# Patient Record
Sex: MALE | Race: WHITE | Employment: OTHER | URBAN - METROPOLITAN AREA
[De-identification: names, ages, dates, MRNs, and addresses within clinical notes are randomized per-mention and may not be internally consistent; named-entity substitution may affect disease eponyms.]

---

## 2023-03-20 LAB
PROSTATE SPECIFIC AG (NG/ML) IN SER/PLAS: 1.2 NG/ML (ref 0–4)
PROSTATE SPECIFIC AG FREE (NG/ML) IN SER/PLAS: 0.2 NG/ML
PROSTATE SPECIFIC AG FREE/PROSTATE SPECIFIC AG TOTAL IN SER/PLAS: 17 %

## 2023-04-10 DIAGNOSIS — K25.9 ESOPHAGOGASTRIC ULCER, UNSPECIFIED ULCER CHRONICITY: ICD-10-CM

## 2023-04-10 RX ORDER — FUROSEMIDE 20 MG/1
1 TABLET ORAL DAILY
COMMUNITY
Start: 2021-11-15 | End: 2023-11-16 | Stop reason: SDUPTHER

## 2023-04-10 RX ORDER — CHOLECALCIFEROL (VITAMIN D3) 25 MCG
1 TABLET ORAL DAILY
COMMUNITY
Start: 2021-11-15

## 2023-04-10 RX ORDER — PANTOPRAZOLE SODIUM 40 MG/1
40 TABLET, DELAYED RELEASE ORAL DAILY
Qty: 30 TABLET | Refills: 11 | Status: SHIPPED | OUTPATIENT
Start: 2023-04-10 | End: 2024-03-15

## 2023-04-10 RX ORDER — BICALUTAMIDE 50 MG/1
1 TABLET, FILM COATED ORAL DAILY
COMMUNITY
Start: 2020-07-01

## 2023-04-10 RX ORDER — SPIRONOLACTONE 50 MG/1
1 TABLET, FILM COATED ORAL DAILY
COMMUNITY
Start: 2021-12-20 | End: 2023-06-16 | Stop reason: SDUPTHER

## 2023-04-10 RX ORDER — CALCIUM CARBONATE 200(500)MG
1 TABLET,CHEWABLE ORAL
COMMUNITY
Start: 2021-12-06 | End: 2024-03-11 | Stop reason: ALTCHOICE

## 2023-04-10 RX ORDER — NADOLOL 20 MG/1
10 TABLET ORAL DAILY
COMMUNITY
End: 2023-11-16 | Stop reason: SDUPTHER

## 2023-04-10 RX ORDER — MINERAL OIL
180 ENEMA (ML) RECTAL DAILY
COMMUNITY
Start: 2022-05-17

## 2023-04-10 RX ORDER — PANTOPRAZOLE SODIUM 40 MG/1
1 TABLET, DELAYED RELEASE ORAL DAILY
COMMUNITY
Start: 2021-11-15 | End: 2023-04-10 | Stop reason: SDUPTHER

## 2023-05-10 ENCOUNTER — TELEPHONE (OUTPATIENT)
Dept: PRIMARY CARE | Facility: CLINIC | Age: 78
End: 2023-05-10
Payer: MEDICARE

## 2023-05-10 NOTE — TELEPHONE ENCOUNTER
Pts wife called and stated that the medication lactulose's  went out of business.  Asking what brand name you suggest to go with next.

## 2023-05-22 ENCOUNTER — LAB (OUTPATIENT)
Dept: LAB | Facility: LAB | Age: 78
End: 2023-05-22
Payer: MEDICARE

## 2023-05-22 ENCOUNTER — OFFICE VISIT (OUTPATIENT)
Dept: PRIMARY CARE | Facility: CLINIC | Age: 78
End: 2023-05-22
Payer: MEDICARE

## 2023-05-22 VITALS
BODY MASS INDEX: 29.98 KG/M2 | WEIGHT: 191 LBS | OXYGEN SATURATION: 98 % | TEMPERATURE: 97.3 F | HEIGHT: 67 IN | HEART RATE: 50 BPM | DIASTOLIC BLOOD PRESSURE: 68 MMHG | SYSTOLIC BLOOD PRESSURE: 132 MMHG

## 2023-05-22 DIAGNOSIS — Z79.899 MEDICATION MANAGEMENT: ICD-10-CM

## 2023-05-22 DIAGNOSIS — J30.9 ALLERGIC RHINITIS, UNSPECIFIED SEASONALITY, UNSPECIFIED TRIGGER: ICD-10-CM

## 2023-05-22 DIAGNOSIS — R01.1 MURMUR: ICD-10-CM

## 2023-05-22 DIAGNOSIS — K21.9 GASTROESOPHAGEAL REFLUX DISEASE, UNSPECIFIED WHETHER ESOPHAGITIS PRESENT: ICD-10-CM

## 2023-05-22 DIAGNOSIS — K74.69 OTHER CIRRHOSIS OF LIVER (MULTI): ICD-10-CM

## 2023-05-22 DIAGNOSIS — R53.83 OTHER FATIGUE: ICD-10-CM

## 2023-05-22 DIAGNOSIS — Z00.00 WELL ADULT EXAM: Primary | ICD-10-CM

## 2023-05-22 LAB
ALANINE AMINOTRANSFERASE (SGPT) (U/L) IN SER/PLAS: 14 U/L (ref 10–52)
ALBUMIN (G/DL) IN SER/PLAS: 3.8 G/DL (ref 3.4–5)
ALKALINE PHOSPHATASE (U/L) IN SER/PLAS: 128 U/L (ref 33–136)
AMMONIA (UMOL/L) IN PLASMA: 52 UMOL/L
ANION GAP IN SER/PLAS: 13 MMOL/L (ref 10–20)
ASPARTATE AMINOTRANSFERASE (SGOT) (U/L) IN SER/PLAS: 26 U/L (ref 9–39)
BASOPHILS (10*3/UL) IN BLOOD BY AUTOMATED COUNT: 0.04 X10E9/L (ref 0–0.1)
BASOPHILS/100 LEUKOCYTES IN BLOOD BY AUTOMATED COUNT: 0.6 % (ref 0–2)
BILIRUBIN TOTAL (MG/DL) IN SER/PLAS: 1.7 MG/DL (ref 0–1.2)
CALCIDIOL (25 OH VITAMIN D3) (NG/ML) IN SER/PLAS: 41 NG/ML
CALCIUM (MG/DL) IN SER/PLAS: 9.4 MG/DL (ref 8.6–10.3)
CARBON DIOXIDE, TOTAL (MMOL/L) IN SER/PLAS: 24 MMOL/L (ref 21–32)
CHLORIDE (MMOL/L) IN SER/PLAS: 102 MMOL/L (ref 98–107)
CHOLESTEROL (MG/DL) IN SER/PLAS: 212 MG/DL (ref 0–199)
CHOLESTEROL IN HDL (MG/DL) IN SER/PLAS: 67.7 MG/DL
CHOLESTEROL/HDL RATIO: 3.1
COBALAMIN (VITAMIN B12) (PG/ML) IN SER/PLAS: 680 PG/ML (ref 211–911)
CREATININE (MG/DL) IN SER/PLAS: 0.95 MG/DL (ref 0.5–1.3)
EOSINOPHILS (10*3/UL) IN BLOOD BY AUTOMATED COUNT: 0.19 X10E9/L (ref 0–0.4)
EOSINOPHILS/100 LEUKOCYTES IN BLOOD BY AUTOMATED COUNT: 2.8 % (ref 0–6)
ERYTHROCYTE DISTRIBUTION WIDTH (RATIO) BY AUTOMATED COUNT: 16.7 % (ref 11.5–14.5)
ERYTHROCYTE MEAN CORPUSCULAR HEMOGLOBIN CONCENTRATION (G/DL) BY AUTOMATED: 28.4 G/DL (ref 32–36)
ERYTHROCYTE MEAN CORPUSCULAR VOLUME (FL) BY AUTOMATED COUNT: 79 FL (ref 80–100)
ERYTHROCYTES (10*6/UL) IN BLOOD BY AUTOMATED COUNT: 3.75 X10E12/L (ref 4.5–5.9)
ESTIMATED AVERAGE GLUCOSE FOR HBA1C: 97 MG/DL
GFR MALE: 83 ML/MIN/1.73M2
GLUCOSE (MG/DL) IN SER/PLAS: 127 MG/DL (ref 74–99)
HEMATOCRIT (%) IN BLOOD BY AUTOMATED COUNT: 29.6 % (ref 41–52)
HEMOGLOBIN (G/DL) IN BLOOD: 8.4 G/DL (ref 13.5–17.5)
HEMOGLOBIN A1C/HEMOGLOBIN TOTAL IN BLOOD: 5 %
IMMATURE GRANULOCYTES/100 LEUKOCYTES IN BLOOD BY AUTOMATED COUNT: 0.3 % (ref 0–0.9)
INR IN PPP BY COAGULATION ASSAY: 1.2 (ref 0.9–1.1)
IRON (UG/DL) IN SER/PLAS: 32 UG/DL (ref 35–150)
IRON BINDING CAPACITY (UG/DL) IN SER/PLAS: 411 UG/DL (ref 240–445)
IRON SATURATION (%) IN SER/PLAS: 8 % (ref 25–45)
LDL: 131 MG/DL (ref 0–99)
LEUKOCYTES (10*3/UL) IN BLOOD BY AUTOMATED COUNT: 6.7 X10E9/L (ref 4.4–11.3)
LYMPHOCYTES (10*3/UL) IN BLOOD BY AUTOMATED COUNT: 1.54 X10E9/L (ref 0.8–3)
LYMPHOCYTES/100 LEUKOCYTES IN BLOOD BY AUTOMATED COUNT: 23.1 % (ref 13–44)
MONOCYTES (10*3/UL) IN BLOOD BY AUTOMATED COUNT: 0.82 X10E9/L (ref 0.05–0.8)
MONOCYTES/100 LEUKOCYTES IN BLOOD BY AUTOMATED COUNT: 12.3 % (ref 2–10)
NEUTROPHILS (10*3/UL) IN BLOOD BY AUTOMATED COUNT: 4.07 X10E9/L (ref 1.6–5.5)
NEUTROPHILS/100 LEUKOCYTES IN BLOOD BY AUTOMATED COUNT: 60.9 % (ref 40–80)
PLATELETS (10*3/UL) IN BLOOD AUTOMATED COUNT: 89 X10E9/L (ref 150–450)
POTASSIUM (MMOL/L) IN SER/PLAS: 4.7 MMOL/L (ref 3.5–5.3)
PROTEIN TOTAL: 6.6 G/DL (ref 6.4–8.2)
PROTHROMBIN TIME (PT) IN PPP BY COAGULATION ASSAY: 14.4 SEC (ref 9.8–13.4)
SODIUM (MMOL/L) IN SER/PLAS: 134 MMOL/L (ref 136–145)
THYROTROPIN (MIU/L) IN SER/PLAS BY DETECTION LIMIT <= 0.05 MIU/L: 2.19 MIU/L (ref 0.44–3.98)
TRIGLYCERIDE (MG/DL) IN SER/PLAS: 65 MG/DL (ref 0–149)
UREA NITROGEN (MG/DL) IN SER/PLAS: 17 MG/DL (ref 6–23)
VLDL: 13 MG/DL (ref 0–40)

## 2023-05-22 PROCEDURE — 93000 ELECTROCARDIOGRAM COMPLETE: CPT | Performed by: INTERNAL MEDICINE

## 2023-05-22 PROCEDURE — 80053 COMPREHEN METABOLIC PANEL: CPT

## 2023-05-22 PROCEDURE — 83036 HEMOGLOBIN GLYCOSYLATED A1C: CPT

## 2023-05-22 PROCEDURE — 82306 VITAMIN D 25 HYDROXY: CPT

## 2023-05-22 PROCEDURE — 1160F RVW MEDS BY RX/DR IN RCRD: CPT | Performed by: INTERNAL MEDICINE

## 2023-05-22 PROCEDURE — 83550 IRON BINDING TEST: CPT

## 2023-05-22 PROCEDURE — 85025 COMPLETE CBC W/AUTO DIFF WBC: CPT

## 2023-05-22 PROCEDURE — 82140 ASSAY OF AMMONIA: CPT

## 2023-05-22 PROCEDURE — 82607 VITAMIN B-12: CPT

## 2023-05-22 PROCEDURE — 80061 LIPID PANEL: CPT

## 2023-05-22 PROCEDURE — 99214 OFFICE O/P EST MOD 30 MIN: CPT | Performed by: INTERNAL MEDICINE

## 2023-05-22 PROCEDURE — 82728 ASSAY OF FERRITIN: CPT

## 2023-05-22 PROCEDURE — 83540 ASSAY OF IRON: CPT

## 2023-05-22 PROCEDURE — 1036F TOBACCO NON-USER: CPT | Performed by: INTERNAL MEDICINE

## 2023-05-22 PROCEDURE — 85610 PROTHROMBIN TIME: CPT

## 2023-05-22 PROCEDURE — 1157F ADVNC CARE PLAN IN RCRD: CPT | Performed by: INTERNAL MEDICINE

## 2023-05-22 PROCEDURE — G0444 DEPRESSION SCREEN ANNUAL: HCPCS | Performed by: INTERNAL MEDICINE

## 2023-05-22 PROCEDURE — 99397 PER PM REEVAL EST PAT 65+ YR: CPT | Performed by: INTERNAL MEDICINE

## 2023-05-22 PROCEDURE — G0439 PPPS, SUBSEQ VISIT: HCPCS | Performed by: INTERNAL MEDICINE

## 2023-05-22 PROCEDURE — 1159F MED LIST DOCD IN RCRD: CPT | Performed by: INTERNAL MEDICINE

## 2023-05-22 PROCEDURE — G0446 INTENS BEHAVE THER CARDIO DX: HCPCS | Performed by: INTERNAL MEDICINE

## 2023-05-22 PROCEDURE — 36415 COLL VENOUS BLD VENIPUNCTURE: CPT

## 2023-05-22 PROCEDURE — 84443 ASSAY THYROID STIM HORMONE: CPT

## 2023-05-22 RX ORDER — LACTULOSE 10 G/15ML
30 SOLUTION ORAL 3 TIMES DAILY
COMMUNITY
End: 2023-05-22 | Stop reason: SDUPTHER

## 2023-05-22 RX ORDER — ACETAMINOPHEN 325 MG/1
325 TABLET ORAL AS NEEDED
COMMUNITY

## 2023-05-22 RX ORDER — LACTULOSE 10 G/15ML
30 SOLUTION ORAL DAILY
Qty: 30 ML | Refills: 11
Start: 2023-05-22 | End: 2023-11-16 | Stop reason: SDUPTHER

## 2023-05-22 ASSESSMENT — PATIENT HEALTH QUESTIONNAIRE - PHQ9
2. FEELING DOWN, DEPRESSED OR HOPELESS: NOT AT ALL
1. LITTLE INTEREST OR PLEASURE IN DOING THINGS: NOT AT ALL
SUM OF ALL RESPONSES TO PHQ9 QUESTIONS 1 AND 2: 0

## 2023-05-22 NOTE — PROGRESS NOTES
Chief Complaint:   Medicare Wellness Exam/Comprehensive Problem Focused Follow Up and Physical Exam    HPI:  More tired lately  Few months  No cp no sob  No abd pain  No fever  Taking naps  Dr coronado gave him flomax  Gi said ok to use tylenol rarely  Bm 3 times per day   Will go to [facility] June 2  Had murmur his whole life, mccray benign     Patient Care Team:  Gisele Patel MD as PCP - General  Gisele Patel MD as PCP - [employer] PCP   Active Problem List  There is no problem list on file for this patient.        Comprehensive Medical/Surgical/Social/Family History  Past Medical History:   Diagnosis Date    Aortic ectasia, unspecified site (CMS/[facility]) 03/16/2021    Aortic dilatation    Personal history of diseases of the blood and blood-forming organs and certain disorders involving the immune mechanism 07/01/2020    History of anemia    Personal history of other medical treatment     H/O echocardiogram     Past Surgical History:   Procedure Laterality Date    COLONOSCOPY  10/22/2018    Colonoscopy    OTHER SURGICAL HISTORY  07/01/2020    Cataract surgery     Social History     Social History Narrative    Not on file     Tobacco/Alcohol/Opioid use, as well as Illicit Drug Use was screened for/reviewed and documented in Social Documentation section of the chart and medication list as appropriate    Allergies and Medications  Patient has no known allergies.  Current Outpatient Medications   Medication Instructions    acetaminophen (TYLENOL) 325 mg, oral, As needed    bicalutamide (Casodex) 50 mg tablet 1 tablet, oral, Daily    calcium carbonate (Tums) 200 mg calcium chewable tablet 1 tablet, oral, Daily RT    cholecalciferol (Vitamin D-3) 25 MCG (1000 UT) tablet 1 tablet, oral, Daily    fexofenadine (ALLEGRA) 180 mg, oral, Daily    furosemide (Lasix) 20 mg tablet 1 tablet, oral, Daily    lactulose 30 g, oral, Daily    nadolol (CORGARD) 10 mg, oral, Daily    pantoprazole (PROTONIX) 40  "mg, oral, Daily    spironolactone (Aldactone) 50 mg tablet 1 tablet, oral, Daily     Medications and Supplements  prescribed by me and other practitioners or clinical pharmacist (such as prescriptions, OTC's, herbal therapies and supplements) were reviewed and documented in the medical record.      Activities of Daily Living  In your present state of health, do you have any difficulty performing the following activities?:   Preparing food and eating?: No  Bathing yourself: No  Getting dressed: No  Using the toilet:No  Moving around from place to place: No  In the past year have you fallen or had a near fall?:No  Able to manage finances independently: Yes  Able to perform grocery shopping: No  Able to manage medications independently: Yes  Able to do housework independently: No  Patient self-assessment of health status? Good    Depression Screen  (Note: if answer to either of the following is \"Yes\", then a more complete depression screening is indicated)   Q1: Over the past two weeks, have you felt down, depressed or hopeless? No  Q2: Over the past two weeks, have you felt little interest or pleasure in doing things? No    Current exercise habits: none    Dietary issues discussed: Yes  Hearing difficulties: Yes  Safe in current home environment: Yes  Visual Acuity assessed: No  Cognitive Impairment No    Advance directives  Advanced Care Planning (including a Living Will, Healthcare POA, as well as specific end of life choices and/or directives), was discussed with the patient and/or surrogate, voluntarily, and documented in the Problem List of the medical record.   Iman is poa  Cardiac Risk Assessment  Cardiovascular risk was discussed and, if needed, lifestyle modifications recommended, including nutritional choices, exercise, and elimination of habits contributing to risk. We agreed on a plan to reduce the current cardiovascular risk based on above discussion as needed.  Aspirin use/disuse was discussed and " "documented in the Problem List of the medical record after reviewing the updated guidelines below:    Consider low dose Aspirin ( mg) use if the benefit for cardiovascular disease prevention outweighs risk for bleeding complications.   In general, low dose ASA should be considered:  In patients WITHOUT prior MI/stroke/PAD (primary prevention):   a. Age <60: Use if 10-year cardiovascular disease risk >20%, with discussion of risks and benefits with patient  b. Age 60-<70: Use if 10-year cardiovascular disease risk >20% and low bleeding (e.g., gastrointenstinal) risk, with discussion of risks and benefits with patient  c. Age >=70: Do not use    In patients WITH prior MI/stroke/PAD (secondary prevention):   Generally use unless extremely high bleeding (e.g., gastrointenstinal) risk, with discussion of risks and benefits with patient        ROS otherwise negative aside from what was mentioned above in HPI.    Gen:  no fever  HEENT:  no trouble swallowing  CV:  no dyspnea, cyanosis  Lungs:  no shortness of breath  GI:  no constipation, no blood in stool  Vascular:  no edema  Neuro:   no weakness  Skin:  no rash  MS:no joint swelling  Gu:  no urinary complaints  All other systems have been reviewed and are negative for complaint    Vitals  /68   Pulse 50   Temp 36.3 °C (97.3 °F) (Temporal)   Ht 1.702 m (5' 7\")   Wt 86.6 kg (191 lb)   SpO2 98%   BMI 29.91 kg/m²   Body mass index is 29.91 kg/m².  Objective   Physical Exam  General Appearance:  Alert and oriented.  NAD  HEENT:  Tm's normal , throat clear, no erythema  Lungs, CTAB  Skin:  no suspicious lesions,  warm and dry  Head :  Normocephalic  Oral Cavity; Clear mucosa moist  Neck/thyroid:  neck supple, full rom, no cervical lymphadenopathy  no thyromegaly  Heart:  RRR  2/6 murmur  Abdomen:  Normal , bs present, soft, nontender, not distended, no masses palpated  Extremities:  No clubbing, cyanosis, or edema  Neurologic:  Nonfocal  Psych: alert, normal " mood    During the course of the visit the patient was educated and counseled about age appropriate screening and preventive services. Completed preventive screenings were documented in the chart and orders were placed for outstanding screenings/procedures as documented in the Assessment and Plan.    Patient Instructions (the written plan) was given to the patient at check out.    Alfonso was seen today for medicare annual wellness visit subsequent.  Diagnoses and all orders for this visit:  Well adult exam (Primary)  -     CT cardiac scoring wo IV contrast; Future  Other cirrhosis of liver (CMS/HCC)  Comments:  stable, fu at least yearly  Orders:  -     Hemoglobin A1C; Future  -     Comprehensive Metabolic Panel; Future  -     TSH with reflex to Free T4 if abnormal; Future  -     Vitamin D, Total; Future  -     Vitamin B12; Future  -     Lipid Panel; Future  -     CBC and Auto Differential; Future  -     Iron and TIBC; Future  -     Ferritin; Future  -     ECG 12 lead  -     Protime-INR; Future  -     Ammonia; Future  -     lactulose 20 gram/30 mL oral solution; Take 45 mL (30 g) by mouth once daily.  Gastroesophageal reflux disease, unspecified whether esophagitis present  -     Hemoglobin A1C; Future  -     Comprehensive Metabolic Panel; Future  -     TSH with reflex to Free T4 if abnormal; Future  -     Vitamin D, Total; Future  -     Vitamin B12; Future  -     Lipid Panel; Future  -     CBC and Auto Differential; Future  -     Iron and TIBC; Future  -     Ferritin; Future  -     ECG 12 lead  -     Protime-INR; Future  -     Ammonia; Future  Medication management  -     Hemoglobin A1C; Future  -     Comprehensive Metabolic Panel; Future  -     TSH with reflex to Free T4 if abnormal; Future  -     Vitamin D, Total; Future  -     Vitamin B12; Future  -     Lipid Panel; Future  -     CBC and Auto Differential; Future  -     Iron and TIBC; Future  -     Ferritin; Future  -     ECG 12 lead  -     Protime-INR; Future  -      Ammonia; Future  Allergic rhinitis, unspecified seasonality, unspecified trigger  -     Hemoglobin A1C; Future  -     Comprehensive Metabolic Panel; Future  -     TSH with reflex to Free T4 if abnormal; Future  -     Vitamin D, Total; Future  -     Vitamin B12; Future  -     Lipid Panel; Future  -     CBC and Auto Differential; Future  -     Iron and TIBC; Future  -     Ferritin; Future  -     ECG 12 lead  -     Protime-INR; Future  -     Ammonia; Future  Murmur  Comments:  benign  Other fatigue  Comments:  discussed   likely due to chronic medical conditions  mccray as ordered  Orders:  -     Hemoglobin A1C; Future  -     Comprehensive Metabolic Panel; Future  -     TSH with reflex to Free T4 if abnormal; Future  -     Vitamin D, Total; Future  -     Vitamin B12; Future  -     Lipid Panel; Future  -     CBC and Auto Differential; Future  -     Iron and TIBC; Future  -     Ferritin; Future  -     ECG 12 lead  -     Protime-INR; Future  -     Ammonia; Future  -     CT cardiac scoring wo IV contrast; Future      Chronic conditions reviewed in the assessment and plan.    Continue medications unless specified otherwise.  Previous labs reviewed.   Other specialty provider notes reviewed.     Lifetime ASCVD Risk:  N/A    Current 10-Year ASCVD Risk:  20.44% - High Risk    Composite Updated 10-Year ASCVD Risk:  N/A    Current 10-Year ASCVD Risk Had No Treatment Been Initiated:  N/A    Previous 10-Year ASCVD Risk:  N/A    Optimal 10-Year ASCVD Risk:  19.87%      Risk Reference Range   Low Risk  < 5%    Borderline Risk  >= 5% < 7.5%    Intermediate Risk  >= 7.5% < 20%    High Risk  >= 20%        Alfonso was seen today for medicare annual wellness visit subsequent.  Diagnoses and all orders for this visit:  Well adult exam (Primary)  -     CT cardiac scoring wo IV contrast; Future  Other cirrhosis of liver (CMS/HCC)  Comments:  stable, fu at least yearly  Orders:  -     Hemoglobin A1C; Future  -     Comprehensive Metabolic Panel;  Future  -     TSH with reflex to Free T4 if abnormal; Future  -     Vitamin D, Total; Future  -     Vitamin B12; Future  -     Lipid Panel; Future  -     CBC and Auto Differential; Future  -     Iron and TIBC; Future  -     Ferritin; Future  -     ECG 12 lead  -     Protime-INR; Future  -     Ammonia; Future  -     lactulose 20 gram/30 mL oral solution; Take 45 mL (30 g) by mouth once daily.  Gastroesophageal reflux disease, unspecified whether esophagitis present  -     Hemoglobin A1C; Future  -     Comprehensive Metabolic Panel; Future  -     TSH with reflex to Free T4 if abnormal; Future  -     Vitamin D, Total; Future  -     Vitamin B12; Future  -     Lipid Panel; Future  -     CBC and Auto Differential; Future  -     Iron and TIBC; Future  -     Ferritin; Future  -     ECG 12 lead  -     Protime-INR; Future  -     Ammonia; Future  Medication management  -     Hemoglobin A1C; Future  -     Comprehensive Metabolic Panel; Future  -     TSH with reflex to Free T4 if abnormal; Future  -     Vitamin D, Total; Future  -     Vitamin B12; Future  -     Lipid Panel; Future  -     CBC and Auto Differential; Future  -     Iron and TIBC; Future  -     Ferritin; Future  -     ECG 12 lead  -     Protime-INR; Future  -     Ammonia; Future  Allergic rhinitis, unspecified seasonality, unspecified trigger  -     Hemoglobin A1C; Future  -     Comprehensive Metabolic Panel; Future  -     TSH with reflex to Free T4 if abnormal; Future  -     Vitamin D, Total; Future  -     Vitamin B12; Future  -     Lipid Panel; Future  -     CBC and Auto Differential; Future  -     Iron and TIBC; Future  -     Ferritin; Future  -     ECG 12 lead  -     Protime-INR; Future  -     Ammonia; Future  Murmur  Comments:  benign  Other fatigue  Comments:  discussed   likely due to chronic medical conditions  mccray as ordered  Orders:  -     Hemoglobin A1C; Future  -     Comprehensive Metabolic Panel; Future  -     TSH with reflex to Free T4 if abnormal;  Future  -     Vitamin D, Total; Future  -     Vitamin B12; Future  -     Lipid Panel; Future  -     CBC and Auto Differential; Future  -     Iron and TIBC; Future  -     Ferritin; Future  -     ECG 12 lead  -     Protime-INR; Future  -     Ammonia; Future  -     CT cardiac scoring wo IV contrast; Future        Annual Wellness exam completed   Preventive Health history reviewed:  Vaccines today: none  Labs ordered    Depression Screening done  Advanced Directives Discussion Completed  Cardiovascular risk discussed and if needed, lifestyle modifications recommended, including nutritional choices, exercise, and elimination of habits contributing to risk.  We agreed on a plan to reduce the current cardiovascular risk.  See ecalc ASCVD Risk  Plus for data discussed regarding risk and risk reduction opportunities.  Aspirin use/disuse was discussed after reviewing the updated guidelines.

## 2023-05-23 DIAGNOSIS — D50.9 IRON DEFICIENCY ANEMIA, UNSPECIFIED IRON DEFICIENCY ANEMIA TYPE: Primary | ICD-10-CM

## 2023-05-23 LAB — FERRITIN (UG/LL) IN SER/PLAS: 20 UG/L (ref 20–300)

## 2023-05-23 RX ORDER — FERROUS SULFATE 325(65) MG
65 TABLET, DELAYED RELEASE (ENTERIC COATED) ORAL
Qty: 60 TABLET | Refills: 11
Start: 2023-05-23 | End: 2023-11-28 | Stop reason: ALTCHOICE

## 2023-06-12 ENCOUNTER — LAB (OUTPATIENT)
Dept: LAB | Facility: LAB | Age: 78
End: 2023-06-12
Payer: MEDICARE

## 2023-06-12 DIAGNOSIS — D50.9 IRON DEFICIENCY ANEMIA, UNSPECIFIED IRON DEFICIENCY ANEMIA TYPE: ICD-10-CM

## 2023-06-12 LAB
BASOPHILS (10*3/UL) IN BLOOD BY AUTOMATED COUNT: 0.05 X10E9/L (ref 0–0.1)
BASOPHILS/100 LEUKOCYTES IN BLOOD BY AUTOMATED COUNT: 0.8 % (ref 0–2)
EOSINOPHILS (10*3/UL) IN BLOOD BY AUTOMATED COUNT: 0.23 X10E9/L (ref 0–0.4)
EOSINOPHILS/100 LEUKOCYTES IN BLOOD BY AUTOMATED COUNT: 3.6 % (ref 0–6)
ERYTHROCYTE DISTRIBUTION WIDTH (RATIO) BY AUTOMATED COUNT: 24.9 % (ref 11.5–14.5)
ERYTHROCYTE MEAN CORPUSCULAR HEMOGLOBIN CONCENTRATION (G/DL) BY AUTOMATED: 30.7 G/DL (ref 32–36)
ERYTHROCYTE MEAN CORPUSCULAR VOLUME (FL) BY AUTOMATED COUNT: 86 FL (ref 80–100)
ERYTHROCYTES (10*6/UL) IN BLOOD BY AUTOMATED COUNT: 3.91 X10E12/L (ref 4.5–5.9)
HEMATOCRIT (%) IN BLOOD BY AUTOMATED COUNT: 33.5 % (ref 41–52)
HEMOGLOBIN (G/DL) IN BLOOD: 10.3 G/DL (ref 13.5–17.5)
IMMATURE GRANULOCYTES/100 LEUKOCYTES IN BLOOD BY AUTOMATED COUNT: 0.3 % (ref 0–0.9)
LEUKOCYTES (10*3/UL) IN BLOOD BY AUTOMATED COUNT: 6.4 X10E9/L (ref 4.4–11.3)
LYMPHOCYTES (10*3/UL) IN BLOOD BY AUTOMATED COUNT: 1.47 X10E9/L (ref 0.8–3)
LYMPHOCYTES/100 LEUKOCYTES IN BLOOD BY AUTOMATED COUNT: 23.1 % (ref 13–44)
MONOCYTES (10*3/UL) IN BLOOD BY AUTOMATED COUNT: 0.88 X10E9/L (ref 0.05–0.8)
MONOCYTES/100 LEUKOCYTES IN BLOOD BY AUTOMATED COUNT: 13.8 % (ref 2–10)
NEUTROPHILS (10*3/UL) IN BLOOD BY AUTOMATED COUNT: 3.72 X10E9/L (ref 1.6–5.5)
NEUTROPHILS/100 LEUKOCYTES IN BLOOD BY AUTOMATED COUNT: 58.4 % (ref 40–80)
OVALOCYTES PRESENCE IN BLOOD BY LIGHT MICROSCOPY: NORMAL
PLATELETS (10*3/UL) IN BLOOD AUTOMATED COUNT: 73 X10E9/L (ref 150–450)
RBC MORPHOLOGY IN BLOOD: NORMAL

## 2023-06-12 PROCEDURE — 36415 COLL VENOUS BLD VENIPUNCTURE: CPT

## 2023-06-12 PROCEDURE — 85025 COMPLETE CBC W/AUTO DIFF WBC: CPT

## 2023-06-16 DIAGNOSIS — K74.69 OTHER CIRRHOSIS OF LIVER (MULTI): ICD-10-CM

## 2023-06-16 RX ORDER — SPIRONOLACTONE 50 MG/1
50 TABLET, FILM COATED ORAL DAILY
Qty: 90 TABLET | Refills: 3 | Status: SHIPPED | OUTPATIENT
Start: 2023-06-16 | End: 2024-05-15

## 2023-07-24 DIAGNOSIS — D64.9 ANEMIA, UNSPECIFIED TYPE: Primary | ICD-10-CM

## 2023-07-27 ENCOUNTER — LAB (OUTPATIENT)
Dept: LAB | Facility: LAB | Age: 78
End: 2023-07-27
Payer: MEDICARE

## 2023-07-27 DIAGNOSIS — D64.9 ANEMIA, UNSPECIFIED TYPE: ICD-10-CM

## 2023-07-27 DIAGNOSIS — K74.69 OTHER CIRRHOSIS OF LIVER (MULTI): ICD-10-CM

## 2023-07-27 DIAGNOSIS — D50.9 IRON DEFICIENCY ANEMIA, UNSPECIFIED IRON DEFICIENCY ANEMIA TYPE: ICD-10-CM

## 2023-07-27 LAB
ALANINE AMINOTRANSFERASE (SGPT) (U/L) IN SER/PLAS: 17 U/L (ref 10–52)
ALBUMIN (G/DL) IN SER/PLAS: 3.2 G/DL (ref 3.4–5)
ALKALINE PHOSPHATASE (U/L) IN SER/PLAS: 144 U/L (ref 33–136)
ANION GAP IN SER/PLAS: 13 MMOL/L (ref 10–20)
ASPARTATE AMINOTRANSFERASE (SGOT) (U/L) IN SER/PLAS: 32 U/L (ref 9–39)
BASOPHILS (10*3/UL) IN BLOOD BY AUTOMATED COUNT: 0.03 X10E9/L (ref 0–0.1)
BASOPHILS/100 LEUKOCYTES IN BLOOD BY AUTOMATED COUNT: 0.4 % (ref 0–2)
BILIRUBIN TOTAL (MG/DL) IN SER/PLAS: 3.3 MG/DL (ref 0–1.2)
CALCIUM (MG/DL) IN SER/PLAS: 8.7 MG/DL (ref 8.6–10.3)
CARBON DIOXIDE, TOTAL (MMOL/L) IN SER/PLAS: 23 MMOL/L (ref 21–32)
CHLORIDE (MMOL/L) IN SER/PLAS: 103 MMOL/L (ref 98–107)
CREATININE (MG/DL) IN SER/PLAS: 0.84 MG/DL (ref 0.5–1.3)
EOSINOPHILS (10*3/UL) IN BLOOD BY AUTOMATED COUNT: 0.23 X10E9/L (ref 0–0.4)
EOSINOPHILS/100 LEUKOCYTES IN BLOOD BY AUTOMATED COUNT: 3.1 % (ref 0–6)
ERYTHROCYTE DISTRIBUTION WIDTH (RATIO) BY AUTOMATED COUNT: 17 % (ref 11.5–14.5)
ERYTHROCYTE MEAN CORPUSCULAR HEMOGLOBIN CONCENTRATION (G/DL) BY AUTOMATED: 34.2 G/DL (ref 32–36)
ERYTHROCYTE MEAN CORPUSCULAR VOLUME (FL) BY AUTOMATED COUNT: 96 FL (ref 80–100)
ERYTHROCYTES (10*6/UL) IN BLOOD BY AUTOMATED COUNT: 3.57 X10E12/L (ref 4.5–5.9)
FERRITIN (UG/LL) IN SER/PLAS: 175 UG/L (ref 20–300)
GFR MALE: 90 ML/MIN/1.73M2
GLUCOSE (MG/DL) IN SER/PLAS: 97 MG/DL (ref 74–99)
HEMATOCRIT (%) IN BLOOD BY AUTOMATED COUNT: 34.2 % (ref 41–52)
HEMOGLOBIN (G/DL) IN BLOOD: 11.7 G/DL (ref 13.5–17.5)
IMMATURE GRANULOCYTES/100 LEUKOCYTES IN BLOOD BY AUTOMATED COUNT: 0.3 % (ref 0–0.9)
IRON (UG/DL) IN SER/PLAS: 206 UG/DL (ref 35–150)
IRON BINDING CAPACITY (UG/DL) IN SER/PLAS: <261 UG/DL (ref 240–445)
IRON SATURATION (%) IN SER/PLAS: ABNORMAL % (ref 25–45)
LEUKOCYTES (10*3/UL) IN BLOOD BY AUTOMATED COUNT: 7.3 X10E9/L (ref 4.4–11.3)
LYMPHOCYTES (10*3/UL) IN BLOOD BY AUTOMATED COUNT: 1.65 X10E9/L (ref 0.8–3)
LYMPHOCYTES/100 LEUKOCYTES IN BLOOD BY AUTOMATED COUNT: 22.6 % (ref 13–44)
MONOCYTES (10*3/UL) IN BLOOD BY AUTOMATED COUNT: 1.01 X10E9/L (ref 0.05–0.8)
MONOCYTES/100 LEUKOCYTES IN BLOOD BY AUTOMATED COUNT: 13.8 % (ref 2–10)
NEUTROPHILS (10*3/UL) IN BLOOD BY AUTOMATED COUNT: 4.37 X10E9/L (ref 1.6–5.5)
NEUTROPHILS/100 LEUKOCYTES IN BLOOD BY AUTOMATED COUNT: 59.8 % (ref 40–80)
OVALOCYTES PRESENCE IN BLOOD BY LIGHT MICROSCOPY: NORMAL
PLATELETS (10*3/UL) IN BLOOD AUTOMATED COUNT: 75 X10E9/L (ref 150–450)
POTASSIUM (MMOL/L) IN SER/PLAS: 4.3 MMOL/L (ref 3.5–5.3)
PROTEIN TOTAL: 5.7 G/DL (ref 6.4–8.2)
RBC MORPHOLOGY IN BLOOD: NORMAL
SODIUM (MMOL/L) IN SER/PLAS: 135 MMOL/L (ref 136–145)
UREA NITROGEN (MG/DL) IN SER/PLAS: 10 MG/DL (ref 6–23)

## 2023-07-27 PROCEDURE — 82728 ASSAY OF FERRITIN: CPT

## 2023-07-27 PROCEDURE — 80053 COMPREHEN METABOLIC PANEL: CPT

## 2023-07-27 PROCEDURE — 83540 ASSAY OF IRON: CPT

## 2023-07-27 PROCEDURE — 83550 IRON BINDING TEST: CPT

## 2023-07-27 PROCEDURE — 85025 COMPLETE CBC W/AUTO DIFF WBC: CPT

## 2023-07-27 PROCEDURE — 36415 COLL VENOUS BLD VENIPUNCTURE: CPT

## 2023-08-11 DIAGNOSIS — K74.69 OTHER CIRRHOSIS OF LIVER (MULTI): Primary | ICD-10-CM

## 2023-09-09 LAB
PROSTATE SPECIFIC AG (NG/ML) IN SER/PLAS: 1.6 NG/ML (ref 0–4)
PROSTATE SPECIFIC AG FREE (NG/ML) IN SER/PLAS: 0.2 NG/ML
PROSTATE SPECIFIC AG FREE/PROSTATE SPECIFIC AG TOTAL IN SER/PLAS: 12 %

## 2023-09-20 LAB
AMORPHOUS CRYSTALS, URINE: NORMAL /HPF
APPEARANCE, URINE: ABNORMAL
APPEARANCE, URINE: NORMAL
ASCORBIC ACID: NORMAL MG/DL
BACTERIA, URINE: ABNORMAL /HPF
BACTERIA, URINE: NORMAL /HPF
BILIRUBIN, URINE: NEGATIVE
BILIRUBIN, URINE: NORMAL
BLOOD, URINE: ABNORMAL
BLOOD, URINE: NORMAL
BROAD CASTS, URINE: NORMAL /LPF
BUDDING YEAST, URINE: NORMAL /HPF
CALCIUM CARBONATE CRYSTALS, URINE: NORMAL /HPF
CALCIUM OXALATE CRYSTALS, URINE: NORMAL /HPF
CALCIUM PHOSPHATE CRYSTALS, URINE: NORMAL /HPF
COLOR, URINE: NORMAL
COLOR, URINE: YELLOW
CYSTINE CRYSTALS, URINE: NORMAL /HPF
EPITHELIAL CASTS, URINE: NORMAL /LPF
FAT, URINE: NORMAL /HPF
FATTY CASTS, URINE: NORMAL /LPF
FINE GRANULAR CASTS, URINE: NORMAL /LPF
GLUCOSE, URINE: NEGATIVE MG/DL
GLUCOSE, URINE: NORMAL
HYALINE CASTS, URINE: ABNORMAL /LPF
HYALINE CASTS, URINE: NORMAL /LPF
KETONES, URINE: NEGATIVE MG/DL
KETONES, URINE: NORMAL
LEUCINE CRYSTALS, URINE: NORMAL /HPF
LEUKOCYTE ESTERASE, URINE: ABNORMAL
LEUKOCYTE ESTERASE, URINE: NORMAL
MIXED CELLULAR CASTS, URINE: NORMAL /LPF
MUCUS, URINE: NORMAL /LPF
NITRITE, URINE: NORMAL
NITRITE, URINE: POSITIVE
OVAL FAT BODIES, URINE: NORMAL /HPF
PH, URINE: 5 (ref 5–8)
PH, URINE: NORMAL
PROTEIN, URINE: NEGATIVE MG/DL
PROTEIN, URINE: NORMAL
RBC CASTS, URINE: NORMAL /LPF
RBC CLUMPS, URINE: NORMAL /HPF
RBC, URINE: ABNORMAL /HPF (ref 0–5)
RBC, URINE: NORMAL
RENAL EPITHELIAL CELLS, URINE: NORMAL /HPF
SPECIFIC GRAVITY, URINE: 1.01 (ref 1–1.03)
SPECIFIC GRAVITY, URINE: NORMAL
SPERMATOZOA, URINE: NORMAL /HPF
SQUAMOUS EPITHELIAL CELLS, URINE: <1 /HPF
SQUAMOUS EPITHELIAL CELLS, URINE: NORMAL /HPF
TRANSITIONAL EPITHELIAL CELLS, URINE: NORMAL /HPF
TRICHOMONAS, URINE: NORMAL /HPF
TRIPLE PHOSPHATE CRYSTALS, URINE: NORMAL /HPF
TYROSINE CRYSTALS, URINE: NORMAL /HPF
URIC ACID (URATE) CRYSTALS, URINE: NORMAL /HPF
URINALYSIS MICROSCOPIC COMMENT: NORMAL
URINE CULTURE: NORMAL
UROBILINOGEN, URINE: <2 MG/DL (ref 0–1.9)
UROBILINOGEN, URINE: NORMAL
WAXY CASTS, URINE: NORMAL /LPF
WBC CASTS, URINE: NORMAL /LPF
WBC CLUMPS, URINE: NORMAL /HPF
WBC, URINE: ABNORMAL /HPF (ref 0–5)
WBC, URINE: NORMAL
YEAST HYPHAE, URINE: NORMAL /HPF

## 2023-09-22 LAB — URINE CULTURE: ABNORMAL

## 2023-11-06 ENCOUNTER — CLINICAL SUPPORT (OUTPATIENT)
Dept: PRIMARY CARE | Facility: CLINIC | Age: 78
End: 2023-11-06
Payer: MEDICARE

## 2023-11-06 PROCEDURE — 90662 IIV NO PRSV INCREASED AG IM: CPT | Performed by: INTERNAL MEDICINE

## 2023-11-06 PROCEDURE — G0008 ADMIN INFLUENZA VIRUS VAC: HCPCS | Performed by: INTERNAL MEDICINE

## 2023-11-16 DIAGNOSIS — K74.69 OTHER CIRRHOSIS OF LIVER (MULTI): ICD-10-CM

## 2023-11-16 RX ORDER — FUROSEMIDE 20 MG/1
20 TABLET ORAL DAILY
Qty: 30 TABLET | Refills: 11 | Status: SHIPPED | OUTPATIENT
Start: 2023-11-16

## 2023-11-16 RX ORDER — NADOLOL 20 MG/1
10 TABLET ORAL DAILY
Qty: 30 TABLET | Refills: 6 | Status: SHIPPED | OUTPATIENT
Start: 2023-11-16

## 2023-11-16 RX ORDER — LACTULOSE 10 G/15ML
30 SOLUTION ORAL DAILY
Qty: 237 ML | Refills: 11 | Status: SHIPPED | OUTPATIENT
Start: 2023-11-16 | End: 2023-11-30 | Stop reason: SDUPTHER

## 2023-11-16 NOTE — TELEPHONE ENCOUNTER
Constulose 30mg   Nadolol 20mg    Furosemide 20mg    Patient wife calling stating she left 2 voicemail's for patient for med refills and she messed up on one of the voicemail's and she didn't want to leave another one and confuse anyone. Patient needs these scripts called in to the [facility] . If any questions call patients wife

## 2023-11-28 ENCOUNTER — OFFICE VISIT (OUTPATIENT)
Dept: PRIMARY CARE | Facility: CLINIC | Age: 78
End: 2023-11-28
Payer: MEDICARE

## 2023-11-28 ENCOUNTER — LAB (OUTPATIENT)
Dept: LAB | Facility: LAB | Age: 78
End: 2023-11-28
Payer: MEDICARE

## 2023-11-28 VITALS
DIASTOLIC BLOOD PRESSURE: 65 MMHG | SYSTOLIC BLOOD PRESSURE: 112 MMHG | WEIGHT: 180 LBS | TEMPERATURE: 97.7 F | HEART RATE: 67 BPM | OXYGEN SATURATION: 97 % | BODY MASS INDEX: 28.19 KG/M2

## 2023-11-28 DIAGNOSIS — E55.9 VITAMIN D DEFICIENCY: ICD-10-CM

## 2023-11-28 DIAGNOSIS — E78.2 MIXED HYPERLIPIDEMIA: ICD-10-CM

## 2023-11-28 DIAGNOSIS — R73.9 HYPERGLYCEMIA: ICD-10-CM

## 2023-11-28 DIAGNOSIS — K74.69 OTHER CIRRHOSIS OF LIVER (MULTI): ICD-10-CM

## 2023-11-28 DIAGNOSIS — K74.69 OTHER CIRRHOSIS OF LIVER (MULTI): Primary | ICD-10-CM

## 2023-11-28 PROBLEM — Z98.890 HISTORY OF LASER IRIDOTOMY: Status: ACTIVE | Noted: 2021-06-25

## 2023-11-28 PROBLEM — R09.82 PND (POST-NASAL DRIP): Status: ACTIVE | Noted: 2023-11-28

## 2023-11-28 PROBLEM — F41.9 ANXIETY: Status: ACTIVE | Noted: 2023-11-28

## 2023-11-28 PROBLEM — K62.5 RECTAL HEMORRHAGE: Status: ACTIVE | Noted: 2023-11-28

## 2023-11-28 PROBLEM — C61 MALIGNANT NEOPLASM OF PROSTATE (MULTI): Status: ACTIVE | Noted: 2018-10-10

## 2023-11-28 PROBLEM — D62 ACUTE BLOOD LOSS ANEMIA: Status: ACTIVE | Noted: 2021-10-29

## 2023-11-28 PROBLEM — E44.0 MALNUTRITION OF MODERATE DEGREE (MULTI): Status: ACTIVE | Noted: 2021-11-03

## 2023-11-28 PROBLEM — H61.23 BILATERAL IMPACTED CERUMEN: Status: ACTIVE | Noted: 2023-11-28

## 2023-11-28 PROBLEM — E53.8 COBALAMIN DEFICIENCY: Status: ACTIVE | Noted: 2021-06-04

## 2023-11-28 PROBLEM — K74.60 HEPATIC CIRRHOSIS (MULTI): Status: ACTIVE | Noted: 2019-12-03

## 2023-11-28 PROBLEM — D50.8 IRON DEFICIENCY ANEMIA SECONDARY TO INADEQUATE DIETARY IRON INTAKE: Status: ACTIVE | Noted: 2023-11-28

## 2023-11-28 PROBLEM — E83.51 HYPOCALCEMIA: Status: ACTIVE | Noted: 2023-11-28

## 2023-11-28 PROBLEM — H00.019 HORDEOLUM EXTERNUM: Status: ACTIVE | Noted: 2023-11-28

## 2023-11-28 PROBLEM — K63.5 COLON POLYPS: Status: ACTIVE | Noted: 2023-11-28

## 2023-11-28 PROBLEM — H61.21 EXCESSIVE CERUMEN IN EAR CANAL, RIGHT: Status: ACTIVE | Noted: 2023-11-28

## 2023-11-28 PROBLEM — M54.9 BACK PAIN: Status: ACTIVE | Noted: 2023-11-28

## 2023-11-28 PROBLEM — I10 ESSENTIAL HYPERTENSION: Status: ACTIVE | Noted: 2017-10-13

## 2023-11-28 PROBLEM — R79.89 ABNORMAL CBC: Status: ACTIVE | Noted: 2023-11-28

## 2023-11-28 PROBLEM — C22.9: Status: ACTIVE | Noted: 2023-11-28

## 2023-11-28 PROBLEM — I85.00 ESOPHAGEAL VARICES (MULTI): Status: ACTIVE | Noted: 2019-12-03

## 2023-11-28 PROBLEM — R52 PAIN: Status: ACTIVE | Noted: 2023-11-28

## 2023-11-28 PROBLEM — D69.6 THROMBOCYTOPENIA (CMS-HCC): Status: ACTIVE | Noted: 2017-10-14

## 2023-11-28 PROBLEM — J34.89 SINUS PRESSURE: Status: ACTIVE | Noted: 2023-11-28

## 2023-11-28 PROBLEM — D72.829 LEUKOCYTOSIS: Status: ACTIVE | Noted: 2017-10-13

## 2023-11-28 PROBLEM — D64.9 ANEMIA: Status: ACTIVE | Noted: 2021-06-04

## 2023-11-28 PROBLEM — K76.6 PORTAL HYPERTENSION (MULTI): Status: ACTIVE | Noted: 2021-11-07

## 2023-11-28 PROBLEM — Z96.1 PSEUDOPHAKIA: Status: ACTIVE | Noted: 2021-06-25

## 2023-11-28 PROBLEM — K25.9 GASTRIC ULCER: Status: ACTIVE | Noted: 2023-11-28

## 2023-11-28 PROBLEM — R01.1 HEART MURMUR: Status: ACTIVE | Noted: 2023-11-28

## 2023-11-28 PROBLEM — J30.1 ALLERGIC RHINITIS DUE TO POLLEN: Status: ACTIVE | Noted: 2023-11-28

## 2023-11-28 PROBLEM — H40.033 ANATOMICAL NARROW ANGLE, BILATERAL: Status: ACTIVE | Noted: 2021-06-25

## 2023-11-28 PROBLEM — M79.671 RIGHT FOOT PAIN: Status: ACTIVE | Noted: 2023-11-28

## 2023-11-28 PROBLEM — S99.929A INJURY OF TOE: Status: ACTIVE | Noted: 2023-11-28

## 2023-11-28 PROBLEM — K62.5 BRBPR (BRIGHT RED BLOOD PER RECTUM): Status: ACTIVE | Noted: 2021-10-24

## 2023-11-28 LAB
25(OH)D3 SERPL-MCNC: 56 NG/ML (ref 30–100)
ALBUMIN SERPL BCP-MCNC: 3.5 G/DL (ref 3.4–5)
ALP SERPL-CCNC: 142 U/L (ref 33–136)
ALT SERPL W P-5'-P-CCNC: 18 U/L (ref 10–52)
AMMONIA PLAS-SCNC: 65 UMOL/L (ref 16–53)
ANION GAP SERPL CALC-SCNC: 14 MMOL/L (ref 10–20)
AST SERPL W P-5'-P-CCNC: 34 U/L (ref 9–39)
BASOPHILS # BLD AUTO: 0.06 X10*3/UL (ref 0–0.1)
BASOPHILS NFR BLD AUTO: 0.8 %
BILIRUB DIRECT SERPL-MCNC: 0.6 MG/DL (ref 0–0.3)
BILIRUB SERPL-MCNC: 2.3 MG/DL (ref 0–1.2)
BUN SERPL-MCNC: 16 MG/DL (ref 6–23)
CALCIUM SERPL-MCNC: 9.1 MG/DL (ref 8.6–10.3)
CHLORIDE SERPL-SCNC: 100 MMOL/L (ref 98–107)
CHOLEST SERPL-MCNC: 231 MG/DL (ref 0–199)
CHOLESTEROL/HDL RATIO: 3
CO2 SERPL-SCNC: 27 MMOL/L (ref 21–32)
CREAT SERPL-MCNC: 0.93 MG/DL (ref 0.5–1.3)
EOSINOPHIL # BLD AUTO: 0.21 X10*3/UL (ref 0–0.4)
EOSINOPHIL NFR BLD AUTO: 2.9 %
ERYTHROCYTE [DISTWIDTH] IN BLOOD BY AUTOMATED COUNT: 13.2 % (ref 11.5–14.5)
EST. AVERAGE GLUCOSE BLD GHB EST-MCNC: 74 MG/DL
FERRITIN SERPL-MCNC: 83 NG/ML (ref 20–300)
GFR SERPL CREATININE-BSD FRML MDRD: 85 ML/MIN/1.73M*2
GLUCOSE SERPL-MCNC: 113 MG/DL (ref 74–99)
HBA1C MFR BLD: 4.2 %
HCT VFR BLD AUTO: 37.3 % (ref 41–52)
HDLC SERPL-MCNC: 77.6 MG/DL
HGB BLD-MCNC: 12.7 G/DL (ref 13.5–17.5)
IMM GRANULOCYTES # BLD AUTO: 0.06 X10*3/UL (ref 0–0.5)
IMM GRANULOCYTES NFR BLD AUTO: 0.8 % (ref 0–0.9)
INR PPP: 1.3 (ref 0.9–1.1)
IRON SATN MFR SERPL: 26 % (ref 25–45)
IRON SERPL-MCNC: 87 UG/DL (ref 35–150)
LDLC SERPL CALC-MCNC: 143 MG/DL
LYMPHOCYTES # BLD AUTO: 1.62 X10*3/UL (ref 0.8–3)
LYMPHOCYTES NFR BLD AUTO: 22.7 %
MCH RBC QN AUTO: 33.9 PG (ref 26–34)
MCHC RBC AUTO-ENTMCNC: 34 G/DL (ref 32–36)
MCV RBC AUTO: 100 FL (ref 80–100)
MONOCYTES # BLD AUTO: 1.03 X10*3/UL (ref 0.05–0.8)
MONOCYTES NFR BLD AUTO: 14.4 %
NEUTROPHILS # BLD AUTO: 4.17 X10*3/UL (ref 1.6–5.5)
NEUTROPHILS NFR BLD AUTO: 58.4 %
NEUTS VAC BLD QL SMEAR: PRESENT
NON HDL CHOLESTEROL: 153 MG/DL (ref 0–149)
NRBC BLD-RTO: 0 /100 WBCS (ref 0–0)
PLATELET # BLD AUTO: 84 X10*3/UL (ref 150–450)
POTASSIUM SERPL-SCNC: 5.1 MMOL/L (ref 3.5–5.3)
PROT SERPL-MCNC: 6.3 G/DL (ref 6.4–8.2)
PROTHROMBIN TIME: 14.2 SECONDS (ref 9.8–12.8)
RBC # BLD AUTO: 3.75 X10*6/UL (ref 4.5–5.9)
RBC MORPH BLD: NORMAL
SODIUM SERPL-SCNC: 136 MMOL/L (ref 136–145)
TIBC SERPL-MCNC: 339 UG/DL (ref 240–445)
TRIGL SERPL-MCNC: 53 MG/DL (ref 0–149)
UIBC SERPL-MCNC: 252 UG/DL (ref 110–370)
VLDL: 11 MG/DL (ref 0–40)
WBC # BLD AUTO: 7.2 X10*3/UL (ref 4.4–11.3)

## 2023-11-28 PROCEDURE — 83036 HEMOGLOBIN GLYCOSYLATED A1C: CPT

## 2023-11-28 PROCEDURE — 1036F TOBACCO NON-USER: CPT | Performed by: INTERNAL MEDICINE

## 2023-11-28 PROCEDURE — 99214 OFFICE O/P EST MOD 30 MIN: CPT | Performed by: INTERNAL MEDICINE

## 2023-11-28 PROCEDURE — 82306 VITAMIN D 25 HYDROXY: CPT

## 2023-11-28 PROCEDURE — 3074F SYST BP LT 130 MM HG: CPT | Performed by: INTERNAL MEDICINE

## 2023-11-28 PROCEDURE — 82728 ASSAY OF FERRITIN: CPT

## 2023-11-28 PROCEDURE — 36415 COLL VENOUS BLD VENIPUNCTURE: CPT

## 2023-11-28 PROCEDURE — 85025 COMPLETE CBC W/AUTO DIFF WBC: CPT

## 2023-11-28 PROCEDURE — 1125F AMNT PAIN NOTED PAIN PRSNT: CPT | Performed by: INTERNAL MEDICINE

## 2023-11-28 PROCEDURE — 82140 ASSAY OF AMMONIA: CPT

## 2023-11-28 PROCEDURE — 83540 ASSAY OF IRON: CPT

## 2023-11-28 PROCEDURE — 83550 IRON BINDING TEST: CPT

## 2023-11-28 PROCEDURE — 82248 BILIRUBIN DIRECT: CPT

## 2023-11-28 PROCEDURE — 3078F DIAST BP <80 MM HG: CPT | Performed by: INTERNAL MEDICINE

## 2023-11-28 PROCEDURE — 85610 PROTHROMBIN TIME: CPT

## 2023-11-28 PROCEDURE — 80061 LIPID PANEL: CPT

## 2023-11-28 PROCEDURE — 1160F RVW MEDS BY RX/DR IN RCRD: CPT | Performed by: INTERNAL MEDICINE

## 2023-11-28 PROCEDURE — 80053 COMPREHEN METABOLIC PANEL: CPT

## 2023-11-28 PROCEDURE — 1159F MED LIST DOCD IN RCRD: CPT | Performed by: INTERNAL MEDICINE

## 2023-11-28 RX ORDER — DUTASTERIDE 0.5 MG/1
0.5 CAPSULE, LIQUID FILLED ORAL DAILY
COMMUNITY
Start: 2023-11-16

## 2023-11-28 ASSESSMENT — PATIENT HEALTH QUESTIONNAIRE - PHQ9
SUM OF ALL RESPONSES TO PHQ9 QUESTIONS 1 AND 2: 0
2. FEELING DOWN, DEPRESSED OR HOPELESS: NOT AT ALL
1. LITTLE INTEREST OR PLEASURE IN DOING THINGS: NOT AT ALL

## 2023-11-28 NOTE — PROGRESS NOTES
Subjective   Patient ID: Alfonso Toscano is a 78 y.o. male who presents for Follow-up (Had all teeth removed and has upper and lower dentures in October 2023).  HPI  Lots of exercising  This weekend  Most days feels well  Thinking is clear  3 bm per day  No cp no sob  No bleeding        Review of Systems  Gen:  no fever  HEENT:  no trouble swallowing  CV:  no dyspnea, cyanosis  Lungs:  no shortness of breath  GI:  no constipation, no blood in stool  Vascular:  no edema  Neuro:   no new weakness  Skin:  no rash  MS:no joint swelling  Gu:  no urinary complaints  All other systems have been reviewed and are negative for complaint    /65   Pulse 67   Temp 36.5 °C (97.7 °F) (Temporal)   Wt 81.6 kg (180 lb)   SpO2 97%   BMI 28.19 kg/m²   Objective   Physical Exam  Lab Results   Component Value Date    WBC 7.3 07/27/2023    HGB 11.7 (L) 07/27/2023    HCT 34.2 (L) 07/27/2023    MCV 96 07/27/2023    PLT 75 (L) 07/27/2023     Lab Results   Component Value Date    GLUCOSE 97 07/27/2023    CALCIUM 8.7 07/27/2023     (L) 07/27/2023    K 4.3 07/27/2023    CO2 23 07/27/2023     07/27/2023    BUN 10 07/27/2023    CREATININE 0.84 07/27/2023     Social History     Socioeconomic History    Marital status:      Spouse name: None    Number of children: None    Years of education: None    Highest education level: None   Occupational History    None   Tobacco Use    Smoking status: Never    Smokeless tobacco: Never   Substance and Sexual Activity    Alcohol use: Not Currently    Drug use: None    Sexual activity: None   Other Topics Concern    None   Social History Narrative    None     Social Determinants of Health     Financial Resource Strain: Not on file   Food Insecurity: Not on file   Transportation Needs: Not on file   Physical Activity: Not on file   Stress: Not on file   Social Connections: Not on file   Intimate Partner Violence: Not on file   Housing Stability: Not on file     Family History    Problem Relation Name Age of Onset    Colon cancer Mother      Liver cancer Mother         General:  Alert and in  NAD  Mouth clear   Lungs, CTAB  Skin:  no suspicious lesions,  warm and dry  Head :  Normocephalic  Neck/thyroid:  neck supple, full rom, no cervical lymphadenopathy  no thyromegaly  Heart:  RRR  2/6 murmur  Abdomen:  Normal , bs present, soft, nontender, not distended, no masses palpated  Extremities:  No clubbing, cyanosis, or edema  Neurologic:  Nonfocal  Psych: alert, normal mood    Problem List Items Addressed This Visit             ICD-10-CM    Hepatic cirrhosis (CMS/HCC) - Primary K74.60    Relevant Orders    Hemoglobin A1C    Comprehensive Metabolic Panel    Vitamin D 25-Hydroxy,Total (for eval of Vitamin D levels)    Lipid Panel    CBC and Auto Differential    Iron and TIBC    Ferritin    Protime-INR    Ammonia    Hyperglycemia R73.9    Relevant Orders    Hemoglobin A1C    Comprehensive Metabolic Panel    Vitamin D 25-Hydroxy,Total (for eval of Vitamin D levels)    Lipid Panel    CBC and Auto Differential    Iron and TIBC    Ferritin    Protime-INR    Vitamin D deficiency E55.9    Relevant Orders    Hemoglobin A1C    Comprehensive Metabolic Panel    Vitamin D 25-Hydroxy,Total (for eval of Vitamin D levels)    Lipid Panel    CBC and Auto Differential    Iron and TIBC    Ferritin    Protime-INR     Other Visit Diagnoses         Codes    Mixed hyperlipidemia     E78.2    Relevant Orders    Lipid Panel        Follow up in 3 months or prn.  Chronic conditions reviewed in the assessment and plan.    Continue medications unless specified otherwise.  Previous labs reviewed.

## 2023-11-30 DIAGNOSIS — K74.69 OTHER CIRRHOSIS OF LIVER (MULTI): ICD-10-CM

## 2023-11-30 RX ORDER — LACTULOSE 10 G/15ML
SOLUTION ORAL
Qty: 237 ML | Refills: 11 | Status: SHIPPED | OUTPATIENT
Start: 2023-11-30 | End: 2024-02-19 | Stop reason: SDUPTHER

## 2024-02-19 DIAGNOSIS — K74.69 OTHER CIRRHOSIS OF LIVER (MULTI): ICD-10-CM

## 2024-02-19 RX ORDER — LACTULOSE 10 G/15ML
SOLUTION ORAL
Qty: 237 ML | Refills: 11 | Status: SHIPPED | OUTPATIENT
Start: 2024-02-19 | End: 2024-02-20 | Stop reason: SDUPTHER

## 2024-02-20 DIAGNOSIS — K74.69 OTHER CIRRHOSIS OF LIVER (MULTI): ICD-10-CM

## 2024-02-20 RX ORDER — LACTULOSE 10 G/15ML
SOLUTION ORAL
Qty: 1892 ML | Refills: 11 | Status: SHIPPED | OUTPATIENT
Start: 2024-02-20

## 2024-03-11 ENCOUNTER — TELEMEDICINE (OUTPATIENT)
Dept: PRIMARY CARE | Facility: CLINIC | Age: 79
End: 2024-03-11
Payer: MEDICARE

## 2024-03-11 DIAGNOSIS — J06.9 UPPER RESPIRATORY TRACT INFECTION, UNSPECIFIED TYPE: Primary | ICD-10-CM

## 2024-03-11 PROCEDURE — 1125F AMNT PAIN NOTED PAIN PRSNT: CPT | Performed by: NURSE PRACTITIONER

## 2024-03-11 PROCEDURE — 1123F ACP DISCUSS/DSCN MKR DOCD: CPT | Performed by: NURSE PRACTITIONER

## 2024-03-11 PROCEDURE — 1159F MED LIST DOCD IN RCRD: CPT | Performed by: NURSE PRACTITIONER

## 2024-03-11 PROCEDURE — 1157F ADVNC CARE PLAN IN RCRD: CPT | Performed by: NURSE PRACTITIONER

## 2024-03-11 PROCEDURE — 1158F ADVNC CARE PLAN TLK DOCD: CPT | Performed by: NURSE PRACTITIONER

## 2024-03-11 PROCEDURE — 99213 OFFICE O/P EST LOW 20 MIN: CPT | Performed by: NURSE PRACTITIONER

## 2024-03-11 PROCEDURE — 1036F TOBACCO NON-USER: CPT | Performed by: NURSE PRACTITIONER

## 2024-03-11 RX ORDER — AZITHROMYCIN 250 MG/1
TABLET, FILM COATED ORAL
Qty: 6 TABLET | Refills: 0 | Status: SHIPPED | OUTPATIENT
Start: 2024-03-11 | End: 2024-03-16

## 2024-03-11 RX ORDER — FLUTICASONE PROPIONATE 50 MCG
1 SPRAY, SUSPENSION (ML) NASAL 2 TIMES DAILY PRN
Qty: 16 G | Refills: 5 | Status: SHIPPED | OUTPATIENT
Start: 2024-03-11 | End: 2025-03-11

## 2024-03-11 NOTE — PROGRESS NOTES
"An interactive audio/visual  telecommunication system which permits real time communications between the patient (at the originating site) and provider (at the distant site) was utilized to provide this telehealth service.    Verbal consent was requested and obtained from the patient for this telehealth visit.  We have confirmed the patient wishes to see me, Galilea Ortega, a board certified nurse practitioner with an active [place] advanced practice license as well as verified the patient's identity and physical location in [place].    Problem List Items Addressed This Visit    None  Visit Diagnoses       Upper respiratory tract infection, unspecified type    -  Primary    likely viral  conservative symptomatic care discussed  wait & see azith sent  fu IO this week if not improving  - needs physical exam   ED red flags discussed    Relevant Medications    azithromycin (Zithromax) 250 mg tablet    fluticasone (Flonase) 50 mcg/actuation nasal spray             Subjective   Patient ID: Alfonso Tsocano is a 79 y.o. male who presents for Sick Visit (Sinus headache cough congestion body aches x 1 week/Has not tested for covid/Productive cough/Taking tylenol tried mucinex and alegra has not helped).  HPI  SPO2 94-96%  No fever  No dyspnea  No sore throat  No ear pain      Review of Systems   All other systems reviewed and are negative.      BP Readings from Last 3 Encounters:   11/28/23 112/65   05/22/23 132/68   10/31/22 115/62      Wt Readings from Last 3 Encounters:   11/28/23 81.6 kg (180 lb)   05/22/23 86.6 kg (191 lb)   10/31/22 84.8 kg (187 lb)      BMI:   Estimated body mass index is 28.19 kg/m² as calculated from the following:    Height as of 5/22/23: 1.702 m (5' 7\").    Weight as of 11/28/23: 81.6 kg (180 lb).    Objective   Physical Exam  Gen: Alert, NAD  Respiratory:  resp effort NL  Neuro:  coordination intact   Mood: normal    Sitting upright  Speaking in complete sentences   "

## 2024-03-13 DIAGNOSIS — K25.9 ESOPHAGOGASTRIC ULCER, UNSPECIFIED ULCER CHRONICITY: ICD-10-CM

## 2024-03-14 PROBLEM — E78.2 MIXED HYPERLIPIDEMIA: Status: ACTIVE | Noted: 2023-11-28

## 2024-03-15 RX ORDER — PANTOPRAZOLE SODIUM 40 MG/1
40 TABLET, DELAYED RELEASE ORAL DAILY
Qty: 30 TABLET | Refills: 11 | Status: SHIPPED | OUTPATIENT
Start: 2024-03-15

## 2024-04-10 ENCOUNTER — LAB (OUTPATIENT)
Dept: LAB | Facility: LAB | Age: 79
End: 2024-04-10
Payer: MEDICARE

## 2024-04-10 ENCOUNTER — OFFICE VISIT (OUTPATIENT)
Dept: PRIMARY CARE | Facility: CLINIC | Age: 79
End: 2024-04-10
Payer: MEDICARE

## 2024-04-10 VITALS
TEMPERATURE: 97.2 F | OXYGEN SATURATION: 98 % | HEIGHT: 68 IN | DIASTOLIC BLOOD PRESSURE: 70 MMHG | HEART RATE: 52 BPM | SYSTOLIC BLOOD PRESSURE: 132 MMHG | WEIGHT: 175 LBS | BODY MASS INDEX: 26.52 KG/M2

## 2024-04-10 DIAGNOSIS — E55.9 VITAMIN D DEFICIENCY: ICD-10-CM

## 2024-04-10 DIAGNOSIS — D69.6 THROMBOCYTOPENIA (CMS-HCC): ICD-10-CM

## 2024-04-10 DIAGNOSIS — J30.1 ALLERGIC RHINITIS DUE TO POLLEN, UNSPECIFIED SEASONALITY: ICD-10-CM

## 2024-04-10 DIAGNOSIS — E78.2 MIXED HYPERLIPIDEMIA: ICD-10-CM

## 2024-04-10 DIAGNOSIS — I85.10 SECONDARY ESOPHAGEAL VARICES WITHOUT BLEEDING (MULTI): ICD-10-CM

## 2024-04-10 DIAGNOSIS — R73.9 ELEVATED BLOOD SUGAR: ICD-10-CM

## 2024-04-10 DIAGNOSIS — E78.2 HYPERLIPEMIA, MIXED: ICD-10-CM

## 2024-04-10 DIAGNOSIS — R53.83 OTHER FATIGUE: ICD-10-CM

## 2024-04-10 DIAGNOSIS — Z00.00 WELL ADULT EXAM: Primary | ICD-10-CM

## 2024-04-10 DIAGNOSIS — C22.9 MALIGNANT NEOPLASM OF LIVER, UNSPECIFIED LIVER MALIGNANCY TYPE (MULTI): ICD-10-CM

## 2024-04-10 PROBLEM — K62.5 BRBPR (BRIGHT RED BLOOD PER RECTUM): Status: RESOLVED | Noted: 2021-10-24 | Resolved: 2024-04-10

## 2024-04-10 PROBLEM — K76.6 PORTAL HYPERTENSION (MULTI): Status: RESOLVED | Noted: 2021-11-07 | Resolved: 2024-04-10

## 2024-04-10 PROBLEM — R79.89 ABNORMAL CBC: Status: RESOLVED | Noted: 2023-11-28 | Resolved: 2024-04-10

## 2024-04-10 PROBLEM — M54.9 BACK PAIN: Status: RESOLVED | Noted: 2023-11-28 | Resolved: 2024-04-10

## 2024-04-10 PROBLEM — E44.0 MALNUTRITION OF MODERATE DEGREE (MULTI): Status: RESOLVED | Noted: 2021-11-03 | Resolved: 2024-04-10

## 2024-04-10 LAB
25(OH)D3 SERPL-MCNC: 48 NG/ML (ref 30–100)
ALBUMIN SERPL BCP-MCNC: 3.2 G/DL (ref 3.4–5)
ALP SERPL-CCNC: 150 U/L (ref 33–136)
ALT SERPL W P-5'-P-CCNC: 19 U/L (ref 10–52)
AMMONIA PLAS-SCNC: 43 UMOL/L (ref 16–53)
ANION GAP SERPL CALC-SCNC: 12 MMOL/L (ref 10–20)
AST SERPL W P-5'-P-CCNC: 42 U/L (ref 9–39)
BASOPHILS # BLD AUTO: 0.03 X10*3/UL (ref 0–0.1)
BASOPHILS NFR BLD AUTO: 0.5 %
BILIRUB DIRECT SERPL-MCNC: 0.7 MG/DL (ref 0–0.3)
BILIRUB SERPL-MCNC: 2.3 MG/DL (ref 0–1.2)
BUN SERPL-MCNC: 11 MG/DL (ref 6–23)
CALCIUM SERPL-MCNC: 8.8 MG/DL (ref 8.6–10.3)
CHLORIDE SERPL-SCNC: 101 MMOL/L (ref 98–107)
CHOLEST SERPL-MCNC: 168 MG/DL (ref 0–199)
CHOLESTEROL/HDL RATIO: 2.5
CO2 SERPL-SCNC: 25 MMOL/L (ref 21–32)
CREAT SERPL-MCNC: 0.79 MG/DL (ref 0.5–1.3)
EGFRCR SERPLBLD CKD-EPI 2021: >90 ML/MIN/1.73M*2
EOSINOPHIL # BLD AUTO: 0.16 X10*3/UL (ref 0–0.4)
EOSINOPHIL NFR BLD AUTO: 2.5 %
ERYTHROCYTE [DISTWIDTH] IN BLOOD BY AUTOMATED COUNT: 15 % (ref 11.5–14.5)
EST. AVERAGE GLUCOSE BLD GHB EST-MCNC: 65 MG/DL
FERRITIN SERPL-MCNC: 101 NG/ML (ref 20–300)
GLUCOSE SERPL-MCNC: 108 MG/DL (ref 74–99)
HBA1C MFR BLD: 3.9 %
HCT VFR BLD AUTO: 34.1 % (ref 41–52)
HDLC SERPL-MCNC: 66.8 MG/DL
HGB BLD-MCNC: 11.5 G/DL (ref 13.5–17.5)
IMM GRANULOCYTES # BLD AUTO: 0.02 X10*3/UL (ref 0–0.5)
IMM GRANULOCYTES NFR BLD AUTO: 0.3 % (ref 0–0.9)
INR PPP: 1.2 (ref 0.9–1.1)
IRON SATN MFR SERPL: 26 % (ref 25–45)
IRON SERPL-MCNC: 74 UG/DL (ref 35–150)
LDLC SERPL CALC-MCNC: 89 MG/DL
LYMPHOCYTES # BLD AUTO: 1.38 X10*3/UL (ref 0.8–3)
LYMPHOCYTES NFR BLD AUTO: 21.2 %
MCH RBC QN AUTO: 34.6 PG (ref 26–34)
MCHC RBC AUTO-ENTMCNC: 33.7 G/DL (ref 32–36)
MCV RBC AUTO: 103 FL (ref 80–100)
MONOCYTES # BLD AUTO: 0.99 X10*3/UL (ref 0.05–0.8)
MONOCYTES NFR BLD AUTO: 15.2 %
NEUTROPHILS # BLD AUTO: 3.93 X10*3/UL (ref 1.6–5.5)
NEUTROPHILS NFR BLD AUTO: 60.3 %
NON HDL CHOLESTEROL: 101 MG/DL (ref 0–149)
NRBC BLD-RTO: 0 /100 WBCS (ref 0–0)
PLATELET # BLD AUTO: 69 X10*3/UL (ref 150–450)
POTASSIUM SERPL-SCNC: 4.7 MMOL/L (ref 3.5–5.3)
PROT SERPL-MCNC: 6 G/DL (ref 6.4–8.2)
PROTHROMBIN TIME: 13.7 SECONDS (ref 9.8–12.8)
RBC # BLD AUTO: 3.32 X10*6/UL (ref 4.5–5.9)
SODIUM SERPL-SCNC: 133 MMOL/L (ref 136–145)
TIBC SERPL-MCNC: 286 UG/DL (ref 240–445)
TRIGL SERPL-MCNC: 61 MG/DL (ref 0–149)
TSH SERPL-ACNC: 2.24 MIU/L (ref 0.44–3.98)
UIBC SERPL-MCNC: 212 UG/DL (ref 110–370)
VIT B12 SERPL-MCNC: 681 PG/ML (ref 211–911)
VLDL: 12 MG/DL (ref 0–40)
WBC # BLD AUTO: 6.5 X10*3/UL (ref 4.4–11.3)

## 2024-04-10 PROCEDURE — 1036F TOBACCO NON-USER: CPT | Performed by: INTERNAL MEDICINE

## 2024-04-10 PROCEDURE — G0444 DEPRESSION SCREEN ANNUAL: HCPCS | Performed by: INTERNAL MEDICINE

## 2024-04-10 PROCEDURE — 99397 PER PM REEVAL EST PAT 65+ YR: CPT | Performed by: INTERNAL MEDICINE

## 2024-04-10 PROCEDURE — 85610 PROTHROMBIN TIME: CPT

## 2024-04-10 PROCEDURE — 83540 ASSAY OF IRON: CPT

## 2024-04-10 PROCEDURE — 99214 OFFICE O/P EST MOD 30 MIN: CPT | Performed by: INTERNAL MEDICINE

## 2024-04-10 PROCEDURE — 83550 IRON BINDING TEST: CPT

## 2024-04-10 PROCEDURE — 82140 ASSAY OF AMMONIA: CPT

## 2024-04-10 PROCEDURE — 80061 LIPID PANEL: CPT

## 2024-04-10 PROCEDURE — 82728 ASSAY OF FERRITIN: CPT

## 2024-04-10 PROCEDURE — G0439 PPPS, SUBSEQ VISIT: HCPCS | Performed by: INTERNAL MEDICINE

## 2024-04-10 PROCEDURE — 3075F SYST BP GE 130 - 139MM HG: CPT | Performed by: INTERNAL MEDICINE

## 2024-04-10 PROCEDURE — 85025 COMPLETE CBC W/AUTO DIFF WBC: CPT

## 2024-04-10 PROCEDURE — 1157F ADVNC CARE PLAN IN RCRD: CPT | Performed by: INTERNAL MEDICINE

## 2024-04-10 PROCEDURE — 3078F DIAST BP <80 MM HG: CPT | Performed by: INTERNAL MEDICINE

## 2024-04-10 PROCEDURE — 80053 COMPREHEN METABOLIC PANEL: CPT

## 2024-04-10 PROCEDURE — 84443 ASSAY THYROID STIM HORMONE: CPT

## 2024-04-10 PROCEDURE — 1159F MED LIST DOCD IN RCRD: CPT | Performed by: INTERNAL MEDICINE

## 2024-04-10 PROCEDURE — 82306 VITAMIN D 25 HYDROXY: CPT

## 2024-04-10 PROCEDURE — 82248 BILIRUBIN DIRECT: CPT

## 2024-04-10 PROCEDURE — 36415 COLL VENOUS BLD VENIPUNCTURE: CPT

## 2024-04-10 PROCEDURE — 1123F ACP DISCUSS/DSCN MKR DOCD: CPT | Performed by: INTERNAL MEDICINE

## 2024-04-10 PROCEDURE — 83036 HEMOGLOBIN GLYCOSYLATED A1C: CPT

## 2024-04-10 PROCEDURE — 1158F ADVNC CARE PLAN TLK DOCD: CPT | Performed by: INTERNAL MEDICINE

## 2024-04-10 PROCEDURE — G0446 INTENS BEHAVE THER CARDIO DX: HCPCS | Performed by: INTERNAL MEDICINE

## 2024-04-10 PROCEDURE — 82607 VITAMIN B-12: CPT

## 2024-04-10 ASSESSMENT — PATIENT HEALTH QUESTIONNAIRE - PHQ9
2. FEELING DOWN, DEPRESSED OR HOPELESS: NOT AT ALL
SUM OF ALL RESPONSES TO PHQ9 QUESTIONS 1 AND 2: 0
1. LITTLE INTEREST OR PLEASURE IN DOING THINGS: NOT AT ALL

## 2024-04-10 NOTE — PROGRESS NOTES
Chief Complaint:   Medicare Wellness Exam/Comprehensive Problem Focused Follow Up and Physical Exam    HPI:    Sleeping well  No eating concerns  No elimination concerns  No cp   No sob  One per day of ensure  Active  Feels well  Does not want imaging done of liver  Bruising if doing active work  Was ill w uri, now better        Patient Care Team:  Gisele Patel MD as PCP - General  iGsele Patel MD as PCP - [employer] PCP   Active Problem List  Patient Active Problem List   Diagnosis    Acute blood loss anemia    Allergic rhinitis due to pollen    Anatomical narrow angle, bilateral    Anemia    Anxiety    Bilateral impacted cerumen    Cobalamin deficiency    Colon polyps    Esophageal varices (CMS/HCC)    Essential hypertension    Excessive cerumen in ear canal, right    Gastric ulcer    Hepatic cirrhosis (CMS/HCC)    History of laser iridotomy    Hyperglycemia    Iron deficiency anemia secondary to inadequate dietary iron intake    Leukocytosis    Hypocalcemia    Malignant neoplasm of liver, not specified as primary or secondary (CMS/HCC)    Malignant neoplasm of prostate (CMS/HCC)    Heart murmur    PND (post-nasal drip)    Pseudophakia    Rectal hemorrhage    Right foot pain    Sinus pressure    Hordeolum externum    Thrombocytopenia (CMS/HCC)    Injury of toe    Vitamin D deficiency    Pain    Mixed hyperlipidemia         Comprehensive Medical/Surgical/Social/Family History  Past Medical History:   Diagnosis Date    Aortic ectasia, unspecified site (CMS/HCC) 03/16/2021    Aortic dilatation    Personal history of diseases of the blood and blood-forming organs and certain disorders involving the immune mechanism 07/01/2020    History of anemia    Personal history of other medical treatment     H/O echocardiogram     Past Surgical History:   Procedure Laterality Date    COLONOSCOPY  10/22/2018    Colonoscopy    OTHER SURGICAL HISTORY  07/01/2020    Cataract surgery     Social  "History     Social History Narrative    Not on file     Tobacco/Alcohol/Opioid use, as well as Illicit Drug Use was screened for/reviewed and documented in Social Documentation section of the chart and medication list as appropriate    Allergies and Medications  Patient has no known allergies.  Current Outpatient Medications   Medication Instructions    acetaminophen (TYLENOL) 325 mg, oral, As needed    bicalutamide (Casodex) 50 mg tablet 1 tablet, oral, Daily    cholecalciferol (Vitamin D-3) 25 MCG (1000 UT) tablet 1 tablet, oral, Daily    dutasteride (AVODART) 0.5 mg, oral, Daily    fexofenadine (ALLEGRA) 180 mg, oral, Daily, prn    fluticasone (Flonase) 50 mcg/actuation nasal spray 1 spray, Each Nostril, 2 times daily PRN, Shake gently. Before first use, prime pump. After use, clean tip and replace cap.    furosemide (LASIX) 20 mg, oral, Daily    lactulose 20 gram/30 mL oral solution 30ML twice daily    nadolol (CORGARD) 10 mg, oral, Daily    pantoprazole (PROTONIX) 40 mg, oral, Daily    spironolactone (ALDACTONE) 50 mg, oral, Daily     Medications and Supplements  prescribed by me and other practitioners or clinical pharmacist (such as prescriptions, OTC's, herbal therapies and supplements) were reviewed and documented in the medical record.      Activities of Daily Living  In your present state of health, do you have any difficulty performing the following activities?:   Preparing food and eating?: Yes  Bathing yourself: No  Getting dressed: No  Using the toilet:No  Moving around from place to place: Yes  In the past year have you fallen or had a near fall?:No  Able to manage finances independently: No  Able to perform grocery shopping: No  Able to manage medications independently: No  Able to do housework independently: No  Patient self-assessment of health status? Good    Depression Screen  (Note: if answer to either of the following is \"Yes\", then a more complete depression screening is indicated)   Q1: Over " the past two weeks, have you felt down, depressed or hopeless? No  Q2: Over the past two weeks, have you felt little interest or pleasure in doing things? No    Current exercise habits: no   Dietary issues discussed: Yes  Hearing difficulties: Yes  Safe in current home environment: Yes  Visual Acuity assessed: No  Cognitive Impairment No    Advance directives  Advanced Care Planning (including a Living Will, Healthcare POA, as well as specific end of life choices and/or directives), was discussed with the patient and/or surrogate, voluntarily, and documented in the Problem List of the medical record.   Wife elise   Cardiac Risk Assessment  Cardiovascular risk was discussed and, if needed, lifestyle modifications recommended, including nutritional choices, exercise, and elimination of habits contributing to risk. We agreed on a plan to reduce the current cardiovascular risk based on above discussion as needed.  Aspirin use/disuse was discussed and documented in the Problem List of the medical record after reviewing the updated guidelines below:    Consider low dose Aspirin ( mg) use if the benefit for cardiovascular disease prevention outweighs risk for bleeding complications.   In general, low dose ASA should be considered:  In patients WITHOUT prior MI/stroke/PAD (primary prevention):   a. Age <60: Use if 10-year cardiovascular disease risk >20%, with discussion of risks and benefits with patient  b. Age 60-<70: Use if 10-year cardiovascular disease risk >20% and low bleeding (e.g., gastrointenstinal) risk, with discussion of risks and benefits with patient  c. Age >=70: Do not use    In patients WITH prior MI/stroke/PAD (secondary prevention):   Generally use unless extremely high bleeding (e.g., gastrointenstinal) risk, with discussion of risks and benefits with patient        ROS otherwise negative aside from what was mentioned above in HPI.    Gen:  no fever  HEENT:  no trouble swallowing  CV:  no dyspnea,  "cyanosis  Lungs:  no shortness of breath  GI:  no constipation, no blood in stool  Vascular:  no edema  Neuro:   no new weakness  Skin:  no rash  MS:no joint swelling  Gu:  no urinary complaints  All other systems have been reviewed and are negative for complaint    Vitals  /70   Pulse 52   Temp 36.2 °C (97.2 °F) (Temporal)   Ht 1.715 m (5' 7.5\")   Wt 79.4 kg (175 lb)   SpO2 98%   BMI 27.00 kg/m²   Body mass index is 27 kg/m².  Objective   Physical Exam  General Appearance:  Alert and oriented.  NAD  HEENT:  Tm's normal , throat clear, no erythema  Lungs, CTAB  Skin:  no suspicious lesions,  warm and dry bruising on hands   Head :  Normocephalic  Oral Cavity; Clear mucosa moist  Neck/thyroid:  neck supple, full rom, no cervical lymphadenopathy  no thyromegaly  Heart:  RRR  2/6 murmurs  Abdomen:  Normal , bs present, soft, nontender, not distended, no masses palpated  Extremities:  No clubbing, cyanosis, or edema  Neurologic:  Nonfocal  Psych: alert, normal mood    During the course of the visit the patient was educated and counseled about age appropriate screening and preventive services. Completed preventive screenings were documented in the chart and orders were placed for outstanding screenings/procedures as documented in the Assessment and Plan.    Patient Instructions (the written plan) was given to the patient at check out.       Problem List Items Addressed This Visit       Allergic rhinitis due to pollen    Esophageal varices (CMS/HCC)    Overview     Last Assessment & Plan: Formatting of this note might be different from the original. Assessment: has had in the past, monitored per PCP    No new symptoms  stable condition.   Follow up at least yearly.  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   2021  Pt does not want further imaging  No new sx          Relevant Orders    Hepatic Function Panel    Protime-INR    Ammonia    Hemoglobin A1C    TSH with reflex to Free T4 if abnormal    Vitamin D 25-Hydroxy,Total (for eval of Vitamin D levels)    Vitamin B12    Lipid Panel    CBC and Auto Differential    Basic Metabolic Panel    Iron and TIBC    Ferritin    Thrombocytopenia (CMS/HCC)    Overview     Last Assessment & Plan: Formatting of this note might be different from the original. Assessment: monitored per PCP    No new symptoms  stable condition.   Follow up at least yearly.           Relevant Orders    Hepatic Function Panel    Protime-INR    Ammonia    Hemoglobin A1C    TSH with reflex to Free T4 if abnormal    Vitamin D 25-Hydroxy,Total (for eval of Vitamin D levels)    Vitamin B12    Lipid Panel    CBC and Auto Differential    Basic Metabolic Panel    Iron and TIBC    Ferritin    Vitamin D deficiency    Relevant Orders    Vitamin D 25-Hydroxy,Total (for eval of Vitamin D levels)    Mixed hyperlipidemia     Other Visit Diagnoses       Well adult exam    -  Primary    Elevated blood sugar        Relevant Orders    Hemoglobin A1C    Other fatigue        Relevant Orders    TSH with reflex to Free T4 if abnormal    Hyperlipemia, mixed        Relevant Orders    Lipid Panel          Chronic conditions reviewed in the assessment and plan.    Continue medications unless specified otherwise.  Previous labs reviewed.   Other specialty provider notes reviewed.       Annual Wellness exam completed   Preventive Health history reviewed:  Vaccines today: none  Labs ordered    Depression Screening done  Advanced Directives Discussion  Completed  Cardiovascular risk discussed and if needed, lifestyle modifications recommended, including nutritional choices, exercise, and elimination of habits contributing to risk.  We agreed on a plan to reduce the current cardiovascular risk.   Aspirin use/disuse was discussed after reviewing the updated guidelines. No asa

## 2024-05-15 DIAGNOSIS — K74.69 OTHER CIRRHOSIS OF LIVER (MULTI): ICD-10-CM

## 2024-05-15 RX ORDER — SPIRONOLACTONE 50 MG/1
50 TABLET, FILM COATED ORAL DAILY
Qty: 90 TABLET | Refills: 3 | Status: SHIPPED | OUTPATIENT
Start: 2024-05-15

## 2024-06-10 ENCOUNTER — LAB (OUTPATIENT)
Dept: LAB | Facility: LAB | Age: 79
End: 2024-06-10
Payer: MEDICARE

## 2024-06-10 DIAGNOSIS — C61 MALIGNANT NEOPLASM OF PROSTATE (MULTI): Primary | ICD-10-CM

## 2024-06-10 LAB — PSA SERPL-MCNC: 2.06 NG/ML

## 2024-06-10 PROCEDURE — 84153 ASSAY OF PSA TOTAL: CPT

## 2024-06-10 PROCEDURE — 36415 COLL VENOUS BLD VENIPUNCTURE: CPT

## 2024-10-08 ENCOUNTER — LAB (OUTPATIENT)
Dept: LAB | Facility: LAB | Age: 79
End: 2024-10-08
Payer: MEDICARE

## 2024-10-08 ENCOUNTER — APPOINTMENT (OUTPATIENT)
Dept: PRIMARY CARE | Facility: CLINIC | Age: 79
End: 2024-10-08
Payer: MEDICARE

## 2024-10-08 VITALS
WEIGHT: 173.4 LBS | BODY MASS INDEX: 26.76 KG/M2 | OXYGEN SATURATION: 97 % | HEART RATE: 52 BPM | DIASTOLIC BLOOD PRESSURE: 52 MMHG | TEMPERATURE: 97.8 F | SYSTOLIC BLOOD PRESSURE: 134 MMHG

## 2024-10-08 DIAGNOSIS — Z23 VACCINE FOR STREPTOCOCCUS PNEUMONIAE AND INFLUENZA: ICD-10-CM

## 2024-10-08 DIAGNOSIS — R53.83 OTHER FATIGUE: ICD-10-CM

## 2024-10-08 DIAGNOSIS — E55.9 VITAMIN D DEFICIENCY: ICD-10-CM

## 2024-10-08 DIAGNOSIS — R73.9 ELEVATED BLOOD SUGAR: ICD-10-CM

## 2024-10-08 DIAGNOSIS — D69.6 THROMBOCYTOPENIA (CMS-HCC): ICD-10-CM

## 2024-10-08 DIAGNOSIS — E78.2 MIXED HYPERLIPIDEMIA: ICD-10-CM

## 2024-10-08 DIAGNOSIS — K74.69 OTHER CIRRHOSIS OF LIVER: ICD-10-CM

## 2024-10-08 DIAGNOSIS — I85.00 ESOPHAGEAL VARICES WITHOUT BLEEDING, UNSPECIFIED ESOPHAGEAL VARICES TYPE (MULTI): ICD-10-CM

## 2024-10-08 DIAGNOSIS — I10 ESSENTIAL HYPERTENSION: ICD-10-CM

## 2024-10-08 DIAGNOSIS — D64.9 ANEMIA, UNSPECIFIED TYPE: ICD-10-CM

## 2024-10-08 DIAGNOSIS — D64.9 ANEMIA, UNSPECIFIED TYPE: Primary | ICD-10-CM

## 2024-10-08 PROBLEM — D62 ACUTE BLOOD LOSS ANEMIA: Status: RESOLVED | Noted: 2021-10-29 | Resolved: 2024-10-08

## 2024-10-08 PROBLEM — K62.5 RECTAL HEMORRHAGE: Status: RESOLVED | Noted: 2023-11-28 | Resolved: 2024-10-08

## 2024-10-08 LAB
25(OH)D3 SERPL-MCNC: 51 NG/ML (ref 30–100)
ALBUMIN SERPL BCP-MCNC: 3.4 G/DL (ref 3.4–5)
ALP SERPL-CCNC: 131 U/L (ref 33–136)
ALT SERPL W P-5'-P-CCNC: 16 U/L (ref 10–52)
AMMONIA PLAS-SCNC: 50 UMOL/L (ref 16–53)
ANION GAP SERPL CALC-SCNC: 10 MMOL/L (ref 10–20)
AST SERPL W P-5'-P-CCNC: 36 U/L (ref 9–39)
BASOPHILS # BLD AUTO: 0.04 X10*3/UL (ref 0–0.1)
BASOPHILS NFR BLD AUTO: 0.6 %
BILIRUB SERPL-MCNC: 2 MG/DL (ref 0–1.2)
BUN SERPL-MCNC: 12 MG/DL (ref 6–23)
CALCIUM SERPL-MCNC: 8.6 MG/DL (ref 8.6–10.3)
CHLORIDE SERPL-SCNC: 103 MMOL/L (ref 98–107)
CHOLEST SERPL-MCNC: 210 MG/DL (ref 0–199)
CHOLESTEROL/HDL RATIO: 2.8
CO2 SERPL-SCNC: 25 MMOL/L (ref 21–32)
CREAT SERPL-MCNC: 0.88 MG/DL (ref 0.5–1.3)
EGFRCR SERPLBLD CKD-EPI 2021: 89 ML/MIN/1.73M*2
EOSINOPHIL # BLD AUTO: 0.3 X10*3/UL (ref 0–0.4)
EOSINOPHIL NFR BLD AUTO: 4.2 %
ERYTHROCYTE [DISTWIDTH] IN BLOOD BY AUTOMATED COUNT: 14.2 % (ref 11.5–14.5)
FERRITIN SERPL-MCNC: 51 NG/ML (ref 20–300)
GLUCOSE SERPL-MCNC: 98 MG/DL (ref 74–99)
HCT VFR BLD AUTO: 32.6 % (ref 41–52)
HDLC SERPL-MCNC: 76.1 MG/DL
HGB BLD-MCNC: 10.5 G/DL (ref 13.5–17.5)
IMM GRANULOCYTES # BLD AUTO: 0.02 X10*3/UL (ref 0–0.5)
IMM GRANULOCYTES NFR BLD AUTO: 0.3 % (ref 0–0.9)
IRON SATN MFR SERPL: 26 % (ref 25–45)
IRON SERPL-MCNC: 90 UG/DL (ref 35–150)
LDLC SERPL CALC-MCNC: 124 MG/DL
LYMPHOCYTES # BLD AUTO: 1.54 X10*3/UL (ref 0.8–3)
LYMPHOCYTES NFR BLD AUTO: 21.7 %
MCH RBC QN AUTO: 30.6 PG (ref 26–34)
MCHC RBC AUTO-ENTMCNC: 32.2 G/DL (ref 32–36)
MCV RBC AUTO: 95 FL (ref 80–100)
MONOCYTES # BLD AUTO: 1.05 X10*3/UL (ref 0.05–0.8)
MONOCYTES NFR BLD AUTO: 14.8 %
NEUTROPHILS # BLD AUTO: 4.14 X10*3/UL (ref 1.6–5.5)
NEUTROPHILS NFR BLD AUTO: 58.4 %
NON HDL CHOLESTEROL: 134 MG/DL (ref 0–149)
NRBC BLD-RTO: 0 /100 WBCS (ref 0–0)
PLATELET # BLD AUTO: 68 X10*3/UL (ref 150–450)
POTASSIUM SERPL-SCNC: 4.4 MMOL/L (ref 3.5–5.3)
PROT SERPL-MCNC: 6.1 G/DL (ref 6.4–8.2)
RBC # BLD AUTO: 3.43 X10*6/UL (ref 4.5–5.9)
SODIUM SERPL-SCNC: 134 MMOL/L (ref 136–145)
TIBC SERPL-MCNC: 340 UG/DL (ref 240–445)
TRIGL SERPL-MCNC: 50 MG/DL (ref 0–149)
TSH SERPL-ACNC: 2.49 MIU/L (ref 0.44–3.98)
UIBC SERPL-MCNC: 250 UG/DL (ref 110–370)
VIT B12 SERPL-MCNC: 643 PG/ML (ref 211–911)
VLDL: 10 MG/DL (ref 0–40)
WBC # BLD AUTO: 7.1 X10*3/UL (ref 4.4–11.3)

## 2024-10-08 PROCEDURE — 1157F ADVNC CARE PLAN IN RCRD: CPT | Performed by: INTERNAL MEDICINE

## 2024-10-08 PROCEDURE — 3078F DIAST BP <80 MM HG: CPT | Performed by: INTERNAL MEDICINE

## 2024-10-08 PROCEDURE — 80061 LIPID PANEL: CPT

## 2024-10-08 PROCEDURE — 82607 VITAMIN B-12: CPT

## 2024-10-08 PROCEDURE — 3075F SYST BP GE 130 - 139MM HG: CPT | Performed by: INTERNAL MEDICINE

## 2024-10-08 PROCEDURE — 1123F ACP DISCUSS/DSCN MKR DOCD: CPT | Performed by: INTERNAL MEDICINE

## 2024-10-08 PROCEDURE — 84443 ASSAY THYROID STIM HORMONE: CPT

## 2024-10-08 PROCEDURE — G2211 COMPLEX E/M VISIT ADD ON: HCPCS | Performed by: INTERNAL MEDICINE

## 2024-10-08 PROCEDURE — 82728 ASSAY OF FERRITIN: CPT

## 2024-10-08 PROCEDURE — 82306 VITAMIN D 25 HYDROXY: CPT

## 2024-10-08 PROCEDURE — 83540 ASSAY OF IRON: CPT

## 2024-10-08 PROCEDURE — 36415 COLL VENOUS BLD VENIPUNCTURE: CPT

## 2024-10-08 PROCEDURE — 82140 ASSAY OF AMMONIA: CPT

## 2024-10-08 PROCEDURE — 85025 COMPLETE CBC W/AUTO DIFF WBC: CPT

## 2024-10-08 PROCEDURE — 83036 HEMOGLOBIN GLYCOSYLATED A1C: CPT

## 2024-10-08 PROCEDURE — 80053 COMPREHEN METABOLIC PANEL: CPT

## 2024-10-08 PROCEDURE — G0008 ADMIN INFLUENZA VIRUS VAC: HCPCS | Performed by: INTERNAL MEDICINE

## 2024-10-08 PROCEDURE — 90662 IIV NO PRSV INCREASED AG IM: CPT | Performed by: INTERNAL MEDICINE

## 2024-10-08 PROCEDURE — 1159F MED LIST DOCD IN RCRD: CPT | Performed by: INTERNAL MEDICINE

## 2024-10-08 PROCEDURE — 83550 IRON BINDING TEST: CPT

## 2024-10-08 PROCEDURE — 99214 OFFICE O/P EST MOD 30 MIN: CPT | Performed by: INTERNAL MEDICINE

## 2024-10-08 NOTE — PROGRESS NOTES
Subjective   Patient ID: Alfonso Toscano is a 79 y.o. male who presents for Follow-up (Dozes off after activity - Falls asleep easily ).  HPI  Dozes off easily  No cp no sob  Bm 4 per day  No depression  No edema  Sleeps well   Review of Systems  Gen:  no fever  HEENT:  no trouble swallowing  CV:  no dyspnea, cyanosis  Lungs:  no shortness of breath  GI:  no constipation, no blood in stool  Vascular:  no edema  Neuro:   no weakness  Skin:  no rash  MS:no joint swelling  Gu:  no urinary complaints  All other systems have been reviewed and are negative for complaint    /52   Pulse 52   Temp 36.6 °C (97.8 °F) (Temporal)   Wt 78.7 kg (173 lb 6.4 oz)   SpO2 97%   BMI 26.76 kg/m²   Objective   Physical Exam  Lab Results   Component Value Date    WBC 7.1 10/08/2024    HGB 10.5 (L) 10/08/2024    HCT 32.6 (L) 10/08/2024    MCV 95 10/08/2024    PLT 68 (L) 10/08/2024     Lab Results   Component Value Date    GLUCOSE 98 10/08/2024    CALCIUM 8.6 10/08/2024     (L) 10/08/2024    K 4.4 10/08/2024    CO2 25 10/08/2024     10/08/2024    BUN 12 10/08/2024    CREATININE 0.88 10/08/2024     Social History     Socioeconomic History    Marital status:    Tobacco Use    Smoking status: Never    Smokeless tobacco: Never   Substance and Sexual Activity    Alcohol use: Not Currently     Family History   Problem Relation Name Age of Onset    Colon cancer Mother      Liver cancer Mother         General:  Alert and in  NAD  Lungs, CTAB  Skin:  no suspicious lesions,  warm and dry  Head :  Normocephalic  Neck/thyroid:  neck supple, full rom, no cervical lymphadenopathy  no thyromegaly  Heart:  RRR  no murmurs  Abdomen:  Normal , bs present, soft, nontender, not distended, no masses palpated  Extremities:  No clubbing, cyanosis, or edema  Neurologic:  Nonfocal  Psych: alert, normal mood      Problem List Items Addressed This Visit       Anemia - Primary    Relevant Orders    Ferritin (Completed)    Iron and TIBC  (Completed)    Esophageal varices    Overview     Last Assessment & Plan: Formatting of this note might be different from the original. Assessment: has had in the past, monitored per PCP    No new symptoms  stable condition.   Follow up at least yearly.  Cont meds          Essential hypertension    Overview     Formatting of this note might be different from the original. On low dose losartan unsure of dose Hold for now Last Assessment & Plan: Formatting of this note might be different from the original. Assessment: takes  Med, monitored per PCP  /52 pulse 72         Hepatic cirrhosis (Multi)    Overview      Pt no longer sees gi     No new symptoms  stable condition.   Follow up at least yearly.           Relevant Orders    Comprehensive Metabolic Panel (Completed)    Hemoglobin A1C (Completed)    TSH with reflex to Free T4 if abnormal (Completed)    Vitamin B12 (Completed)    Lipid Panel (Completed)    CBC and Auto Differential (Completed)    Thrombocytopenia (CMS-HCC)    Overview     Last Assessment & Plan: Formatting of this note might be different from the original. Assessment: monitored per PCP    No new symptoms  stable condition.   Follow up at least yearly.           Relevant Orders    Comprehensive Metabolic Panel (Completed)    Hemoglobin A1C (Completed)    TSH with reflex to Free T4 if abnormal (Completed)    Vitamin B12 (Completed)    Lipid Panel (Completed)    CBC and Auto Differential (Completed)    Vitamin D deficiency    Relevant Orders    Vitamin D 25-Hydroxy,Total (for eval of Vitamin D levels) (Completed)    Mixed hyperlipidemia    Relevant Orders    Comprehensive Metabolic Panel (Completed)    Hemoglobin A1C (Completed)    TSH with reflex to Free T4 if abnormal (Completed)    Vitamin B12 (Completed)    Lipid Panel (Completed)    CBC and Auto Differential (Completed)     Other Visit Diagnoses       Elevated blood sugar        Relevant Orders    Hemoglobin A1C (Completed)    Other fatigue         Relevant Orders    Ammonia (Completed)    Vaccine for streptococcus pneumoniae and influenza        Relevant Orders    Flu vaccine, trivalent, preservative free, HIGH-DOSE, age 65y+ (Fluzone) (Completed)          Chronic conditions reviewed in the assessment and plan.    Continue medications unless specified otherwise.  Previous labs reviewed.   Other specialty provider notes reviewed.   Follow up in 6 months or prn  for mcw   May need to adjust lactulose

## 2024-10-09 LAB
EST. AVERAGE GLUCOSE BLD GHB EST-MCNC: 71 MG/DL
HBA1C MFR BLD: 4.1 %

## 2024-10-10 DIAGNOSIS — D69.6 THROMBOCYTOPENIA (CMS-HCC): ICD-10-CM

## 2024-10-10 DIAGNOSIS — K74.69 OTHER CIRRHOSIS OF LIVER: ICD-10-CM

## 2024-10-10 DIAGNOSIS — E78.2 MIXED HYPERLIPIDEMIA: ICD-10-CM

## 2024-10-14 DIAGNOSIS — K74.69 OTHER CIRRHOSIS OF LIVER: ICD-10-CM

## 2024-10-14 RX ORDER — FUROSEMIDE 20 MG/1
20 TABLET ORAL DAILY
Qty: 30 TABLET | Refills: 11 | Status: SHIPPED | OUTPATIENT
Start: 2024-10-14

## 2024-10-31 ENCOUNTER — LAB (OUTPATIENT)
Dept: LAB | Facility: LAB | Age: 79
End: 2024-10-31
Payer: MEDICARE

## 2024-10-31 DIAGNOSIS — D69.6 THROMBOCYTOPENIA (CMS-HCC): ICD-10-CM

## 2024-10-31 DIAGNOSIS — K74.69 OTHER CIRRHOSIS OF LIVER: ICD-10-CM

## 2024-10-31 DIAGNOSIS — E78.2 MIXED HYPERLIPIDEMIA: ICD-10-CM

## 2024-10-31 LAB
BASOPHILS # BLD AUTO: 0.03 X10*3/UL (ref 0–0.1)
BASOPHILS NFR BLD AUTO: 0.4 %
EOSINOPHIL # BLD AUTO: 0.21 X10*3/UL (ref 0–0.4)
EOSINOPHIL NFR BLD AUTO: 3.1 %
ERYTHROCYTE [DISTWIDTH] IN BLOOD BY AUTOMATED COUNT: 14.2 % (ref 11.5–14.5)
HCT VFR BLD AUTO: 31.2 % (ref 41–52)
HGB BLD-MCNC: 10.1 G/DL (ref 13.5–17.5)
IMM GRANULOCYTES # BLD AUTO: 0.02 X10*3/UL (ref 0–0.5)
IMM GRANULOCYTES NFR BLD AUTO: 0.3 % (ref 0–0.9)
LYMPHOCYTES # BLD AUTO: 1.31 X10*3/UL (ref 0.8–3)
LYMPHOCYTES NFR BLD AUTO: 19.2 %
MCH RBC QN AUTO: 30.5 PG (ref 26–34)
MCHC RBC AUTO-ENTMCNC: 32.4 G/DL (ref 32–36)
MCV RBC AUTO: 94 FL (ref 80–100)
MONOCYTES # BLD AUTO: 1.29 X10*3/UL (ref 0.05–0.8)
MONOCYTES NFR BLD AUTO: 18.9 %
NEUTROPHILS # BLD AUTO: 3.97 X10*3/UL (ref 1.6–5.5)
NEUTROPHILS NFR BLD AUTO: 58.1 %
NRBC BLD-RTO: 0 /100 WBCS (ref 0–0)
PLATELET # BLD AUTO: 68 X10*3/UL (ref 150–450)
RBC # BLD AUTO: 3.31 X10*6/UL (ref 4.5–5.9)
WBC # BLD AUTO: 6.8 X10*3/UL (ref 4.4–11.3)

## 2024-10-31 PROCEDURE — 85025 COMPLETE CBC W/AUTO DIFF WBC: CPT

## 2024-10-31 PROCEDURE — 36415 COLL VENOUS BLD VENIPUNCTURE: CPT

## 2024-11-04 DIAGNOSIS — R71.0 DECREASED HEMOGLOBIN: ICD-10-CM

## 2024-11-05 DIAGNOSIS — D64.9 ANEMIA, UNSPECIFIED TYPE: Primary | ICD-10-CM

## 2024-11-05 RX ORDER — FERROUS SULFATE 325(65) MG
TABLET, DELAYED RELEASE (ENTERIC COATED) ORAL
Start: 2024-11-05

## 2024-11-19 ENCOUNTER — LAB (OUTPATIENT)
Dept: LAB | Facility: LAB | Age: 79
End: 2024-11-19
Payer: MEDICARE

## 2024-11-19 DIAGNOSIS — D64.9 ANEMIA, UNSPECIFIED TYPE: ICD-10-CM

## 2024-11-19 DIAGNOSIS — K74.69 OTHER CIRRHOSIS OF LIVER: ICD-10-CM

## 2024-11-19 PROCEDURE — 82274 ASSAY TEST FOR BLOOD FECAL: CPT

## 2024-11-20 RX ORDER — NADOLOL 20 MG/1
10 TABLET ORAL DAILY
Qty: 30 TABLET | Refills: 6 | Status: SHIPPED | OUTPATIENT
Start: 2024-11-20

## 2024-11-22 LAB — HEMOCCULT STL QL IA: POSITIVE

## 2024-11-23 DIAGNOSIS — K92.2 GASTROINTESTINAL HEMORRHAGE, UNSPECIFIED GASTROINTESTINAL HEMORRHAGE TYPE: ICD-10-CM

## 2024-11-23 DIAGNOSIS — D64.9 ANEMIA, UNSPECIFIED TYPE: Primary | ICD-10-CM

## 2024-11-25 ENCOUNTER — LAB (OUTPATIENT)
Dept: LAB | Facility: LAB | Age: 79
End: 2024-11-25
Payer: MEDICARE

## 2024-11-25 DIAGNOSIS — R71.0 DECREASED HEMOGLOBIN: ICD-10-CM

## 2024-11-25 DIAGNOSIS — D64.9 ANEMIA, UNSPECIFIED TYPE: ICD-10-CM

## 2024-11-25 LAB
ALBUMIN SERPL BCP-MCNC: 3.3 G/DL (ref 3.4–5)
ALP SERPL-CCNC: 138 U/L (ref 33–136)
ALT SERPL W P-5'-P-CCNC: 19 U/L (ref 10–52)
ANION GAP SERPL CALC-SCNC: 11 MMOL/L (ref 10–20)
AST SERPL W P-5'-P-CCNC: 59 U/L (ref 9–39)
BASOPHILS # BLD AUTO: 0.04 X10*3/UL (ref 0–0.1)
BASOPHILS NFR BLD AUTO: 0.6 %
BILIRUB SERPL-MCNC: 2 MG/DL (ref 0–1.2)
BUN SERPL-MCNC: 13 MG/DL (ref 6–23)
CALCIUM SERPL-MCNC: 8.5 MG/DL (ref 8.6–10.3)
CHLORIDE SERPL-SCNC: 102 MMOL/L (ref 98–107)
CO2 SERPL-SCNC: 24 MMOL/L (ref 21–32)
CREAT SERPL-MCNC: 0.94 MG/DL (ref 0.5–1.3)
EGFRCR SERPLBLD CKD-EPI 2021: 83 ML/MIN/1.73M*2
EOSINOPHIL # BLD AUTO: 0.2 X10*3/UL (ref 0–0.4)
EOSINOPHIL NFR BLD AUTO: 3.2 %
ERYTHROCYTE [DISTWIDTH] IN BLOOD BY AUTOMATED COUNT: 14.6 % (ref 11.5–14.5)
FERRITIN SERPL-MCNC: 74 NG/ML (ref 20–300)
GLUCOSE SERPL-MCNC: 101 MG/DL (ref 74–99)
HCT VFR BLD AUTO: 30.6 % (ref 41–52)
HGB BLD-MCNC: 9.9 G/DL (ref 13.5–17.5)
IMM GRANULOCYTES # BLD AUTO: 0.02 X10*3/UL (ref 0–0.5)
IMM GRANULOCYTES NFR BLD AUTO: 0.3 % (ref 0–0.9)
IRON SATN MFR SERPL: 66 % (ref 25–45)
IRON SERPL-MCNC: 214 UG/DL (ref 35–150)
LYMPHOCYTES # BLD AUTO: 1.3 X10*3/UL (ref 0.8–3)
LYMPHOCYTES NFR BLD AUTO: 21.1 %
MCH RBC QN AUTO: 30.8 PG (ref 26–34)
MCHC RBC AUTO-ENTMCNC: 32.4 G/DL (ref 32–36)
MCV RBC AUTO: 95 FL (ref 80–100)
MONOCYTES # BLD AUTO: 1.11 X10*3/UL (ref 0.05–0.8)
MONOCYTES NFR BLD AUTO: 18 %
NEUTROPHILS # BLD AUTO: 3.5 X10*3/UL (ref 1.6–5.5)
NEUTROPHILS NFR BLD AUTO: 56.8 %
NRBC BLD-RTO: 0 /100 WBCS (ref 0–0)
PLATELET # BLD AUTO: 66 X10*3/UL (ref 150–450)
POTASSIUM SERPL-SCNC: 5.4 MMOL/L (ref 3.5–5.3)
PROT SERPL-MCNC: 6 G/DL (ref 6.4–8.2)
RBC # BLD AUTO: 3.21 X10*6/UL (ref 4.5–5.9)
SODIUM SERPL-SCNC: 132 MMOL/L (ref 136–145)
TIBC SERPL-MCNC: 322 UG/DL (ref 240–445)
UIBC SERPL-MCNC: 108 UG/DL (ref 110–370)
WBC # BLD AUTO: 6.2 X10*3/UL (ref 4.4–11.3)

## 2024-11-25 PROCEDURE — 83540 ASSAY OF IRON: CPT

## 2024-11-25 PROCEDURE — 80053 COMPREHEN METABOLIC PANEL: CPT

## 2024-11-25 PROCEDURE — 82728 ASSAY OF FERRITIN: CPT

## 2024-11-25 PROCEDURE — 83550 IRON BINDING TEST: CPT

## 2024-11-25 PROCEDURE — 85025 COMPLETE CBC W/AUTO DIFF WBC: CPT

## 2024-11-25 PROCEDURE — 36415 COLL VENOUS BLD VENIPUNCTURE: CPT

## 2024-11-29 DIAGNOSIS — E87.5 HIGH POTASSIUM: ICD-10-CM

## 2024-11-29 DIAGNOSIS — D64.9 ANEMIA, UNSPECIFIED TYPE: ICD-10-CM

## 2024-12-09 ENCOUNTER — LAB (OUTPATIENT)
Dept: LAB | Facility: LAB | Age: 79
End: 2024-12-09
Payer: MEDICARE

## 2024-12-09 ENCOUNTER — HOSPITAL ENCOUNTER (OUTPATIENT)
Dept: RADIOLOGY | Facility: CLINIC | Age: 79
Discharge: HOME | End: 2024-12-09
Payer: MEDICARE

## 2024-12-09 DIAGNOSIS — C61 MALIGNANT NEOPLASM OF PROSTATE (MULTI): Primary | ICD-10-CM

## 2024-12-09 DIAGNOSIS — K92.2 GASTROINTESTINAL HEMORRHAGE, UNSPECIFIED GASTROINTESTINAL HEMORRHAGE TYPE: ICD-10-CM

## 2024-12-09 LAB — PSA SERPL-MCNC: 3.91 NG/ML

## 2024-12-09 PROCEDURE — 74174 CTA ABD&PLVS W/CONTRAST: CPT | Performed by: RADIOLOGY

## 2024-12-09 PROCEDURE — 36415 COLL VENOUS BLD VENIPUNCTURE: CPT

## 2024-12-09 PROCEDURE — 2550000001 HC RX 255 CONTRASTS: Performed by: INTERNAL MEDICINE

## 2024-12-09 PROCEDURE — 84153 ASSAY OF PSA TOTAL: CPT

## 2024-12-09 PROCEDURE — 74174 CTA ABD&PLVS W/CONTRAST: CPT

## 2024-12-14 DIAGNOSIS — C22.0 HEPATOCELLULAR CARCINOMA (MULTI): Primary | ICD-10-CM

## 2024-12-20 ENCOUNTER — LAB (OUTPATIENT)
Dept: LAB | Facility: LAB | Age: 79
End: 2024-12-20
Payer: MEDICARE

## 2024-12-20 DIAGNOSIS — E87.5 HIGH POTASSIUM: ICD-10-CM

## 2024-12-20 DIAGNOSIS — D64.9 ANEMIA, UNSPECIFIED TYPE: ICD-10-CM

## 2024-12-20 DIAGNOSIS — D64.9 LOW HEMOGLOBIN: ICD-10-CM

## 2024-12-20 LAB
ANION GAP SERPL CALC-SCNC: 10 MMOL/L (ref 10–20)
BASOPHILS # BLD AUTO: 0.03 X10*3/UL (ref 0–0.1)
BASOPHILS NFR BLD AUTO: 0.5 %
BUN SERPL-MCNC: 10 MG/DL (ref 6–23)
CALCIUM SERPL-MCNC: 8.3 MG/DL (ref 8.6–10.3)
CHLORIDE SERPL-SCNC: 104 MMOL/L (ref 98–107)
CO2 SERPL-SCNC: 26 MMOL/L (ref 21–32)
CREAT SERPL-MCNC: 0.82 MG/DL (ref 0.5–1.3)
EGFRCR SERPLBLD CKD-EPI 2021: 90 ML/MIN/1.73M*2
EOSINOPHIL # BLD AUTO: 0.2 X10*3/UL (ref 0–0.4)
EOSINOPHIL NFR BLD AUTO: 3 %
ERYTHROCYTE [DISTWIDTH] IN BLOOD BY AUTOMATED COUNT: 14.6 % (ref 11.5–14.5)
FERRITIN SERPL-MCNC: 37 NG/ML (ref 20–300)
GLUCOSE SERPL-MCNC: 91 MG/DL (ref 74–99)
HCT VFR BLD AUTO: 28.8 % (ref 41–52)
HGB BLD-MCNC: 9.4 G/DL (ref 13.5–17.5)
IMM GRANULOCYTES # BLD AUTO: 0.07 X10*3/UL (ref 0–0.5)
IMM GRANULOCYTES NFR BLD AUTO: 1.1 % (ref 0–0.9)
IRON SERPL-MCNC: 55 UG/DL (ref 35–150)
LYMPHOCYTES # BLD AUTO: 1.39 X10*3/UL (ref 0.8–3)
LYMPHOCYTES NFR BLD AUTO: 21.1 %
MCH RBC QN AUTO: 30.2 PG (ref 26–34)
MCHC RBC AUTO-ENTMCNC: 32.6 G/DL (ref 32–36)
MCV RBC AUTO: 93 FL (ref 80–100)
MONOCYTES # BLD AUTO: 1.14 X10*3/UL (ref 0.05–0.8)
MONOCYTES NFR BLD AUTO: 17.3 %
NEUTROPHILS # BLD AUTO: 3.77 X10*3/UL (ref 1.6–5.5)
NEUTROPHILS NFR BLD AUTO: 57 %
NRBC BLD-RTO: 0 /100 WBCS (ref 0–0)
PLATELET # BLD AUTO: 63 X10*3/UL (ref 150–450)
POTASSIUM SERPL-SCNC: 4.2 MMOL/L (ref 3.5–5.3)
RBC # BLD AUTO: 3.11 X10*6/UL (ref 4.5–5.9)
SODIUM SERPL-SCNC: 136 MMOL/L (ref 136–145)
WBC # BLD AUTO: 6.6 X10*3/UL (ref 4.4–11.3)

## 2024-12-20 PROCEDURE — 36415 COLL VENOUS BLD VENIPUNCTURE: CPT

## 2024-12-20 PROCEDURE — 85025 COMPLETE CBC W/AUTO DIFF WBC: CPT

## 2024-12-20 PROCEDURE — 83540 ASSAY OF IRON: CPT

## 2024-12-20 PROCEDURE — 80048 BASIC METABOLIC PNL TOTAL CA: CPT

## 2024-12-20 PROCEDURE — 82728 ASSAY OF FERRITIN: CPT

## 2024-12-23 DIAGNOSIS — K74.69 OTHER CIRRHOSIS OF LIVER: Primary | ICD-10-CM

## 2024-12-23 DIAGNOSIS — K74.60 HEPATIC CIRRHOSIS, UNSPECIFIED HEPATIC CIRRHOSIS TYPE, UNSPECIFIED WHETHER ASCITES PRESENT (MULTI): ICD-10-CM

## 2024-12-23 DIAGNOSIS — I85.00 ESOPHAGEAL VARICES WITHOUT BLEEDING, UNSPECIFIED ESOPHAGEAL VARICES TYPE (MULTI): Primary | ICD-10-CM

## 2024-12-31 ENCOUNTER — HOSPITAL ENCOUNTER (OUTPATIENT)
Dept: RADIOLOGY | Facility: CLINIC | Age: 79
Discharge: HOME | End: 2024-12-31
Payer: MEDICARE

## 2024-12-31 DIAGNOSIS — C22.0 HEPATOCELLULAR CARCINOMA (MULTI): ICD-10-CM

## 2024-12-31 PROCEDURE — A9575 INJ GADOTERATE MEGLUMI 0.1ML: HCPCS | Performed by: INTERNAL MEDICINE

## 2024-12-31 PROCEDURE — 74183 MRI ABD W/O CNTR FLWD CNTR: CPT

## 2024-12-31 PROCEDURE — 2550000001 HC RX 255 CONTRASTS: Performed by: INTERNAL MEDICINE

## 2024-12-31 RX ORDER — GADOTERATE MEGLUMINE 376.9 MG/ML
18 INJECTION INTRAVENOUS
Status: COMPLETED | OUTPATIENT
Start: 2024-12-31 | End: 2024-12-31

## 2024-12-31 RX ADMIN — GADOTERATE MEGLUMINE 18 ML: 376.9 INJECTION INTRAVENOUS at 12:52

## 2025-01-01 ENCOUNTER — TELEPHONE (OUTPATIENT)
Dept: PRIMARY CARE | Facility: CLINIC | Age: 80
End: 2025-01-01
Payer: MEDICARE

## 2025-01-03 DIAGNOSIS — R16.0 LIVER MASSES: ICD-10-CM

## 2025-01-06 ENCOUNTER — TELEPHONE (OUTPATIENT)
Dept: HEMATOLOGY/ONCOLOGY | Facility: CLINIC | Age: 80
End: 2025-01-06
Payer: MEDICARE

## 2025-01-06 NOTE — TELEPHONE ENCOUNTER
I called and spoke with Julieta about getting Alfonso scheduled to see Dr. Alfredo. Julieta was agreeable to this Friday 01/10 at 1200. I did confirm that Alfonso did just have an ablation done at [facility]. No path/tissue was obtained during that time. Following the ablation Alfonso was in the hospital for week then went home with home care. They have not had contact with [facility] since 2021. Office number was provided and they are aware of our location. She was appreciative and will be at the appointment 01/10

## 2025-01-09 ENCOUNTER — APPOINTMENT (OUTPATIENT)
Dept: HEMATOLOGY/ONCOLOGY | Facility: CLINIC | Age: 80
End: 2025-01-09
Payer: MEDICARE

## 2025-01-10 ENCOUNTER — OFFICE VISIT (OUTPATIENT)
Dept: HEMATOLOGY/ONCOLOGY | Facility: CLINIC | Age: 80
End: 2025-01-10
Payer: MEDICARE

## 2025-01-10 ENCOUNTER — LAB (OUTPATIENT)
Dept: LAB | Facility: CLINIC | Age: 80
End: 2025-01-10
Payer: MEDICARE

## 2025-01-10 VITALS
DIASTOLIC BLOOD PRESSURE: 69 MMHG | SYSTOLIC BLOOD PRESSURE: 130 MMHG | HEART RATE: 58 BPM | OXYGEN SATURATION: 98 % | BODY MASS INDEX: 26.92 KG/M2 | WEIGHT: 182.32 LBS | RESPIRATION RATE: 18 BRPM | TEMPERATURE: 97.3 F

## 2025-01-10 DIAGNOSIS — D49.7 NEOPLASM OF UNSPECIFIED BEHAVIOR OF ENDOCRINE GLANDS AND OTHER PARTS OF NERVOUS SYSTEM: ICD-10-CM

## 2025-01-10 DIAGNOSIS — C22.0 HEPATOCELLULAR CARCINOMA (MULTI): ICD-10-CM

## 2025-01-10 DIAGNOSIS — R16.0 LIVER MASSES: ICD-10-CM

## 2025-01-10 DIAGNOSIS — D64.9 NORMOCYTIC ANEMIA: ICD-10-CM

## 2025-01-10 DIAGNOSIS — D64.9 NORMOCYTIC ANEMIA: Primary | ICD-10-CM

## 2025-01-10 LAB
ALBUMIN SERPL BCP-MCNC: 3.2 G/DL (ref 3.4–5)
ALP SERPL-CCNC: 123 U/L (ref 33–136)
ALT SERPL W P-5'-P-CCNC: 15 U/L (ref 10–52)
ANION GAP SERPL CALC-SCNC: 11 MMOL/L (ref 10–20)
APPEARANCE UR: CLEAR
AST SERPL W P-5'-P-CCNC: 29 U/L (ref 9–39)
BASO STIPL BLD QL SMEAR: PRESENT
BASOPHILS # BLD AUTO: 0.02 X10*3/UL (ref 0–0.1)
BASOPHILS NFR BLD AUTO: 0.3 %
BILIRUB SERPL-MCNC: 2 MG/DL (ref 0–1.2)
BILIRUB UR STRIP.AUTO-MCNC: NEGATIVE MG/DL
BUN SERPL-MCNC: 12 MG/DL (ref 6–23)
CALCIUM SERPL-MCNC: 8.9 MG/DL (ref 8.6–10.3)
CHLORIDE SERPL-SCNC: 102 MMOL/L (ref 98–107)
CO2 SERPL-SCNC: 26 MMOL/L (ref 21–32)
COLOR UR: NORMAL
CREAT SERPL-MCNC: 0.88 MG/DL (ref 0.5–1.3)
EGFRCR SERPLBLD CKD-EPI 2021: 89 ML/MIN/1.73M*2
EOSINOPHIL # BLD AUTO: 0.18 X10*3/UL (ref 0–0.4)
EOSINOPHIL NFR BLD AUTO: 2.5 %
ERYTHROCYTE [DISTWIDTH] IN BLOOD BY AUTOMATED COUNT: 14.4 % (ref 11.5–14.5)
GLUCOSE SERPL-MCNC: 108 MG/DL (ref 74–99)
GLUCOSE UR STRIP.AUTO-MCNC: NORMAL MG/DL
HCT VFR BLD AUTO: 29.3 % (ref 41–52)
HGB BLD-MCNC: 9.5 G/DL (ref 13.5–17.5)
IMM GRANULOCYTES # BLD AUTO: 0.01 X10*3/UL (ref 0–0.5)
IMM GRANULOCYTES NFR BLD AUTO: 0.1 % (ref 0–0.9)
KETONES UR STRIP.AUTO-MCNC: NEGATIVE MG/DL
LEUKOCYTE ESTERASE UR QL STRIP.AUTO: NEGATIVE
LYMPHOCYTES # BLD AUTO: 1.2 X10*3/UL (ref 0.8–3)
LYMPHOCYTES NFR BLD AUTO: 16.9 %
MAGNESIUM SERPL-MCNC: 1.7 MG/DL (ref 1.6–2.4)
MCH RBC QN AUTO: 30 PG (ref 26–34)
MCHC RBC AUTO-ENTMCNC: 32.4 G/DL (ref 32–36)
MCV RBC AUTO: 92 FL (ref 80–100)
MONOCYTES # BLD AUTO: 0.99 X10*3/UL (ref 0.05–0.8)
MONOCYTES NFR BLD AUTO: 14 %
NEUTROPHILS # BLD AUTO: 4.68 X10*3/UL (ref 1.6–5.5)
NEUTROPHILS NFR BLD AUTO: 66.2 %
NITRITE UR QL STRIP.AUTO: NEGATIVE
NRBC BLD-RTO: ABNORMAL /100{WBCS}
OVALOCYTES BLD QL SMEAR: NORMAL
PH UR STRIP.AUTO: 7 [PH]
PLATELET # BLD AUTO: 58 X10*3/UL (ref 150–450)
POLYCHROMASIA BLD QL SMEAR: NORMAL
POTASSIUM SERPL-SCNC: 4.3 MMOL/L (ref 3.5–5.3)
PROT SERPL-MCNC: 5.8 G/DL (ref 6.4–8.2)
PROT UR STRIP.AUTO-MCNC: NEGATIVE MG/DL
RBC # BLD AUTO: 3.17 X10*6/UL (ref 4.5–5.9)
RBC # UR STRIP.AUTO: NEGATIVE /UL
RBC MORPH BLD: NORMAL
SODIUM SERPL-SCNC: 135 MMOL/L (ref 136–145)
SP GR UR STRIP.AUTO: 1.01
TSH SERPL-ACNC: 2.09 MIU/L (ref 0.44–3.98)
UROBILINOGEN UR STRIP.AUTO-MCNC: NORMAL MG/DL
WBC # BLD AUTO: 7.1 X10*3/UL (ref 4.4–11.3)

## 2025-01-10 PROCEDURE — 36415 COLL VENOUS BLD VENIPUNCTURE: CPT

## 2025-01-10 PROCEDURE — 84155 ASSAY OF PROTEIN SERUM: CPT

## 2025-01-10 PROCEDURE — 82024 ASSAY OF ACTH: CPT

## 2025-01-10 PROCEDURE — 83521 IG LIGHT CHAINS FREE EACH: CPT

## 2025-01-10 PROCEDURE — 84165 PROTEIN E-PHORESIS SERUM: CPT

## 2025-01-10 PROCEDURE — 82330 ASSAY OF CALCIUM: CPT

## 2025-01-10 PROCEDURE — 1157F ADVNC CARE PLAN IN RCRD: CPT | Performed by: INTERNAL MEDICINE

## 2025-01-10 PROCEDURE — 3075F SYST BP GE 130 - 139MM HG: CPT | Performed by: INTERNAL MEDICINE

## 2025-01-10 PROCEDURE — 99215 OFFICE O/P EST HI 40 MIN: CPT | Mod: 25 | Performed by: INTERNAL MEDICINE

## 2025-01-10 PROCEDURE — 1159F MED LIST DOCD IN RCRD: CPT | Performed by: INTERNAL MEDICINE

## 2025-01-10 PROCEDURE — 99205 OFFICE O/P NEW HI 60 MIN: CPT | Performed by: INTERNAL MEDICINE

## 2025-01-10 PROCEDURE — 87340 HEPATITIS B SURFACE AG IA: CPT

## 2025-01-10 PROCEDURE — 80053 COMPREHEN METABOLIC PANEL: CPT

## 2025-01-10 PROCEDURE — 82746 ASSAY OF FOLIC ACID SERUM: CPT

## 2025-01-10 PROCEDURE — 3078F DIAST BP <80 MM HG: CPT | Performed by: INTERNAL MEDICINE

## 2025-01-10 PROCEDURE — 83735 ASSAY OF MAGNESIUM: CPT

## 2025-01-10 PROCEDURE — 82784 ASSAY IGA/IGD/IGG/IGM EACH: CPT

## 2025-01-10 PROCEDURE — 81003 URINALYSIS AUTO W/O SCOPE: CPT

## 2025-01-10 PROCEDURE — 86704 HEP B CORE ANTIBODY TOTAL: CPT

## 2025-01-10 PROCEDURE — 83010 ASSAY OF HAPTOGLOBIN QUANT: CPT

## 2025-01-10 PROCEDURE — 85025 COMPLETE CBC W/AUTO DIFF WBC: CPT

## 2025-01-10 PROCEDURE — 1126F AMNT PAIN NOTED NONE PRSNT: CPT | Performed by: INTERNAL MEDICINE

## 2025-01-10 PROCEDURE — 1123F ACP DISCUSS/DSCN MKR DOCD: CPT | Performed by: INTERNAL MEDICINE

## 2025-01-10 PROCEDURE — 82533 TOTAL CORTISOL: CPT

## 2025-01-10 PROCEDURE — 84443 ASSAY THYROID STIM HORMONE: CPT

## 2025-01-10 PROCEDURE — 86706 HEP B SURFACE ANTIBODY: CPT

## 2025-01-10 ASSESSMENT — NCCN CANCER DISTRESS MANAGEMENT: NCCN PHYSICAL CONCERNS: 3

## 2025-01-10 ASSESSMENT — PAIN SCALES - GENERAL: PAINLEVEL_OUTOF10: 0-NO PAIN

## 2025-01-10 NOTE — PROGRESS NOTES
Patient ID: Alfonso Toscano is a 79 y.o. male.  Referring Physician: Gisele Patel MD  [address] [place] [address], [address]  [place] [address]  Primary Care Provider: Gisele Patel MD      Subjective    HPI  Mr. Alfonso Toscano is a 78 y/o M with a history of hepatocellular carcinoma presenting for initial consultation at the request of Dr. Patel for follow-up after hospital discharge.    MRI 11/6/25 shows cirrhosis, new heterogeneously enhancing masses throughout the left and right  hepatic lobes with increasing intrahepatic biliary dilatation as detailed are concerning for recurrence of hepatocellular carcinoma, an old ablation site in the posterior right lobe with no visible viable neoplasm, abnormal possible old ablation site in the ventral lateral segment of the left lobe which does have a possible viable enhancing tissue, 3 or 4 arterial-enhancing sub centimeter lesions and one additional arterial phase enhancement in the right lower lobe concerning for hepatocellular carcinoma.     Most recent hepatic function testing shows bilirubin 2.0, EST 59, and elevated alk phos 138.    Patient reports prolonged hospitalization after ablation with a GI bleed and he felt absolutely awful and was in the ICU. He was a very heavy drinker and quit drinking in 1988. He does get banded regularly for esophageal varicosities. He has a history of prostate cancer and sees urology every 6 months. He denies any new pain. No n/v/d. No unexplained weight loss, he has actually gained a little weight recently. No new lumps, bumps, or rash. No new cough or SOB. No other respiratory symptoms. No issues with swelling. No other major complaints at this time.     Patient's past medical history, surgical history, family history and social history reviewed.    Objective     Vitals:    01/10/25 1212   BP: 130/69   Pulse: 58   Resp: 18   Temp: 36.3 °C (97.3 °F)   SpO2: 98%     Review of Systems:   Review of  Systems:    Positive per HPI, otherwise negative.   Physical Exam  Gen: appears well in clinic, NAD  HEENT: atraumatic head, normocephalic, EOMI, conjunctiva normal  LUNG: no increased WOB, CTAB  CV: No JVD. RRR  GI: soft, NT, ND  LE: no LE edema  Skin: no obvious rashes or lesions on visible skin  Neuro: interactive, no focal deficits noted  Psych: normal mood and affect  Performance Status:  Symptomatic; fully ambulatory    Labs/Imaging/Pathology: personally reviewed reports and images in Epic electronic medical record system. Pertinent results as it related to the plan represented in below in assessment and plan.   Assessment/Plan    History of hepatocellular adenocarcinoma  - Initially diagnosed in May 2021.   - Ultrasound showed 3 liver lesions.  - MRI liver done.   - He underwent ablation at the [facility] 10/11/21 with complications and a prolonged hospitalization for GI bleed. He was admitted 10/24/21-11/9/21 with persistent GI blood loss.   - Since then he has seen gastroenterology and undergoes banding on a regular basis for esophageal varicosities.   - Last colonoscopy 11/3/21 that showed diverticulitis in the sigmoid colon.  - Last EGD 10/26/21.  - We discussed there has been no imaging on the liver lesions since 2021.  - It is hard to understand if lesions are new or worsening and that he has a new sub centimeter lesion that is concerning for hepatocellular adenocarcinoma and we will plan for repeat MRI in 6 weeks.   - Regarding his anemia we will plan for SPEP and serum free light chains to assess for plasma cell dyscrasia.  - Will plan to check haptoglobin to rule out hemolysis.  - We will repeat blood counts today.  - If all are normal we will plan for follow-up with imaging in 6 weeks.   - We discussed given he is not interested in liver directed therapy that we could consider systemic therapy with avastin +atezolizumab.  - Reviewed side effects and consent signed today.  - RTC in 6 weeks.      RTC in 6 weeks. This note has been transcribed using a medical scribe and there is a possibility of unintentional typing misprints.    Diagnoses and all orders for this visit:  Normocytic anemia  -     Haptoglobin; Future  -     Serum Protein Electrophoresis; Future  -     [place]/Lambda Free Light Chain, Serum; Future  -     Immunoglobulins (IgG, IgA, IgM); Future  -     Folate; Future  Hepatocellular carcinoma (Multi)  -     Referral to Hematology and Oncology  -     CBC and Auto Differential; Future  -     Comprehensive Metabolic Panel; Future  -     Hepatitis B Core Antibody, Total; Future  -     Hepatitis B surface antibody; Future  -     Hepatitis B surface antigen; Future  -     ACTH; Future  -     Cortisol; Future  -     TSH with reflex to Free T4 if abnormal; Future  -     Urinalysis with Reflex Microscopic; Future  -     Magnesium; Future  -     IR CVC port placement; Future  -     Calcium, Ionized; Future  -     MR liver w and wo IV contrast; Future  -     Clinic Appointment Request; Future  Liver masses  -     Referral To Hematology and Oncology  -     Calcium, Ionized; Future  -     MR liver w and wo IV contrast; Future  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   Time Spent  Prep time on day of patient encounter: 5 minutes  Time spent directly with patient, family or caregiver: 40 minutes  Additional Time Spent on Patient Care Activities: 7 minutes  Documentation Time: 7 minutes  Other Time Spent: 0 minutes  Total: 59 minutes

## 2025-01-11 LAB
CA-I BLD-SCNC: 1.16 MMOL/L (ref 1.1–1.33)
CORTIS SERPL-MCNC: 16 UG/DL (ref 2.5–20)
FOLATE SERPL-MCNC: 9.5 NG/ML
HAPTOGLOB SERPL NEPH-MCNC: <30 MG/DL (ref 30–200)
HBV CORE AB SER QL: NONREACTIVE
HBV SURFACE AB SER-ACNC: <3.1 MIU/ML
HBV SURFACE AG SERPL QL IA: NONREACTIVE
IGA SERPL-MCNC: 592 MG/DL (ref 70–400)
IGG SERPL-MCNC: 1080 MG/DL (ref 700–1600)
IGM SERPL-MCNC: 130 MG/DL (ref 40–230)
PROT SERPL-MCNC: 5.8 G/DL (ref 6.4–8.2)

## 2025-01-12 LAB
ACTH PLAS-MCNC: 17.7 PG/ML (ref 7.2–63.3)
KAPPA LC SERPL-MCNC: 4.66 MG/DL (ref 0.33–1.94)
KAPPA LC/LAMBDA SER: 1.14 {RATIO} (ref 0.26–1.65)
LAMBDA LC SERPL-MCNC: 4.08 MG/DL (ref 0.57–2.63)

## 2025-01-13 LAB
ALBUMIN: 3.2 G/DL (ref 3.4–5)
ALPHA 1 GLOBULIN: 0.3 G/DL (ref 0.2–0.6)
ALPHA 2 GLOBULIN: 0.5 G/DL (ref 0.4–1.1)
BETA GLOBULIN: 0.9 G/DL (ref 0.5–1.2)
GAMMA GLOBULIN: 1 G/DL (ref 0.5–1.4)
PATH REVIEW-SERUM PROTEIN ELECTROPHORESIS: ABNORMAL
PROTEIN ELECTROPHORESIS COMMENT: ABNORMAL

## 2025-01-21 ENCOUNTER — TELEPHONE (OUTPATIENT)
Dept: HEMATOLOGY/ONCOLOGY | Facility: CLINIC | Age: 80
End: 2025-01-21
Payer: MEDICARE

## 2025-01-21 DIAGNOSIS — C22.0 HEPATOCELLULAR CARCINOMA (MULTI): Primary | ICD-10-CM

## 2025-01-21 DIAGNOSIS — D64.9 NORMOCYTIC ANEMIA: ICD-10-CM

## 2025-01-21 NOTE — TELEPHONE ENCOUNTER
"I called and spoke with Julieta about [employer]. Per Dr. Alfredo \"Can we let him know there is some evidence his blood is breaking down and recommend another blood test when convenient. We will follow his results.\" I told Julieta all information. She was very appreciative and all questions were answered.She is aware that we will call with results. She was appreciative and denied further questions     "

## 2025-01-23 ENCOUNTER — LAB (OUTPATIENT)
Dept: LAB | Facility: LAB | Age: 80
End: 2025-01-23
Payer: MEDICARE

## 2025-01-23 DIAGNOSIS — C22.0 HEPATOCELLULAR CARCINOMA (MULTI): ICD-10-CM

## 2025-01-23 DIAGNOSIS — D64.9 NORMOCYTIC ANEMIA: ICD-10-CM

## 2025-01-23 LAB
BASOPHILS # BLD AUTO: 0.03 X10*3/UL (ref 0–0.1)
BASOPHILS NFR BLD AUTO: 0.4 %
DAT-POLYSPECIFIC: NORMAL
EOSINOPHIL # BLD AUTO: 0.25 X10*3/UL (ref 0–0.4)
EOSINOPHIL NFR BLD AUTO: 3.5 %
ERYTHROCYTE [DISTWIDTH] IN BLOOD BY AUTOMATED COUNT: 14.7 % (ref 11.5–14.5)
HCT VFR BLD AUTO: 31.3 % (ref 41–52)
HGB BLD-MCNC: 9.9 G/DL (ref 13.5–17.5)
IMM GRANULOCYTES # BLD AUTO: 0.03 X10*3/UL (ref 0–0.5)
IMM GRANULOCYTES NFR BLD AUTO: 0.4 % (ref 0–0.9)
LYMPHOCYTES # BLD AUTO: 1.43 X10*3/UL (ref 0.8–3)
LYMPHOCYTES NFR BLD AUTO: 20.1 %
MCH RBC QN AUTO: 29.3 PG (ref 26–34)
MCHC RBC AUTO-ENTMCNC: 31.6 G/DL (ref 32–36)
MCV RBC AUTO: 93 FL (ref 80–100)
MONOCYTES # BLD AUTO: 1.03 X10*3/UL (ref 0.05–0.8)
MONOCYTES NFR BLD AUTO: 14.5 %
NEUTROPHILS # BLD AUTO: 4.33 X10*3/UL (ref 1.6–5.5)
NEUTROPHILS NFR BLD AUTO: 61.1 %
NRBC BLD-RTO: 0 /100 WBCS (ref 0–0)
PLATELET # BLD AUTO: 75 X10*3/UL (ref 150–450)
RBC # BLD AUTO: 3.38 X10*6/UL (ref 4.5–5.9)
WBC # BLD AUTO: 7.1 X10*3/UL (ref 4.4–11.3)

## 2025-01-23 PROCEDURE — 82105 ALPHA-FETOPROTEIN SERUM: CPT

## 2025-01-23 PROCEDURE — 85025 COMPLETE CBC W/AUTO DIFF WBC: CPT

## 2025-01-24 LAB — AFP SERPL-MCNC: 48 NG/ML (ref 0–9)

## 2025-02-22 ENCOUNTER — HOSPITAL ENCOUNTER (OUTPATIENT)
Dept: RADIOLOGY | Facility: HOSPITAL | Age: 80
Discharge: HOME | End: 2025-02-22
Payer: MEDICARE

## 2025-02-22 DIAGNOSIS — C22.0 HEPATOCELLULAR CARCINOMA (MULTI): ICD-10-CM

## 2025-02-22 DIAGNOSIS — R16.0 LIVER MASSES: ICD-10-CM

## 2025-02-22 PROCEDURE — 74183 MRI ABD W/O CNTR FLWD CNTR: CPT

## 2025-02-22 PROCEDURE — 2550000001 HC RX 255 CONTRASTS: Performed by: INTERNAL MEDICINE

## 2025-02-22 PROCEDURE — A9575 INJ GADOTERATE MEGLUMI 0.1ML: HCPCS | Performed by: INTERNAL MEDICINE

## 2025-02-22 PROCEDURE — 74183 MRI ABD W/O CNTR FLWD CNTR: CPT | Performed by: STUDENT IN AN ORGANIZED HEALTH CARE EDUCATION/TRAINING PROGRAM

## 2025-02-22 RX ORDER — GADOTERATE MEGLUMINE 376.9 MG/ML
18 INJECTION INTRAVENOUS
Status: COMPLETED | OUTPATIENT
Start: 2025-02-22 | End: 2025-02-22

## 2025-02-22 RX ADMIN — GADOTERATE MEGLUMINE 18 ML: 376.9 INJECTION INTRAVENOUS at 11:20

## 2025-02-22 NOTE — PROGRESS NOTES
Patient ID: Alfonso Toscano is a 80 y.o. male.    Subjective    HPI  Mr. Alfonso Toscano is a 78 y/o M with a history of hepatocellular carcinoma presenting for follow up of hepatocellular cancer.   Since his last visit, he had an MRI on 2/22/25 which revealed the segment III lesion is stable, however there is a new lesion in segment VI. He denies any new abdominal complaints. He does have fatigue and cramping of the calf muscles. He does describe being generally weak, but has no other complaints.       Patient's past medical history, surgical history, family history and social history reviewed.    Review of Systems:   Review of Systems:    Positive per HPI, otherwise negative.     Objective    BSA: 1.95 meters squared  /71 (BP Location: Right arm, Patient Position: Sitting)   Pulse 52   Temp 36.4 °C (97.5 °F) (Temporal)   Resp 19   Wt 80.5 kg (177 lb 7.5 oz)   SpO2 97%   BMI 27.85 kg/m²     Physical Exam  Gen: appears well in clinic, NAD  HEENT: atraumatic head, normocephalic, EOMI, conjunctiva normal  LUNG: no increased WOB, CTAB  CV: No JVD. RRR  GI: soft, NT, ND  LE: no LE edema  Skin: no obvious rashes or lesions on visible skin  Neuro: interactive, no focal deficits noted  Psych: normal mood and affect    Performance Status:  Symptomatic; fully ambulatory    Labs/Imaging/Pathology: Personally reviewed reports and images in Epic electronic medical record system. Pertinent results as it related to the plan represented in below in assessment and plan.   Component      Latest Ref Rng 1/23/2025   Alpha-Fetoprotein      0 - 9 ng/mL 48 (H)       Legend:  (H) High      Assessment/Plan   History of hepatocellular adenocarcinoma  - Initially diagnosed in May 2021.   - Ultrasound showed 3 liver lesions.  - MRI liver done.   - He underwent ablation at the [facility] 10/11/21 with complications and a prolonged hospitalization for GI bleed. He was admitted 10/24/21-11/9/21 with persistent GI blood loss.    - Since then he has seen gastroenterology and undergoes banding on a regular basis for esophageal varicosities.   - Last colonoscopy 11/3/21 that showed diverticulitis in the sigmoid colon.  - Last EGD 10/26/21.  - We discussed there has been no imaging on the liver lesions since 2021.  - It is hard to understand if lesions are new or worsening and that he has a new sub centimeter lesion that is concerning for hepatocellular adenocarcinoma and we will plan for repeat MRI in 6 weeks.   - Regarding his anemia we will plan for SPEP and serum free light chains to assess for plasma cell dyscrasia.  - Will plan to check haptoglobin to rule out hemolysis.  - We will repeat blood counts today.  - If all are normal we will plan for follow-up with imaging in 6 weeks.   - We discussed given he is not interested in liver directed therapy that we could consider systemic therapy with avastin +atezolizumab.  - Reviewed side effects and consent signed today.  - RTC in 6 weeks.     2/25/25:   - New lesions in segment VI is concerning for disease progression.    - His AFP on 1/23/25, 48 ng/mL, was also elevated.    - We discussed localized treatment options. However, he is reluctant to undergo radiation or ablation.    - We discussed alternatively, we could consider systemic treatment.   - Will plan to check labs today   - We discussed bevacizumab vs atezolizumab   - Will plan to start cycle 1 next week   - RTC for toxicity check with Eric, then RTC with Dr. Alfredo for cycle 2   - We reviewed side effects and consent signed      Reviewed ongoing medical problems and how they relate to his malignancy, will continue long term monitoring.    RTC for toxicity check with Eric, then RTC with Dr. Alfredo for cycle 2  This note has been transcribed using a medical scribe and there is a possibility of unintentional typing misprints       Diagnoses and all orders for this visit:  Hepatocellular carcinoma (Multi)  -     Clinic Appointment  Request  -     prochlorperazine (Compazine) 10 mg tablet; Take 1 tablet (10 mg) by mouth every 6 hours if needed for nausea or vomiting.  -     loperamide (Imodium) 2 mg capsule; Take 2 capsules (4 mg) by mouth with the first episode of diarrhea and 1 capsule (2 mg) by mouth with any additional episodes. Maximum 8 capsules (16 mg) per day.  -     CBC and Auto Differential; Future  -     Comprehensive metabolic panel; Future  -     Acth; Future  -     Afp Tumor Marker; Future  -     Cortisol Am; Future  -     Tsh With Reflex To Free T4 If Abnormal; Future  -     Urinalysis with Reflex Microscopic; Future  -     Infusion Appointment Request; Future  -     Clinic Appointment Request; Future  -     Infusion Appointment Request; Future  -     CBC and Auto Differential; Future  -     Comprehensive metabolic panel; Future  -     Urinalysis with Reflex Microscopic; Future  -     Infusion Appointment Request; Future  Other orders  -     prochlorperazine (Compazine) tablet 10 mg  -     prochlorperazine (Compazine) injection 10 mg  -     atezolizumab (Tecentriq) 1,200 mg in sodium chloride 0.9% 270 mL IV  -     bevacizumab-awwb (Mvasi) 1,200 mg in sodium chloride 0.9% 148 mL IV  -     sodium chloride 0.9 % bolus 500 mL  -     dextrose 5 % in water (D5W) bolus 500 mL  -     diphenhydrAMINE (BENADryl) injection 50 mg  -     methylPREDNISolone sod succinate (SOLU-Medrol) 40 mg/mL injection 40 mg  -     famotidine PF (Pepcid) injection 20 mg  -     EPINEPHrine (Epipen) injection syringe 0.3 mg  -     albuterol 2.5 mg /3 mL (0.083 %) nebulizer solution 3 mL    Laura Alfredo MD  Hematology/Oncology  [facility] at [facility]      Scribe Attestation  By signing my name below, I, Mosie Ocampo, attest that this documentation has been prepared under the direction and in the presence of Laura Alfredo MD.    Time Spent  Prep time on day of patient encounter: 5 minutes  Time spent directly with patient, family or  caregiver: 30 minutes  Additional Time Spent on Patient Care Activities: 5 minutes  Documentation Time: 5 minutes  Other Time Spent: 0 minutes  Total: 45 minutes

## 2025-02-25 ENCOUNTER — PATIENT OUTREACH (OUTPATIENT)
Dept: HEMATOLOGY/ONCOLOGY | Facility: CLINIC | Age: 80
End: 2025-02-25

## 2025-02-25 ENCOUNTER — LAB (OUTPATIENT)
Dept: LAB | Facility: CLINIC | Age: 80
End: 2025-02-25
Payer: MEDICARE

## 2025-02-25 ENCOUNTER — OFFICE VISIT (OUTPATIENT)
Dept: HEMATOLOGY/ONCOLOGY | Facility: CLINIC | Age: 80
End: 2025-02-25
Payer: MEDICARE

## 2025-02-25 VITALS
HEART RATE: 52 BPM | SYSTOLIC BLOOD PRESSURE: 138 MMHG | RESPIRATION RATE: 19 BRPM | OXYGEN SATURATION: 97 % | DIASTOLIC BLOOD PRESSURE: 71 MMHG | BODY MASS INDEX: 27.85 KG/M2 | TEMPERATURE: 97.5 F | WEIGHT: 177.47 LBS

## 2025-02-25 DIAGNOSIS — C22.0 HEPATOCELLULAR CARCINOMA (MULTI): ICD-10-CM

## 2025-02-25 DIAGNOSIS — C22.0 HEPATOCELLULAR CARCINOMA (MULTI): Primary | ICD-10-CM

## 2025-02-25 LAB
ALBUMIN SERPL BCP-MCNC: 3.4 G/DL (ref 3.4–5)
ALP SERPL-CCNC: 114 U/L (ref 33–136)
ALT SERPL W P-5'-P-CCNC: 18 U/L (ref 10–52)
ANION GAP SERPL CALC-SCNC: 10 MMOL/L (ref 10–20)
APPEARANCE UR: CLEAR
AST SERPL W P-5'-P-CCNC: 31 U/L (ref 9–39)
BASOPHILS # BLD AUTO: 0.03 X10*3/UL (ref 0–0.1)
BASOPHILS NFR BLD AUTO: 0.3 %
BILIRUB SERPL-MCNC: 1.8 MG/DL (ref 0–1.2)
BILIRUB UR STRIP.AUTO-MCNC: NEGATIVE MG/DL
BUN SERPL-MCNC: 15 MG/DL (ref 6–23)
CALCIUM SERPL-MCNC: 8.9 MG/DL (ref 8.6–10.3)
CHLORIDE SERPL-SCNC: 103 MMOL/L (ref 98–107)
CO2 SERPL-SCNC: 26 MMOL/L (ref 21–32)
COLOR UR: YELLOW
CREAT SERPL-MCNC: 1.03 MG/DL (ref 0.5–1.3)
EGFRCR SERPLBLD CKD-EPI 2021: 74 ML/MIN/1.73M*2
EOSINOPHIL # BLD AUTO: 0.19 X10*3/UL (ref 0–0.4)
EOSINOPHIL NFR BLD AUTO: 2.2 %
ERYTHROCYTE [DISTWIDTH] IN BLOOD BY AUTOMATED COUNT: 15 % (ref 11.5–14.5)
GLUCOSE SERPL-MCNC: 136 MG/DL (ref 74–99)
GLUCOSE UR STRIP.AUTO-MCNC: NORMAL MG/DL
HCT VFR BLD AUTO: 29.3 % (ref 41–52)
HGB BLD-MCNC: 9.3 G/DL (ref 13.5–17.5)
IMM GRANULOCYTES # BLD AUTO: 0.01 X10*3/UL (ref 0–0.5)
IMM GRANULOCYTES NFR BLD AUTO: 0.1 % (ref 0–0.9)
KETONES UR STRIP.AUTO-MCNC: NEGATIVE MG/DL
LEUKOCYTE ESTERASE UR QL STRIP.AUTO: NEGATIVE
LYMPHOCYTES # BLD AUTO: 1.53 X10*3/UL (ref 0.8–3)
LYMPHOCYTES NFR BLD AUTO: 17.3 %
MCH RBC QN AUTO: 28.7 PG (ref 26–34)
MCHC RBC AUTO-ENTMCNC: 31.7 G/DL (ref 32–36)
MCV RBC AUTO: 90 FL (ref 80–100)
MONOCYTES # BLD AUTO: 1.34 X10*3/UL (ref 0.05–0.8)
MONOCYTES NFR BLD AUTO: 15.2 %
NEUTROPHILS # BLD AUTO: 5.73 X10*3/UL (ref 1.6–5.5)
NEUTROPHILS NFR BLD AUTO: 64.9 %
NITRITE UR QL STRIP.AUTO: NEGATIVE
NRBC BLD-RTO: ABNORMAL /100{WBCS}
PH UR STRIP.AUTO: 7 [PH]
PLATELET # BLD AUTO: 74 X10*3/UL (ref 150–450)
POTASSIUM SERPL-SCNC: 4.7 MMOL/L (ref 3.5–5.3)
PROT SERPL-MCNC: 6.1 G/DL (ref 6.4–8.2)
PROT UR STRIP.AUTO-MCNC: NEGATIVE MG/DL
RBC # BLD AUTO: 3.24 X10*6/UL (ref 4.5–5.9)
RBC # UR STRIP.AUTO: NEGATIVE MG/DL
RBC MORPH BLD: NORMAL
SODIUM SERPL-SCNC: 134 MMOL/L (ref 136–145)
SP GR UR STRIP.AUTO: 1.02
TSH SERPL-ACNC: 1.86 MIU/L (ref 0.44–3.98)
UROBILINOGEN UR STRIP.AUTO-MCNC: ABNORMAL MG/DL
WBC # BLD AUTO: 8.8 X10*3/UL (ref 4.4–11.3)

## 2025-02-25 PROCEDURE — 84443 ASSAY THYROID STIM HORMONE: CPT

## 2025-02-25 PROCEDURE — 3075F SYST BP GE 130 - 139MM HG: CPT | Performed by: INTERNAL MEDICINE

## 2025-02-25 PROCEDURE — 1126F AMNT PAIN NOTED NONE PRSNT: CPT | Performed by: INTERNAL MEDICINE

## 2025-02-25 PROCEDURE — 81003 URINALYSIS AUTO W/O SCOPE: CPT

## 2025-02-25 PROCEDURE — 1123F ACP DISCUSS/DSCN MKR DOCD: CPT | Performed by: INTERNAL MEDICINE

## 2025-02-25 PROCEDURE — 82533 TOTAL CORTISOL: CPT

## 2025-02-25 PROCEDURE — 80053 COMPREHEN METABOLIC PANEL: CPT

## 2025-02-25 PROCEDURE — 82024 ASSAY OF ACTH: CPT

## 2025-02-25 PROCEDURE — 36415 COLL VENOUS BLD VENIPUNCTURE: CPT

## 2025-02-25 PROCEDURE — 85025 COMPLETE CBC W/AUTO DIFF WBC: CPT

## 2025-02-25 PROCEDURE — 3078F DIAST BP <80 MM HG: CPT | Performed by: INTERNAL MEDICINE

## 2025-02-25 PROCEDURE — 1159F MED LIST DOCD IN RCRD: CPT | Performed by: INTERNAL MEDICINE

## 2025-02-25 PROCEDURE — 82105 ALPHA-FETOPROTEIN SERUM: CPT

## 2025-02-25 PROCEDURE — 99215 OFFICE O/P EST HI 40 MIN: CPT | Performed by: INTERNAL MEDICINE

## 2025-02-25 PROCEDURE — G2211 COMPLEX E/M VISIT ADD ON: HCPCS | Performed by: INTERNAL MEDICINE

## 2025-02-25 PROCEDURE — 1157F ADVNC CARE PLAN IN RCRD: CPT | Performed by: INTERNAL MEDICINE

## 2025-02-25 RX ORDER — EPINEPHRINE 0.3 MG/.3ML
0.3 INJECTION SUBCUTANEOUS EVERY 5 MIN PRN
OUTPATIENT
Start: 2025-03-06

## 2025-02-25 RX ORDER — FAMOTIDINE 10 MG/ML
20 INJECTION, SOLUTION INTRAVENOUS ONCE AS NEEDED
OUTPATIENT
Start: 2025-03-06

## 2025-02-25 RX ORDER — PROCHLORPERAZINE EDISYLATE 5 MG/ML
10 INJECTION INTRAMUSCULAR; INTRAVENOUS EVERY 6 HOURS PRN
OUTPATIENT
Start: 2025-03-06

## 2025-02-25 RX ORDER — PROCHLORPERAZINE MALEATE 10 MG
10 TABLET ORAL EVERY 6 HOURS PRN
OUTPATIENT
Start: 2025-03-06

## 2025-02-25 RX ORDER — LOPERAMIDE HYDROCHLORIDE 2 MG/1
CAPSULE ORAL
Qty: 100 CAPSULE | Refills: 2 | Status: SHIPPED | OUTPATIENT
Start: 2025-02-25

## 2025-02-25 RX ORDER — PROCHLORPERAZINE MALEATE 10 MG
10 TABLET ORAL EVERY 6 HOURS PRN
Qty: 30 TABLET | Refills: 5 | Status: SHIPPED | OUTPATIENT
Start: 2025-02-25

## 2025-02-25 RX ORDER — ALBUTEROL SULFATE 0.83 MG/ML
3 SOLUTION RESPIRATORY (INHALATION) AS NEEDED
OUTPATIENT
Start: 2025-03-06

## 2025-02-25 RX ORDER — DIPHENHYDRAMINE HYDROCHLORIDE 50 MG/ML
50 INJECTION INTRAMUSCULAR; INTRAVENOUS AS NEEDED
OUTPATIENT
Start: 2025-03-06

## 2025-02-25 ASSESSMENT — PAIN SCALES - GENERAL: PAINLEVEL_OUTOF10: 0-NO PAIN

## 2025-02-26 LAB
AFP SERPL-MCNC: 29 NG/ML (ref 0–9)
CORTIS AM PEAK SERPL-MSCNC: 12.9 UG/DL (ref 5–20)

## 2025-02-27 LAB — ACTH PLAS-MCNC: 14.3 PG/ML (ref 7.2–63.3)

## 2025-02-28 NOTE — PROGRESS NOTES
Education given on Avastin and Atezolizumab. Red bag was provided with all contents included. Our office number was provided and hours included on when to call us. Education given on our on call number during weekends, holidays and after hours. Handouts were given. Education was provided on what to do if he were to have a fever. The yellow ED card was provided and education was given on to keep that on him at all times if he needs to seek emergent medical care. Handouts were provided. Alfonso and his wife know that he can continue to take his daily medications as prescribed and that his daily routine does not have to change. Education given on PRN medications that were sent to [facility] pharmacy. Education provided on compazine for nausea as needed. Alfonso knows that he does not have to take this prior to coming in as this medication will only be for if needed at home. Education was also provided on imodium if he were to experience diarrhea. Handout was provided on how we recommend taking it.  Education given on infusion routine. Handouts were provided on Atezolizumab and Avastin. We discussed all the potential side effects from both medications. Alfonso will get his labs done 1-2 days prior to cycle 2. All appointments were made for cycle 1, tox check 1 week to follow and scheduled with Dr. Alfredo prior to cycle 2.  All questions were answered about treatment, the infusion routine and side effects. At this time Alfonso declined a port placement and would like to do PIV. Education given on signs to watch for phlebitis and infiltration. Alfonso knows that we can schedule a port at any time. At this time his appetite is good and does not need to see dietician. They know that we can schedule that at any time. Linda PRYOR will see them on day 1. Alfonso and his wife know they can call us at any time with symptoms, side effects or questions.

## 2025-03-06 ENCOUNTER — INFUSION (OUTPATIENT)
Dept: HEMATOLOGY/ONCOLOGY | Facility: CLINIC | Age: 80
End: 2025-03-06
Payer: MEDICARE

## 2025-03-06 ENCOUNTER — NUTRITION (OUTPATIENT)
Dept: HEMATOLOGY/ONCOLOGY | Facility: CLINIC | Age: 80
End: 2025-03-06

## 2025-03-06 VITALS — HEIGHT: 66 IN | WEIGHT: 178.13 LBS | BODY MASS INDEX: 28.63 KG/M2

## 2025-03-06 VITALS
HEIGHT: 66 IN | OXYGEN SATURATION: 97 % | RESPIRATION RATE: 16 BRPM | TEMPERATURE: 97.5 F | SYSTOLIC BLOOD PRESSURE: 135 MMHG | HEART RATE: 56 BPM | BODY MASS INDEX: 28.63 KG/M2 | WEIGHT: 178.13 LBS | DIASTOLIC BLOOD PRESSURE: 52 MMHG

## 2025-03-06 DIAGNOSIS — C22.0 HEPATOCELLULAR CARCINOMA (MULTI): ICD-10-CM

## 2025-03-06 PROCEDURE — 2500000004 HC RX 250 GENERAL PHARMACY W/ HCPCS (ALT 636 FOR OP/ED): Performed by: INTERNAL MEDICINE

## 2025-03-06 PROCEDURE — 96413 CHEMO IV INFUSION 1 HR: CPT

## 2025-03-06 PROCEDURE — 96417 CHEMO IV INFUS EACH ADDL SEQ: CPT

## 2025-03-06 RX ORDER — HEPARIN SODIUM,PORCINE/PF 10 UNIT/ML
50 SYRINGE (ML) INTRAVENOUS AS NEEDED
Status: CANCELLED | OUTPATIENT
Start: 2025-03-06

## 2025-03-06 RX ORDER — DIPHENHYDRAMINE HYDROCHLORIDE 50 MG/ML
50 INJECTION INTRAMUSCULAR; INTRAVENOUS AS NEEDED
Status: DISCONTINUED | OUTPATIENT
Start: 2025-03-06 | End: 2025-03-06 | Stop reason: HOSPADM

## 2025-03-06 RX ORDER — ALBUTEROL SULFATE 0.83 MG/ML
3 SOLUTION RESPIRATORY (INHALATION) AS NEEDED
Status: DISCONTINUED | OUTPATIENT
Start: 2025-03-06 | End: 2025-03-06 | Stop reason: HOSPADM

## 2025-03-06 RX ORDER — HEPARIN 100 UNIT/ML
500 SYRINGE INTRAVENOUS AS NEEDED
OUTPATIENT
Start: 2025-03-06

## 2025-03-06 RX ORDER — FAMOTIDINE 10 MG/ML
20 INJECTION, SOLUTION INTRAVENOUS ONCE AS NEEDED
Status: DISCONTINUED | OUTPATIENT
Start: 2025-03-06 | End: 2025-03-06 | Stop reason: HOSPADM

## 2025-03-06 RX ORDER — EPINEPHRINE 0.3 MG/.3ML
0.3 INJECTION SUBCUTANEOUS EVERY 5 MIN PRN
Status: DISCONTINUED | OUTPATIENT
Start: 2025-03-06 | End: 2025-03-06 | Stop reason: HOSPADM

## 2025-03-06 RX ORDER — HEPARIN 100 UNIT/ML
500 SYRINGE INTRAVENOUS AS NEEDED
Status: CANCELLED | OUTPATIENT
Start: 2025-03-06

## 2025-03-06 RX ORDER — HEPARIN SODIUM,PORCINE/PF 10 UNIT/ML
50 SYRINGE (ML) INTRAVENOUS AS NEEDED
OUTPATIENT
Start: 2025-03-06

## 2025-03-06 RX ADMIN — ATEZOLIZUMAB 1200 MG: 1200 INJECTION, SOLUTION INTRAVENOUS at 11:29

## 2025-03-06 RX ADMIN — BEVACIZUMAB-AWWB 1200 MG: 400 INJECTION, SOLUTION INTRAVENOUS at 12:52

## 2025-03-06 ASSESSMENT — PAIN SCALES - GENERAL: PAINLEVEL_OUTOF10: 0-NO PAIN

## 2025-03-06 NOTE — SIGNIFICANT EVENT
03/06/25 1055   Prechemo Checklist   Has the patient been in the hospital, ED, or urgent care since last date of service No   Chemo/Immuno Consent Completed and Signed Yes  (2/25/24)   Protocol/Indications Verified Yes   Confirmed to previous date/time of medication N/A  (C1D1 today)   Compared to previous dose N/A  (C1D1 today)   All medications are dated accurately Yes   Pregnancy Test Negative Not applicable   Parameters Met Yes  (labs done on 2/25 - AST: 31, ALT: 18, bili: 1.8, Scr: 1.03, urine protein: negative, BP: 135/52)   BSA/Weight-Height Verified Yes   Dose Calculations Verified (current, total, cumulative) Yes

## 2025-03-06 NOTE — PROGRESS NOTES
NUTRITION Assessment NOTE    Nutrition Assessment     Reason for Visit:  Alfonso Toscano is a 80 y.o. male with HCC, starting Atezolizumab + Bevacizumab.     Patient Active Problem List   Diagnosis    Allergic rhinitis due to pollen    Anatomical narrow angle, bilateral    Anemia    Anxiety    Bilateral impacted cerumen    Cobalamin deficiency    Colon polyps    Esophageal varices    Essential hypertension    Excessive cerumen in ear canal, right    Gastric ulcer    Gastrointestinal hemorrhage associated with anorectal source    Hepatic cirrhosis (Multi)    History of laser iridotomy    Hyperglycemia    Iron deficiency anemia secondary to inadequate dietary iron intake    Leukocytosis    Hypocalcemia    Malignant neoplasm of liver, not specified as primary or secondary (Multi)    Malignant neoplasm of prostate (Multi)    Heart murmur    PND (post-nasal drip)    Pseudophakia    Right foot pain    Sinus pressure    Hordeolum externum    Thrombocytopenia (CMS-HCC)    Vitamin D deficiency    Mixed hyperlipidemia    Hepatocellular carcinoma    Nontraumatic hemorrhagic shock (Multi)    Cancer (Multi)    Gastrointestinal hemorrhage, unspecified gastrointestinal hemorrhage type    Lactic acidosis       Nutrition Significant Labs:  Lab Results   Component Value Date/Time    GLUCOSE 81 04/10/2025 0419     (L) 04/10/2025 0419    K 4.8 04/10/2025 0419     04/10/2025 0419    CO2 23 04/10/2025 0419    ANIONGAP 9 (L) 04/10/2025 0419    BUN 12 04/10/2025 0419    CREATININE 0.96 04/10/2025 0419    EGFR 81 04/10/2025 0419    CALCIUM 7.5 (L) 04/10/2025 0419    ALBUMIN 2.3 (L) 04/09/2025 0420    ALKPHOS 149 (H) 04/09/2025 0420    PROT 4.2 (L) 04/09/2025 0420    AST 46 (H) 04/09/2025 0420    BILITOT 2.9 (H) 04/09/2025 0420    ALT 23 04/09/2025 0420    MG 1.70 04/09/2025 0420    PHOS 2.6 03/13/2025 1402     Lab Results   Component Value Date/Time    VITD25 51 10/08/2024 1048     CMP Trend:    Recent Labs     04/10/25  0417  "04/09/25  0420 04/08/25  0408 04/07/25  0555   GLUCOSE 81 100* 88 83   * 128* 130* 130*   K 4.8 4.7 4.6 4.5    101 100 101   CO2 23 24 25 24   ANIONGAP 9* 8* 10 10   BUN 12 13 15 16   CREATININE 0.96 0.99 1.13 1.18   EGFR 81 78 67 63   CALCIUM 7.5* 7.3* 7.3* 7.3*   ALBUMIN  --  2.3* 2.3* 2.3*   ALKPHOS  --  149* 142* 134   PROT  --  4.2* 4.3* 4.3*   AST  --  46* 44* 42*   BILITOT  --  2.9* 3.1* 2.9*   ALT  --  23 22 21    and LFT Trend:   Recent Labs     04/09/25 0420 04/08/25  0408 04/07/25  0555 10/08/24  1048 04/10/24  1059 11/28/23  1117 12/15/21  1910 12/03/21  1000   ALBUMIN 2.3* 2.3* 2.3*   < > 3.2* 3.5   < > 2.4*   BILITOT 2.9* 3.1* 2.9*   < > 2.3* 2.3*   < > 1.6*   BILIDIR  --   --   --   --  0.7* 0.6*  --  0.7*   ALKPHOS 149* 142* 134   < > 150* 142*   < > 321*   ALT 23 22 21   < > 19 18   < > 40   AST 46* 44* 42*   < > 42* 34   < > 52*   PROT 4.2* 4.3* 4.3*   < > 6.0* 6.3*   < > 5.5*    < > = values in this interval not displayed.       Anthropometrics:  Height: 168.4 cm (5' 6.3\")   Weight: 80.8 kg (178 lb 2.1 oz)   BMI (Calculated): 28.49    IBW/kg (Dietitian Calculated): 65.36 kg Percent of IBW: 123.62 %     Adjusted Body Weight (kg): 69.22 kg    Weight History:   Daily Weight  04/08/25 : 90 kg (198 lb 6.6 oz)  03/31/25 : 94.8 kg (208 lb 15.9 oz)  03/27/25 : 87.6 kg (193 lb 2 oz)  03/19/25 : 83.5 kg (184 lb)  03/13/25 : 81.6 kg (179 lb 14.3 oz)  03/13/25 : 81.6 kg (179 lb 14.3 oz)  03/06/25 : 80.8 kg (178 lb 2.1 oz)  03/06/25 : 80.8 kg (178 lb 2.1 oz)  02/25/25 : 80.5 kg (177 lb 7.5 oz)  02/22/25 : 87 kg (191 lb 12.8 oz)    Weight Change %:       Nutrition History:  Food & Nutrition History:  Patient reports a good appetite and intake at this time. No involuntary weight loss prior to starting treatment. Denies decreased intake or early satiety. No issues with N/V/D/C. Had difficulty swallowing in the past d/t esophageal varices, but not experiencing dysphagia at this time. Patient and wife " report typical intake.     Breakfast: oatmeal or bagel, coffee; occasional eggs   Lunch: if having a big lunch, has a small dinner   Dinner: meat, potatoes, veggie   Snacks: fruit    Enjoys soup. Has Ensure on board.     Medications:  Current Outpatient Medications   Medication Instructions    bicalutamide (Casodex) 50 mg tablet 1 tablet, oral, Daily with lunch    cholecalciferol (Vitamin D-3) 25 MCG (1000 UT) tablet 1 tablet, Daily with lunch    dutasteride (AVODART) 0.5 mg, oral, Daily with lunch    fexofenadine (ALLEGRA) 180 mg, oral, Daily PRN    furosemide (LASIX) 20 mg, oral, Daily    lactulose 20 gram/30 mL oral solution 30ML three times daily    loperamide (Imodium) 2 mg capsule 1 capsule, oral, 4 times daily PRN    nadolol (CORGARD) 10 mg, oral, Daily    pantoprazole (PROTONIX) 40 mg, oral, Daily    prochlorperazine (COMPAZINE) 10 mg, oral, Every 6 hours PRN    spironolactone (ALDACTONE) 50 mg, oral, Daily       Nutrition Focused Physical Exam Findings: Deferred, but temporal wasting noted.     Estimated Needs:       Total Energy Estimated Needs in 24 hours (kCal): 2076 kCal  Method for Estimating Needs: 30 kcal/kg ABW  Total Protein Estimated Needs in 24 Hours (g): 83.04 g (83-103g/day)  Method for Estimating 24 Hour Protein Needs: 1.2-1.5g/kg ABW  Total Fluid Estimated Needs in 24 Hours (mL): 2076 mL  Method for Estimating 24 Hour Fluid Needs: 1 mL/kcal or per MD             Nutrition Diagnosis        Nutrition Diagnosis  Patient has Nutrition Diagnosis: Yes  Nutrition Diagnosis 1: Increased nutrient needs  Related to (1): dx and tx  As Evidenced by (1): need for a high protein/high calorie diet for weight and LBM maintenance  Additional Assessment Information (1): potential for swallowing difficulties, given hx of esophagal varices       Nutrition Interventions/Recommendations   Nutrition Prescription: Oral nutrition Management of NIS, maintain weight    Nutrition Interventions:   Food and Nutrient  Delivery: Meals & Snacks: Energy-modified diet, Protein-modified diet, Fluid-modified diet, Modification of schedule of oral intake  Goals: Encouraged to incorporate higher calorie foods as often as possible; discussed food preferences. Recommend small frequent meals with protein source at each meal/ snack. Encouraged adequate fluid intake for hydration. Discussed importance of adequate intake to maintain weight while undergoing treatment. Reviewed where good sources of kcal and PRO are found in the diet.  Medical Food Supplement: Commercial beverage medical food supplement therapy  Goals: Discussed use of ONS; Recommend daily if possible. Reviewed high protein/high calorie commercial options. If unable to tolerate commerical options, gave info for making homemade high protein/high calorie shakes/smoothies/soups     Coordination of Care:       Nutrition Education:   Nutrition Education Content: Content related nutrition education  Provided the Eating Hints booklet along with other food lists and info for other online resources    Nutrition Monitoring and Evaluation   Food and Nutrient Intake  Monitoring and Evaluation Plan: Energy intake, Fluid intake, Amount of food, Meal/snack pattern, Protein intake  Energy Intake: Estimated energy intake  Criteria: 75% of needs; dietary recall; measured/ reported weight  Fluid Intake: Estimated fluid intake  Criteria: maintain hydration; dietary recall  Amount of Food: Medical food intake  Criteria: ONS as directed; dietary recall  Meal/Snack Pattern: Estimated meal and snack pattern  Criteria: 4-6 meals daily, dietary recall  Estimated protein intake: Estimated protein intake  Criteria: 75% of needs; dietery recall    Anthropometric measurements  Monitoring and Evaluation Plan: Weight  Body Weight: Measured body weight  Criteria: Maintain weight         Nutrition Focused Physical Findings  Monitoring and Evaluation Plan: Digestive System  Criteria: monitor for any GI toxicity  and adjust plan as needed         Follow Up: RD contact information provided. This service will continue to follow.

## 2025-03-10 NOTE — PROGRESS NOTES
Patient ID: Alfonso Toscano is a 80 y.o. male.  Diagnosis:  Hepatocellular adenocarcinoma   MedOn: Dr. Alfredo   Primary Care Provider: Gisele Patel MD  Referring Provider: No referring provider defined for this encounter.  Visit Type: Follow Up    Date of Service: 03/13/25  Location: [facility]     Patient Care Team:  Gisele Patel MD as PCP - General  Gisele Patel MD as PCP - [employer] PCP  Laura Alfredo MD as Consulting Physician (Hematology and Oncology)    Current Therapy: Atezo + Beva     ONCOLOGIC HISTORY     No matching staging information was found for the patient.    Hepatocellular adenocarcinoma  May 2021: Initially diagnosed   10/11/2021: Ablation at [facility]; complications and a prolonged hospitalization for GIB  10/24/2021 - 11/9/2021: persistent GI blood loss   - Since then he has seen gastroenterology and undergoes banding on a regular basis for esophageal varicosities.   - Last colonoscopy 11/3/21 that showed diverticulitis in the sigmoid colon.  - Last EGD 10/26/21.  - No imaging on the liver lesions since 2021.  12/9/2024: CT angio AP ordered by PCP for GIB   12/31/2024: MRI Liver   - It is hard to understand if lesions are new or worsening. Hhe has a new sub centimeter lesion that is concerning for hepatocellular adenocarcinoma. Repeat MRI in 6 weeks.   - Anemia workup with SPEP- no M protein; FLC elevated, no hemolysis   - He is not interested in liver directed therapy; we could consider systemic therapy   1/23/2025: Elevated AFP 48   2/24/2025: MRI Liver- New lesions in segment VI is concerning for disease progression.    - Discussed localized treatment options. However, he is reluctant to undergo radiation or ablation.   3/6/2025: Started C1 Atezolizumab and Bevacizumab   3/13/2025: toxicity check     Oncology History   Hepatocellular carcinoma (Multi)   2/25/2025 Initial Diagnosis    Hepatocellular carcinoma (Multi)     3/6/2025 -  Chemotherapy     Atezolizumab + Bevacizumab, 21 Day Cycles        Other Contributing History      Subjective      INTERVAL HISTORY     Alfonso Toscano is a 80 y.o. male who presents today for follow up of hepatocellular adenocarcinoma. Patient of Dr. Alfredo currently on Atezo + Beva. Here today for a tox check. Treatment was last Thursday. Patient was feeling fine Friday - Sunday. On Monday he developed fatigue. Fatigue has been worsening since Monday and now associated with confusion, hoarseness, slow speech, and unsteady gait. He denies GI side effects, eating and drinking well. No signs of infection, bleeding, or stroke. His BP is normal. He does have difficulty hearing. His spouse is present and is helpful with describing his history and symptoms. Spouse feels he is off, wanting to check on his anemia. He states today is just a bad day and yesterday as bad too.     Review of Systems   Constitutional:  Positive for fatigue. Negative for appetite change, chills, diaphoresis, fever and unexpected weight change.   HENT:   Positive for hearing loss and voice change. Negative for nosebleeds.    Eyes: Negative.  Negative for eye problems.   Respiratory:  Negative for cough and shortness of breath.    Cardiovascular:  Negative for chest pain, leg swelling and palpitations.   Gastrointestinal:  Negative for blood in stool, constipation, diarrhea, nausea and vomiting.   Genitourinary:  Negative for hematuria.    Musculoskeletal:  Positive for gait problem. Negative for arthralgias, back pain and myalgias.   Skin:  Negative for itching and rash.   Neurological:  Positive for gait problem and speech difficulty. Negative for dizziness, extremity weakness, headaches and numbness.   Hematological:  Bruises/bleeds easily.   Psychiatric/Behavioral:  Positive for confusion.        Objective      /57   Pulse 59   Temp 36.4 °C (97.5 °F) (Temporal)   Resp 16   Wt 81.6 kg (179 lb 14.3 oz)   SpO2 95%   BMI 28.77 kg/m²   BSA: 1.95 meters  squared    Wt Readings from Last 5 Encounters:   03/13/25 81.6 kg (179 lb 14.3 oz)   03/06/25 80.8 kg (178 lb 2.1 oz)   03/06/25 80.8 kg (178 lb 2.1 oz)   02/25/25 80.5 kg (177 lb 7.5 oz)   02/22/25 87 kg (191 lb 12.8 oz)     Performance Status:  ECOG Score: 1- Restricted in physically strenuous activity.  Carries out light duty.  Karnofsky Score: 80 - Normal activity with effort; some signs or symptoms of disease    PHYSICAL EXAM   Physical Exam  Vitals reviewed.   Constitutional:       General: He is not in acute distress.  HENT:      Head: Normocephalic and atraumatic.      Right Ear: Decreased hearing noted.      Left Ear: Decreased hearing noted.      Mouth/Throat:      Mouth: Mucous membranes are moist.      Pharynx: Oropharynx is clear.   Eyes:      Pupils: Pupils are equal, round, and reactive to light.   Cardiovascular:      Rate and Rhythm: Normal rate.      Pulses: Normal pulses.      Heart sounds: Murmur heard.   Pulmonary:      Effort: Pulmonary effort is normal. No respiratory distress.      Breath sounds: Normal breath sounds. No wheezing.   Abdominal:      General: Bowel sounds are normal. There is no distension.      Palpations: Abdomen is soft. There is no mass.      Tenderness: There is no abdominal tenderness. There is no guarding.   Musculoskeletal:         General: Normal range of motion.      Cervical back: Normal range of motion.      Right lower leg: No edema.      Left lower leg: No edema.   Skin:     General: Skin is warm and dry.      Findings: Bruising present.   Neurological:      General: No focal deficit present.      Mental Status: He is lethargic.      Motor: No weakness.      Gait: Gait abnormal.      Comments: Per infusion RN  - left arm tremor with IV start - I did not appreciate this on my neuro assessment - wife states this is new    Psychiatric:         Mood and Affect: Mood normal.         Speech: Speech is delayed.         Behavior: Behavior normal.       Allergies  No Known  Allergies   Medications  Current Outpatient Medications   Medication Instructions    acetaminophen (TYLENOL) 325 mg, As needed    bicalutamide (Casodex) 50 mg tablet 1 tablet, Daily    cholecalciferol (Vitamin D-3) 25 MCG (1000 UT) tablet 1 tablet, Daily    dutasteride (AVODART) 0.5 mg, Daily    fexofenadine (ALLEGRA) 180 mg, Daily    furosemide (LASIX) 20 mg, oral, Daily    lactulose 20 gram/30 mL oral solution 30ML twice daily    loperamide (Imodium) 2 mg capsule Take 2 capsules (4 mg) by mouth with the first episode of diarrhea and 1 capsule (2 mg) by mouth with any additional episodes. Maximum 8 capsules (16 mg) per day.    nadolol (CORGARD) 10 mg, oral, Daily    pantoprazole (PROTONIX) 40 mg, oral, Daily    prochlorperazine (COMPAZINE) 10 mg, oral, Every 6 hours PRN    spironolactone (ALDACTONE) 50 mg, oral, Daily        Diagnostic Results   RESULTS   No results found for this or any previous visit (from the past 96 hours).   Latest Reference Range & Units 03/13/25 14:02   GLUCOSE 74 - 99 mg/dL 172 (H)   SODIUM 136 - 145 mmol/L 133 (L)   POTASSIUM 3.5 - 5.3 mmol/L 4.0   CHLORIDE 98 - 107 mmol/L 104   Bicarbonate 21 - 32 mmol/L 22   Anion Gap 10 - 20 mmol/L 11   Blood Urea Nitrogen 6 - 23 mg/dL 14   Creatinine 0.50 - 1.30 mg/dL 0.87   EGFR >60 mL/min/1.73m*2 88   Calcium 8.6 - 10.3 mg/dL 8.6   Albumin 3.4 - 5.0 g/dL 2.9 (L)   Alkaline Phosphatase 33 - 136 U/L 110   ALT 10 - 52 U/L 17   AST 9 - 39 U/L 31   Bilirubin Total 0.0 - 1.2 mg/dL 2.5 (H)   (H): Data is abnormally high  (L): Data is abnormally low    Recent Imaging     12/9/2024 CTA AP  IMPRESSION:  1. No CTA evidence of active GI bleed.  2. New heterogeneously enhancing masses throughout the left and right  hepatic lobes with increasing intrahepatic biliary dilatation as  detailed are concerning for recurrence of hepatocellular carcinoma.  Low attenuating lesion with central lucency in segment 5/6 are  consistent with post-ablation changes.  MRI liver  would provide  better diagnostic characterization these multiple intrahepatic mass  lesions.  3. Chronic incomplete portal vein thrombosis has progressed when  compared to 10/24/2021.  4. New concave wedge compression fracture of L4 with 50% vertebral  height loss. No retropulsion into the spinal canal.  5. Cirrhotic appearing liver with ascites, numerous splenorenal and  gastroesophageal varices are compatible with decompensated cirrhosis.  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  6. Stable mural wall thickening of the ascending colon is likely  secondary to portal colopathy.  7. Additional incidental non-acute findings as detailed above.    12/31/2024 MRI Liver  IMPRESSION:  Cirrhotic liver with stigmata of portal hypertension including upper  abdominal varices (including unchanged coronary vein varix reported  on recent CTA) and splenomegaly. No ascites      I note prior percutaneous liver ablation from 2021 performed at an  outside institution. Images from the procedure are not available for  review      There is definitely an old ablation site measuring about 22 mm  maximum diameter in the posterior segment right lobe, peripheral to  which there are clustered few dilated bile ducts. No evidence of  residual viable neoplasm. Complete lack of any enhancement within it      I suspect another ablation zone is the single largest liver  abnormality on today's exam, located in the ventral lateral segment  left lobe, the arterial phase enhancing lesion I described as the  first focal abnormality in the liver section of the body my report  above. There is no definite portal venous phase washout of the  arterial phase enhancing components, but if this was a treated  hepatocellular carcinoma, it has viable/enhancing tissue      Three or four subcentimeter and one additional more substantial but  no larger than 18 mm, arterial enhancing liver lesions without portal  venous washout, capsule or  pseudo capsule are all best categorized as  LR-3, intermediate probability for hepatocellular carcinoma.  Surveillance recommended      Two branches of the posterior branch right portal vein have chronic  thrombosis peripheral to the treated right lobe hepatocellular  carcinoma      The main, left and anterior branch right portal veins are all  narrowed but patent    2/24/2025 MRI Liver  IMPRESSION:  1. Morphological changes of cirrhosis with multiple observations as  follows: TR viable lesion in segment 3 measuring 3.5 cm, grossly  unchanged from prior MRI. TR nonviable lesion in segment 6/7 with the  ablation zone measuring 2.2 cm. LI-RADS 5 exophytic observation in  segment 6 posteriorly measuring 1.8 cm which appears new from prior  CT scan dated 10/24/2021 (was not properly visualized in prior MRI  dated 12/31/2024 given motion artifact and location).      2. Sequelae of portal hypertension with a trace perihepatic ascites.  Large caliber multiple upper abdomen versus noted.      3. Diminutive and chronically thrombosed main portal vein and its  main branches.      4. Redemonstrated focal right posterior hepatic lobe biliary  dilatation likely related to more central stricture formation.    Assessment/Plan   ASSESSMENT     Alfonso Toscano is a 80 y.o. male with Hepatocellular adenocarcinoma initially diagnosed in May 2021 s/p ablation now with disease progression who presents in follow up on atezolizumab and bevacizumab systemic therapy. He started treatment last week and is here today for a toxicity check.     Fatigued, confusion, unsteady gait, hoarse/weak voice since Monday. Denies n/v/c/d. No signs of infection, no fevers. Denies chest pain and SOB. No blurred vision or headaches. Denies dizziness. Sleeping over 12 hours. Continues to eat and drink well. Denies bleeding. Denies pain. No acute abdomen. Neurological assessment negative. Infusion RN noted a new tremor when starting his IV. Per spouse he had  normal mentation on Sunday at an event, became confused Monday and has been worsening since. His vitals are normal. No changes in medications other than starting Atezo/Beva for his liver cancer. He did not take any of the newly prescribed antiemetics because he had no nausea. Checked CBC and CMP for electrolyte abnormalities and worsening anemia. No electrolyte abnormalities to explain symptoms. His Na is 133, Ca is normal, bili 2.5 all other LFTs normal, glucose 172, kidney function normal. CBC is baseline, hgb 9.4 which is his baseline anemia. Platelets 50. WBC and diff normal.     Bevacizumab with known adverse reactions of fatigue (33% to 82%) and voice disorder (5% to 13%) and Atezolizumab has known adverse reactions of asthenia and fatigue (<=44%). Typically these drugs are well tolerated but demyelinating disease, encephalitis, Guillain-Crumpler syndrome, meningitis, myasthenia gravis, myelitis, reversible posterior leukoencephalopathy syndrome (RPLS), hemorrhaging, GI perforation, thromboembolism, and PRES have been reported.     Advised patient to be evaluated in the ED for his neuro symptoms. Patient and spouse agreeable.     PLAN     # Hepatocellular adenocarcinoma   - C2 Atezo and Beva scheduled 3/27    # Confusion, unsteady gait, fatigue, speech changes  - CBC and CMP, UACS  -  ml bolus   - Evaluation in ED     Follow up scheduled with Dr. Alfredo on 3/27/25 for C2.     Alfonso was seen today for op infusion.  Diagnoses and all orders for this visit:  Hepatocellular carcinoma (Multi) (Primary)  -     CBC and Auto Differential; Future  -     Comprehensive Metabolic Panel; Future  Confusion  -     Urinalysis with Reflex Culture and Microscopic; Future  On antineoplastic chemotherapy  -     Urinalysis with Reflex Culture and Microscopic; Future  Other orders  -     Cancel: sodium chloride 0.9 % bolus 500 mL    Atezolizumab + Bevacizumab, 21 Day Cycles  Venous Access Orders    Patient verbalizes understanding  of above plan. Time provided for patient's questions. All questions answered to patient's satisfaction in office. Patient instructed to reach out for any new concerning issues at [phone].    Blessing Hoff MSN, APRN, A-GNP-C, AOCNP  [facility]  Division of Hematology & Medical Oncology   [place] [address]  Phone: [phone]  Available via [employer] Secure Chat  [email or web address]

## 2025-03-11 DIAGNOSIS — K25.9 ESOPHAGOGASTRIC ULCER, UNSPECIFIED ULCER CHRONICITY: ICD-10-CM

## 2025-03-11 RX ORDER — PANTOPRAZOLE SODIUM 40 MG/1
40 TABLET, DELAYED RELEASE ORAL DAILY
Qty: 30 TABLET | Refills: 11 | Status: SHIPPED | OUTPATIENT
Start: 2025-03-11

## 2025-03-11 NOTE — TELEPHONE ENCOUNTER
"Refill Request      Last OV with PCP 10/8/2024     Future Appointments       Date / Time Provider Department Dept Phone    3/13/2025 1:30 PM (Arrive by 1:15 PM) Blessing Hoff, APRN-CNP [facility]  [phone]    3/13/2025 2:00 PM INF [address] [phone]    3/27/2025 11:20 AM (Arrive by 11:05 AM) Laura Alfredo MD [facility]  [phone]    3/27/2025 11:30 AM INF [address] [phone]    5/21/2025 2:00 PM (Arrive by 1:45 PM) Gisele Patel MD  [facility] [phone]          Lab Results   Component Value Date    HGBA1C 4.1 10/08/2024     Lab Results   Component Value Date    CHOL 210 (H) 10/08/2024    CHOL 168 04/10/2024    CHOL 231 (H) 11/28/2023     Lab Results   Component Value Date    HDL 76.1 10/08/2024    HDL 66.8 04/10/2024    HDL 77.6 11/28/2023     Lab Results   Component Value Date    LDLCALC 124 (H) 10/08/2024    LDLCALC 89 04/10/2024    LDLCALC 143 (H) 11/28/2023     Lab Results   Component Value Date    TRIG 50 10/08/2024    TRIG 61 04/10/2024    TRIG 53 11/28/2023     No components found for: \"CHOLHDL\"  Lab Results   Component Value Date    TSH 1.86 02/25/2025     Lab Results   Component Value Date    GLUCOSE 136 (H) 02/25/2025    CALCIUM 8.9 02/25/2025     (L) 02/25/2025    K 4.7 02/25/2025    CO2 26 02/25/2025     02/25/2025    BUN 15 02/25/2025    CREATININE 1.03 02/25/2025       "

## 2025-03-13 ENCOUNTER — APPOINTMENT (OUTPATIENT)
Dept: RADIOLOGY | Facility: HOSPITAL | Age: 80
End: 2025-03-13
Payer: MEDICARE

## 2025-03-13 ENCOUNTER — OFFICE VISIT (OUTPATIENT)
Dept: HEMATOLOGY/ONCOLOGY | Facility: CLINIC | Age: 80
End: 2025-03-13
Payer: MEDICARE

## 2025-03-13 ENCOUNTER — INFUSION (OUTPATIENT)
Dept: HEMATOLOGY/ONCOLOGY | Facility: CLINIC | Age: 80
End: 2025-03-13
Payer: MEDICARE

## 2025-03-13 ENCOUNTER — HOSPITAL ENCOUNTER (EMERGENCY)
Facility: HOSPITAL | Age: 80
Discharge: HOME | End: 2025-03-13
Attending: STUDENT IN AN ORGANIZED HEALTH CARE EDUCATION/TRAINING PROGRAM
Payer: MEDICARE

## 2025-03-13 ENCOUNTER — APPOINTMENT (OUTPATIENT)
Dept: CARDIOLOGY | Facility: HOSPITAL | Age: 80
End: 2025-03-13
Payer: MEDICARE

## 2025-03-13 VITALS
BODY MASS INDEX: 28.91 KG/M2 | DIASTOLIC BLOOD PRESSURE: 57 MMHG | TEMPERATURE: 97.7 F | HEIGHT: 66 IN | WEIGHT: 179.9 LBS | HEART RATE: 53 BPM | RESPIRATION RATE: 16 BRPM | OXYGEN SATURATION: 96 % | SYSTOLIC BLOOD PRESSURE: 149 MMHG

## 2025-03-13 VITALS
SYSTOLIC BLOOD PRESSURE: 141 MMHG | HEART RATE: 59 BPM | DIASTOLIC BLOOD PRESSURE: 57 MMHG | WEIGHT: 179.9 LBS | TEMPERATURE: 97.5 F | BODY MASS INDEX: 28.77 KG/M2 | RESPIRATION RATE: 16 BRPM | OXYGEN SATURATION: 95 %

## 2025-03-13 DIAGNOSIS — R53.1 GENERAL WEAKNESS: Primary | ICD-10-CM

## 2025-03-13 DIAGNOSIS — C22.0 HEPATOCELLULAR CARCINOMA (MULTI): Primary | ICD-10-CM

## 2025-03-13 DIAGNOSIS — Z79.899 ON ANTINEOPLASTIC CHEMOTHERAPY: ICD-10-CM

## 2025-03-13 DIAGNOSIS — E72.20 HYPERAMMONEMIA (MULTI): ICD-10-CM

## 2025-03-13 DIAGNOSIS — R41.0 CONFUSION: ICD-10-CM

## 2025-03-13 DIAGNOSIS — C22.0 HEPATOCELLULAR CARCINOMA (MULTI): ICD-10-CM

## 2025-03-13 DIAGNOSIS — K76.82 HEPATIC ENCEPHALOPATHY: ICD-10-CM

## 2025-03-13 LAB
ALBUMIN SERPL BCP-MCNC: 2.9 G/DL (ref 3.4–5)
ALP SERPL-CCNC: 110 U/L (ref 33–136)
ALT SERPL W P-5'-P-CCNC: 17 U/L (ref 10–52)
AMMONIA PLAS-SCNC: 81 UMOL/L (ref 16–53)
ANION GAP SERPL CALC-SCNC: 11 MMOL/L (ref 10–20)
APPEARANCE UR: CLEAR
APTT PPP: 39 SECONDS (ref 26–36)
AST SERPL W P-5'-P-CCNC: 31 U/L (ref 9–39)
BASOPHILS # BLD AUTO: 0.02 X10*3/UL (ref 0–0.1)
BASOPHILS NFR BLD AUTO: 0.4 %
BILIRUB SERPL-MCNC: 2.5 MG/DL (ref 0–1.2)
BILIRUB UR STRIP.AUTO-MCNC: NEGATIVE MG/DL
BUN SERPL-MCNC: 14 MG/DL (ref 6–23)
CALCIUM SERPL-MCNC: 8.6 MG/DL (ref 8.6–10.3)
CARDIAC TROPONIN I PNL SERPL HS: 9 NG/L (ref 0–20)
CHLORIDE SERPL-SCNC: 104 MMOL/L (ref 98–107)
CO2 SERPL-SCNC: 22 MMOL/L (ref 21–32)
COLOR UR: YELLOW
CREAT SERPL-MCNC: 0.87 MG/DL (ref 0.5–1.3)
EGFRCR SERPLBLD CKD-EPI 2021: 88 ML/MIN/1.73M*2
EOSINOPHIL # BLD AUTO: 0.17 X10*3/UL (ref 0–0.4)
EOSINOPHIL NFR BLD AUTO: 3.5 %
ERYTHROCYTE [DISTWIDTH] IN BLOOD BY AUTOMATED COUNT: 15.5 % (ref 11.5–14.5)
GLUCOSE SERPL-MCNC: 172 MG/DL (ref 74–99)
GLUCOSE UR STRIP.AUTO-MCNC: NORMAL MG/DL
HCT VFR BLD AUTO: 29.1 % (ref 41–52)
HGB BLD-MCNC: 9.4 G/DL (ref 13.5–17.5)
IMM GRANULOCYTES # BLD AUTO: 0.01 X10*3/UL (ref 0–0.5)
IMM GRANULOCYTES NFR BLD AUTO: 0.2 % (ref 0–0.9)
INR PPP: 1.4 (ref 0.9–1.1)
KETONES UR STRIP.AUTO-MCNC: NEGATIVE MG/DL
LEUKOCYTE ESTERASE UR QL STRIP.AUTO: NEGATIVE
LYMPHOCYTES # BLD AUTO: 0.93 X10*3/UL (ref 0.8–3)
LYMPHOCYTES NFR BLD AUTO: 18.9 %
MAGNESIUM SERPL-MCNC: 1.66 MG/DL (ref 1.6–2.4)
MCH RBC QN AUTO: 28.9 PG (ref 26–34)
MCHC RBC AUTO-ENTMCNC: 32.3 G/DL (ref 32–36)
MCV RBC AUTO: 90 FL (ref 80–100)
MONOCYTES # BLD AUTO: 0.74 X10*3/UL (ref 0.05–0.8)
MONOCYTES NFR BLD AUTO: 15.1 %
NEUTROPHILS # BLD AUTO: 3.04 X10*3/UL (ref 1.6–5.5)
NEUTROPHILS NFR BLD AUTO: 61.9 %
NITRITE UR QL STRIP.AUTO: NEGATIVE
NRBC BLD-RTO: ABNORMAL /100{WBCS}
PH UR STRIP.AUTO: 7 [PH]
PHOSPHATE SERPL-MCNC: 2.6 MG/DL (ref 2.5–4.9)
PLATELET # BLD AUTO: 50 X10*3/UL (ref 150–450)
POTASSIUM SERPL-SCNC: 4 MMOL/L (ref 3.5–5.3)
PROT SERPL-MCNC: 5.4 G/DL (ref 6.4–8.2)
PROT UR STRIP.AUTO-MCNC: NEGATIVE MG/DL
PROTHROMBIN TIME: 15.7 SECONDS (ref 9.8–12.4)
RBC # BLD AUTO: 3.25 X10*6/UL (ref 4.5–5.9)
RBC # UR STRIP.AUTO: NEGATIVE MG/DL
SODIUM SERPL-SCNC: 133 MMOL/L (ref 136–145)
SP GR UR STRIP.AUTO: 1.02
UROBILINOGEN UR STRIP.AUTO-MCNC: ABNORMAL MG/DL
WBC # BLD AUTO: 4.9 X10*3/UL (ref 4.4–11.3)

## 2025-03-13 PROCEDURE — 85730 THROMBOPLASTIN TIME PARTIAL: CPT | Performed by: PHYSICIAN ASSISTANT

## 2025-03-13 PROCEDURE — 3078F DIAST BP <80 MM HG: CPT

## 2025-03-13 PROCEDURE — 1157F ADVNC CARE PLAN IN RCRD: CPT

## 2025-03-13 PROCEDURE — 93005 ELECTROCARDIOGRAM TRACING: CPT

## 2025-03-13 PROCEDURE — 84484 ASSAY OF TROPONIN QUANT: CPT | Performed by: PHYSICIAN ASSISTANT

## 2025-03-13 PROCEDURE — 82140 ASSAY OF AMMONIA: CPT | Performed by: PHYSICIAN ASSISTANT

## 2025-03-13 PROCEDURE — 36415 COLL VENOUS BLD VENIPUNCTURE: CPT | Performed by: PHYSICIAN ASSISTANT

## 2025-03-13 PROCEDURE — 81003 URINALYSIS AUTO W/O SCOPE: CPT | Performed by: PHYSICIAN ASSISTANT

## 2025-03-13 PROCEDURE — 99215 OFFICE O/P EST HI 40 MIN: CPT

## 2025-03-13 PROCEDURE — 1160F RVW MEDS BY RX/DR IN RCRD: CPT

## 2025-03-13 PROCEDURE — 71045 X-RAY EXAM CHEST 1 VIEW: CPT

## 2025-03-13 PROCEDURE — 80053 COMPREHEN METABOLIC PANEL: CPT

## 2025-03-13 PROCEDURE — 96360 HYDRATION IV INFUSION INIT: CPT | Mod: INF

## 2025-03-13 PROCEDURE — 83735 ASSAY OF MAGNESIUM: CPT | Performed by: STUDENT IN AN ORGANIZED HEALTH CARE EDUCATION/TRAINING PROGRAM

## 2025-03-13 PROCEDURE — 85025 COMPLETE CBC W/AUTO DIFF WBC: CPT

## 2025-03-13 PROCEDURE — 70450 CT HEAD/BRAIN W/O DYE: CPT

## 2025-03-13 PROCEDURE — 99285 EMERGENCY DEPT VISIT HI MDM: CPT | Mod: 25 | Performed by: STUDENT IN AN ORGANIZED HEALTH CARE EDUCATION/TRAINING PROGRAM

## 2025-03-13 PROCEDURE — 99417 PROLNG OP E/M EACH 15 MIN: CPT

## 2025-03-13 PROCEDURE — 84100 ASSAY OF PHOSPHORUS: CPT | Performed by: STUDENT IN AN ORGANIZED HEALTH CARE EDUCATION/TRAINING PROGRAM

## 2025-03-13 PROCEDURE — 1159F MED LIST DOCD IN RCRD: CPT

## 2025-03-13 PROCEDURE — 2500000001 HC RX 250 WO HCPCS SELF ADMINISTERED DRUGS (ALT 637 FOR MEDICARE OP): Performed by: PHYSICIAN ASSISTANT

## 2025-03-13 PROCEDURE — 99215 OFFICE O/P EST HI 40 MIN: CPT | Mod: 25

## 2025-03-13 PROCEDURE — 1126F AMNT PAIN NOTED NONE PRSNT: CPT

## 2025-03-13 PROCEDURE — 2500000004 HC RX 250 GENERAL PHARMACY W/ HCPCS (ALT 636 FOR OP/ED)

## 2025-03-13 PROCEDURE — 1123F ACP DISCUSS/DSCN MKR DOCD: CPT

## 2025-03-13 PROCEDURE — 3077F SYST BP >= 140 MM HG: CPT

## 2025-03-13 RX ORDER — LACTULOSE 10 G/15ML
20 SOLUTION ORAL ONCE
Status: COMPLETED | OUTPATIENT
Start: 2025-03-13 | End: 2025-03-13

## 2025-03-13 RX ORDER — HEPARIN 100 UNIT/ML
500 SYRINGE INTRAVENOUS AS NEEDED
OUTPATIENT
Start: 2025-03-13

## 2025-03-13 RX ORDER — HEPARIN SODIUM,PORCINE/PF 10 UNIT/ML
50 SYRINGE (ML) INTRAVENOUS AS NEEDED
OUTPATIENT
Start: 2025-03-13

## 2025-03-13 RX ADMIN — SODIUM CHLORIDE 500 ML: 9 INJECTION, SOLUTION INTRAVENOUS at 14:28

## 2025-03-13 RX ADMIN — LACTULOSE 20 G: 20 SOLUTION ORAL at 20:47

## 2025-03-13 ASSESSMENT — ENCOUNTER SYMPTOMS
COUGH: 0
BLOOD IN STOOL: 0
HEMATURIA: 0
FATIGUE: 1
PALPITATIONS: 0
EXTREMITY WEAKNESS: 0
DIAPHORESIS: 0
HEADACHES: 0
VOMITING: 0
SHORTNESS OF BREATH: 0
VOICE CHANGE: 1
CONFUSION: 1
BRUISES/BLEEDS EASILY: 1
ARTHRALGIAS: 0
FEVER: 0
APPETITE CHANGE: 0
CONSTIPATION: 0
BACK PAIN: 0
DIARRHEA: 0
DIZZINESS: 0
LEG SWELLING: 0
MYALGIAS: 0
NAUSEA: 0
NUMBNESS: 0
CHILLS: 0
EYE PROBLEMS: 0
EYES NEGATIVE: 1
UNEXPECTED WEIGHT CHANGE: 0
SPEECH DIFFICULTY: 1

## 2025-03-13 ASSESSMENT — COLUMBIA-SUICIDE SEVERITY RATING SCALE - C-SSRS
1. IN THE PAST MONTH, HAVE YOU WISHED YOU WERE DEAD OR WISHED YOU COULD GO TO SLEEP AND NOT WAKE UP?: NO
6. HAVE YOU EVER DONE ANYTHING, STARTED TO DO ANYTHING, OR PREPARED TO DO ANYTHING TO END YOUR LIFE?: NO
2. HAVE YOU ACTUALLY HAD ANY THOUGHTS OF KILLING YOURSELF?: NO

## 2025-03-13 ASSESSMENT — PAIN SCALES - GENERAL
PAINLEVEL_OUTOF10: 0 - NO PAIN
PAINLEVEL_OUTOF10: 0-NO PAIN

## 2025-03-13 ASSESSMENT — PAIN - FUNCTIONAL ASSESSMENT: PAIN_FUNCTIONAL_ASSESSMENT: 0-10

## 2025-03-13 NOTE — ED PROVIDER NOTES
Emergency Department Provider Note        History of Present Illness     History provided by: Patient  Limitations to History: None  External Records Reviewed with Brief Summary:  oncology np notes    HPI:  Alfonso Toscano is a 80 y.o. male with history of significant alcohol use until 1988 at which point he stopped due to developing cirrhosis, history of esophageal varices, hypertension, anemia, liver cancer on new chemotherapy that he started last Thursday, thrombocytopenia and hyperlipidemia who presents today for evaluation of altered mental status generalized weakness and new tremors.  His wife is providing majority of the history, states that she started noticing a change on Monday, states he has been more tired, sleeping a lot and has seemed more weak.  She states at times he is also seemed confused.  Patient denies any chest pain, shortness of breath, abdominal pain, nausea, vomiting, diarrhea, denies any urinary frequency burning urgency, denies any fevers or chills, denies URI symptoms, denies any headache or blurred vision, denies any falls.  Denies blood in his stools.  He does not feel weak but does endorse feeling more tired.  He is alert and oriented to self, president, place but not year, believes it is 2022.  He is oriented to situation.  There is concern from oncology NP that this is medication related, CBC CMP was obtained outpatient and resulted about 2 hours ago.  Patient was sent here for further workup.    Physical Exam   Triage vitals:  T 36.2 °C (97.2 °F)  HR 54  /67  RR 18  O2 97 % None (Room air)    Physical Exam  Vitals and nursing note reviewed.   Constitutional:       General: He is not in acute distress.     Appearance: Normal appearance. He is not toxic-appearing.      Comments: Patient sleeping and falls asleep multiple times when I am interviewing him.   HENT:      Head: Normocephalic and atraumatic.      Nose: Nose normal.   Eyes:      General: No visual field deficit.      Extraocular Movements: Extraocular movements intact.      Pupils: Pupils are equal, round, and reactive to light.   Cardiovascular:      Rate and Rhythm: Normal rate and regular rhythm.      Heart sounds: Murmur (Congenital per patient) heard.   Pulmonary:      Effort: Pulmonary effort is normal.      Breath sounds: Normal breath sounds.   Abdominal:      Palpations: Abdomen is soft.      Tenderness: There is no abdominal tenderness. There is no guarding.   Musculoskeletal:         General: Normal range of motion.      Cervical back: Normal range of motion and neck supple.   Skin:     General: Skin is warm and dry.   Neurological:      General: No focal deficit present.      Mental Status: He is alert. He is disoriented.      GCS: GCS eye subscore is 4. GCS verbal subscore is 4. GCS motor subscore is 6.      Cranial Nerves: No cranial nerve deficit, dysarthria or facial asymmetry.      Motor: Tremor (Mild asterixis present.) present. No seizure activity or pronator drift.      Comments: Patient without pronator drift, is able to lift each leg off the bed, no focal deficits however he does generally seem weak.   Psychiatric:         Mood and Affect: Mood normal.         Thought Content: Thought content normal.        CT head wo IV contrast   Final Result   No evidence of acute cortical infarct or intracranial hemorrhage.        Mild cerebral volume loss and white matter hypodensities likely   representing microvascular ischemic disease.        Signed by: Pari Mistry 3/13/2025 8:00 PM   Dictation workstation:   [id number]      XR chest 1 view   Final Result   No evidence of acute intrathoracic abnormality.        Signed by: Jorje Khoury 3/13/2025 5:59 PM   Dictation workstation:   [id number]        Labs Reviewed   AMMONIA - Abnormal       Result Value    Ammonia 81 (*)    COAGULATION SCREEN - Abnormal    Protime 15.7 (*)     INR 1.4 (*)     aPTT 39 (*)     Narrative:     The APTT is no longer used for monitoring  Unfractionated Heparin Therapy. For monitoring Heparin Therapy, use the Heparin Assay.   URINALYSIS WITH REFLEX CULTURE AND MICROSCOPIC - Abnormal    Color, Urine Yellow      Appearance, Urine Clear      Specific Gravity, Urine 1.016      pH, Urine 7.0      Protein, Urine NEGATIVE      Glucose, Urine Normal      Blood, Urine NEGATIVE      Ketones, Urine NEGATIVE      Bilirubin, Urine NEGATIVE      Urobilinogen, Urine 2 (1+) (*)     Nitrite, Urine NEGATIVE      Leukocyte Esterase, Urine NEGATIVE     TROPONIN I, HIGH SENSITIVITY - Normal    Troponin I, High Sensitivity 9      Narrative:     Less than 99th percentile of normal range cutoff-  Female and children under 18 years old <14 ng/L; Male <21 ng/L: Negative  Repeat testing should be performed if clinically indicated.     Female and children under 18 years old 14-50 ng/L; Male 21-50 ng/L:  Consistent with possible cardiac damage and possible increased clinical   risk. Serial measurements may help to assess extent of myocardial damage.     >50 ng/L: Consistent with cardiac damage, increased clinical risk and  myocardial infarction. Serial measurements may help assess extent of   myocardial damage.      NOTE: Children less than 1 year old may have higher baseline troponin   levels and results should be interpreted in conjunction with the overall   clinical context.     NOTE: Troponin I testing is performed using a different   testing methodology at [facility] than at other   [facility]. Direct result comparisons should only   be made within the same method.   PHOSPHORUS - Normal    Phosphorus 2.6     MAGNESIUM - Normal    Magnesium 1.66     URINALYSIS WITH REFLEX CULTURE AND MICROSCOPIC    Narrative:     The following orders were created for panel order Urinalysis with Reflex Culture and Microscopic.  Procedure                               Abnormality         Status                     ---------                               -----------          ------                     Urinalysis with Reflex C...[[id number]]  Abnormal            Final result               Extra Urine Gray Tube[[id number]]                                                         Please view results for these tests on the individual orders.         Medical Decision Making & ED Course   Medical Decision Makin y.o. male who presents today for evaluation of altered mental status, new tremor and confusion that started on Monday, suspect hepatic encephalopathy, patient does have mild asterixis.  Did order CT head, chest x-ray, basic labs including urinalysis and ammonia level.  CT head was negative, chest x-ray unremarkable, labs reassuring aside from a mildly elevated ammonia of 81 which is worse than his baseline, given concerns for hepatic encephalopathy, myself and Dr. Rivero recommended admission however patient was very adamant that he does not want to be admitted and his wife and son were at bedside and agree with this.  They asked whether they can just give him an extra dose of lactulose.  See ED course for shared decision making discussion.  Ultimately patient and family decided that they would like to double up on the lactulose, they are aware of the increased risk of falls, confusion, delirium, death or permanent disability however would like to try increasing the lactulose to twice daily at home instead of once daily to see if this improves his symptoms.  They are aware to have PCP follow-up within 1 to 2 days and to bring him back with any worsening whatsoever.  They expressed understanding, patient be discharged in stable condition per his and family's request.  ----      Differential diagnoses considered include but are not limited to: Brain bleed, anemia, electrolyte derangements, MI, UTI, pneumonia, infection, hepatic encephalopathy, etc.     Social Determinants of Health which Significantly Impact Care: None identified     EKG Independent Interpretation: EKG interpreted by  myself. Please see ED Course for full interpretation.    Independent Result Review and Interpretation: Relevant laboratory and radiographic results were reviewed and independently interpreted by myself.  As necessary, they are commented on in the ED Course.    Chronic conditions affecting the patient's care: As documented above in [facility]    The patient was discussed with the following consultants/services: None    Care Considerations: As documented above in [facility]    ED Course:  ED Course as of 03/13/25 2047   Thu Mar 13, 2025   1721 EKG taken at 1630 on 13 March 2025 showing normal sinus bradycardia with a rate of 52, normal axis, first-degree AV block DE interval 220 otherwise normal intervals, no acute ST elevations or depressions [DS]   2016 Myself and Dr. Rivero had a shared decision-making discussion with the family at this point in time, recommended that with elevated ammonia level and likely hepatics encephalopathy admission.  Family was wondering whether they can just give the lactulose at home, stated that they have 2 quarts of it.  We discussed risks and benefits, our formal recommendation is for admission so that patient can be monitored, he has an elevated fall risk, he can have worsening confusion, delirium, falls which can include acute life or limb threatening processes.  His wife states she is with him at all times, he really does not wish to be admitted and his family at bedside agrees, we engaged in a shared decision-making discussion with patient and family as well as myself and ED attending, everyone in agreement and the family that they would prefer for patient to go home and would like to just double his lactulose, 1 dose will be given here and he is advised to be given 20 g twice daily instead of just 20 g once daily, we also discussed the need for follow-up within 1 to 2 days with PCP.  His wife states that she will arrange this.  They are aware that if any new or worsening symptoms etc. that he  would need to be brought back and he feels very comfortable with this.  All parties in agreement, patient will be discharged per his and family's request. [MC]      ED Course User Index  [DS] Stefan Rivero MD  [MC] Carmella Bowers PA-C         Diagnoses as of 03/13/25 2047   General weakness   Hyperammonemia (Multi)   Hepatic encephalopathy     Disposition   As a result of the work-up, the patient was discharged home.  he was informed of his diagnosis and instructed to come back with any concerns or worsening of condition.  he and was agreeable to the plan as discussed above.  he was given the opportunity to ask questions.  All of the patient's questions were answered.    Procedures   Procedures    This was a shared visit with an ED attending.  The patient was seen and discussed with the ED attending    Carmella Bowers PA-C  Emergency Medicine       Carmella Bowers PA-C  03/13/25 2047

## 2025-03-14 LAB
ATRIAL RATE: 52 BPM
P AXIS: 41 DEGREES
P OFFSET: 166 MS
P ONSET: 110 MS
PATH REVIEW-CBC DIFFERENTIAL: NORMAL
PR INTERVAL: 220 MS
Q ONSET: 220 MS
QRS COUNT: 9 BEATS
QRS DURATION: 78 MS
QT INTERVAL: 440 MS
QTC CALCULATION(BAZETT): 409 MS
QTC FREDERICIA: 419 MS
R AXIS: 40 DEGREES
T AXIS: 53 DEGREES
T OFFSET: 440 MS
VENTRICULAR RATE: 52 BPM

## 2025-03-14 NOTE — DISCHARGE INSTRUCTIONS
Increase lactulose from 30 mL once daily to 30 mL twice daily.  Monitor, patient as he can be an elevated fall risk over the next couple of days, make sure that he is not going anywhere alone he is closely monitored, we recommended admission to the hospital however engaged in shared decision making where family feels comfortable caring for him.  If any worsening, please feel free to bring him back to the emergency room.  Please follow-up with family doctor in the next 1 to 2 days for reassessment.

## 2025-03-18 LAB
ATRIAL RATE: 52 BPM
P AXIS: 41 DEGREES
P OFFSET: 166 MS
P ONSET: 110 MS
PR INTERVAL: 220 MS
Q ONSET: 220 MS
QRS COUNT: 9 BEATS
QRS DURATION: 78 MS
QT INTERVAL: 440 MS
QTC CALCULATION(BAZETT): 409 MS
QTC FREDERICIA: 419 MS
R AXIS: 40 DEGREES
T AXIS: 53 DEGREES
T OFFSET: 440 MS
VENTRICULAR RATE: 52 BPM

## 2025-03-19 ENCOUNTER — APPOINTMENT (OUTPATIENT)
Dept: PRIMARY CARE | Facility: CLINIC | Age: 80
End: 2025-03-19
Payer: MEDICARE

## 2025-03-19 VITALS
HEART RATE: 56 BPM | HEIGHT: 66 IN | BODY MASS INDEX: 29.57 KG/M2 | DIASTOLIC BLOOD PRESSURE: 76 MMHG | SYSTOLIC BLOOD PRESSURE: 140 MMHG | TEMPERATURE: 97.6 F | OXYGEN SATURATION: 98 % | WEIGHT: 184 LBS

## 2025-03-19 DIAGNOSIS — D69.6 THROMBOCYTOPENIA (CMS-HCC): ICD-10-CM

## 2025-03-19 DIAGNOSIS — K74.60 HEPATIC CIRRHOSIS, UNSPECIFIED HEPATIC CIRRHOSIS TYPE, UNSPECIFIED WHETHER ASCITES PRESENT (MULTI): Primary | ICD-10-CM

## 2025-03-19 DIAGNOSIS — C61 MALIGNANT NEOPLASM OF PROSTATE (MULTI): ICD-10-CM

## 2025-03-19 DIAGNOSIS — C22.0 HEPATOCELLULAR CARCINOMA (MULTI): ICD-10-CM

## 2025-03-19 DIAGNOSIS — E78.2 MIXED HYPERLIPIDEMIA: ICD-10-CM

## 2025-03-19 PROCEDURE — 1157F ADVNC CARE PLAN IN RCRD: CPT | Performed by: INTERNAL MEDICINE

## 2025-03-19 PROCEDURE — 1159F MED LIST DOCD IN RCRD: CPT | Performed by: INTERNAL MEDICINE

## 2025-03-19 PROCEDURE — 3077F SYST BP >= 140 MM HG: CPT | Performed by: INTERNAL MEDICINE

## 2025-03-19 PROCEDURE — G2211 COMPLEX E/M VISIT ADD ON: HCPCS | Performed by: INTERNAL MEDICINE

## 2025-03-19 PROCEDURE — 99214 OFFICE O/P EST MOD 30 MIN: CPT | Performed by: INTERNAL MEDICINE

## 2025-03-19 PROCEDURE — 1123F ACP DISCUSS/DSCN MKR DOCD: CPT | Performed by: INTERNAL MEDICINE

## 2025-03-19 PROCEDURE — 3078F DIAST BP <80 MM HG: CPT | Performed by: INTERNAL MEDICINE

## 2025-03-19 ASSESSMENT — PATIENT HEALTH QUESTIONNAIRE - PHQ9
1. LITTLE INTEREST OR PLEASURE IN DOING THINGS: NOT AT ALL
2. FEELING DOWN, DEPRESSED OR HOPELESS: NOT AT ALL
SUM OF ALL RESPONSES TO PHQ9 QUESTIONS 1 AND 2: 0

## 2025-03-19 NOTE — PROGRESS NOTES
"     3/19/2025    1630   Over the past 2 weeks, how often have you been bothered by any of the following problems?     Unable to screen due to: --   Little interest or pleasure in doing things Not at all   Feeling down, depressed, or hopeless Not at all   Patient Health Questionnaire-2 Score 0   Subjective   Patient ID: Alfonso Toscano is a 80 y.o. male who presents for Follow-up.  HPI  First infusion went well   Likes dr nichole  Came in to hematology office and had a   Tremor   More fatigue  Confusion  Did not want to stay at hospital  Doubled up on lactulose now much better  Loose stool   4 times  Doing much better       Review of Systems  Gen:  no fever  HEENT:  no trouble swallowing  CV:  no dyspnea, cyanosis  Lungs:  no shortness of breath  GI:  no constipation, no blood in stool  Vascular:  no edema  Neuro:   no weakness  Skin:  no rash  MS:no joint swelling  Gu:  no urinary complaints  All other systems have been reviewed and are negative for complaint    /76   Pulse 56   Temp 36.4 °C (97.6 °F)   Ht 1.676 m (5' 6\")   Wt 83.5 kg (184 lb)   SpO2 98%   BMI 29.70 kg/m²   Objective   Physical Exam  Lab Results   Component Value Date    WBC 4.9 03/13/2025    HGB 9.4 (L) 03/13/2025    HCT 29.1 (L) 03/13/2025    MCV 90 03/13/2025    PLT 50 (L) 03/13/2025     Lab Results   Component Value Date    GLUCOSE 172 (H) 03/13/2025    CALCIUM 8.6 03/13/2025     (L) 03/13/2025    K 4.0 03/13/2025    CO2 22 03/13/2025     03/13/2025    BUN 14 03/13/2025    CREATININE 0.87 03/13/2025     Social History     Socioeconomic History    Marital status:    Tobacco Use    Smoking status: Never    Smokeless tobacco: Never   Substance and Sexual Activity    Alcohol use: Not Currently     Family History   Problem Relation Name Age of Onset    Colon cancer Mother      Liver cancer Mother         General:  Alert and in  NAD  oriented  Correct year oriented place and person and year   Lungs, CTAB  Skin:  no " suspicious lesions,  warm and dry  Head :  Normocephalic  Neck/thyroid:  neck supple, full rom, no cervical lymphadenopathy  no thyromegaly  Heart:  RRR   3/6 murmurs  Abdomen:  Normal , bs present, soft, nontender, not distended, no masses palpated  Extremities:  No clubbing, cyanosis, or edema  Neurologic:  Nonfocal no asterixis    Psych: alert, normal mood      Problem List Items Addressed This Visit       Hepatic cirrhosis (Multi) - Primary    Overview     Following w oncology  Doing well now   stable condition.   Follow up at least yearly.           Relevant Orders    Ammonia    Malignant neoplasm of prostate (Multi)    Overview     Last Assessment & Plan: Formatting of this note might be different from the original. Assessment: had  In 2008,had radiation Comment on above: Stable condition. Follow up at least yearly.dr coronado;  stable         Thrombocytopenia (CMS-HCC)    Overview         No new symptoms  stable condition.   Follow up at least yearly.           Mixed hyperlipidemia    Hepatocellular carcinoma (Multi)    Overview       DEFECTS OF TREATED HEPATIC NEOPLASM; 1.5 CM HYPODENSE RIGHT ANTERIOR   HEPATIC LESION,   STABLE.   2021  Pt does not want further imaging  No new sx   3/2025  Following w oncology dr nichole for infusions  Doing well currently         Wife is also historian  Chronic conditions reviewed in the assessment and plan.    Continue medications unless specified otherwise.  Previous labs reviewed.   Other specialty provider notes reviewed.    In June for fu or prn

## 2025-03-20 PROBLEM — R52 PAIN: Status: RESOLVED | Noted: 2023-11-28 | Resolved: 2025-03-20

## 2025-03-20 PROBLEM — S99.929A INJURY OF TOE: Status: RESOLVED | Noted: 2023-11-28 | Resolved: 2025-03-20

## 2025-03-23 LAB — AMMONIA PLAS-SCNC: NORMAL UMOL/L

## 2025-03-24 DIAGNOSIS — K74.60 HEPATIC CIRRHOSIS, UNSPECIFIED HEPATIC CIRRHOSIS TYPE, UNSPECIFIED WHETHER ASCITES PRESENT (MULTI): ICD-10-CM

## 2025-03-25 ENCOUNTER — LAB (OUTPATIENT)
Dept: LAB | Facility: CLINIC | Age: 80
End: 2025-03-25
Payer: MEDICARE

## 2025-03-25 DIAGNOSIS — R41.0 CONFUSION: ICD-10-CM

## 2025-03-25 DIAGNOSIS — C22.0 HEPATOCELLULAR CARCINOMA: ICD-10-CM

## 2025-03-25 DIAGNOSIS — Z79.899 ON ANTINEOPLASTIC CHEMOTHERAPY: ICD-10-CM

## 2025-03-25 DIAGNOSIS — K74.69 OTHER CIRRHOSIS OF LIVER: ICD-10-CM

## 2025-03-25 LAB
ALBUMIN SERPL BCP-MCNC: 2.9 G/DL (ref 3.4–5)
ALP SERPL-CCNC: 124 U/L (ref 33–136)
ALT SERPL W P-5'-P-CCNC: 19 U/L (ref 10–52)
AMMONIA PLAS-SCNC: 60 UMOL/L (ref 16–53)
ANION GAP SERPL CALC-SCNC: 11 MMOL/L (ref 10–20)
AST SERPL W P-5'-P-CCNC: 37 U/L (ref 9–39)
BASOPHILS # BLD AUTO: 0.04 X10*3/UL (ref 0–0.1)
BASOPHILS NFR BLD AUTO: 0.6 %
BILIRUB SERPL-MCNC: 2.9 MG/DL (ref 0–1.2)
BUN SERPL-MCNC: 13 MG/DL (ref 6–23)
CALCIUM SERPL-MCNC: 8.2 MG/DL (ref 8.6–10.3)
CHLORIDE SERPL-SCNC: 103 MMOL/L (ref 98–107)
CO2 SERPL-SCNC: 24 MMOL/L (ref 21–32)
CREAT SERPL-MCNC: 0.94 MG/DL (ref 0.5–1.3)
EGFRCR SERPLBLD CKD-EPI 2021: 83 ML/MIN/1.73M*2
EOSINOPHIL # BLD AUTO: 0.21 X10*3/UL (ref 0–0.4)
EOSINOPHIL NFR BLD AUTO: 3.1 %
ERYTHROCYTE [DISTWIDTH] IN BLOOD BY AUTOMATED COUNT: 16.1 % (ref 11.5–14.5)
GLUCOSE SERPL-MCNC: 96 MG/DL (ref 74–99)
HCT VFR BLD AUTO: 29.6 % (ref 41–52)
HGB BLD-MCNC: 9.5 G/DL (ref 13.5–17.5)
IMM GRANULOCYTES # BLD AUTO: 0.02 X10*3/UL (ref 0–0.5)
IMM GRANULOCYTES NFR BLD AUTO: 0.3 % (ref 0–0.9)
LYMPHOCYTES # BLD AUTO: 0.98 X10*3/UL (ref 0.8–3)
LYMPHOCYTES NFR BLD AUTO: 14.3 %
MCH RBC QN AUTO: 28.4 PG (ref 26–34)
MCHC RBC AUTO-ENTMCNC: 32.1 G/DL (ref 32–36)
MCV RBC AUTO: 89 FL (ref 80–100)
MONOCYTES # BLD AUTO: 1.22 X10*3/UL (ref 0.05–0.8)
MONOCYTES NFR BLD AUTO: 17.8 %
NEUTROPHILS # BLD AUTO: 4.4 X10*3/UL (ref 1.6–5.5)
NEUTROPHILS NFR BLD AUTO: 63.9 %
PLATELET # BLD AUTO: 48 X10*3/UL (ref 150–450)
POTASSIUM SERPL-SCNC: 4 MMOL/L (ref 3.5–5.3)
PROT SERPL-MCNC: 5.5 G/DL (ref 6.4–8.2)
RBC # BLD AUTO: 3.34 X10*6/UL (ref 4.5–5.9)
SODIUM SERPL-SCNC: 134 MMOL/L (ref 136–145)
WBC # BLD AUTO: 6.9 X10*3/UL (ref 4.4–11.3)

## 2025-03-25 PROCEDURE — 80053 COMPREHEN METABOLIC PANEL: CPT

## 2025-03-25 PROCEDURE — 36415 COLL VENOUS BLD VENIPUNCTURE: CPT

## 2025-03-25 PROCEDURE — 82140 ASSAY OF AMMONIA: CPT | Performed by: INTERNAL MEDICINE

## 2025-03-25 PROCEDURE — 85025 COMPLETE CBC W/AUTO DIFF WBC: CPT

## 2025-03-25 RX ORDER — LACTULOSE 10 G/15ML
SOLUTION ORAL
Qty: 1892 ML | Refills: 11 | Status: ON HOLD | OUTPATIENT
Start: 2025-03-25

## 2025-03-25 NOTE — TELEPHONE ENCOUNTER
"Refill Request      Last OV with PCP 3/19/2025     Future Appointments       Date / Time Provider Department Dept Phone    3/27/2025 11:20 AM (Arrive by 11:05 AM) Laura Alfredo MD [facility]  [phone]    3/27/2025 11:30 AM INF 9C [facility]  [phone]    6/16/2025 3:00 PM (Arrive by 2:45 PM) Gisele Patel MD  [facility] [phone]          Lab Results   Component Value Date    HGBA1C 4.1 10/08/2024     Lab Results   Component Value Date    CHOL 210 (H) 10/08/2024    CHOL 168 04/10/2024    CHOL 231 (H) 11/28/2023     Lab Results   Component Value Date    HDL 76.1 10/08/2024    HDL 66.8 04/10/2024    HDL 77.6 11/28/2023     Lab Results   Component Value Date    LDLCALC 124 (H) 10/08/2024    LDLCALC 89 04/10/2024    LDLCALC 143 (H) 11/28/2023     Lab Results   Component Value Date    TRIG 50 10/08/2024    TRIG 61 04/10/2024    TRIG 53 11/28/2023     No components found for: \"CHOLHDL\"  Lab Results   Component Value Date    TSH 1.86 02/25/2025     Lab Results   Component Value Date    GLUCOSE 96 03/25/2025    CALCIUM 8.2 (L) 03/25/2025     (L) 03/25/2025    K 4.0 03/25/2025    CO2 24 03/25/2025     03/25/2025    BUN 13 03/25/2025    CREATININE 0.94 03/25/2025       "

## 2025-03-27 ENCOUNTER — LAB (OUTPATIENT)
Dept: LAB | Facility: CLINIC | Age: 80
End: 2025-03-27
Payer: MEDICARE

## 2025-03-27 ENCOUNTER — HOSPITAL ENCOUNTER (OUTPATIENT)
Dept: RADIOLOGY | Facility: CLINIC | Age: 80
Discharge: HOME | End: 2025-03-27
Payer: MEDICARE

## 2025-03-27 ENCOUNTER — INFUSION (OUTPATIENT)
Dept: HEMATOLOGY/ONCOLOGY | Facility: CLINIC | Age: 80
End: 2025-03-27
Payer: MEDICARE

## 2025-03-27 ENCOUNTER — OFFICE VISIT (OUTPATIENT)
Dept: HEMATOLOGY/ONCOLOGY | Facility: CLINIC | Age: 80
End: 2025-03-27
Payer: MEDICARE

## 2025-03-27 VITALS
TEMPERATURE: 97.5 F | RESPIRATION RATE: 18 BRPM | HEART RATE: 60 BPM | OXYGEN SATURATION: 98 % | BODY MASS INDEX: 31.17 KG/M2 | SYSTOLIC BLOOD PRESSURE: 143 MMHG | WEIGHT: 193.12 LBS | DIASTOLIC BLOOD PRESSURE: 60 MMHG

## 2025-03-27 DIAGNOSIS — C22.0 HEPATOCELLULAR CARCINOMA: ICD-10-CM

## 2025-03-27 LAB
APPEARANCE UR: CLEAR
BILIRUB UR STRIP.AUTO-MCNC: NEGATIVE MG/DL
COLOR UR: YELLOW
GLUCOSE UR STRIP.AUTO-MCNC: NORMAL MG/DL
KETONES UR STRIP.AUTO-MCNC: NEGATIVE MG/DL
LEUKOCYTE ESTERASE UR QL STRIP.AUTO: NEGATIVE
MUCOUS THREADS #/AREA URNS AUTO: NORMAL /LPF
NITRITE UR QL STRIP.AUTO: NEGATIVE
PH UR STRIP.AUTO: 6 [PH]
PROT UR STRIP.AUTO-MCNC: NEGATIVE MG/DL
RBC # UR STRIP.AUTO: ABNORMAL MG/DL
RBC #/AREA URNS AUTO: NORMAL /HPF
SP GR UR STRIP.AUTO: 1.01
UROBILINOGEN UR STRIP.AUTO-MCNC: NORMAL MG/DL
WBC #/AREA URNS AUTO: NORMAL /HPF

## 2025-03-27 PROCEDURE — 99214 OFFICE O/P EST MOD 30 MIN: CPT | Performed by: INTERNAL MEDICINE

## 2025-03-27 PROCEDURE — 1126F AMNT PAIN NOTED NONE PRSNT: CPT | Performed by: INTERNAL MEDICINE

## 2025-03-27 PROCEDURE — 81003 URINALYSIS AUTO W/O SCOPE: CPT

## 2025-03-27 PROCEDURE — 1159F MED LIST DOCD IN RCRD: CPT | Performed by: INTERNAL MEDICINE

## 2025-03-27 PROCEDURE — 3078F DIAST BP <80 MM HG: CPT | Performed by: INTERNAL MEDICINE

## 2025-03-27 PROCEDURE — 1123F ACP DISCUSS/DSCN MKR DOCD: CPT | Performed by: INTERNAL MEDICINE

## 2025-03-27 PROCEDURE — 3077F SYST BP >= 140 MM HG: CPT | Performed by: INTERNAL MEDICINE

## 2025-03-27 PROCEDURE — G2211 COMPLEX E/M VISIT ADD ON: HCPCS | Performed by: INTERNAL MEDICINE

## 2025-03-27 PROCEDURE — 2550000001 HC RX 255 CONTRASTS: Performed by: INTERNAL MEDICINE

## 2025-03-27 PROCEDURE — 74170 CT ABD WO CNTRST FLWD CNTRST: CPT

## 2025-03-27 PROCEDURE — 1157F ADVNC CARE PLAN IN RCRD: CPT | Performed by: INTERNAL MEDICINE

## 2025-03-27 RX ADMIN — IOHEXOL 50 ML: 350 INJECTION, SOLUTION INTRAVENOUS at 13:19

## 2025-03-27 ASSESSMENT — PAIN SCALES - GENERAL: PAINLEVEL_OUTOF10: 0-NO PAIN

## 2025-03-27 NOTE — PROGRESS NOTES
Patient ID: Alfonso Toscano is a 80 y.o. male.    Subjective    HPI  Mr. Alfonso Toscano is a 80 y.o. male presents for follow up of hepatocellular cancer.  Blood work from 3/25/25, Bilirubin elevated at 2.9; AFP of 2/25/25, 29 ng/mL, down from 48.    Today, his wife notes some fatigue and notes he did have one episode of hematochezia this morning. He also has some abdominal swelling. He denies nausea, vomiting, diarrhea, difficulty breathing, lower limb swelling.    Patient's past medical history, surgical history, family history and social history reviewed.    Review of Systems:   Review of Systems:    Positive per HPI, otherwise negative.     Objective    BSA: 2.02 meters squared  /60 (BP Location: Right arm, Patient Position: Sitting)   Pulse 60   Temp 36.4 °C (97.5 °F) (Temporal)   Resp 18   Wt 87.6 kg (193 lb 2 oz)   SpO2 98%   BMI 31.17 kg/m²       Physical Exam  Gen: appears well in clinic, NAD  HEENT: atraumatic head, normocephalic, EOMI, conjunctiva normal  LUNG: no increased WOB, CTAB  CV: No JVD. RRR  GI: soft, NT, ND  LE: no LE edema  Skin: no obvious rashes or lesions on visible skin  Neuro: interactive, no focal deficits noted  Psych: normal mood and affect    Performance Status:  Symptomatic; fully ambulatory    Labs/Imaging/Pathology: Personally reviewed reports and images in Epic electronic medical record system. Pertinent results as it related to the plan represented in below in assessment and plan.     Component      Latest Ref Rng 1/23/2025 2/25/2025   Alpha-Fetoprotein      0 - 9 ng/mL 48 (H)  29 (H)       Legend:  (H) High    Component      Latest Ref Rng 3/13/2025 3/25/2025   WBC      4.4 - 11.3 x10*3/uL 4.9  6.9    nRBC --     RBC      4.50 - 5.90 x10*6/uL 3.25 (L)  3.34 (L)    HEMOGLOBIN      13.5 - 17.5 g/dL 9.4 (L)  9.5 (L)    HEMATOCRIT      41.0 - 52.0 % 29.1 (L)  29.6 (L)    MCV      80 - 100 fL 90  89    MCHC      32.0 - 36.0 g/dL 32.3  32.1    Platelets      150 - 450  x10*3/uL 50 (L)  48 (L)    RED CELL DISTRIBUTION WIDTH      11.5 - 14.5 % 15.5 (H)  16.1 (H)    Neutrophils %      40.0 - 80.0 % 61.9  63.9    Immature Granulocytes %, Automated      0.0 - 0.9 % 0.2  0.3    Lymphocytes %      13.0 - 44.0 % 18.9  14.3    Monocytes %      2.0 - 10.0 % 15.1  17.8    Eosinophils %      0.0 - 6.0 % 3.5  3.1    Basophils %      0.0 - 2.0 % 0.4  0.6    Neutrophils Absolute      1.60 - 5.50 x10*3/uL 3.04  4.40    Lymphocytes Absolute      0.80 - 3.00 x10*3/uL 0.93  0.98    Monocytes Absolute      0.05 - 0.80 x10*3/uL 0.74  1.22 (H)    Eosinophils Absolute      0.00 - 0.40 x10*3/uL 0.17  0.21    Basophils Absolute      0.00 - 0.10 x10*3/uL 0.02  0.04    MCH      26.0 - 34.0 pg 28.9  28.4    Immature Granulocytes Absolute, Automated      0.00 - 0.50 x10*3/uL 0.01  0.02       Legend:  (L) Low  (H) High    Component      Latest Ref Rn 3/13/2025 3/25/2025   GLUCOSE      74 - 99 mg/dL 172 (H)  96    SODIUM      136 - 145 mmol/L 133 (L)  134 (L)    POTASSIUM      3.5 - 5.3 mmol/L 4.0  4.0    CHLORIDE      98 - 107 mmol/L 104  103    Bicarbonate      21 - 32 mmol/L 22  24    Anion Gap      10 - 20 mmol/L 11  11    Blood Urea Nitrogen      6 - 23 mg/dL 14  13    Creatinine      0.50 - 1.30 mg/dL 0.87  0.94    Calcium      8.6 - 10.3 mg/dL 8.6  8.2 (L)    Albumin      3.4 - 5.0 g/dL 2.9 (L)  2.9 (L)    Alkaline Phosphatase      33 - 136 U/L 110  124    Total Protein      6.4 - 8.2 g/dL 5.4 (L)  5.5 (L)    AST      9 - 39 U/L 31  37    Bilirubin Total      0.0 - 1.2 mg/dL 2.5 (H)  2.9 (H)    ALT      10 - 52 U/L 17  19    EGFR      >60 mL/min/1.73m*2 88  83       Legend:  (H) High  (L) Low    Assessment/Plan   History of hepatocellular adenocarcinoma  - Initially diagnosed in May 2021.   - Ultrasound showed 3 liver lesions.  - MRI liver done.   - He underwent ablation at the [facility] 10/11/21 with complications and a prolonged hospitalization for GI bleed. He was admitted 10/24/21-11/9/21  with persistent GI blood loss.   - Since then he has seen gastroenterology and undergoes banding on a regular basis for esophageal varicosities.   - Last colonoscopy 11/3/21 that showed diverticulitis in the sigmoid colon.  - Last EGD 10/26/21.  - We discussed there has been no imaging on the liver lesions since 2021.  - It is hard to understand if lesions are new or worsening and that he has a new sub centimeter lesion that is concerning for hepatocellular adenocarcinoma and we will plan for repeat MRI in 6 weeks.   - Regarding his anemia we will plan for SPEP and serum free light chains to assess for plasma cell dyscrasia.  - Will plan to check haptoglobin to rule out hemolysis.  - We will repeat blood counts today.  - If all are normal we will plan for follow-up with imaging in 6 weeks.   - We discussed given he is not interested in liver directed therapy that we could consider systemic therapy with avastin +atezolizumab.  - Reviewed side effects and consent signed today.  - RTC in 6 weeks.     2/25/25:   - New lesions in segment VI is concerning for disease progression.    - His AFP on 1/23/25, 48 ng/mL, was also elevated.    - We discussed localized treatment options. However, he is reluctant to undergo radiation or ablation.    - We discussed alternatively, we could consider systemic treatment.   - Will plan to check labs today   - We discussed bevacizumab vs atezolizumab   - Will plan to start cycle 1 next week   - RTC for toxicity check with Eric, then RTC with Dr. Alfredo for cycle 2   - We reviewed side effects and consent signed    3/27/25:   - Patient has been having some increased weight gain and increased ammonia leading to AMS after initial therapy; concern for toxicity from immunotherapy versus obstructive process   - Will plan for stat CT of liver to determine etiology   - Hold treatment today   - Will call patient with CT results   - We did discuss we would consider localized SBRT if there is no  other site of disease and will reach out to Dr. Alexandra if needed.   - RTC TBD      Reviewed ongoing medical problems and how they relate to his malignancy, will continue long term monitoring.    RTC next week  This note has been transcribed using a medical scribe and there is a possibility of unintentional typing misprints      Diagnoses and all orders for this visit:  Hepatocellular carcinoma (Multi)  -     Clinic Appointment Request  -     CT liver w and wo IV contrast; Future      Laura Alfredo MD  Hematology/Oncology  [facility]      Scribe Attestation  By signing my name below, I, Moise Ocampo, attest that this documentation has been prepared under the direction and in the presence of Laura Alfredo MD.

## 2025-03-28 ENCOUNTER — HOSPITAL ENCOUNTER (INPATIENT)
Facility: HOSPITAL | Age: 80
DRG: 853 | End: 2025-03-28
Attending: STUDENT IN AN ORGANIZED HEALTH CARE EDUCATION/TRAINING PROGRAM | Admitting: STUDENT IN AN ORGANIZED HEALTH CARE EDUCATION/TRAINING PROGRAM
Payer: MEDICARE

## 2025-03-28 ENCOUNTER — APPOINTMENT (OUTPATIENT)
Dept: GASTROENTEROLOGY | Facility: HOSPITAL | Age: 80
DRG: 853 | End: 2025-03-28
Payer: MEDICARE

## 2025-03-28 ENCOUNTER — APPOINTMENT (OUTPATIENT)
Dept: RADIOLOGY | Facility: HOSPITAL | Age: 80
DRG: 853 | End: 2025-03-28
Payer: MEDICARE

## 2025-03-28 ENCOUNTER — ANESTHESIA EVENT (OUTPATIENT)
Dept: GASTROENTEROLOGY | Facility: HOSPITAL | Age: 80
DRG: 853 | End: 2025-03-28
Payer: MEDICARE

## 2025-03-28 ENCOUNTER — ANESTHESIA (OUTPATIENT)
Dept: GASTROENTEROLOGY | Facility: HOSPITAL | Age: 80
DRG: 853 | End: 2025-03-28
Payer: MEDICARE

## 2025-03-28 DIAGNOSIS — D64.9 ANEMIA, UNSPECIFIED TYPE: ICD-10-CM

## 2025-03-28 DIAGNOSIS — R79.89 ELEVATED TROPONIN: ICD-10-CM

## 2025-03-28 DIAGNOSIS — R79.89 ELEVATED LACTIC ACID LEVEL: ICD-10-CM

## 2025-03-28 DIAGNOSIS — D62 ACUTE BLOOD LOSS ANEMIA: ICD-10-CM

## 2025-03-28 DIAGNOSIS — I95.9 HYPOTENSION, UNSPECIFIED HYPOTENSION TYPE: ICD-10-CM

## 2025-03-28 DIAGNOSIS — K92.2 GASTROINTESTINAL HEMORRHAGE, UNSPECIFIED GASTROINTESTINAL HEMORRHAGE TYPE: Primary | ICD-10-CM

## 2025-03-28 DIAGNOSIS — K92.1 HEMATOCHEZIA: ICD-10-CM

## 2025-03-28 DIAGNOSIS — K70.31 ALCOHOLIC CIRRHOSIS OF LIVER WITH ASCITES (MULTI): ICD-10-CM

## 2025-03-28 PROBLEM — R57.8: Status: ACTIVE | Noted: 2025-03-28

## 2025-03-28 LAB
ABO GROUP (TYPE) IN BLOOD: NORMAL
ALBUMIN SERPL BCP-MCNC: 2.4 G/DL (ref 3.4–5)
ALP SERPL-CCNC: 109 U/L (ref 33–136)
ALT SERPL W P-5'-P-CCNC: 19 U/L (ref 10–52)
ANION GAP SERPL CALC-SCNC: 13 MMOL/L (ref 10–20)
ANTIBODY SCREEN: NORMAL
APTT PPP: 43 SECONDS (ref 26–36)
AST SERPL W P-5'-P-CCNC: 38 U/L (ref 9–39)
BASOPHILS # BLD AUTO: 0.05 X10*3/UL (ref 0–0.1)
BASOPHILS NFR BLD AUTO: 0.5 %
BILIRUB SERPL-MCNC: 2.6 MG/DL (ref 0–1.2)
BLOOD EXPIRATION DATE: NORMAL
BUN SERPL-MCNC: 14 MG/DL (ref 6–23)
CALCIUM SERPL-MCNC: 7.8 MG/DL (ref 8.6–10.3)
CARDIAC TROPONIN I PNL SERPL HS: 361 NG/L (ref 0–20)
CARDIAC TROPONIN I PNL SERPL HS: 582 NG/L (ref 0–20)
CHLORIDE SERPL-SCNC: 109 MMOL/L (ref 98–107)
CO2 SERPL-SCNC: 19 MMOL/L (ref 21–32)
CREAT SERPL-MCNC: 1.01 MG/DL (ref 0.5–1.3)
DISPENSE STATUS: NORMAL
EGFRCR SERPLBLD CKD-EPI 2021: 76 ML/MIN/1.73M*2
EOSINOPHIL # BLD AUTO: 0.22 X10*3/UL (ref 0–0.4)
EOSINOPHIL NFR BLD AUTO: 2.4 %
ERYTHROCYTE [DISTWIDTH] IN BLOOD BY AUTOMATED COUNT: 16.2 % (ref 11.5–14.5)
ERYTHROCYTE [DISTWIDTH] IN BLOOD BY AUTOMATED COUNT: 16.3 % (ref 11.5–14.5)
ERYTHROCYTE [DISTWIDTH] IN BLOOD BY AUTOMATED COUNT: 16.3 % (ref 11.5–14.5)
ERYTHROCYTE [DISTWIDTH] IN BLOOD BY AUTOMATED COUNT: 17.2 % (ref 11.5–14.5)
GLUCOSE BLD MANUAL STRIP-MCNC: 112 MG/DL (ref 74–99)
GLUCOSE BLD MANUAL STRIP-MCNC: 89 MG/DL (ref 74–99)
GLUCOSE BLD MANUAL STRIP-MCNC: 99 MG/DL (ref 74–99)
GLUCOSE SERPL-MCNC: 130 MG/DL (ref 74–99)
HCT VFR BLD AUTO: 22.2 % (ref 41–52)
HCT VFR BLD AUTO: 22.5 % (ref 41–52)
HCT VFR BLD AUTO: 22.5 % (ref 41–52)
HCT VFR BLD AUTO: 26.9 % (ref 41–52)
HGB BLD-MCNC: 7.2 G/DL (ref 13.5–17.5)
HGB BLD-MCNC: 7.4 G/DL (ref 13.5–17.5)
HGB BLD-MCNC: 7.7 G/DL (ref 13.5–17.5)
HGB BLD-MCNC: 8.8 G/DL (ref 13.5–17.5)
IMM GRANULOCYTES # BLD AUTO: 0.04 X10*3/UL (ref 0–0.5)
IMM GRANULOCYTES NFR BLD AUTO: 0.4 % (ref 0–0.9)
INR PPP: 2 (ref 0.9–1.1)
LACTATE SERPL-SCNC: 2.1 MMOL/L (ref 0.4–2)
LACTATE SERPL-SCNC: 2.1 MMOL/L (ref 0.4–2)
LACTATE SERPL-SCNC: 2.2 MMOL/L (ref 0.4–2)
LACTATE SERPL-SCNC: 3.3 MMOL/L (ref 0.4–2)
LYMPHOCYTES # BLD AUTO: 1.96 X10*3/UL (ref 0.8–3)
LYMPHOCYTES NFR BLD AUTO: 21 %
MCH RBC QN AUTO: 28.5 PG (ref 26–34)
MCH RBC QN AUTO: 29.6 PG (ref 26–34)
MCH RBC QN AUTO: 29.7 PG (ref 26–34)
MCH RBC QN AUTO: 29.8 PG (ref 26–34)
MCHC RBC AUTO-ENTMCNC: 32 G/DL (ref 32–36)
MCHC RBC AUTO-ENTMCNC: 32.7 G/DL (ref 32–36)
MCHC RBC AUTO-ENTMCNC: 33.3 G/DL (ref 32–36)
MCHC RBC AUTO-ENTMCNC: 34.2 G/DL (ref 32–36)
MCV RBC AUTO: 87 FL (ref 80–100)
MCV RBC AUTO: 89 FL (ref 80–100)
MCV RBC AUTO: 89 FL (ref 80–100)
MCV RBC AUTO: 91 FL (ref 80–100)
MONOCYTES # BLD AUTO: 1.62 X10*3/UL (ref 0.05–0.8)
MONOCYTES NFR BLD AUTO: 17.3 %
NEUTROPHILS # BLD AUTO: 5.46 X10*3/UL (ref 1.6–5.5)
NEUTROPHILS NFR BLD AUTO: 58.4 %
NRBC BLD-RTO: 0 /100 WBCS (ref 0–0)
PLATELET # BLD AUTO: 37 X10*3/UL (ref 150–450)
PLATELET # BLD AUTO: 51 X10*3/UL (ref 150–450)
PLATELET # BLD AUTO: 51 X10*3/UL (ref 150–450)
PLATELET # BLD AUTO: 74 X10*3/UL (ref 150–450)
POTASSIUM SERPL-SCNC: 4.2 MMOL/L (ref 3.5–5.3)
PRODUCT BLOOD TYPE: 5100
PRODUCT CODE: NORMAL
PROT SERPL-MCNC: 4.5 G/DL (ref 6.4–8.2)
PROTHROMBIN TIME: 21.8 SECONDS (ref 9.8–12.4)
RBC # BLD AUTO: 2.49 X10*6/UL (ref 4.5–5.9)
RBC # BLD AUTO: 2.53 X10*6/UL (ref 4.5–5.9)
RBC # BLD AUTO: 2.58 X10*6/UL (ref 4.5–5.9)
RBC # BLD AUTO: 2.97 X10*6/UL (ref 4.5–5.9)
RH FACTOR (ANTIGEN D): NORMAL
SODIUM SERPL-SCNC: 137 MMOL/L (ref 136–145)
UNIT ABO: NORMAL
UNIT NUMBER: NORMAL
UNIT RH: NORMAL
UNIT VOLUME: 278
UNIT VOLUME: 289
UNIT VOLUME: 330
UNIT VOLUME: 350
WBC # BLD AUTO: 5.3 X10*3/UL (ref 4.4–11.3)
WBC # BLD AUTO: 6.6 X10*3/UL (ref 4.4–11.3)
WBC # BLD AUTO: 8.5 X10*3/UL (ref 4.4–11.3)
WBC # BLD AUTO: 9.4 X10*3/UL (ref 4.4–11.3)
XM INTEP: NORMAL
XM INTEP: NORMAL

## 2025-03-28 PROCEDURE — 86923 COMPATIBILITY TEST ELECTRIC: CPT

## 2025-03-28 PROCEDURE — 45382 COLONOSCOPY W/CONTROL BLEED: CPT | Performed by: STUDENT IN AN ORGANIZED HEALTH CARE EDUCATION/TRAINING PROGRAM

## 2025-03-28 PROCEDURE — P9045 ALBUMIN (HUMAN), 5%, 250 ML: HCPCS | Mod: JZ | Performed by: STUDENT IN AN ORGANIZED HEALTH CARE EDUCATION/TRAINING PROGRAM

## 2025-03-28 PROCEDURE — 06LY8CC OCCLUSION OF HEMORRHOIDAL PLEXUS WITH EXTRALUMINAL DEVICE, VIA NATURAL OR ARTIFICIAL OPENING ENDOSCOPIC: ICD-10-PCS | Performed by: STUDENT IN AN ORGANIZED HEALTH CARE EDUCATION/TRAINING PROGRAM

## 2025-03-28 PROCEDURE — 3E033XZ INTRODUCTION OF VASOPRESSOR INTO PERIPHERAL VEIN, PERCUTANEOUS APPROACH: ICD-10-PCS | Performed by: NURSE ANESTHETIST, CERTIFIED REGISTERED

## 2025-03-28 PROCEDURE — 0DJ08ZZ INSPECTION OF UPPER INTESTINAL TRACT, VIA NATURAL OR ARTIFICIAL OPENING ENDOSCOPIC: ICD-10-PCS | Performed by: STUDENT IN AN ORGANIZED HEALTH CARE EDUCATION/TRAINING PROGRAM

## 2025-03-28 PROCEDURE — 84484 ASSAY OF TROPONIN QUANT: CPT

## 2025-03-28 PROCEDURE — 2500000004 HC RX 250 GENERAL PHARMACY W/ HCPCS (ALT 636 FOR OP/ED): Performed by: NURSE ANESTHETIST, CERTIFIED REGISTERED

## 2025-03-28 PROCEDURE — 2500000005 HC RX 250 GENERAL PHARMACY W/O HCPCS

## 2025-03-28 PROCEDURE — P9016 RBC LEUKOCYTES REDUCED: HCPCS

## 2025-03-28 PROCEDURE — 74174 CTA ABD&PLVS W/CONTRAST: CPT

## 2025-03-28 PROCEDURE — 2500000005 HC RX 250 GENERAL PHARMACY W/O HCPCS: Performed by: NURSE ANESTHETIST, CERTIFIED REGISTERED

## 2025-03-28 PROCEDURE — 2500000004 HC RX 250 GENERAL PHARMACY W/ HCPCS (ALT 636 FOR OP/ED): Performed by: EMERGENCY MEDICINE

## 2025-03-28 PROCEDURE — 2500000004 HC RX 250 GENERAL PHARMACY W/ HCPCS (ALT 636 FOR OP/ED)

## 2025-03-28 PROCEDURE — 36415 COLL VENOUS BLD VENIPUNCTURE: CPT | Performed by: STUDENT IN AN ORGANIZED HEALTH CARE EDUCATION/TRAINING PROGRAM

## 2025-03-28 PROCEDURE — 99223 1ST HOSP IP/OBS HIGH 75: CPT | Performed by: NURSE PRACTITIONER

## 2025-03-28 PROCEDURE — 99291 CRITICAL CARE FIRST HOUR: CPT | Performed by: STUDENT IN AN ORGANIZED HEALTH CARE EDUCATION/TRAINING PROGRAM

## 2025-03-28 PROCEDURE — P9035 PLATELET PHERES LEUKOREDUCED: HCPCS

## 2025-03-28 PROCEDURE — 0W3P8ZZ CONTROL BLEEDING IN GASTROINTESTINAL TRACT, VIA NATURAL OR ARTIFICIAL OPENING ENDOSCOPIC: ICD-10-PCS | Performed by: STUDENT IN AN ORGANIZED HEALTH CARE EDUCATION/TRAINING PROGRAM

## 2025-03-28 PROCEDURE — 2720000007 HC OR 272 NO HCPCS

## 2025-03-28 PROCEDURE — 85610 PROTHROMBIN TIME: CPT | Performed by: STUDENT IN AN ORGANIZED HEALTH CARE EDUCATION/TRAINING PROGRAM

## 2025-03-28 PROCEDURE — 76937 US GUIDE VASCULAR ACCESS: CPT

## 2025-03-28 PROCEDURE — 3700000002 HC GENERAL ANESTHESIA TIME - EACH INCREMENTAL 1 MINUTE

## 2025-03-28 PROCEDURE — 85730 THROMBOPLASTIN TIME PARTIAL: CPT | Performed by: STUDENT IN AN ORGANIZED HEALTH CARE EDUCATION/TRAINING PROGRAM

## 2025-03-28 PROCEDURE — 86901 BLOOD TYPING SEROLOGIC RH(D): CPT | Performed by: STUDENT IN AN ORGANIZED HEALTH CARE EDUCATION/TRAINING PROGRAM

## 2025-03-28 PROCEDURE — 85025 COMPLETE CBC W/AUTO DIFF WBC: CPT | Performed by: STUDENT IN AN ORGANIZED HEALTH CARE EDUCATION/TRAINING PROGRAM

## 2025-03-28 PROCEDURE — 2500000004 HC RX 250 GENERAL PHARMACY W/ HCPCS (ALT 636 FOR OP/ED): Performed by: STUDENT IN AN ORGANIZED HEALTH CARE EDUCATION/TRAINING PROGRAM

## 2025-03-28 PROCEDURE — P9017 PLASMA 1 DONOR FRZ W/IN 8 HR: HCPCS

## 2025-03-28 PROCEDURE — 36430 TRANSFUSION BLD/BLD COMPNT: CPT

## 2025-03-28 PROCEDURE — 82947 ASSAY GLUCOSE BLOOD QUANT: CPT

## 2025-03-28 PROCEDURE — P9045 ALBUMIN (HUMAN), 5%, 250 ML: HCPCS | Mod: JZ | Performed by: NURSE ANESTHETIST, CERTIFIED REGISTERED

## 2025-03-28 PROCEDURE — 99291 CRITICAL CARE FIRST HOUR: CPT

## 2025-03-28 PROCEDURE — 84484 ASSAY OF TROPONIN QUANT: CPT | Performed by: STUDENT IN AN ORGANIZED HEALTH CARE EDUCATION/TRAINING PROGRAM

## 2025-03-28 PROCEDURE — 80053 COMPREHEN METABOLIC PANEL: CPT | Performed by: STUDENT IN AN ORGANIZED HEALTH CARE EDUCATION/TRAINING PROGRAM

## 2025-03-28 PROCEDURE — 2500000004 HC RX 250 GENERAL PHARMACY W/ HCPCS (ALT 636 FOR OP/ED): Mod: JZ | Performed by: STUDENT IN AN ORGANIZED HEALTH CARE EDUCATION/TRAINING PROGRAM

## 2025-03-28 PROCEDURE — 2550000001 HC RX 255 CONTRASTS: Performed by: STUDENT IN AN ORGANIZED HEALTH CARE EDUCATION/TRAINING PROGRAM

## 2025-03-28 PROCEDURE — 96361 HYDRATE IV INFUSION ADD-ON: CPT

## 2025-03-28 PROCEDURE — 83605 ASSAY OF LACTIC ACID: CPT

## 2025-03-28 PROCEDURE — 83605 ASSAY OF LACTIC ACID: CPT | Performed by: STUDENT IN AN ORGANIZED HEALTH CARE EDUCATION/TRAINING PROGRAM

## 2025-03-28 PROCEDURE — 3700000001 HC GENERAL ANESTHESIA TIME - INITIAL BASE CHARGE

## 2025-03-28 PROCEDURE — 85027 COMPLETE CBC AUTOMATED: CPT

## 2025-03-28 PROCEDURE — 43235 EGD DIAGNOSTIC BRUSH WASH: CPT | Performed by: STUDENT IN AN ORGANIZED HEALTH CARE EDUCATION/TRAINING PROGRAM

## 2025-03-28 PROCEDURE — 96375 TX/PRO/DX INJ NEW DRUG ADDON: CPT

## 2025-03-28 PROCEDURE — 74174 CTA ABD&PLVS W/CONTRAST: CPT | Performed by: RADIOLOGY

## 2025-03-28 PROCEDURE — 2020000001 HC ICU ROOM DAILY

## 2025-03-28 PROCEDURE — 96365 THER/PROPH/DIAG IV INF INIT: CPT

## 2025-03-28 PROCEDURE — 96374 THER/PROPH/DIAG INJ IV PUSH: CPT | Mod: 59

## 2025-03-28 RX ORDER — ALBUMIN HUMAN 50 G/1000ML
SOLUTION INTRAVENOUS AS NEEDED
Status: DISCONTINUED | OUTPATIENT
Start: 2025-03-28 | End: 2025-03-28

## 2025-03-28 RX ORDER — ALBUMIN HUMAN 50 G/1000ML
25 SOLUTION INTRAVENOUS ONCE
Status: COMPLETED | OUTPATIENT
Start: 2025-03-28 | End: 2025-03-28

## 2025-03-28 RX ORDER — NOREPINEPHRINE BITARTRATE/D5W 8 MG/250ML
0-.2 PLASTIC BAG, INJECTION (ML) INTRAVENOUS CONTINUOUS
Status: DISCONTINUED | OUTPATIENT
Start: 2025-03-28 | End: 2025-03-29

## 2025-03-28 RX ORDER — PANTOPRAZOLE SODIUM 40 MG/10ML
40 INJECTION, POWDER, LYOPHILIZED, FOR SOLUTION INTRAVENOUS 2 TIMES DAILY
Status: DISCONTINUED | OUTPATIENT
Start: 2025-03-28 | End: 2025-03-31

## 2025-03-28 RX ORDER — FUROSEMIDE 40 MG/1
20 TABLET ORAL DAILY
Status: DISCONTINUED | OUTPATIENT
Start: 2025-03-29 | End: 2025-03-31 | Stop reason: HOSPADM

## 2025-03-28 RX ORDER — DEXTROSE 50 % IN WATER (D50W) INTRAVENOUS SYRINGE
25
Status: DISCONTINUED | OUTPATIENT
Start: 2025-03-28 | End: 2025-03-29

## 2025-03-28 RX ORDER — PHENYLEPHRINE HCL IN 0.9% NACL 1 MG/10 ML
SYRINGE (ML) INTRAVENOUS AS NEEDED
Status: DISCONTINUED | OUTPATIENT
Start: 2025-03-28 | End: 2025-03-28

## 2025-03-28 RX ORDER — PANTOPRAZOLE SODIUM 40 MG/10ML
40 INJECTION, POWDER, LYOPHILIZED, FOR SOLUTION INTRAVENOUS DAILY
Status: DISCONTINUED | OUTPATIENT
Start: 2025-03-28 | End: 2025-03-28

## 2025-03-28 RX ORDER — BICALUTAMIDE 50 MG/1
50 TABLET, FILM COATED ORAL DAILY
Status: DISCONTINUED | OUTPATIENT
Start: 2025-03-29 | End: 2025-03-29

## 2025-03-28 RX ORDER — OCTREOTIDE ACETATE 50 UG/ML
50 INJECTION, SOLUTION INTRAVENOUS; SUBCUTANEOUS ONCE
Status: COMPLETED | OUTPATIENT
Start: 2025-03-28 | End: 2025-03-28

## 2025-03-28 RX ORDER — ETOMIDATE 2 MG/ML
INJECTION INTRAVENOUS AS NEEDED
Status: DISCONTINUED | OUTPATIENT
Start: 2025-03-28 | End: 2025-03-28

## 2025-03-28 RX ORDER — SPIRONOLACTONE 25 MG/1
50 TABLET ORAL DAILY
Status: DISCONTINUED | OUTPATIENT
Start: 2025-03-29 | End: 2025-03-31 | Stop reason: HOSPADM

## 2025-03-28 RX ORDER — CEFTRIAXONE 1 G/50ML
1 INJECTION, SOLUTION INTRAVENOUS EVERY 24 HOURS
Status: DISCONTINUED | OUTPATIENT
Start: 2025-03-29 | End: 2025-03-31

## 2025-03-28 RX ORDER — FINASTERIDE 5 MG/1
5 TABLET, FILM COATED ORAL DAILY
Status: DISCONTINUED | OUTPATIENT
Start: 2025-03-29 | End: 2025-03-31 | Stop reason: HOSPADM

## 2025-03-28 RX ORDER — LIDOCAINE HYDROCHLORIDE 20 MG/ML
INJECTION, SOLUTION INFILTRATION; PERINEURAL AS NEEDED
Status: DISCONTINUED | OUTPATIENT
Start: 2025-03-28 | End: 2025-03-28

## 2025-03-28 RX ORDER — DEXTROSE 50 % IN WATER (D50W) INTRAVENOUS SYRINGE
12.5
Status: DISCONTINUED | OUTPATIENT
Start: 2025-03-28 | End: 2025-03-29

## 2025-03-28 RX ORDER — ONDANSETRON HYDROCHLORIDE 2 MG/ML
INJECTION, SOLUTION INTRAVENOUS AS NEEDED
Status: DISCONTINUED | OUTPATIENT
Start: 2025-03-28 | End: 2025-03-28

## 2025-03-28 RX ORDER — SODIUM CHLORIDE, SODIUM LACTATE, POTASSIUM CHLORIDE, CALCIUM CHLORIDE 600; 310; 30; 20 MG/100ML; MG/100ML; MG/100ML; MG/100ML
100 INJECTION, SOLUTION INTRAVENOUS CONTINUOUS
Status: ACTIVE | OUTPATIENT
Start: 2025-03-28 | End: 2025-03-29

## 2025-03-28 RX ORDER — SUCCINYLCHOLINE CHLORIDE 20 MG/ML
INJECTION INTRAMUSCULAR; INTRAVENOUS AS NEEDED
Status: DISCONTINUED | OUTPATIENT
Start: 2025-03-28 | End: 2025-03-28

## 2025-03-28 RX ORDER — LACTULOSE 10 G/15ML
30 SOLUTION ORAL 3 TIMES DAILY
Status: DISCONTINUED | OUTPATIENT
Start: 2025-03-28 | End: 2025-03-30

## 2025-03-28 RX ORDER — CETIRIZINE HYDROCHLORIDE 10 MG/1
10 TABLET ORAL DAILY
Status: DISCONTINUED | OUTPATIENT
Start: 2025-03-29 | End: 2025-03-31 | Stop reason: HOSPADM

## 2025-03-28 RX ORDER — NADOLOL 20 MG/1
10 TABLET ORAL DAILY
Status: DISCONTINUED | OUTPATIENT
Start: 2025-03-29 | End: 2025-03-29

## 2025-03-28 RX ORDER — CEFTRIAXONE 1 G/50ML
1 INJECTION, SOLUTION INTRAVENOUS ONCE
Status: COMPLETED | OUTPATIENT
Start: 2025-03-28 | End: 2025-03-28

## 2025-03-28 RX ADMIN — OCTREOTIDE ACETATE 50 MCG/HR: 500 INJECTION, SOLUTION INTRAVENOUS; SUBCUTANEOUS at 09:30

## 2025-03-28 RX ADMIN — OCTREOTIDE ACETATE 50 MCG/HR: 500 INJECTION, SOLUTION INTRAVENOUS; SUBCUTANEOUS at 19:05

## 2025-03-28 RX ADMIN — SODIUM CHLORIDE, POTASSIUM CHLORIDE, SODIUM LACTATE AND CALCIUM CHLORIDE 500 ML: 600; 310; 30; 20 INJECTION, SOLUTION INTRAVENOUS at 14:09

## 2025-03-28 RX ADMIN — Medication 100 MCG: at 15:51

## 2025-03-28 RX ADMIN — PANTOPRAZOLE SODIUM 40 MG: 40 INJECTION, POWDER, FOR SOLUTION INTRAVENOUS at 08:26

## 2025-03-28 RX ADMIN — SUCCINYLCHOLINE CHLORIDE 100 MG: 20 INJECTION, SOLUTION INTRAMUSCULAR; INTRAVENOUS at 15:18

## 2025-03-28 RX ADMIN — PANTOPRAZOLE SODIUM 40 MG: 40 INJECTION, POWDER, FOR SOLUTION INTRAVENOUS at 21:12

## 2025-03-28 RX ADMIN — DEXAMETHASONE SODIUM PHOSPHATE 4 MG: 4 INJECTION, SOLUTION INTRAMUSCULAR; INTRAVENOUS at 15:26

## 2025-03-28 RX ADMIN — SODIUM CHLORIDE, POTASSIUM CHLORIDE, SODIUM LACTATE AND CALCIUM CHLORIDE 1000 ML: 600; 310; 30; 20 INJECTION, SOLUTION INTRAVENOUS at 13:07

## 2025-03-28 RX ADMIN — IOHEXOL 100 ML: 350 INJECTION, SOLUTION INTRAVENOUS at 04:33

## 2025-03-28 RX ADMIN — SODIUM CHLORIDE, POTASSIUM CHLORIDE, SODIUM LACTATE AND CALCIUM CHLORIDE 100 ML/HR: 600; 310; 30; 20 INJECTION, SOLUTION INTRAVENOUS at 13:56

## 2025-03-28 RX ADMIN — LIDOCAINE HYDROCHLORIDE 80 ML: 20 INJECTION, SOLUTION INFILTRATION; PERINEURAL at 15:18

## 2025-03-28 RX ADMIN — Medication 150 MCG: at 15:55

## 2025-03-28 RX ADMIN — Medication 150 MCG: at 16:00

## 2025-03-28 RX ADMIN — SODIUM CHLORIDE, POTASSIUM CHLORIDE, SODIUM LACTATE AND CALCIUM CHLORIDE 500 ML: 600; 310; 30; 20 INJECTION, SOLUTION INTRAVENOUS at 04:07

## 2025-03-28 RX ADMIN — ALBUMIN HUMAN 25 G: 0.05 INJECTION, SOLUTION INTRAVENOUS at 14:17

## 2025-03-28 RX ADMIN — SODIUM CHLORIDE, POTASSIUM CHLORIDE, SODIUM LACTATE AND CALCIUM CHLORIDE 100 ML/HR: 600; 310; 30; 20 INJECTION, SOLUTION INTRAVENOUS at 19:08

## 2025-03-28 RX ADMIN — ALBUMIN HUMAN 250 ML: 0.05 INJECTION, SOLUTION INTRAVENOUS at 15:10

## 2025-03-28 RX ADMIN — ETOMIDATE 18 MG: 40 INJECTION, SOLUTION INTRAVENOUS at 15:18

## 2025-03-28 RX ADMIN — OCTREOTIDE ACETATE 50 MCG: 50 INJECTION, SOLUTION INTRAVENOUS; SUBCUTANEOUS at 09:05

## 2025-03-28 RX ADMIN — CEFTRIAXONE SODIUM 1 G: 1 INJECTION, SOLUTION INTRAVENOUS at 09:04

## 2025-03-28 RX ADMIN — ONDANSETRON 4 MG: 2 INJECTION INTRAMUSCULAR; INTRAVENOUS at 15:45

## 2025-03-28 RX ADMIN — NOREPINEPHRINE BITARTRATE 0.01 MCG/KG/MIN: 8 INJECTION, SOLUTION INTRAVENOUS at 13:56

## 2025-03-28 SDOH — SOCIAL STABILITY: SOCIAL INSECURITY: DO YOU FEEL UNSAFE GOING BACK TO THE PLACE WHERE YOU ARE LIVING?: NO

## 2025-03-28 SDOH — ECONOMIC STABILITY: HOUSING INSECURITY: IN THE LAST 12 MONTHS, WAS THERE A TIME WHEN YOU WERE NOT ABLE TO PAY THE MORTGAGE OR RENT ON TIME?: NO

## 2025-03-28 SDOH — HEALTH STABILITY: MENTAL HEALTH: CURRENT SMOKER: 0

## 2025-03-28 SDOH — ECONOMIC STABILITY: INCOME INSECURITY: IN THE PAST 12 MONTHS HAS THE ELECTRIC, GAS, OIL, OR WATER COMPANY THREATENED TO SHUT OFF SERVICES IN YOUR HOME?: NO

## 2025-03-28 SDOH — SOCIAL STABILITY: SOCIAL INSECURITY: HAS ANYONE EVER THREATENED TO HURT YOUR FAMILY OR YOUR PETS?: NO

## 2025-03-28 SDOH — SOCIAL STABILITY: SOCIAL INSECURITY: WITHIN THE LAST YEAR, HAVE YOU BEEN HUMILIATED OR EMOTIONALLY ABUSED IN OTHER WAYS BY YOUR PARTNER OR EX-PARTNER?: NO

## 2025-03-28 SDOH — SOCIAL STABILITY: SOCIAL INSECURITY: HAVE YOU HAD THOUGHTS OF HARMING ANYONE ELSE?: NO

## 2025-03-28 SDOH — SOCIAL STABILITY: SOCIAL INSECURITY: HAVE YOU HAD ANY THOUGHTS OF HARMING ANYONE ELSE?: NO

## 2025-03-28 SDOH — SOCIAL STABILITY: SOCIAL INSECURITY
WITHIN THE LAST YEAR, HAVE YOU BEEN KICKED, HIT, SLAPPED, OR OTHERWISE PHYSICALLY HURT BY YOUR PARTNER OR EX-PARTNER?: NO

## 2025-03-28 SDOH — ECONOMIC STABILITY: HOUSING INSECURITY: AT ANY TIME IN THE PAST 12 MONTHS, WERE YOU HOMELESS OR LIVING IN A SHELTER (INCLUDING NOW)?: NO

## 2025-03-28 SDOH — ECONOMIC STABILITY: FOOD INSECURITY: WITHIN THE PAST 12 MONTHS, YOU WORRIED THAT YOUR FOOD WOULD RUN OUT BEFORE YOU GOT THE MONEY TO BUY MORE.: NEVER TRUE

## 2025-03-28 SDOH — SOCIAL STABILITY: SOCIAL INSECURITY: DOES ANYONE TRY TO KEEP YOU FROM HAVING/CONTACTING OTHER FRIENDS OR DOING THINGS OUTSIDE YOUR HOME?: NO

## 2025-03-28 SDOH — ECONOMIC STABILITY: HOUSING INSECURITY: IN THE PAST 12 MONTHS, HOW MANY TIMES HAVE YOU MOVED WHERE YOU WERE LIVING?: 0

## 2025-03-28 SDOH — ECONOMIC STABILITY: FOOD INSECURITY: WITHIN THE PAST 12 MONTHS, THE FOOD YOU BOUGHT JUST DIDN'T LAST AND YOU DIDN'T HAVE MONEY TO GET MORE.: NEVER TRUE

## 2025-03-28 SDOH — SOCIAL STABILITY: SOCIAL INSECURITY: WITHIN THE LAST YEAR, HAVE YOU BEEN AFRAID OF YOUR PARTNER OR EX-PARTNER?: NO

## 2025-03-28 SDOH — SOCIAL STABILITY: SOCIAL INSECURITY
WITHIN THE LAST YEAR, HAVE YOU BEEN RAPED OR FORCED TO HAVE ANY KIND OF SEXUAL ACTIVITY BY YOUR PARTNER OR EX-PARTNER?: NO

## 2025-03-28 SDOH — SOCIAL STABILITY: SOCIAL INSECURITY: ARE YOU OR HAVE YOU BEEN THREATENED OR ABUSED PHYSICALLY, EMOTIONALLY, OR SEXUALLY BY ANYONE?: NO

## 2025-03-28 SDOH — ECONOMIC STABILITY: TRANSPORTATION INSECURITY: IN THE PAST 12 MONTHS, HAS LACK OF TRANSPORTATION KEPT YOU FROM MEDICAL APPOINTMENTS OR FROM GETTING MEDICATIONS?: NO

## 2025-03-28 SDOH — SOCIAL STABILITY: SOCIAL INSECURITY: ABUSE: ADULT

## 2025-03-28 SDOH — SOCIAL STABILITY: SOCIAL INSECURITY: WERE YOU ABLE TO COMPLETE ALL THE BEHAVIORAL HEALTH SCREENINGS?: YES

## 2025-03-28 SDOH — ECONOMIC STABILITY: FOOD INSECURITY: HOW HARD IS IT FOR YOU TO PAY FOR THE VERY BASICS LIKE FOOD, HOUSING, MEDICAL CARE, AND HEATING?: NOT HARD AT ALL

## 2025-03-28 SDOH — SOCIAL STABILITY: SOCIAL INSECURITY: DO YOU FEEL ANYONE HAS EXPLOITED OR TAKEN ADVANTAGE OF YOU FINANCIALLY OR OF YOUR PERSONAL PROPERTY?: NO

## 2025-03-28 SDOH — SOCIAL STABILITY: SOCIAL INSECURITY: ARE THERE ANY APPARENT SIGNS OF INJURIES/BEHAVIORS THAT COULD BE RELATED TO ABUSE/NEGLECT?: NO

## 2025-03-28 ASSESSMENT — LIFESTYLE VARIABLES
AUDIT-C TOTAL SCORE: 0
HOW OFTEN DO YOU HAVE A DRINK CONTAINING ALCOHOL: NEVER
HOW OFTEN DO YOU HAVE 6 OR MORE DRINKS ON ONE OCCASION: NEVER
HOW MANY STANDARD DRINKS CONTAINING ALCOHOL DO YOU HAVE ON A TYPICAL DAY: PATIENT DOES NOT DRINK
AUDIT-C TOTAL SCORE: 0
SKIP TO QUESTIONS 9-10: 1

## 2025-03-28 ASSESSMENT — COGNITIVE AND FUNCTIONAL STATUS - GENERAL
TURNING FROM BACK TO SIDE WHILE IN FLAT BAD: A LITTLE
DRESSING REGULAR LOWER BODY CLOTHING: A LITTLE
TOILETING: A LITTLE
WALKING IN HOSPITAL ROOM: A LOT
DRESSING REGULAR UPPER BODY CLOTHING: A LITTLE
MOBILITY SCORE: 14
PATIENT BASELINE BEDBOUND: NO
HELP NEEDED FOR BATHING: A LITTLE
MOVING FROM LYING ON BACK TO SITTING ON SIDE OF FLAT BED WITH BEDRAILS: A LITTLE
CLIMB 3 TO 5 STEPS WITH RAILING: A LOT
STANDING UP FROM CHAIR USING ARMS: A LOT
MOVING TO AND FROM BED TO CHAIR: A LOT
PERSONAL GROOMING: A LITTLE
DAILY ACTIVITIY SCORE: 19

## 2025-03-28 ASSESSMENT — ACTIVITIES OF DAILY LIVING (ADL)
PATIENT'S MEMORY ADEQUATE TO SAFELY COMPLETE DAILY ACTIVITIES?: YES
ADEQUATE_TO_COMPLETE_ADL: YES
TOILETING: INDEPENDENT
BATHING: INDEPENDENT
GROOMING: INDEPENDENT
HEARING - RIGHT EAR: DIFFICULTY WITH NOISE
WALKS IN HOME: NEEDS ASSISTANCE
DRESSING YOURSELF: INDEPENDENT
LACK_OF_TRANSPORTATION: NO
JUDGMENT_ADEQUATE_SAFELY_COMPLETE_DAILY_ACTIVITIES: YES
FEEDING YOURSELF: INDEPENDENT
HEARING - LEFT EAR: DIFFICULTY WITH NOISE

## 2025-03-28 ASSESSMENT — PAIN SCALES - GENERAL
PAINLEVEL_OUTOF10: 0 - NO PAIN
PAIN_LEVEL: 0
PAINLEVEL_OUTOF10: 0 - NO PAIN

## 2025-03-28 ASSESSMENT — PAIN - FUNCTIONAL ASSESSMENT
PAIN_FUNCTIONAL_ASSESSMENT: 0-10

## 2025-03-28 ASSESSMENT — PATIENT HEALTH QUESTIONNAIRE - PHQ9
1. LITTLE INTEREST OR PLEASURE IN DOING THINGS: NOT AT ALL
SUM OF ALL RESPONSES TO PHQ9 QUESTIONS 1 & 2: 0
2. FEELING DOWN, DEPRESSED OR HOPELESS: NOT AT ALL

## 2025-03-28 NOTE — H&P
[facility] Critical Care Medicine       Date:  3/28/2025  Patient:  Alfonso Toscano  YOB: 1945  MRN:  [id number]   Admit Date:  3/28/2025  ========================================================================================================    Chief Complaint   Patient presents with    Black or Bloody Stool     Pt report bloody stools since yesterday, bright red today with increased weakness. Hx of liver cancer currently being treated     History of Present Illness:  Alfonso Toscano is a 80 y.o. year old male patient with Past Medical History of HCC dx 2021, prostate CA dx 2008 treated with radiation, liver cirrhosis, hx GIB, esophageal varices- last EGD 2021, HLD, HTN, and hepatic encephalopathy who presented to [facility] ER for evaluation of weakness and GIB. Per patient, noted bright red blood in BM beginning Wednesday, worsening yesterday with associated fatigue/weakness sought ER evaluation. In the ER, noted to have acute anemia and transfused PRBC x2, BP remained borderline, CTA A/P without evidence of active GIB, GI was contacted who recommended initiation of Octreotide gtt with plan for EGD/colonoscopy today. Admitted to ICU for further management of GIB/Hemorrhagic shock.    ER Course:  Vitals: Temp 36.2C, HR 52bpm, BP 76/41, SpO2 100% on RA  Labs: Bicarb 19, Tbili 2.6, Ammonia 60, Lactate 3.3, Trop 582, INR 2.0, H/H 7.2/22.5, PLT 74  Imaging: EKG- Sinus spike 59bpm with 1st degree AV block. CTA A/P- No active GIB,     Interval ICU Events:  3/28: Admit to ICU, pending post-transfusion CBC, ordered FFP x1. Plan for EGD/Colonoscopy today    Medical History:  Past Medical History:   Diagnosis Date    Aortic ectasia, 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    Aortic dilatation    Cancer (Multi)     Esophageal varices     Hepatocellular carcinoma (Multi)     Personal history of diseases of the blood and blood-forming organs and certain disorders involving the immune mechanism 07/01/2020    History of  "anemia    Personal history of other medical treatment     H/O echocardiogram     Past Surgical History:   Procedure Laterality Date    COLONOSCOPY  10/22/2018    Colonoscopy    OTHER SURGICAL HISTORY  07/01/2020    Cataract surgery     (Not in a hospital admission)    Patient has no known allergies.  Social History     Tobacco Use    Smoking status: Never    Smokeless tobacco: Never   Substance Use Topics    Alcohol use: Not Currently     Family History   Problem Relation Name Age of Onset    Colon cancer Mother      Liver cancer Mother         Review of Systems:  14 point review of systems was completed and negative except for those specially mention in my HPI    Physical Exam:    Heart Rate:  [51-60]   Temperature:  [35.6 °C (96.1 °F)-36.4 °C (97.5 °F)]   Respirations:  [12-18]   BP: ()/(39-60)   Height:  [167.6 cm (5' 6\")]   Weight:  [87.5 kg (193 lb)-87.6 kg (193 lb 2 oz)]   Pulse Ox:  [98 %-100 %]     Physical Exam  Vitals and nursing note reviewed.   Constitutional:       Appearance: He is ill-appearing.   HENT:      Mouth/Throat:      Pharynx: Oropharynx is clear.   Eyes:      Pupils: Pupils are equal, round, and reactive to light.   Cardiovascular:      Rate and Rhythm: Regular rhythm. Bradycardia present.      Pulses: Normal pulses.      Heart sounds: Normal heart sounds.   Pulmonary:      Effort: Pulmonary effort is normal.      Breath sounds: Normal breath sounds.   Abdominal:      General: There is distension.      Palpations: Abdomen is soft.      Tenderness: There is no abdominal tenderness.   Musculoskeletal:         General: Normal range of motion.      Right lower leg: No edema.      Left lower leg: No edema.   Skin:     Capillary Refill: Capillary refill takes less than 2 seconds.      Findings: Bruising present.   Neurological:      General: No focal deficit present.      Mental Status: He is alert and oriented to person, place, and time.         Objective:    I have reviewed all " medications, laboratory results, and imaging pertinent for today's encounter    Assessment/Plan:    I am currently managing this critically ill patient for the following problems:    Neuro/Psych/Pain Ctrl/Sedation:  Metabolic encephalopathy OP visit on 3/13, toxicity of chemotherapy evaluated, possible SE of Atezolizumab  Monitor neuro checks  Ammonia = 60  [facility] ICU    Respiratory/ENT:  No active issues  O2 supplementation to maintain SpO2 >92%    Cardiovascular:  Shock hemorrhagic 2/2 GIB  NSTEMI type 2 in the setting of  acute blood loss/GIB  HTN/HLD  Blood products as below  Monitor lactate  Borderline hypotensive  Holding home meds  Maintain MAP >65  Trend troponin until peak    GI:  GIB  Alcoholic liver cirrhosis sober since 1988  Hx esophageal varices last EGD 2021  NPO  PPI BID  Octreotide gtt  GI consult: plan for EGD/colonoscopy today    Renal/Volume Status (Intra & Extravascular):  No active issues  Monitor UOP  Daily electrolytes    Endocrine  No hx DM  A1C 4.1  TSH normal  Hypoglycemia precautions while NPO    Infectious Disease:  No SIRS criteria  Continue Ceftriaxone 1g daily for SBP ppx    Heme/Onc:  Hepatocellular carcinoma  Hemorrhagic shock  Thrombocytopenia baseline PLT 60-80  Hx prostate CA s/p radiation  -Intitial Dx 5/2021, underwent ablation at [facility] 10/2021 with complication of GIB  -Last visit with Dr Alfredo 2/25/2025: MRI liver with new lesions noted in segment VI concerning for disease progression; 3/6/2025 started   -3/6/2025 C1 Atezolizumab and Bevacizumab, toxicity check 3/13  S/P PRBC x2, FFP x1  Pending post transfusion labs  Q6 CBC  Daily INR    MSK:  Bedrest with active bleed  Advance activity as tolerated    Ethics/Code Status:  Full Code    :  DVT Prophylaxis: N/A, active bleed  GI Prophylaxis: PPI BID  Bowel Regimen: N/A  Diet: NPO  CVC: None  Lowry: None  Gerardo: None  Restraints: None  Dispo: ICU    Critical Care Time:  55 minutes spent in preparing to see patient  (I.e. review of medical records), evaluation of diagnostics (I.e. labs, imaging, etc.), documentation, discussing plan of care with patient/ family/ caregiver, and/ or coordination of care with multidisciplinary team. Time does not include completion of procedure time.      Noy Lechuga, APRN-CNP

## 2025-03-28 NOTE — Clinical Note
Patient will be transferred back to 102 after first 15 minute vital signs are done after procedure at 1509. Report will be given to Renetta Long RN caring for the patient.

## 2025-03-28 NOTE — Clinical Note
Huddle and Timeout completed together with team. Patient wristband and LUIS information verified.  Anesthesia safety check completed. Patient was awake

## 2025-03-28 NOTE — CONSULTS
Department of Internal Medicine  Gastroenterology  Consult note    IMPRESSION/RECOMMENDATIONS  Patient is a 80-year-old female with PMH significant for alcohol cirrhosis c/b EV, portal hypertensive gastropathy/colopathy/proctopathy, ascites (para x2 in 2021) and HCC s/p ablation 2021 recent chemotherapy stared March, 2025. Sober since 1988.    Acute GIB with hematochezia  Normocytic anemia 2/2 acute blood loss- Hgb 7.5, baseline 9.5. MCV 89.   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)  MELD-Na scope 18 points    -IV fluid resuscitation and blood products per critical care team  -Trend H&H and transfuse PRBC for Hgb <7.0  -Monitor stool color and consistency and document  -Transfuse 1 unit FFP now  -Octreotide bolus given in ED, continue octreotide drip  -PPI 80 mg IV bolus given in ED, recommend PPI drip  -Ceftriaxone 1 g IV daily for SBP prophylaxis  -Recommend EGD and flexible sigmoidoscopy when patient stabilized  -Give tapwater enema until clear prior to procedure  -Further recommendations pending results of EGD and flexible sigmoidoscopy  -Trend trend CBC, CMP and INR daily  -At some point patient will need abdominal ultrasound for ascites survey, paracentesis if indicated  -Furosemide, Aldactone on hold for now, diuretics per critical care team when indicated  -Lactulose currently on hold, resume when able.    Discussed with Dr. Carrillo, Dr. Mckoy and Dr. Warner.    Reason for Consult: GIB    History of Present Illness    Alfonso Toscano is a 80 y.o. male with a PMH of Ethanol corhosis c/b ascites, hepatocellular  carcinoma s/p IR ablation [10/11/2021] MWA of liver tumors, HTN, LEA, Prostate CA SP RT [2009], now in remission was admitted due to lower GI bleed which GI has been consulted.  According to the patient, he had his first bloody bowel movement Wednesday evening.  Denied abdominal pain, nausea, vomiting or diarrhea.  Patient went to bed feeling well and on Thursday he had 3 more episodes of hematochezia.  Wife brought him in around 3 AM this morning as he had multiple episodes of rectal bleeding throughout the night.  Patient has increased weakness and fatigue over the last 24 hours and was unable to walk without assistance.  No routine NSAID use and is not taking any blood thinners.  Patient was recently started on chemotherapy for liver cancer and had 1 dose of atezolizumab and bevaczumab-AWWB (Dr. Kat) on March 6, 2025.    On arrival to ED, patient was afebrile and hemodynamically stable.  Patient had multiple episodes of hematochezia while in the ED and his blood pressure did drop to 76/41 while in the ED. initial blood work showed WBC 9.4, Hgb 7.2, HCT 22.5%, MCV 89, and platelets 74.  Baseline Hgb 9.5.  PT 21.8, INR 2.0.  14, creatinine 1.01.  Albumin 2.4, total bilirubin 2.6, lactate 3.3, troponin 1 582.  CT angio abdomen and pelvis with no evidence of active GIB.  There is new mild colitis of the ascending and proximal transverse colon which could be infectious, inflammatory or chemotherapy related.  Cirrhotic appearing liver with bilateral masses and stigmata of portal hypertension.  There is stable appearance of subocclusive thrombus within the portal vein..  Interval development of moderate amount of ascites within the abdomen and pelvis.  Diffuse partially calcified atherosclerotic disease without significant stenosis.  Mild aneurysmal dilation of the infrarenal aorta measuring up to 2.2 cm.  Chronic diverticulosis without diverticulitis.    Patient will be admitted to the medical intensive care unit  for further evaluation and treatment.  GIs been consulted for acute GI bleeding concern for brisk upper GIB versus lower GIB.  Plan for EGD with flexible sigmoidoscopy today once patient has been stabilized.  Patient received octreotide bolus and octreotide drip started.  He received Protonix bolus and 1 g IV ceftriaxone.  Last EGD and colonoscopy in 2021.  Results listed below.  No routine NSAID use.  No blood thinners.  Patient has been sober since 1988. Last visit with Dr Alfredo 2/25/2025: MRI liver with new lesions noted in segment VI concerning for disease progression; 3/6/2025 started. -3/6/2025 C1 Atezolizumab and Bevacizumab, toxicity check 3/13.    Patient had a hospitalization in October, 2021 at [facility] for massive GIB presenting with hematochezia and hemorrhagic shock.  Patient received a total of 9 units of PRBCs.  He received 3 colonoscopies during this stay for diagnosis and treatment of bleeding from portal hypertensive proctopathy.  Hospital stay was complicated by fluid overload from blood transfusion, he was diuresed with Lasix and Aldactone and sent home on both meds.    ENDOSCOPIC REVIEW  EGD 3/30/2021 with Dr. Jean-Baptiste [facility] indicated for heme positive stool, rectal bleeding unexplained LEA in the setting of liver cirrhosis  -Z-line irregular, 40 cm from the incisors  -Scar in the lower third of the esophagus  -Nonbleeding grade 1 esophageal varices  -Portal hypertensive gastropathy  -3 recent bleeding angiodysplastic lesions in the stomach.  Clips were placed.  -Oozing gastric ulcer, clip was placed.  -Normal examined duodenum  -No specimens collected    [facility] HOSPITAL NOTES October, 2021 for hemorrhagic shock 2/2 lower GIB, hematochezia  -10/26/21 s/p EGD + Colon -> +Non-bleeding grade I-II EV's with red irena signs, completely eradicated, banded. Mildly obstructing Schatzski's ring; A single non-bleeding angioectasia in the duodenum. Clip (MR conditional) was placed.; Cirrhosis-related colopathy.   Medium sized, non-bleeding internal hemorrhoids.     -10/29/21 s/p flex sig +hemorrhoids, diverticulosis, friability with spontaneous bleeding in rectum- treated with APC, non-bleeding external and internal hemorrhoids.   Due to patient with ongoing hematochezia despite treatment with APC the previous day, CTA was done to search for other possible sources of bleeding.    -10/30/21 CTA : no evidence of active GIB.     -11/3/21 colonscopy :Diverticulosis in the sigmoid colon friability with contact bleeding in the rectum. Treated with argon plasma coagulation ,Non-bleeding external and internal hemorrhoids.     HCC diagnosed May, 2021 where there were liver lesions found on US and MRI LIver confirmed. Ablation was performed at [facility].    Colonoscopy 3/30/2021 with Dr. Estiven DIAZ indicated for heme positive stool, rectal bleeding unexplained LEA in the setting of liver cirrhosis  -Multiple small and large mouth diverticula found in the sigmoid colon  There was narrowing of the colon in association with the diverticular opening  There was evidence of diverticular spasm  Peridiverticular erythema was seen  There was evidence of an impacted diverticulum  Petechiae were visualized in association with the diverticular opening  There was no evidence of diverticular bleeding  A 9 mm polyp found in the rectum removed with hot snare as/P polypectomy with 1 hemostatic clip placed    Allergies  Patient has no known allergies.    Current Medication    Current Facility-Administered Medications:     cefTRIAXone (Rocephin) 1 g in dextrose (iso) IV 50 mL, 1 g, intravenous, Once, Eli Jauregui MD, Last Rate: 100 mL/hr at 03/28/25 0904, 1 g at 03/28/25 0904    octreotide (SandoSTATIN) 500 mcg in sodium chloride 0.9% 100 mL (5 mcg/mL) infusion, 50 mcg/hr, intravenous, Continuous, Eli Jauregui MD    pantoprazole (Protonix) injection 40 mg, 40 mg, intravenous, Daily, Nicolás Wiley DO, 40 mg at 03/28/25 0826    Current Outpatient  Medications:     bicalutamide (Casodex) 50 mg tablet, Take 1 tablet (50 mg total) by mouth once daily., Disp: , Rfl:     cholecalciferol (Vitamin D-3) 25 MCG (1000 UT) tablet, Take 1 tablet (25 mcg) by mouth once daily., Disp: , Rfl:     dutasteride (Avodart) 0.5 mg capsule, Take 1 capsule (0.5 mg) by mouth once daily., Disp: , Rfl:     fexofenadine (Allegra) 180 mg tablet, Take 1 tablet (180 mg) by mouth once daily. prn, Disp: , Rfl:     furosemide (Lasix) 20 mg tablet, TAKE 1 TABLET BY MOUTH ONCE DAILY, Disp: 30 tablet, Rfl: 11    lactulose 20 gram/30 mL oral solution, 30ML three times daily (Patient taking differently: Take 30 mL (20 g) by mouth 3 times a day. 30ML three times daily), Disp: 1892 mL, Rfl: 11    loperamide (Imodium) 2 mg capsule, Take 2 capsules (4 mg) by mouth with the first episode of diarrhea and 1 capsule (2 mg) by mouth with any additional episodes. Maximum 8 capsules (16 mg) per day., Disp: 100 capsule, Rfl: 2    nadolol (Corgard) 20 mg tablet, Take 0.5 tablets (10 mg) by mouth once daily., Disp: 30 tablet, Rfl: 6    pantoprazole (ProtoNix) 40 mg EC tablet, TAKE 1 TABLET ONCE DAILY, Disp: 30 tablet, Rfl: 11    prochlorperazine (Compazine) 10 mg tablet, Take 1 tablet (10 mg) by mouth every 6 hours if needed for nausea or vomiting., Disp: 30 tablet, Rfl: 5    spironolactone (Aldactone) 50 mg tablet, TAKE 1 TABLET BY MOUTH ONCE DAILY, Disp: 90 tablet, Rfl: 3    Past Medical History  Active Ambulatory Problems     Diagnosis Date Noted    Allergic rhinitis due to pollen 11/28/2023    Anatomical narrow angle, bilateral 06/25/2021    Anemia 06/04/2021    Anxiety 11/28/2023    Bilateral impacted cerumen 11/28/2023    Cobalamin deficiency 06/04/2021    Colon polyps 11/28/2023    Esophageal varices 12/03/2019    Essential hypertension 10/13/2017    Excessive cerumen in ear canal, right 11/28/2023    Gastric ulcer 11/28/2023    Gastrointestinal hemorrhage associated with anorectal source 10/24/2021     Hepatic cirrhosis (Multi) 12/03/2019    History of laser iridotomy 06/25/2021    Hyperglycemia 11/28/2023    Iron deficiency anemia secondary to inadequate dietary iron intake 11/28/2023    Leukocytosis 10/13/2017    Hypocalcemia 11/28/2023    Malignant neoplasm of liver, not specified as primary or secondary (Multi) 11/28/2023    Malignant neoplasm of prostate (Multi) 10/10/2018    Heart murmur 11/28/2023    PND (post-nasal drip) 11/28/2023    Pseudophakia 06/25/2021    Right foot pain 11/28/2023    Sinus pressure 11/28/2023    Hordeolum externum 11/28/2023    Thrombocytopenia (CMS-HCC) 10/14/2017    Vitamin D deficiency 11/28/2023    Mixed hyperlipidemia 11/28/2023    Hepatocellular carcinoma (Multi) 02/25/2025     Resolved Ambulatory Problems     Diagnosis Date Noted    Abnormal CBC 11/28/2023    Acute blood loss anemia 10/29/2021    Back pain 11/28/2023    Malnutrition of moderate degree (Multi) 11/03/2021    Portal hypertension (Multi) 11/07/2021    Rectal hemorrhage 11/28/2023    Injury of toe 11/28/2023    Pain 11/28/2023     Past Medical History:   Diagnosis Date    Aortic ectasia, 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    Cancer (Multi)     Personal history of diseases of the blood and blood-forming organs and certain disorders involving the immune mechanism 07/01/2020    Personal history of other medical treatment        Past Surgical History  Past Surgical History:   Procedure Laterality Date    COLONOSCOPY  10/22/2018    Colonoscopy    OTHER SURGICAL HISTORY  07/01/2020    Cataract surgery       Family History  Family History   Problem Relation Name Age of Onset    Colon cancer Mother      Liver cancer Mother         Social History  TOBACCO:  reports that he has never smoked. He has never used smokeless tobacco.  ETOH:  reports that he does not currently use alcohol.Sober since 1988.  DRUGS:  has no history on file for drug use.    Review of Systems  Review of systems negative unless otherwise stated  "above.    PHYSICAL EXAM  VS: BP (!) 109/44   Pulse 54   Temp 36.1 °C (97 °F)   Resp 14   Ht 1.676 m (5' 6\")   Wt 87.5 kg (193 lb)   SpO2 100%   BMI 31.15 kg/m²  Body mass index is 31.15 kg/m².  Constitutional:       Appearance: He is ill-appearing.   HENT:      Head: Normocephalic.      Mouth/Throat:      Mouth: Mucous membranes are moist.   Eyes:      General: Scleral icterus present.   Cardiovascular:      Rate and Rhythm: Normal rate and regular rhythm.      Heart sounds: No murmur heard.     No gallop.   Pulmonary:      Effort: Pulmonary effort is normal. No respiratory distress.      Breath sounds: Normal breath sounds. No wheezing or rales.   Abdominal:      General: Abdomen is flat. There is distension.      Palpations: Abdomen is soft.      Tenderness: There is no abdominal tenderness. There is no guarding or rebound.   Musculoskeletal:      Cervical back: Neck supple.   Skin:     General: Skin is warm and dry.      Coloration: Skin is jaundiced and pale.      DATA  Recent blood work and relevant radiology and endoscopic studies were reviewed and discussed with the patient   Results from last 7 days   Lab Units 03/28/25  0404   WBC AUTO x10*3/uL 9.4   RBC AUTO x10*6/uL 2.53*   HEMOGLOBIN g/dL 7.2*   HEMATOCRIT % 22.5*   MCV fL 89   MCHC g/dL 32.0   RDW % 17.2*   PLATELETS AUTO x10*3/uL 74*       Results from last 72 hours   Lab Units 03/28/25  0404   SODIUM mmol/L 137   POTASSIUM mmol/L 4.2   CHLORIDE mmol/L 109*   CO2 mmol/L 19*   BUN mg/dL 14   CREATININE mg/dL 1.01   CALCIUM mg/dL 7.8*   PROTEIN TOTAL g/dL 4.5*   BILIRUBIN TOTAL mg/dL 2.6*   ALK PHOS U/L 109   AST U/L 38   ALT U/L 19       Results from last 72 hours   Lab Units 03/28/25  0404   INR  2.0*         RADIOLOGY REVIEW  === 03/28/25 ===    CT ABDOMEN PELVIS ANGIOGRAM W AND/OR WO IV CONTRAST    - Impression -  1.  No evidence of active GI bleed. There is new mild colitis of the  ascending and proximal transverse colon which may be " infectious,  inflammatory, or chemotherapy related.    2. Cirrhotic appearing liver with bilateral masses again noted and  stigmata of portal hypertension. There is stable appearance of  subocclusive thrombus within the portal vein. There is also interval  development of moderate amount of ascites within the abdomen and  pelvis.    3. Diffuse partially calcified atherosclerotic disease without  significant stenosis. 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    4. Colonic diverticulosis without diverticulitis.    5. Coronary artery calcifications.    6. Stable superior endplate deformity of the L4 vertebral body.        Signed by: Nikos Metcalf 3/28/2025 7:58 AM  Dictation workstation:   [id number]       (Electronically signed byKEKE Copeland on 3/28/2025 at 9:25 AM)

## 2025-03-28 NOTE — Clinical Note
Paracentesis catheter inserted and yellow colored ascites draining via [place] [facility].  Sample was collected and sent to lab.

## 2025-03-28 NOTE — Clinical Note
Paracentesis completed.  Catheter removed and 4x4 and tegaderm dressing applied.  1000 ml removed.  Patient tolerated well.

## 2025-03-28 NOTE — Clinical Note
Dr. Schoenberger in room to discuss and explain procedure to patient.  Patient states understanding and consent signed.

## 2025-03-28 NOTE — ED PROVIDER NOTES
HPI   Chief Complaint   Patient presents with    Black or Bloody Stool     Pt report bloody stools since yesterday, bright red today with increased weakness. Hx of liver cancer currently being treated       Patient is a 80-year-old male with past medical history of hepatocellular carcinoma who presents the ED via EMS from home for weakness and GI bleeding.  Patient states that since yesterday he has developed blood in his stool.  Tonight he was going to the bathroom and was too weak to get up off the toilet.  He feels extremely fatigued and overall weak.  He denies any current blood thinners.  He reports that he is actively getting treatment for his liver cancer.  He denies any current abdominal pain.              Patient History   Past Medical History:   Diagnosis Date    Aortic ectasia, 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    Aortic dilatation    Personal history of diseases of the blood and blood-forming organs and certain disorders involving the immune mechanism 07/01/2020    History of anemia    Personal history of other medical treatment     H/O echocardiogram     Past Surgical History:   Procedure Laterality Date    COLONOSCOPY  10/22/2018    Colonoscopy    OTHER SURGICAL HISTORY  07/01/2020    Cataract surgery     Family History   Problem Relation Name Age of Onset    Colon cancer Mother      Liver cancer Mother       Social History     Tobacco Use    Smoking status: Never    Smokeless tobacco: Never   Substance Use Topics    Alcohol use: Not Currently    Drug use: Not on file       Physical Exam   ED Triage Vitals [03/28/25 0350]   Temperature Heart Rate Respirations BP   36.2 °C (97.2 °F) 52 14 (!) 76/41      Pulse Ox Temp Source Heart Rate Source Patient Position   100 % Temporal Monitor Sitting      BP Location FiO2 (%)     Left arm --       Physical Exam  Vitals and nursing note reviewed.   Constitutional:       Appearance: He is ill-appearing.   HENT:      Head: Normocephalic.      Mouth/Throat:       Mouth: Mucous membranes are moist.   Eyes:      General: Scleral icterus present.   Cardiovascular:      Rate and Rhythm: Normal rate and regular rhythm.      Heart sounds: No murmur heard.     No gallop.   Pulmonary:      Effort: Pulmonary effort is normal. No respiratory distress.      Breath sounds: Normal breath sounds. No wheezing or rales.   Abdominal:      General: Abdomen is flat. There is distension.      Palpations: Abdomen is soft.      Tenderness: There is no abdominal tenderness. There is no guarding or rebound.   Musculoskeletal:      Cervical back: Neck supple.   Skin:     General: Skin is warm and dry.      Coloration: Skin is jaundiced and pale.           ED Course & MDM   Diagnoses as of 03/28/25 0654   Gastrointestinal hemorrhage, unspecified gastrointestinal hemorrhage type   Hypotension, unspecified hypotension type   Anemia, unspecified type   Elevated troponin   Elevated lactic acid level                 No data recorded                                 Medical Decision Making  EKG performed at 0351 as interpreted by me as sinus bradycardia with a ventricular rate of 52 bpm.  First-degree AV block.  QTc within normal limits.  No ST elevation or depression.    EKG performed at 0441 as interpreted by me as sinus bradycardia with ventricular to 59 bpm.  First-degree AV block.  QTc within normal limits.  No STEMI.      On arrival to the ED the patient is ill-appearing.  He has jaundice and scleral icterus as well as paler to him.  He has no abdominal tenderness on exam.  His blood pressure is hypotensive at 84/39.  We will obtain a workup including type and screen as well as a CT angiogram of the abdomen and pelvis to evaluate for his GI bleeding.  Will give him IV Protonix.  We will initially start him with a 500 cc fluid bolus.    Lab work reveals a elevated troponin likely due to demand ischemia with his hypotension.  He has a worst anemia his hemoglobin is gone up and down over 2 g in the past  3 days likely due to blood loss given his GI bleeding.  A unit of PRBCs was ordered and informed consent was obtained.    His blood pressure did mildly improve after the first unit but he did have a bloody bowel movement here in the ED.  We will order a second unit of blood to be transfused.    Patient was signed out to the oncoming physician pending the CTA of the abdomen and pelvis being read.        Procedure  Critical Care    Performed by: Nicolás Wiley DO  Authorized by: Nicolás Wiley DO    Critical care provider statement:     Critical care time (minutes):  53    Critical care time was exclusive of:  Separately billable procedures and treating other patients    Critical care was necessary to treat or prevent imminent or life-threatening deterioration of the following conditions:  Shock    Critical care was time spent personally by me on the following activities:  Examination of patient, re-evaluation of patient's condition, evaluation of patient's response to treatment and ordering and review of laboratory studies       Nicolás Wiley DO  03/28/25 0655

## 2025-03-28 NOTE — Clinical Note
Patient in US for therapeutic and diagnostic paracentesis.   images being taken and will be reviewed by Dr. Schoenberger.

## 2025-03-28 NOTE — Clinical Note
Erbe pad removed post procedure and skin intact. Patient tolerated procedure well. Appears comfortable with no complaints of pain. VS stable. Arousable prior to transport. Patient transported to MICU via bed  Report called per CRNA . Handoff completed

## 2025-03-28 NOTE — Clinical Note
Dr. Schoenberger using US to determine site paracentesis catheter will be inserted.  Site marked.  Patient tolerating well.

## 2025-03-28 NOTE — ANESTHESIA POSTPROCEDURE EVALUATION
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)      Hematochezia    Scheduled Providers: Issa Mckoy DO; Michi Mao MD Responsible Provider: Noah Tubbs MD    Anesthesia Type: MAC ASA Status: 4 - Emergent            Anesthesia Type: MAC    Vitals Value Taken Time   /53 03/28/25 1643   Temp 36.0 03/28/25 1643   Pulse 64 03/28/25 1643   Resp 18 03/28/25 1643   SpO2 100 03/28/25 1643       Anesthesia Post Evaluation    Patient location during evaluation: bedside  Patient participation: complete - patient participated  Level of consciousness: awake and alert  Pain score: 0  Pain management: adequate  Airway patency: patent  Cardiovascular status: hemodynamically stable  Respiratory status: nonlabored ventilation, spontaneous ventilation and face mask  Hydration status: balanced  Postoperative Nausea and Vomiting: none        No notable events documented.

## 2025-03-28 NOTE — CARE PLAN
The patient's goals for the shift include        Problem: Nutrition  Goal: Nutrient intake appropriate for maintaining nutritional needs  Outcome: Not Progressing  Note: Pt npo     Problem: Discharge Planning  Goal: Discharge to home or other facility with appropriate resources  Outcome: Progressing     Problem: Chronic Conditions and Co-morbidities  Goal: Patient's chronic conditions and co-morbidity symptoms are monitored and maintained or improved  Outcome: Progressing     Problem: Skin  Goal: Decreased wound size/increased tissue granulation at next dressing change  Outcome: Met  Goal: Participates in plan/prevention/treatment measures  Outcome: Met  Goal: Prevent/manage excess moisture  Outcome: Met  Goal: Prevent/minimize sheer/friction injuries  Outcome: Met  Flowsheets (Taken 3/28/2025 7059)  Prevent/minimize sheer/friction injuries:   Turn/reposition every 2 hours/use positioning/transfer devices   HOB 30 degrees or less     Problem: Pain - Adult  Goal: Verbalizes/displays adequate comfort level or baseline comfort level  Outcome: Met     Problem: Safety - Adult  Goal: Free from fall injury  Outcome: Met       The clinical goals for the shift include patient will maintain MAP > 65 THIS SHIFT    Over the shift, the patient did not make progress toward the following goals. Barriers to progression include active GIB, hypotension, fatigue. Recommendations to address these barriers include medication management, PROM while in bed.    Problem: Nutrition  Goal: Nutrient intake appropriate for maintaining nutritional needs  Outcome: Not Progressing  Note: Pt npo

## 2025-03-28 NOTE — ANESTHESIA PREPROCEDURE EVALUATION
Patient: Alfonso Toscano    Procedure Information       Date/Time: 03/28/25 1400    Scheduled providers: sIsa Mckoy DO; Michi Mao MD    Procedures:       EGD      FLEXIBLE SIGMOIDOSCOPY    Location: [facility]            Relevant Problems   Cardiac   (+) Essential hypertension   (+) Heart murmur   (+) Mixed hyperlipidemia      Neuro   (+) Anxiety      GI   (+) Esophageal varices   (+) Gastric ulcer   (+) Gastrointestinal hemorrhage associated with anorectal source   (+) Gastrointestinal hemorrhage, unspecified gastrointestinal hemorrhage type      /Renal   (+) Malignant neoplasm of prostate (Multi)      Liver   (+) Hepatic cirrhosis (Multi)   (+) Hepatocellular carcinoma   (+) Malignant neoplasm of liver, not specified as primary or secondary (Multi)      Hematology   (+) Anemia   (+) Iron deficiency anemia secondary to inadequate dietary iron intake   (+) Thrombocytopenia (CMS-HCC)      HEENT   (+) Sinus pressure       Clinical information reviewed:   Tobacco  Allergies  Meds  Problems  Med Hx  Surg Hx   Fam Hx  Soc   Hx        NPO Detail:  No data recorded     Physical Exam    Airway  Mallampati: II  TM distance: >3 FB  Neck ROM: full     Cardiovascular   Rate: abnormal     Dental    Pulmonary - normal exam     Abdominal - normal exam             Anesthesia Plan    History of general anesthesia?: yes  History of complications of general anesthesia?: no    ASA 4 - emergent     MAC     The patient is not a current smoker.  Patient did not smoke on day of procedure.    intravenous induction   Anesthetic plan and risks discussed with patient.    Plan discussed with CRNA and attending.

## 2025-03-28 NOTE — ED PROVIDER NOTES
Emergency Department Transition of Care Note       Signout   I received Alfonso Toscano in signout from Dr. Wiley.  Please see the ED Provider Note for all HPI, PE and MDM up to the time of signout at  0700.  This is in addition to the primary record.    In brief Alfonso Toscano is an 80 y.o. male presenting for  gross GI bleed.  Patient has a history of liver CA and appears to be in end-stage liver disease.  Has had previous history is of GI bleed.  He does not take blood thinners however his INR is elevated due to his liver function.  He receives most of his care at [facility] and would like to be transferred there if possible.  Patient is currently receiving his second unit of PRBCs and another 500 cc of LR.  Patient was signed out to me pending a CT scanning of the abdomen pelvis to look for brisk bleeding.  Patient will need to be admitted to ICU for further management and care.    At the time of signout we were awaiting:    CT angio of the abdomen pelvis.    ED Course & Medical Decision Making   Medical Decision Making:  Under my care,   Patient received 2 units of PRBCs and another 500 of LR.  Received CT scans results and made a disposition for the patient.   I discussed results with the patient and his family explained that there is no areas of brisk bleeding that would constitute interventional radiology doing an embolization.  I did speak then with Joanne Webb the nurse practitioner on with GI today and  she recommends to go forth with the octreotide bolus and drip along with a gram of Rocephin.  She states that the patient is stabilized well enough today that they will try to scope him later today.  I discussed this case with Noy the nurse practitioner up in the ICU and she agrees to admit the patient under Dr. Warner.  And they will continue the patient's care.  Patient's most recent blood pressure was 112/48.  This is the best blood pressure since arrival to the ED.  He appears  to be mentating fairly well and is able to discuss his treatment plan.  I explained the plan of admission and evaluation by gastroenterology with the patient and his family and they agree.  All questions were answered.  Patient will be admitted to the ICU.    ED Course:  Diagnoses as of 03/28/25 0710   Gastrointestinal hemorrhage, unspecified gastrointestinal hemorrhage type   Hypotension, unspecified hypotension type   Anemia, unspecified type   Elevated troponin   Elevated lactic acid level       Disposition   As a result of their workup, the patient will require admission to the hospital.  The patient was informed of his diagnosis.  The patient was given the opportunity to ask questions and I answered them. The patient agreed to be admitted to the hospital.    Procedures   Procedures        Eli Jauregui MD  Emergency Medicine     Eli Jauregui MD  03/28/25 8859       Eli Jauregui MD  03/28/25 7570

## 2025-03-29 ENCOUNTER — APPOINTMENT (OUTPATIENT)
Dept: CARDIOLOGY | Facility: HOSPITAL | Age: 80
DRG: 853 | End: 2025-03-29
Payer: MEDICARE

## 2025-03-29 LAB
ALBUMIN SERPL BCP-MCNC: 2.8 G/DL (ref 3.4–5)
ALP SERPL-CCNC: 94 U/L (ref 33–136)
ALT SERPL W P-5'-P-CCNC: 18 U/L (ref 10–52)
ANION GAP SERPL CALC-SCNC: 13 MMOL/L (ref 10–20)
AST SERPL W P-5'-P-CCNC: 35 U/L (ref 9–39)
BASOPHILS # BLD AUTO: 0.01 X10*3/UL (ref 0–0.1)
BASOPHILS NFR BLD AUTO: 0.1 %
BILIRUB SERPL-MCNC: 4.1 MG/DL (ref 0–1.2)
BUN SERPL-MCNC: 23 MG/DL (ref 6–23)
CALCIUM SERPL-MCNC: 7.8 MG/DL (ref 8.6–10.3)
CHLORIDE SERPL-SCNC: 107 MMOL/L (ref 98–107)
CO2 SERPL-SCNC: 20 MMOL/L (ref 21–32)
CREAT SERPL-MCNC: 1.3 MG/DL (ref 0.5–1.3)
EGFRCR SERPLBLD CKD-EPI 2021: 56 ML/MIN/1.73M*2
EOSINOPHIL # BLD AUTO: 0 X10*3/UL (ref 0–0.4)
EOSINOPHIL NFR BLD AUTO: 0 %
ERYTHROCYTE [DISTWIDTH] IN BLOOD BY AUTOMATED COUNT: 16.3 % (ref 11.5–14.5)
ERYTHROCYTE [DISTWIDTH] IN BLOOD BY AUTOMATED COUNT: 16.8 % (ref 11.5–14.5)
ERYTHROCYTE [DISTWIDTH] IN BLOOD BY AUTOMATED COUNT: 16.8 % (ref 11.5–14.5)
ERYTHROCYTE [DISTWIDTH] IN BLOOD BY AUTOMATED COUNT: 16.9 % (ref 11.5–14.5)
ERYTHROCYTE [DISTWIDTH] IN BLOOD BY AUTOMATED COUNT: 17 % (ref 11.5–14.5)
ERYTHROCYTE [DISTWIDTH] IN BLOOD BY AUTOMATED COUNT: 17 % (ref 11.5–14.5)
GLUCOSE BLD MANUAL STRIP-MCNC: 115 MG/DL (ref 74–99)
GLUCOSE BLD MANUAL STRIP-MCNC: 118 MG/DL (ref 74–99)
GLUCOSE BLD MANUAL STRIP-MCNC: 134 MG/DL (ref 74–99)
GLUCOSE SERPL-MCNC: 125 MG/DL (ref 74–99)
HCT VFR BLD AUTO: 20.9 % (ref 41–52)
HCT VFR BLD AUTO: 21.1 % (ref 41–52)
HCT VFR BLD AUTO: 21.9 % (ref 41–52)
HCT VFR BLD AUTO: 22.1 % (ref 41–52)
HCT VFR BLD AUTO: 22.8 % (ref 41–52)
HCT VFR BLD AUTO: 22.8 % (ref 41–52)
HGB BLD-MCNC: 7 G/DL (ref 13.5–17.5)
HGB BLD-MCNC: 7.2 G/DL (ref 13.5–17.5)
HGB BLD-MCNC: 7.4 G/DL (ref 13.5–17.5)
HGB BLD-MCNC: 7.4 G/DL (ref 13.5–17.5)
HGB BLD-MCNC: 7.6 G/DL (ref 13.5–17.5)
HGB BLD-MCNC: 7.6 G/DL (ref 13.5–17.5)
IMM GRANULOCYTES # BLD AUTO: 0.07 X10*3/UL (ref 0–0.5)
IMM GRANULOCYTES NFR BLD AUTO: 0.7 % (ref 0–0.9)
INR PPP: 1.6 (ref 0.9–1.1)
LYMPHOCYTES # BLD AUTO: 0.65 X10*3/UL (ref 0.8–3)
LYMPHOCYTES NFR BLD AUTO: 6.9 %
MCH RBC QN AUTO: 29.2 PG (ref 26–34)
MCH RBC QN AUTO: 29.4 PG (ref 26–34)
MCH RBC QN AUTO: 29.5 PG (ref 26–34)
MCH RBC QN AUTO: 29.9 PG (ref 26–34)
MCHC RBC AUTO-ENTMCNC: 33.3 G/DL (ref 32–36)
MCHC RBC AUTO-ENTMCNC: 33.3 G/DL (ref 32–36)
MCHC RBC AUTO-ENTMCNC: 33.5 G/DL (ref 32–36)
MCHC RBC AUTO-ENTMCNC: 33.5 G/DL (ref 32–36)
MCHC RBC AUTO-ENTMCNC: 33.8 G/DL (ref 32–36)
MCHC RBC AUTO-ENTMCNC: 34.1 G/DL (ref 32–36)
MCV RBC AUTO: 87 FL (ref 80–100)
MCV RBC AUTO: 87 FL (ref 80–100)
MCV RBC AUTO: 88 FL (ref 80–100)
MONOCYTES # BLD AUTO: 0.28 X10*3/UL (ref 0.05–0.8)
MONOCYTES NFR BLD AUTO: 3 %
NEUTROPHILS # BLD AUTO: 8.46 X10*3/UL (ref 1.6–5.5)
NEUTROPHILS NFR BLD AUTO: 89.3 %
NRBC BLD-RTO: 0 /100 WBCS (ref 0–0)
PLATELET # BLD AUTO: 51 X10*3/UL (ref 150–450)
PLATELET # BLD AUTO: 55 X10*3/UL (ref 150–450)
PLATELET # BLD AUTO: 57 X10*3/UL (ref 150–450)
PLATELET # BLD AUTO: 60 X10*3/UL (ref 150–450)
PLATELET # BLD AUTO: 60 X10*3/UL (ref 150–450)
PLATELET # BLD AUTO: 66 X10*3/UL (ref 150–450)
POTASSIUM SERPL-SCNC: 4.4 MMOL/L (ref 3.5–5.3)
PROT SERPL-MCNC: 4.9 G/DL (ref 6.4–8.2)
PROTHROMBIN TIME: 17.8 SECONDS (ref 9.8–12.4)
RBC # BLD AUTO: 2.37 X10*6/UL (ref 4.5–5.9)
RBC # BLD AUTO: 2.41 X10*6/UL (ref 4.5–5.9)
RBC # BLD AUTO: 2.52 X10*6/UL (ref 4.5–5.9)
RBC # BLD AUTO: 2.53 X10*6/UL (ref 4.5–5.9)
RBC # BLD AUTO: 2.58 X10*6/UL (ref 4.5–5.9)
RBC # BLD AUTO: 2.58 X10*6/UL (ref 4.5–5.9)
SODIUM SERPL-SCNC: 136 MMOL/L (ref 136–145)
WBC # BLD AUTO: 10.5 X10*3/UL (ref 4.4–11.3)
WBC # BLD AUTO: 13.2 X10*3/UL (ref 4.4–11.3)
WBC # BLD AUTO: 8.4 X10*3/UL (ref 4.4–11.3)
WBC # BLD AUTO: 9.5 X10*3/UL (ref 4.4–11.3)
WBC # BLD AUTO: 9.5 X10*3/UL (ref 4.4–11.3)
WBC # BLD AUTO: 9.6 X10*3/UL (ref 4.4–11.3)

## 2025-03-29 PROCEDURE — 2500000001 HC RX 250 WO HCPCS SELF ADMINISTERED DRUGS (ALT 637 FOR MEDICARE OP)

## 2025-03-29 PROCEDURE — 2020000001 HC ICU ROOM DAILY

## 2025-03-29 PROCEDURE — 93005 ELECTROCARDIOGRAM TRACING: CPT

## 2025-03-29 PROCEDURE — 36415 COLL VENOUS BLD VENIPUNCTURE: CPT

## 2025-03-29 PROCEDURE — 2500000004 HC RX 250 GENERAL PHARMACY W/ HCPCS (ALT 636 FOR OP/ED)

## 2025-03-29 PROCEDURE — 85610 PROTHROMBIN TIME: CPT

## 2025-03-29 PROCEDURE — 85027 COMPLETE CBC AUTOMATED: CPT

## 2025-03-29 PROCEDURE — 99232 SBSQ HOSP IP/OBS MODERATE 35: CPT | Performed by: INTERNAL MEDICINE

## 2025-03-29 PROCEDURE — 82947 ASSAY GLUCOSE BLOOD QUANT: CPT

## 2025-03-29 PROCEDURE — 99291 CRITICAL CARE FIRST HOUR: CPT

## 2025-03-29 PROCEDURE — 80053 COMPREHEN METABOLIC PANEL: CPT

## 2025-03-29 RX ORDER — NADOLOL 20 MG/1
10 TABLET ORAL EVERY EVENING
Status: DISCONTINUED | OUTPATIENT
Start: 2025-03-29 | End: 2025-03-31 | Stop reason: HOSPADM

## 2025-03-29 RX ORDER — BICALUTAMIDE 50 MG/1
50 TABLET, FILM COATED ORAL DAILY
Status: DISCONTINUED | OUTPATIENT
Start: 2025-03-29 | End: 2025-03-31 | Stop reason: HOSPADM

## 2025-03-29 RX ORDER — MIDODRINE HYDROCHLORIDE 5 MG/1
5 TABLET ORAL
Status: DISCONTINUED | OUTPATIENT
Start: 2025-03-29 | End: 2025-03-30

## 2025-03-29 RX ADMIN — SODIUM CHLORIDE, POTASSIUM CHLORIDE, SODIUM LACTATE AND CALCIUM CHLORIDE 100 ML/HR: 600; 310; 30; 20 INJECTION, SOLUTION INTRAVENOUS at 06:57

## 2025-03-29 RX ADMIN — MIDODRINE HYDROCHLORIDE 5 MG: 5 TABLET ORAL at 17:52

## 2025-03-29 RX ADMIN — LACTULOSE 20 G: 10 SOLUTION ORAL at 15:06

## 2025-03-29 RX ADMIN — OCTREOTIDE ACETATE 50 MCG/HR: 500 INJECTION, SOLUTION INTRAVENOUS; SUBCUTANEOUS at 05:15

## 2025-03-29 RX ADMIN — PANTOPRAZOLE SODIUM 40 MG: 40 INJECTION, POWDER, FOR SOLUTION INTRAVENOUS at 21:04

## 2025-03-29 RX ADMIN — LACTULOSE 20 G: 10 SOLUTION ORAL at 21:04

## 2025-03-29 RX ADMIN — MIDODRINE HYDROCHLORIDE 5 MG: 5 TABLET ORAL at 11:32

## 2025-03-29 RX ADMIN — PANTOPRAZOLE SODIUM 40 MG: 40 INJECTION, POWDER, FOR SOLUTION INTRAVENOUS at 09:05

## 2025-03-29 RX ADMIN — NADOLOL 10 MG: 20 TABLET ORAL at 21:04

## 2025-03-29 RX ADMIN — OCTREOTIDE ACETATE 50 MCG/HR: 500 INJECTION, SOLUTION INTRAVENOUS; SUBCUTANEOUS at 17:49

## 2025-03-29 RX ADMIN — BICALUTAMIDE 50 MG: 50 TABLET ORAL at 11:32

## 2025-03-29 RX ADMIN — CEFTRIAXONE SODIUM 1 G: 1 INJECTION, SOLUTION INTRAVENOUS at 09:05

## 2025-03-29 RX ADMIN — LACTULOSE 20 G: 10 SOLUTION ORAL at 09:49

## 2025-03-29 RX ADMIN — CETIRIZINE HYDROCHLORIDE 10 MG: 10 TABLET ORAL at 09:49

## 2025-03-29 RX ADMIN — FINASTERIDE 5 MG: 5 TABLET, FILM COATED ORAL at 09:49

## 2025-03-29 ASSESSMENT — COGNITIVE AND FUNCTIONAL STATUS - GENERAL
MOVING TO AND FROM BED TO CHAIR: A LOT
STANDING UP FROM CHAIR USING ARMS: A LOT
EATING MEALS: A LITTLE
MOVING FROM LYING ON BACK TO SITTING ON SIDE OF FLAT BED WITH BEDRAILS: A LITTLE
DRESSING REGULAR LOWER BODY CLOTHING: A LOT
WALKING IN HOSPITAL ROOM: A LOT
TURNING FROM BACK TO SIDE WHILE IN FLAT BAD: A LITTLE
HELP NEEDED FOR BATHING: A LOT
CLIMB 3 TO 5 STEPS WITH RAILING: A LOT
DRESSING REGULAR UPPER BODY CLOTHING: A LOT
DAILY ACTIVITIY SCORE: 13
TOILETING: A LOT
PERSONAL GROOMING: A LOT
MOBILITY SCORE: 14

## 2025-03-29 ASSESSMENT — PAIN - FUNCTIONAL ASSESSMENT
PAIN_FUNCTIONAL_ASSESSMENT: 0-10

## 2025-03-29 ASSESSMENT — PAIN SCALES - GENERAL
PAINLEVEL_OUTOF10: 4
PAINLEVEL_OUTOF10: 0 - NO PAIN
PAINLEVEL_OUTOF10: 3
PAINLEVEL_OUTOF10: 0 - NO PAIN

## 2025-03-29 NOTE — CARE PLAN
Problem: Skin  Goal: Promote/optimize nutrition  Outcome: Progressing  Flowsheets (Taken 3/29/2025 1938)  Promote/optimize nutrition:   Assist with feeding   Monitor/record intake including meals     Problem: Discharge Planning  Goal: Discharge to home or other facility with appropriate resources  Outcome: Progressing     Problem: Chronic Conditions and Co-morbidities  Goal: Patient's chronic conditions and co-morbidity symptoms are monitored and maintained or improved  Outcome: Progressing     Problem: Nutrition  Goal: Nutrient intake appropriate for maintaining nutritional needs  Outcome: Progressing     Problem: Fall/Injury  Goal: Not fall by end of shift  Outcome: Progressing  Goal: Be free from injury by end of the shift  Outcome: Progressing  Goal: Verbalize understanding of personal risk factors for fall in the hospital  Outcome: Progressing   The patient's goals for the shift include      The clinical goals for the shift include pts wital signs will remain wdl

## 2025-03-29 NOTE — CARE PLAN
The patient's goals for the shift include      The clinical goals for the shift include patient will maintain MAP > 65 THIS SHIFT    Over the shift, the patient did not make progress toward the following goals. Barriers to progression include decreased mobility. Recommendations to address these barriers include increased ADLs and mobility.

## 2025-03-29 NOTE — PROGRESS NOTES
Department of Internal Medicine  Gastroenterology  Progress note      Subjective  GI is following for GIB    Pt had one stool overnight - ?BRB vs maroon - pt without complaints  Hgb stable      Current Medication    Current Facility-Administered Medications:     bicalutamide (Casodex) tablet 50 mg, 50 mg, oral, Daily, Patricia Oliveros, PharmD    cefTRIAXone (Rocephin) 1 g in dextrose (iso) IV 50 mL, 1 g, intravenous, q24h, KEKE Power, Stopped at 03/29/25 0941    cetirizine (ZyrTEC) tablet 10 mg, 10 mg, oral, Daily, KEKE Mcgrath    dextrose 50 % injection 12.5 g, 12.5 g, intravenous, q15 min PRN, KEKE Power    dextrose 50 % injection 25 g, 25 g, intravenous, q15 min PRN, KEKE Power    finasteride (Proscar) tablet 5 mg, 5 mg, oral, Daily, KEKE Mcgrath    [Held by provider] furosemide (Lasix) tablet 20 mg, 20 mg, oral, Daily, KEKE Chauhan    glucagon (Glucagen) injection 1 mg, 1 mg, intramuscular, q15 min PRN, KEKE Power    glucagon (Glucagen) injection 1 mg, 1 mg, intramuscular, q15 min PRN, KEKE Power    lactated Ringer's infusion, 100 mL/hr, intravenous, Continuous, KEKE Power, Last Rate: 100 mL/hr at 03/29/25 0657, 100 mL/hr at 03/29/25 0657    lactulose 20 gram/30 mL oral solution 20 g, 30 mL, oral, TID, KEKE Mcgrath    nadolol (Corgard) tablet 10 mg, 10 mg, oral, Daily, KEKE Mcgrath    norepinephrine (Levophed) 8 mg in dextrose 5% 250 mL (0.032 mg/mL) infusion (premix), 0-0.2 mcg/kg/min, intravenous, Continuous, KEKE Power, Last Rate: 0 mL/hr at 03/29/25 0845, 0 mcg/kg/min at 03/29/25 0845    octreotide (SandoSTATIN) 500 mcg in sodium chloride 0.9% 100 mL (5 mcg/mL) infusion, 50 mcg/hr, intravenous, Continuous, KEKE Power, Last Rate: 10 mL/hr at 03/29/25 0515, 50 mcg/hr at 03/29/25 0515    pantoprazole (Protonix) injection 40 mg, 40 mg,  intravenous, BID, Noy Lechuga, DARVIN-CNP, 40 mg at 03/29/25 0905    [Held by provider] spironolactone (Aldactone) tablet 50 mg, 50 mg, oral, Daily, DARVIN Chauhan-CNP    Past Medical History  Active Ambulatory Problems     Diagnosis Date Noted    Allergic rhinitis due to pollen 11/28/2023    Anatomical narrow angle, bilateral 06/25/2021    Anemia 06/04/2021    Anxiety 11/28/2023    Bilateral impacted cerumen 11/28/2023    Cobalamin deficiency 06/04/2021    Colon polyps 11/28/2023    Esophageal varices 12/03/2019    Essential hypertension 10/13/2017    Excessive cerumen in ear canal, right 11/28/2023    Gastric ulcer 11/28/2023    Gastrointestinal hemorrhage associated with anorectal source 10/24/2021    Hepatic cirrhosis (Multi) 12/03/2019    History of laser iridotomy 06/25/2021    Hyperglycemia 11/28/2023    Iron deficiency anemia secondary to inadequate dietary iron intake 11/28/2023    Leukocytosis 10/13/2017    Hypocalcemia 11/28/2023    Malignant neoplasm of liver, not specified as primary or secondary (Multi) 11/28/2023    Malignant neoplasm of prostate (Multi) 10/10/2018    Heart murmur 11/28/2023    PND (post-nasal drip) 11/28/2023    Pseudophakia 06/25/2021    Right foot pain 11/28/2023    Sinus pressure 11/28/2023    Hordeolum externum 11/28/2023    Thrombocytopenia (CMS-HCC) 10/14/2017    Vitamin D deficiency 11/28/2023    Mixed hyperlipidemia 11/28/2023    Hepatocellular carcinoma (Multi) 02/25/2025     Resolved Ambulatory Problems     Diagnosis Date Noted    Abnormal CBC 11/28/2023    Acute blood loss anemia 10/29/2021    Back pain 11/28/2023    Malnutrition of moderate degree (Multi) 11/03/2021    Portal hypertension (Multi) 11/07/2021    Rectal hemorrhage 11/28/2023    Injury of toe 11/28/2023    Pain 11/28/2023     Past Medical History:   Diagnosis Date    Aortic ectasia, 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    Cancer (Multi)     Personal history of diseases of the blood and  "blood-forming organs and certain disorders involving the immune mechanism 07/01/2020    Personal history of other medical treatment        PHYSICAL EXAM  VS: BP (!) 99/42   Pulse 66   Temp 36.6 °C (97.8 °F) (Temporal)   Resp 12   Ht 1.727 m (5' 8\")   Wt 92.5 kg (203 lb 14.8 oz)   SpO2 97%   BMI 31.01 kg/m²  Body mass index is 31.01 kg/m².  Physical Exam  Constitutional:       Comments: Awake   HENT:      Head: Normocephalic.   Cardiovascular:      Rate and Rhythm: Normal rate and regular rhythm.   Pulmonary:      Effort: Pulmonary effort is normal.      Breath sounds: Normal breath sounds.   Abdominal:      General: Bowel sounds are normal.      Palpations: Abdomen is soft.   Neurological:      Mental Status: He is alert.   Psychiatric:         Mood and Affect: Mood normal.          DATA  Recent blood work and relevant radiology and endoscopic studies were reviewed and discussed with the patient   Results from last 7 days   Lab Units 03/29/25  0803   WBC AUTO x10*3/uL 10.5   RBC AUTO x10*6/uL 2.53*   HEMOGLOBIN g/dL 7.4*   HEMATOCRIT % 22.1*   MCV fL 87   MCHC g/dL 33.5   RDW % 17.0*   PLATELETS AUTO x10*3/uL 57*       Results from last 72 hours   Lab Units 03/29/25  0420   SODIUM mmol/L 136   POTASSIUM mmol/L 4.4   CHLORIDE mmol/L 107   CO2 mmol/L 20*   BUN mg/dL 23   CREATININE mg/dL 1.30   CALCIUM mg/dL 7.8*   PROTEIN TOTAL g/dL 4.9*   BILIRUBIN TOTAL mg/dL 4.1*   ALK PHOS U/L 94   AST U/L 35   ALT U/L 18       Results from last 72 hours   Lab Units 03/29/25  0420   INR  1.6*               ENDOSCOPIC REVIEW  Colonoscopy Findings/Impression 3/28/25  Fair prep.  The terminal ileum appeared normal.  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 After this was completed, patient had spurting from a hemorrhoid. Colonoscope was switched to an EGD scope with  (bleeding stopped) and 3 bands were placed in the region of the bleeding (particularly over one site that looked like it had recent stigmata of " bleeding).   EGD Findings/Impression 3/28/25  No active or recent stigmata of bleeding.  Minimal Grade I varices in the mid esophagus + scarring from prior ?banding.   Small hiatal hernia.  Gastric inflammatory polyp (~2 mm) that started to ooze with suction irritation s/p 2 clips.   The stomach was otherwise normal.  The duodenum appeared normal.  IMPRESSION/RECOMMENDATIONS  Patient is a 80-year-old male with PMH significant for alcohol cirrhosis c/b EV, portal hypertensive gastropathy/colopathy/proctopathy, ascites (para x2 in 2021) and HCC s/p ablation 2021 recent chemotherapy stared March, 2025. Sober since 1988.     Pt underwent EGD/colonoscopy yest as noted above - suspect hemorrhoidal bleeding - treated with banding, XRT proctitis treated as well. Pt had one BM overnight - Hgb stable  -ok to advance diet as tolerated  -if recurrent bleeding - would ask for surgery input -   -restart meds - lactulose, diuretics per ICU team  -will follow          (Electronically signed byBrittany Carrillo MD on 3/29/2025 at 9:49 AM)

## 2025-03-29 NOTE — CARE PLAN
"  Problem: Skin  Pt turned an repositioned every 2hrs. Heels elevated off bed with pillows or protective devices. Bony prominences padded with mepilex foam dressings. Moisture barriers applied to skin exposed to incontinence or wound drainage.     Goal: Promote/optimize nutrition  Outcome: Progressing     Problem: Discharge Planning  Pt information obtained at admission about typical living arrangement and level of community support that pt has access to. Changes in level of function evaluated by multidisciplinary team and plan made for optimal level of pt independence and function once discharged from this hospital facility. Family or significant social support personnel sought for corroborating input if pt unable to speak for self or appears to be less than forthcoming with factual report of situation.      Goal: Discharge to home or other facility with appropriate resources  Outcome: Progressing     Problem: Chronic Conditions and Co-morbidities  Deconditioned, acutely weak secondary to recurrent GIB episodes on top of chronic anemia. Goal to control GI blood loss and optimize endurance to extent capable.    Goal: Patient's chronic conditions and co-morbidity symptoms are monitored and maintained or improved  Outcome: Progressing     Problem: Nutrition  NPO for overnight period. IVF infused, small amounts of water for oral hydration and comfort. Accuchecks monitored q 4hrs while NPO.   Goal: Nutrient intake appropriate for maintaining nutritional needs  Outcome: Progressing     Problem: Fall/Injury  Falls prevention measures have been explained. Pt warned of bed alarm use if activated, use of nurse call bell system detailed and modifications made to devices if activation is difficult.   Purposeful hourly rounding program explained for assurance of the \"five Ps (pain, position, potty, possessions, privacy). Monitoring devices, IV tubing and SCD connecting tubes shown to pt to educate on their contribution to " falling hazard and personal injury potential.   The above points reviewed with pts lacking retention of new information until evidence of learning is shown.     Goal: Not fall by end of shift  Outcome: Progressing     Problem: Fall/Injury  Goal: Be free from injury by end of the shift  Outcome: Progressing     Problem: Fall/Injury  Goal: Verbalize understanding of personal risk factors for fall in the hospital  Outcome: Progressing

## 2025-03-29 NOTE — PROGRESS NOTES
Alfonso Toscano is a 80 y.o. male on day 1 of admission presenting with Nontraumatic hemorrhagic shock (Multi).    [facility] Critical Care Medicine       Date:  3/29/2025  Patient:  Alfonso Toscano  YOB: 1945  MRN:  [id number]   Admit Date:  3/28/2025  ========================================================================================================    Chief Complaint   Patient presents with    Black or Bloody Stool     Pt report bloody stools since yesterday, bright red today with increased weakness. Hx of liver cancer currently being treated         History of Present Illness:  Alfonso Toscano is a 80 y.o. year old male patient with PMHx of HCC dx 2021, prostate CA dx 2008 treated with radiation, liver cirrhosis, hx GIB, esophageal varices- last EGD 2021, HLD, HTN, and hepatic encephalopathy who [place] [facility] [place] of weakness and GIB. Per patient, noted bright red blood in BM beginning Wednesday, worsening yesterday with associated fatigue/weakness sought ER evaluation. In the ER, noted to have acute anemia and transfused PRBC x2, BP remained borderline, CTA A/P without evidence of active GIB, GI was contacted who recommended initiation of Octreotide gtt with plan for EGD/colonoscopy today. Admitted to ICU for further management of GIB/Hemorrhagic shock.       Interval ICU Events:  3/28: Admit to ICU, pending post-transfusion CBC, ordered FFP x1. Plan for EGD/Colonoscopy today    3/29: No overnight events. A&Ox 3 patient sitting up in bed, denies any chest pain, abdominal pain, sob, or nausea. S/P EGD and colonoscopy 3/28 with 2 unit PRBC, FFP x1 and Plt x1.  Post transfusion Hgb 7.4 is 7.4 this morning with reports of one undocumented BM overnight. Per GI, patient cleared to advance to clear liquid no red diet and close monitoring and documented description of stool output. Patient with soft blood pressures placed on low dose levophed to keep MAP >65.  mL overnight.      Medical History:  Past Medical History:   Diagnosis Date    Aortic ectasia, 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    Aortic dilatation    Cancer (Multi)     Esophageal varices     Hepatocellular carcinoma (Multi)     Personal history of diseases of the blood and blood-forming organs and certain disorders involving the immune mechanism 07/01/2020    History of anemia    Personal history of other medical treatment     H/O echocardiogram     Past Surgical History:   Procedure Laterality Date    COLONOSCOPY  10/22/2018    Colonoscopy    OTHER SURGICAL HISTORY  07/01/2020    Cataract surgery     Medications Prior to Admission   Medication Sig Dispense Refill Last Dose/Taking    bicalutamide (Casodex) 50 mg tablet Take 1 tablet (50 mg total) by mouth once daily.   3/27/2025 Noon    cholecalciferol (Vitamin D-3) 25 MCG (1000 UT) tablet Take 1 tablet (25 mcg) by mouth once daily.   3/27/2025    dutasteride (Avodart) 0.5 mg capsule Take 1 capsule (0.5 mg) by mouth once daily.   3/27/2025    furosemide (Lasix) 20 mg tablet TAKE 1 TABLET BY MOUTH ONCE DAILY 30 tablet 11 3/27/2025 Evening    lactulose 20 gram/30 mL oral solution 30ML three times daily (Patient taking differently: Take 30 mL (20 g) by mouth 3 times a day. 30ML three times daily) 1892 mL 11 3/27/2025 Evening    nadolol (Corgard) 20 mg tablet Take 0.5 tablets (10 mg) by mouth once daily. 30 tablet 6 3/26/2025    pantoprazole (ProtoNix) 40 mg EC tablet TAKE 1 TABLET ONCE DAILY 30 tablet 11 3/27/2025    spironolactone (Aldactone) 50 mg tablet TAKE 1 TABLET BY MOUTH ONCE DAILY 90 tablet 3 3/27/2025    fexofenadine (Allegra) 180 mg tablet Take 1 tablet (180 mg) by mouth once daily. prn   Unknown    loperamide (Imodium) 2 mg capsule Take 2 capsules (4 mg) by mouth with the first episode of diarrhea and 1 capsule (2 mg) by mouth with any additional episodes. Maximum 8 capsules (16 mg) per day. (Patient not taking: Reported on 3/28/2025) 100 capsule 2 Unknown     "prochlorperazine (Compazine) 10 mg tablet Take 1 tablet (10 mg) by mouth every 6 hours if needed for nausea or vomiting. 30 tablet 5 Unknown     Patient has no known allergies.  Social History     Tobacco Use    Smoking status: Never    Smokeless tobacco: Never   Substance Use Topics    Alcohol use: Not Currently     Family History   Problem Relation Name Age of Onset    Colon cancer Mother      Liver cancer Mother         Review of Systems:  14 point review of systems was completed and negative except for those specially mention in my HPI    Physical Exam:    Heart Rate:  [52-80]   Temp:  [35.6 °C (96.1 °F)-36.7 °C (98.1 °F)]   Resp:  [11-26]   BP: ()/(36-74)   Height:  [172.7 cm (5' 8\")]   Weight:  [88.1 kg (194 lb 3.6 oz)-92.5 kg (203 lb 14.8 oz)]   SpO2:  [91 %-100 %]     Physical Exam  Constitutional:       Appearance: He is overweight.   HENT:      Head: Normocephalic and atraumatic.      Mouth/Throat:      Mouth: Mucous membranes are dry.      Pharynx: Oropharynx is clear.   Eyes:      Extraocular Movements: Extraocular movements intact.      Pupils: Pupils are equal, round, and reactive to light.   Cardiovascular:      Rate and Rhythm: Normal rate and regular rhythm.      Pulses: Normal pulses.      Heart sounds: Murmur heard.   Pulmonary:      Effort: Pulmonary effort is normal.      Breath sounds: Normal breath sounds.   Abdominal:      General: There is distension.      Palpations: Abdomen is soft.      Tenderness: There is no abdominal tenderness. There is no guarding.   Musculoskeletal:         General: Normal range of motion.      Cervical back: Normal range of motion and neck supple.      Left lower le+ Edema present.      Left foot: Swelling present.   Skin:     General: Skin is warm and dry.      Capillary Refill: Capillary refill takes less than 2 seconds.   Neurological:      Mental Status: He is alert and oriented to person, place, and time.      Comments: [facility]    Psychiatric:         " Mood and Affect: Mood normal.         Behavior: Behavior normal.         Objective:    I have reviewed all medications, laboratory results, and imaging pertinent for today's encounter    Assessment/Plan:    I am currently managing this critically ill patient for the following problems:    Neuro/Psych/Pain Ctrl/Sedation:  #Metabolic encephalopathy OP visit on 3/13, toxicity of chemotherapy evaluated, possible SE of Atezolizumab  --Current neuro status: A&O x3   --Monitor neuro checks  --Ammonia 60 (3/25)  --[facility] ICU  --Home lactulose resumed    Respiratory/ENT:  No active issues  --O2 supplementation to maintain SpO2 >92%  --Currently >92% on RA     Cardiovascular:  #Shock hemorrhagic 2/2 GIB related to portal hypertension. gastropathy, colopathy, and proctopathy (radiation-induced)  #NSTEMI type 2 in the setting of acute blood loss/GIB  #Hx HTN/HLD  #Portal Hypertension  --Blood products as below  --Monitor lactate, recent 2.2  --Borderline hypotensive: Levophed initiated at titrate to MAP >65  --Levo off at 0830  --IVF LR @100 mL x 1  --Midodrine 5mg TID ordered  --Resumed portal HTN meds: aldactone daily and nadolol resumed nightly - hold if hypotension persists  --Consider prn Lasix if additional blood transfusion required  --Troponin down trended      GI:  #Acute GIB with hematochezia  #Alcoholic liver cirrhosis sober since 1988  #Hx esophageal varices last EGD 2021  --Diet GI clear no reds  --Octreotide gtt  --PPI BID  --EDG 3/28: No active/recent bleed, minimal Grade I varices of mid esophagus + scarring (prior banding?), small gastric polyp w/oozing from suction irritation s/p 2 clips, stomach and duodenum appeared normal  --Colonoscopy 3/28: Mild radiation proctitis status post APC and 1 clip, bleeding hemorrhoid banded x3   --Monitor and document stool output  --Check CBC q4 hr  --GI following    Renal/Volume Status (Intra & Extravascular):  No active issues  --Monitor UOP  --Daily CMP, replete electrolytes  as needed    Endocrine  No hx DM  --A1C 4.1  --TSH normal     Infectious Disease:  No SIRS criteria  --Continue Ceftriaxone 1g daily x 6 days for SBP ppx  --Monitor for infection       Heme/Onc:  #Hepatocellular carcinoma  #Hemorrhagic shock  #Thrombocytopenia baseline PLT 60-80  #Hx prostate CA s/p radiation  #Hx of lower GIB 2/2 portal hypertension gastropathy, colopathy, proctopathy (radiation-induced)  --Initial Dx 5/2021, underwent ablation [facility] 10/2021 with complication of GIB  --Last visit with Dr Alfredo 2/25/2025: MRI liver with new lesions noted in segment VI concerning for disease progression; 3/6/2025 started   --3/6/2025 C1 Atezolizumab and Bevacizumab, toxicity check 3/13  --S/P PRBC x2, FFP x1, Plt x 1 (3/28)  --Post transfusion Hgb down trending 7.4_7.0  --CBC q 4hr  --Transfuse 1 unit PRBC if serial Hgb < 7.0 or if patient symptomatic  --Treat with prn Lasix to avoid fluid overload in the setting of portal HTN  --Daily INR, recent 1.6     MSK:  --Advance activity as tolerated  --PT/OT     Ethics/Code Status:  Full Code    :  DVT Prophylaxis: None - active GIB bleed  GI Prophylaxis: PPI BID  Bowel Regimen: none  Diet: NPO  CVC: none  [place]: none  Gerardo: none  Restraints: none  Dispo: ICU    Critical Care Time:  45 minutes spent in preparing to see patient (I.e. review of medical records), evaluation of diagnostics (I.e. labs, imaging, etc.), documentation, discussing plan of care with patient/ family/ caregiver, and/ or coordination of care with multidisciplinary team. Time does not include completion of procedure time.     Maria Vargas, APRN-CNP

## 2025-03-30 VITALS
RESPIRATION RATE: 17 BRPM | SYSTOLIC BLOOD PRESSURE: 136 MMHG | OXYGEN SATURATION: 94 % | BODY MASS INDEX: 32.48 KG/M2 | HEART RATE: 56 BPM | TEMPERATURE: 97.5 F | WEIGHT: 214.29 LBS | HEIGHT: 68 IN | DIASTOLIC BLOOD PRESSURE: 61 MMHG

## 2025-03-30 LAB
ALBUMIN SERPL BCP-MCNC: 2.6 G/DL (ref 3.4–5)
ALP SERPL-CCNC: 93 U/L (ref 33–136)
ALT SERPL W P-5'-P-CCNC: 18 U/L (ref 10–52)
ANION GAP SERPL CALC-SCNC: 9 MMOL/L (ref 10–20)
AST SERPL W P-5'-P-CCNC: 32 U/L (ref 9–39)
ATRIAL RATE: 52 BPM
ATRIAL RATE: 59 BPM
BASO STIPL BLD QL SMEAR: PRESENT
BASOPHILS # BLD AUTO: 0.01 X10*3/UL (ref 0–0.1)
BASOPHILS NFR BLD AUTO: 0.1 %
BILIRUB SERPL-MCNC: 2.4 MG/DL (ref 0–1.2)
BLOOD EXPIRATION DATE: NORMAL
BUN SERPL-MCNC: 27 MG/DL (ref 6–23)
CALCIUM SERPL-MCNC: 7.3 MG/DL (ref 8.6–10.3)
CHLORIDE SERPL-SCNC: 106 MMOL/L (ref 98–107)
CO2 SERPL-SCNC: 22 MMOL/L (ref 21–32)
CREAT SERPL-MCNC: 1.16 MG/DL (ref 0.5–1.3)
DACRYOCYTES BLD QL SMEAR: NORMAL
DISPENSE STATUS: NORMAL
EGFRCR SERPLBLD CKD-EPI 2021: 64 ML/MIN/1.73M*2
EOSINOPHIL # BLD AUTO: 0 X10*3/UL (ref 0–0.4)
EOSINOPHIL NFR BLD AUTO: 0 %
ERYTHROCYTE [DISTWIDTH] IN BLOOD BY AUTOMATED COUNT: 17.1 % (ref 11.5–14.5)
ERYTHROCYTE [DISTWIDTH] IN BLOOD BY AUTOMATED COUNT: 17.2 % (ref 11.5–14.5)
GLUCOSE SERPL-MCNC: 118 MG/DL (ref 74–99)
HCT VFR BLD AUTO: 20.5 % (ref 41–52)
HCT VFR BLD AUTO: 20.5 % (ref 41–52)
HGB BLD-MCNC: 6.8 G/DL (ref 13.5–17.5)
HGB BLD-MCNC: 6.9 G/DL (ref 13.5–17.5)
HOLD SPECIMEN: NORMAL
IMM GRANULOCYTES # BLD AUTO: 0.08 X10*3/UL (ref 0–0.5)
IMM GRANULOCYTES NFR BLD AUTO: 0.7 % (ref 0–0.9)
INR PPP: 2 (ref 0.9–1.1)
LYMPHOCYTES # BLD AUTO: 0.98 X10*3/UL (ref 0.8–3)
LYMPHOCYTES NFR BLD AUTO: 8.6 %
MCH RBC QN AUTO: 29.5 PG (ref 26–34)
MCH RBC QN AUTO: 29.6 PG (ref 26–34)
MCHC RBC AUTO-ENTMCNC: 33.2 G/DL (ref 32–36)
MCHC RBC AUTO-ENTMCNC: 33.7 G/DL (ref 32–36)
MCV RBC AUTO: 88 FL (ref 80–100)
MCV RBC AUTO: 89 FL (ref 80–100)
MONOCYTES # BLD AUTO: 1.06 X10*3/UL (ref 0.05–0.8)
MONOCYTES NFR BLD AUTO: 9.3 %
NEUTROPHILS # BLD AUTO: 9.21 X10*3/UL (ref 1.6–5.5)
NEUTROPHILS NFR BLD AUTO: 81.3 %
NRBC BLD-RTO: 0 /100 WBCS (ref 0–0)
NRBC BLD-RTO: 0 /100 WBCS (ref 0–0)
OVALOCYTES BLD QL SMEAR: NORMAL
P AXIS: 67 DEGREES
P AXIS: 75 DEGREES
P OFFSET: 166 MS
P OFFSET: 176 MS
P ONSET: 107 MS
P ONSET: 114 MS
PLATELET # BLD AUTO: 56 X10*3/UL (ref 150–450)
PLATELET # BLD AUTO: 61 X10*3/UL (ref 150–450)
POLYCHROMASIA BLD QL SMEAR: NORMAL
POTASSIUM SERPL-SCNC: 4.4 MMOL/L (ref 3.5–5.3)
PR INTERVAL: 218 MS
PR INTERVAL: 238 MS
PRODUCT BLOOD TYPE: 5100
PRODUCT CODE: NORMAL
PROT SERPL-MCNC: 4.8 G/DL (ref 6.4–8.2)
PROTHROMBIN TIME: 21.8 SECONDS (ref 9.8–12.4)
Q ONSET: 223 MS
Q ONSET: 226 MS
QRS COUNT: 10 BEATS
QRS COUNT: 9 BEATS
QRS DURATION: 54 MS
QRS DURATION: 66 MS
QT INTERVAL: 452 MS
QT INTERVAL: 466 MS
QTC CALCULATION(BAZETT): 433 MS
QTC CALCULATION(BAZETT): 447 MS
QTC FREDERICIA: 444 MS
QTC FREDERICIA: 449 MS
R AXIS: 48 DEGREES
R AXIS: 49 DEGREES
RBC # BLD AUTO: 2.3 X10*6/UL (ref 4.5–5.9)
RBC # BLD AUTO: 2.34 X10*6/UL (ref 4.5–5.9)
RBC MORPH BLD: NORMAL
SODIUM SERPL-SCNC: 133 MMOL/L (ref 136–145)
T AXIS: -66 DEGREES
T AXIS: 35 DEGREES
T OFFSET: 452 MS
T OFFSET: 456 MS
UNIT ABO: NORMAL
UNIT NUMBER: NORMAL
UNIT RH: NORMAL
UNIT VOLUME: 290
VENTRICULAR RATE: 52 BPM
VENTRICULAR RATE: 59 BPM
WBC # BLD AUTO: 11.2 X10*3/UL (ref 4.4–11.3)
WBC # BLD AUTO: 11.3 X10*3/UL (ref 4.4–11.3)
XM INTEP: NORMAL

## 2025-03-30 PROCEDURE — 99232 SBSQ HOSP IP/OBS MODERATE 35: CPT | Performed by: INTERNAL MEDICINE

## 2025-03-30 PROCEDURE — 2500000004 HC RX 250 GENERAL PHARMACY W/ HCPCS (ALT 636 FOR OP/ED)

## 2025-03-30 PROCEDURE — 36415 COLL VENOUS BLD VENIPUNCTURE: CPT

## 2025-03-30 PROCEDURE — 99232 SBSQ HOSP IP/OBS MODERATE 35: CPT

## 2025-03-30 PROCEDURE — 2500000002 HC RX 250 W HCPCS SELF ADMINISTERED DRUGS (ALT 637 FOR MEDICARE OP, ALT 636 FOR OP/ED)

## 2025-03-30 PROCEDURE — 84075 ASSAY ALKALINE PHOSPHATASE: CPT

## 2025-03-30 PROCEDURE — P9016 RBC LEUKOCYTES REDUCED: HCPCS

## 2025-03-30 PROCEDURE — 36430 TRANSFUSION BLD/BLD COMPNT: CPT

## 2025-03-30 PROCEDURE — 85025 COMPLETE CBC W/AUTO DIFF WBC: CPT

## 2025-03-30 PROCEDURE — 85610 PROTHROMBIN TIME: CPT

## 2025-03-30 PROCEDURE — 85027 COMPLETE CBC AUTOMATED: CPT | Performed by: STUDENT IN AN ORGANIZED HEALTH CARE EDUCATION/TRAINING PROGRAM

## 2025-03-30 PROCEDURE — 1210000001 HC SEMI-PRIVATE ROOM DAILY

## 2025-03-30 PROCEDURE — 36415 COLL VENOUS BLD VENIPUNCTURE: CPT | Performed by: STUDENT IN AN ORGANIZED HEALTH CARE EDUCATION/TRAINING PROGRAM

## 2025-03-30 PROCEDURE — 2500000001 HC RX 250 WO HCPCS SELF ADMINISTERED DRUGS (ALT 637 FOR MEDICARE OP)

## 2025-03-30 RX ORDER — LACTULOSE 10 G/15ML
20 SOLUTION ORAL 2 TIMES DAILY
Status: DISCONTINUED | OUTPATIENT
Start: 2025-03-30 | End: 2025-03-30

## 2025-03-30 RX ORDER — PHYTONADIONE 5 MG/1
5 TABLET ORAL ONCE
Status: COMPLETED | OUTPATIENT
Start: 2025-03-30 | End: 2025-03-30

## 2025-03-30 RX ORDER — LACTULOSE 10 G/15ML
20 SOLUTION ORAL 2 TIMES DAILY
Status: DISCONTINUED | OUTPATIENT
Start: 2025-03-30 | End: 2025-03-31 | Stop reason: HOSPADM

## 2025-03-30 RX ADMIN — CEFTRIAXONE SODIUM 1 G: 1 INJECTION, SOLUTION INTRAVENOUS at 09:07

## 2025-03-30 RX ADMIN — BICALUTAMIDE 50 MG: 50 TABLET ORAL at 09:07

## 2025-03-30 RX ADMIN — FINASTERIDE 5 MG: 5 TABLET, FILM COATED ORAL at 09:07

## 2025-03-30 RX ADMIN — PANTOPRAZOLE SODIUM 40 MG: 40 INJECTION, POWDER, FOR SOLUTION INTRAVENOUS at 09:07

## 2025-03-30 RX ADMIN — LACTULOSE 20 G: 20 SOLUTION ORAL at 20:31

## 2025-03-30 RX ADMIN — CETIRIZINE HYDROCHLORIDE 10 MG: 10 TABLET ORAL at 09:07

## 2025-03-30 RX ADMIN — OCTREOTIDE ACETATE 50 MCG/HR: 500 INJECTION, SOLUTION INTRAVENOUS; SUBCUTANEOUS at 02:50

## 2025-03-30 RX ADMIN — PHYTONADIONE 5 MG: 5 TABLET ORAL at 11:51

## 2025-03-30 RX ADMIN — PANTOPRAZOLE SODIUM 40 MG: 40 INJECTION, POWDER, FOR SOLUTION INTRAVENOUS at 20:31

## 2025-03-30 RX ADMIN — LACTULOSE 20 G: 10 SOLUTION ORAL at 09:07

## 2025-03-30 RX ADMIN — NADOLOL 10 MG: 20 TABLET ORAL at 20:30

## 2025-03-30 RX ADMIN — SPIRONOLACTONE 50 MG: 25 TABLET ORAL at 09:07

## 2025-03-30 ASSESSMENT — COGNITIVE AND FUNCTIONAL STATUS - GENERAL
TOILETING: A LOT
TURNING FROM BACK TO SIDE WHILE IN FLAT BAD: A LITTLE
WALKING IN HOSPITAL ROOM: A LOT
DRESSING REGULAR LOWER BODY CLOTHING: A LOT
PERSONAL GROOMING: A LOT
MOVING FROM LYING ON BACK TO SITTING ON SIDE OF FLAT BED WITH BEDRAILS: A LITTLE
DRESSING REGULAR LOWER BODY CLOTHING: A LOT
EATING MEALS: A LITTLE
TOILETING: A LOT
WALKING IN HOSPITAL ROOM: A LOT
MOBILITY SCORE: 14
CLIMB 3 TO 5 STEPS WITH RAILING: A LOT
DRESSING REGULAR UPPER BODY CLOTHING: A LOT
STANDING UP FROM CHAIR USING ARMS: A LOT
TURNING FROM BACK TO SIDE WHILE IN FLAT BAD: A LITTLE
MOBILITY SCORE: 14
DRESSING REGULAR UPPER BODY CLOTHING: A LOT
MOVING TO AND FROM BED TO CHAIR: A LOT
MOVING TO AND FROM BED TO CHAIR: A LOT
PERSONAL GROOMING: A LOT
HELP NEEDED FOR BATHING: A LOT
STANDING UP FROM CHAIR USING ARMS: A LOT
DAILY ACTIVITIY SCORE: 13
MOVING FROM LYING ON BACK TO SITTING ON SIDE OF FLAT BED WITH BEDRAILS: A LITTLE
DAILY ACTIVITIY SCORE: 13
HELP NEEDED FOR BATHING: A LOT
CLIMB 3 TO 5 STEPS WITH RAILING: A LOT
EATING MEALS: A LITTLE

## 2025-03-30 ASSESSMENT — PAIN SCALES - GENERAL
PAINLEVEL_OUTOF10: 0 - NO PAIN

## 2025-03-30 ASSESSMENT — PAIN - FUNCTIONAL ASSESSMENT
PAIN_FUNCTIONAL_ASSESSMENT: 0-10

## 2025-03-30 NOTE — PROGRESS NOTES
Alfonso Toscano is a 80 y.o. male on day 2 of admission presenting with Nontraumatic hemorrhagic shock (Multi).    [facility] Critical Care Medicine       Date:  3/30/2025  Patient:  Alfonso Toscano  YOB: 1945  MRN:  [id number]   Admit Date:  3/28/2025  ========================================================================================================    Chief Complaint   Patient presents with    Black or Bloody Stool     Pt report bloody stools since yesterday, bright red today with increased weakness. Hx of liver cancer currently being treated         History of Present Illness:  Alfonso Toscano is a 80 y.o. year old male patient with PMHx of HCC dx 2021, prostate CA dx 2008 treated with radiation, liver cirrhosis, hx GIB, esophageal varices- last EGD 2021, HLD, HTN, and hepatic encephalopathy who [place] [facility] [place] of [place] and GIB. Per patient, noted bright red blood in BM beginning Wednesday, worsening yesterday with associated fatigue/weakness sought ER evaluation. In the ER, noted to have acute anemia and transfused PRBC x2, BP remained borderline, CTA A/P without evidence of active GIB, GI was contacted who recommended initiation of Octreotide gtt with plan for EGD/colonoscopy today. Admitted to ICU for further management of GIB/Hemorrhagic shock.       Interval ICU Events:  3/28: Admit to ICU, pending post-transfusion CBC, ordered FFP x1. Plan for EGD/Colonoscopy today    3/29: No overnight events. A&Ox 3 patient sitting up in bed, denies any chest pain, abdominal pain, sob, or nausea. S/P EGD and colonoscopy 3/28 with 2 unit PRBC, FFP x1 and Plt x1.  Post transfusion Hgb 7.4 is 7.4 this morning with reports of one undocumented BM overnight. Per GI, patient cleared to advance to clear liquid no red diet and close monitoring and documented description of stool output. Patient with soft blood pressures placed on low dose levophed to keep MAP >65.  mL overnight.      3/30: No overnight events. Patient's MAP greater than 90 after 2 doses of Midodrine: Discontinued medication. Patient remained hemodynamically stable overnight. Hgb 6.8 this am and recheck 6.9 without any reported hematochezia. Frequent loose stools documented: Adjusting Lactulose dosing. Examined patient to be A&Ox3 but tired c/o poor sleep. 12 hr . No further complaints. Plan to transfuse 1 unit PRBC and increase activity as tolerated.     Medical History:  Past Medical History:   Diagnosis Date    Aortic ectasia, 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    Aortic dilatation    Cancer (Multi)     Esophageal varices     Hepatocellular carcinoma (Multi)     Personal history of diseases of the blood and blood-forming organs and certain disorders involving the immune mechanism 07/01/2020    History of anemia    Personal history of other medical treatment     H/O echocardiogram     Past Surgical History:   Procedure Laterality Date    COLONOSCOPY  10/22/2018    Colonoscopy    OTHER SURGICAL HISTORY  07/01/2020    Cataract surgery     Medications Prior to Admission   Medication Sig Dispense Refill Last Dose/Taking    bicalutamide (Casodex) 50 mg tablet Take 1 tablet (50 mg total) by mouth once daily.   3/27/2025 Noon    cholecalciferol (Vitamin D-3) 25 MCG (1000 UT) tablet Take 1 tablet (25 mcg) by mouth once daily.   3/27/2025    dutasteride (Avodart) 0.5 mg capsule Take 1 capsule (0.5 mg) by mouth once daily.   3/27/2025    furosemide (Lasix) 20 mg tablet TAKE 1 TABLET BY MOUTH ONCE DAILY 30 tablet 11 3/27/2025 Evening    lactulose 20 gram/30 mL oral solution 30ML three times daily (Patient taking differently: Take 30 mL (20 g) by mouth 3 times a day. 30ML three times daily) 1892 mL 11 3/27/2025 Evening    nadolol (Corgard) 20 mg tablet Take 0.5 tablets (10 mg) by mouth once daily. 30 tablet 6 3/26/2025    pantoprazole (ProtoNix) 40 mg EC tablet TAKE 1 TABLET ONCE DAILY 30 tablet 11 3/27/2025    spironolactone  (Aldactone) 50 mg tablet TAKE 1 TABLET BY MOUTH ONCE DAILY 90 tablet 3 3/27/2025    fexofenadine (Allegra) 180 mg tablet Take 1 tablet (180 mg) by mouth once daily. prn   Unknown    loperamide (Imodium) 2 mg capsule Take 2 capsules (4 mg) by mouth with the first episode of diarrhea and 1 capsule (2 mg) by mouth with any additional episodes. Maximum 8 capsules (16 mg) per day. (Patient not taking: Reported on 3/28/2025) 100 capsule 2 Unknown    prochlorperazine (Compazine) 10 mg tablet Take 1 tablet (10 mg) by mouth every 6 hours if needed for nausea or vomiting. 30 tablet 5 Unknown     Patient has no known allergies.  Social History     Tobacco Use    Smoking status: Never    Smokeless tobacco: Never   Substance Use Topics    Alcohol use: Not Currently     Family History   Problem Relation Name Age of Onset    Colon cancer Mother      Liver cancer Mother         Review of Systems:  14 point review of systems was completed and negative except for those specially mention in my HPI    Physical Exam:    Heart Rate:  [60-74]   Temp:  [36.2 °C (97.2 °F)-36.8 °C (98.2 °F)]   Resp:  [12-25]   BP: ()/(42-98)   Weight:  [92.5 kg (203 lb 14.8 oz)]   SpO2:  [92 %-98 %]     Physical Exam  Constitutional:       Appearance: He is overweight.   HENT:      Head: Normocephalic and atraumatic.      Mouth/Throat:      Mouth: Mucous membranes are moist.      Pharynx: Oropharynx is clear.   Eyes:      Extraocular Movements: Extraocular movements intact.      Pupils: Pupils are equal, round, and reactive to light.   Cardiovascular:      Rate and Rhythm: Normal rate and regular rhythm.      Pulses: Normal pulses.      Heart sounds: Murmur heard.   Pulmonary:      Effort: Pulmonary effort is normal.      Breath sounds: Normal breath sounds.   Abdominal:      General: Bowel sounds are normal. There is distension.      Palpations: Abdomen is soft.      Tenderness: There is no abdominal tenderness. There is no guarding.    Musculoskeletal:         General: Normal range of motion.      Cervical back: Normal range of motion and neck supple.      Left lower le+ Edema present.      Left foot: Swelling present.   Skin:     General: Skin is warm and dry.      Capillary Refill: Capillary refill takes less than 2 seconds.      Findings: Bruising present.   Neurological:      Mental Status: He is alert and oriented to person, place, and time.      Comments: [place]    Psychiatric:         Mood and Affect: Mood normal.         Speech: Speech normal.         Behavior: Behavior normal.         Objective:    I have reviewed all medications, laboratory results, and imaging pertinent for today's encounter    Assessment/Plan:    I am currently managing this critically ill patient for the following problems:    Neuro/Psych/Pain Ctrl/Sedation:  #Metabolic encephalopathy OP visit on 3/13, toxicity of chemotherapy evaluated, possible SE of Atezolizumab  --Current neuro status: A&O x3   --Ammonia 60 (3/25)  --Home lactulose resumed  --Monitor delirium, maintain sleep hygiene. Avoid anticholinergics, benzodiazepines, sedatives, excessive tubes/lines     Respiratory/ENT:  No active issues  --Currently >92% on RA     Cardiovascular:  #Shock hemorrhagic 2/2 GIB related to portal hypertension. gastropathy, colopathy, and proctopathy (radiation-induced) - Resolved  #NSTEMI type 2 in the setting of acute blood loss/GIB - Resolved  #Hx HTN/HLD  #Portal Hypertension  --Hemodynamically stable off pressors  --Midodrine discontinued d/t increased MAPs  --Resumed portal HTN meds: aldactone daily and nadolol nightly  --Continue to hold home Lasix and reassess     GI:  #Acute GIB with hematochezia  #Alcoholic liver cirrhosis sober since   #Hx esophageal varices last EGD   --Diet advanced   --PPI BID  --EDG 3/28: No active/recent bleed, minimal Grade I varices of mid esophagus + scarring (prior banding?), small gastric polyp w/oozing from suction irritation  s/p 2 clips, stomach and duodenum appeared normal  --Colonoscopy 3/28: Mild radiation proctitis status post APC and 1 clip, bleeding hemorrhoid banded x3   --Monitor and document stool output  --Frequent stools reported 2/2 lactulose - Dose adjusted to 20 g BID with hold parameters  --1 unit PRBC ordered, recheck post infusion h/h  --Plt stable at 61  --INR 2.0: Vitamin K 5 mg PO x1  --GI following  --Daily CBC    Renal/Volume Status (Intra & Extravascular):  No active issues  --Monitor UOP  --Daily CMP, replete electrolytes as needed    Endocrine  No hx DM  --A1C 4.1  --TSH normal     Infectious Disease:  No SIRS criteria  --Continue Ceftriaxone 1g daily x 6 days for SBP ppx  --Monitor for infection    Heme/Onc:  #Hepatocellular carcinoma  #Hemorrhagic shock - resolved  #Thrombocytopenia baseline PLT 60-80  #Hx prostate CA s/p radiation  #Hx of lower GIB 2/2 portal hypertension gastropathy, colopathy, proctopathy (radiation-induced)  --Initial Dx 5/2021, underwent ablation at [facility] 10/2021 with complication of GIB  --Last visit with Dr Alfredo 2/25/2025: MRI liver with new lesions noted in segment VI concerning for disease progression; 3/6/2025 started   --3/6/2025 C1 Atezolizumab and Bevacizumab, toxicity check 3/13  --S/P PRBC x2, FFP x1, Plt x 1 (3/28)  --Hgb 6.8 and recheck 6.9 - No evidence of active bleeding  --Transfuse 1 unit PRBC and recheck post transfusion labs  --Monitor daily CBC     MSK  --PT/OT consulted  --Increase daily activity     Ethics/Code Status:  Full Code    :  DVT Prophylaxis: None - active GIB bleed  GI Prophylaxis: PPI BID  Bowel Regimen: On maintenance Lactulose  Diet: Clears no red  CVC: none  [place]: none  Gerardo: none  Restraints: none  Dispo: General Medicine    Critical Care Time: 35 minutes spent in preparing to see patient (I.e. review of medical records), evaluation of diagnostics (I.e. labs, imaging, etc.), documentation, discussing plan of care with patient/ family/  caregiver, and/ or coordination of care with multidisciplinary team. Time does not include completion of procedure time.     Maria Vargas, APRN-CNP

## 2025-03-30 NOTE — NURSING NOTE
Pt received vi bed from MICU, VS and assessment completed. Oriented to surrounding. Call bell within reach. Care on going.

## 2025-03-30 NOTE — PROGRESS NOTES
Department of Internal Medicine  Gastroenterology  Progress note      Subjective  GI is following for GIB, cirrhosis    Pt having brown stool - on lactulose - more alert this am  Hgb stable - getting 1  PRBC  Current Medication    Current Facility-Administered Medications:     bicalutamide (Casodex) tablet 50 mg, 50 mg, oral, Daily, Patricia Oliveros, PharmD, 50 mg at 03/30/25 0907    cefTRIAXone (Rocephin) 1 g in dextrose (iso) IV 50 mL, 1 g, intravenous, q24h, KEKE Power, Stopped at 03/30/25 1042    cetirizine (ZyrTEC) tablet 10 mg, 10 mg, oral, Daily, KEKE Mcgrath, 10 mg at 03/30/25 0907    finasteride (Proscar) tablet 5 mg, 5 mg, oral, Daily, KEKE Mcgrath, 5 mg at 03/30/25 0907    [Held by provider] furosemide (Lasix) tablet 20 mg, 20 mg, oral, Daily, KEKE Chauhan    lactulose 20 gram/30 mL oral solution 20 g, 30 mL, oral, TID, KEKE Mcgrath, 20 g at 03/30/25 0907    nadolol (Corgard) tablet 10 mg, 10 mg, oral, q PM, KEKE Mcgrath, 10 mg at 03/29/25 2104    octreotide (SandoSTATIN) 500 mcg in sodium chloride 0.9% 100 mL (5 mcg/mL) infusion, 50 mcg/hr, intravenous, Continuous, KEKE Power, Last Rate: 10 mL/hr at 03/30/25 0435, 50 mcg/hr at 03/30/25 0435    pantoprazole (Protonix) injection 40 mg, 40 mg, intravenous, BID, KEKE Power, 40 mg at 03/30/25 0907    phytonadione (Vitamin K) tablet 5 mg, 5 mg, oral, Once, Ambar Narayanan, PharmD    spironolactone (Aldactone) tablet 50 mg, 50 mg, oral, Daily, KEKE Mcgrath, 50 mg at 03/30/25 3956    Past Medical History  Active Ambulatory Problems     Diagnosis Date Noted    Allergic rhinitis due to pollen 11/28/2023    Anatomical narrow angle, bilateral 06/25/2021    Anemia 06/04/2021    Anxiety 11/28/2023    Bilateral impacted cerumen 11/28/2023    Cobalamin deficiency 06/04/2021    Colon polyps 11/28/2023    Esophageal varices 12/03/2019    Essential  "hypertension 10/13/2017    Excessive cerumen in ear canal, right 11/28/2023    Gastric ulcer 11/28/2023    Gastrointestinal hemorrhage associated with anorectal source 10/24/2021    Hepatic cirrhosis (Multi) 12/03/2019    History of laser iridotomy 06/25/2021    Hyperglycemia 11/28/2023    Iron deficiency anemia secondary to inadequate dietary iron intake 11/28/2023    Leukocytosis 10/13/2017    Hypocalcemia 11/28/2023    Malignant neoplasm of liver, not specified as primary or secondary (Multi) 11/28/2023    Malignant neoplasm of prostate (Multi) 10/10/2018    Heart murmur 11/28/2023    PND (post-nasal drip) 11/28/2023    Pseudophakia 06/25/2021    Right foot pain 11/28/2023    Sinus pressure 11/28/2023    Hordeolum externum 11/28/2023    Thrombocytopenia (CMS-HCC) 10/14/2017    Vitamin D deficiency 11/28/2023    Mixed hyperlipidemia 11/28/2023    Hepatocellular carcinoma (Multi) 02/25/2025     Resolved Ambulatory Problems     Diagnosis Date Noted    Abnormal CBC 11/28/2023    Acute blood loss anemia 10/29/2021    Back pain 11/28/2023    Malnutrition of moderate degree (Multi) 11/03/2021    Portal hypertension (Multi) 11/07/2021    Rectal hemorrhage 11/28/2023    Injury of toe 11/28/2023    Pain 11/28/2023     Past Medical History:   Diagnosis Date    Aortic ectasia, 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    Cancer (Multi)     Personal history of diseases of the blood and blood-forming organs and certain disorders involving the immune mechanism 07/01/2020    Personal history of other medical treatment        PHYSICAL EXAM  VS: /53   Pulse 58   Temp 36.1 °C (97 °F) (Temporal)   Resp 14   Ht 1.727 m (5' 8\")   Wt 92.5 kg (203 lb 14.8 oz)   SpO2 98%   BMI 31.01 kg/m²  Body mass index is 31.01 kg/m².  Physical Exam  Abdominal:      General: Bowel sounds are normal. There is distension.      Tenderness: There is no abdominal tenderness. There is no guarding or rebound.   Neurological:      Comments: Minimal " asterixsis          DATA  Recent blood work and relevant radiology and endoscopic studies were reviewed and discussed with the patient   Results from last 7 days   Lab Units 03/30/25  0706   WBC AUTO x10*3/uL 11.2   RBC AUTO x10*6/uL 2.34*   HEMOGLOBIN g/dL 6.9*   HEMATOCRIT % 20.5*   MCV fL 88   MCHC g/dL 33.7   RDW % 17.2*   PLATELETS AUTO x10*3/uL 61*       Results from last 72 hours   Lab Units 03/30/25  0345   SODIUM mmol/L 133*   POTASSIUM mmol/L 4.4   CHLORIDE mmol/L 106   CO2 mmol/L 22   BUN mg/dL 27*   CREATININE mg/dL 1.16   CALCIUM mg/dL 7.3*   PROTEIN TOTAL g/dL 4.8*   BILIRUBIN TOTAL mg/dL 2.4*   ALK PHOS U/L 93   AST U/L 32   ALT U/L 18       Results from last 72 hours   Lab Units 03/30/25  0345   INR  2.0*               IMPRESSION/RECOMMENDATIONS  Patient is a 80-year-old male with PMH significant for alcohol cirrhosis c/b EV, portal hypertensive gastropathy/colopathy/proctopathy, ascites (para x2 in 2021) and HCC s/p ablation 2021 recent chemotherapy stared March, 2025. Sober since 1988.      Pt underwent EGD/colonoscopy  - suspect hemorrhoidal bleeding - treated with banding, XRT proctitis treated as well. Pt having brown stool - on lactulose- Hgb stable  -ok to advance diet as tolerated  -if recurrent bleeding - would ask for surgery input -   -restart meds - lactulose, diuretics per ICU team  -will sign off - please call with questions          (Electronically signed byBrittany Carrillo MD on 3/30/2025 at 10:59 AM)

## 2025-03-30 NOTE — CARE PLAN
The patient's goals for the shift include      The clinical goals for the shift include pts wital signs will remain wdl

## 2025-03-31 ENCOUNTER — APPOINTMENT (OUTPATIENT)
Dept: RADIOLOGY | Facility: HOSPITAL | Age: 80
DRG: 853 | End: 2025-03-31
Payer: MEDICARE

## 2025-03-31 VITALS
HEIGHT: 68 IN | DIASTOLIC BLOOD PRESSURE: 54 MMHG | RESPIRATION RATE: 18 BRPM | BODY MASS INDEX: 31.67 KG/M2 | SYSTOLIC BLOOD PRESSURE: 132 MMHG | HEART RATE: 50 BPM | OXYGEN SATURATION: 96 % | TEMPERATURE: 98.2 F | WEIGHT: 209 LBS

## 2025-03-31 LAB
ALBUMIN FLD-MCNC: <0.5 G/DL
ALBUMIN SERPL BCP-MCNC: 2.6 G/DL (ref 3.4–5)
ALP SERPL-CCNC: 102 U/L (ref 33–136)
ALT SERPL W P-5'-P-CCNC: 19 U/L (ref 10–52)
AMYLASE FLD-CCNC: 12 U/L
ANION GAP SERPL CALC-SCNC: 11 MMOL/L (ref 10–20)
AST SERPL W P-5'-P-CCNC: 36 U/L (ref 9–39)
BASOPHILS # BLD AUTO: 0.01 X10*3/UL (ref 0–0.1)
BASOPHILS NFR BLD AUTO: 0.1 %
BASOPHILS NFR FLD MANUAL: 0 %
BILIRUB SERPL-MCNC: 3.1 MG/DL (ref 0–1.2)
BLASTS NFR FLD MANUAL: 0 %
BUN SERPL-MCNC: 20 MG/DL (ref 6–23)
CALCIUM SERPL-MCNC: 7.7 MG/DL (ref 8.6–10.3)
CHLORIDE SERPL-SCNC: 105 MMOL/L (ref 98–107)
CLARITY FLD: CLEAR
CO2 SERPL-SCNC: 23 MMOL/L (ref 21–32)
COLOR FLD: YELLOW
CREAT SERPL-MCNC: 1.01 MG/DL (ref 0.5–1.3)
EGFRCR SERPLBLD CKD-EPI 2021: 76 ML/MIN/1.73M*2
EOSINOPHIL # BLD AUTO: 0.08 X10*3/UL (ref 0–0.4)
EOSINOPHIL NFR BLD AUTO: 0.8 %
EOSINOPHIL NFR FLD MANUAL: 0 %
ERYTHROCYTE [DISTWIDTH] IN BLOOD BY AUTOMATED COUNT: 17.3 % (ref 11.5–14.5)
GLUCOSE BLD MANUAL STRIP-MCNC: 140 MG/DL (ref 74–99)
GLUCOSE SERPL-MCNC: 118 MG/DL (ref 74–99)
HCT VFR BLD AUTO: 23.5 % (ref 41–52)
HGB BLD-MCNC: 7.9 G/DL (ref 13.5–17.5)
HOLD SPECIMEN: NORMAL
IMM GRANULOCYTES # BLD AUTO: 0.05 X10*3/UL (ref 0–0.5)
IMM GRANULOCYTES NFR BLD AUTO: 0.5 % (ref 0–0.9)
IMMATURE GRANULOCYTES IN FLUID: 0 %
INR PPP: 1.8 (ref 0.9–1.1)
LYMPHOCYTES # BLD AUTO: 1.06 X10*3/UL (ref 0.8–3)
LYMPHOCYTES NFR BLD AUTO: 11.1 %
LYMPHOCYTES NFR FLD MANUAL: 48 %
MCH RBC QN AUTO: 29.9 PG (ref 26–34)
MCHC RBC AUTO-ENTMCNC: 33.6 G/DL (ref 32–36)
MCV RBC AUTO: 89 FL (ref 80–100)
MONOCYTES # BLD AUTO: 1.18 X10*3/UL (ref 0.05–0.8)
MONOCYTES NFR BLD AUTO: 12.4 %
MONOS+MACROS NFR FLD MANUAL: 40 %
NEUTROPHILS # BLD AUTO: 7.13 X10*3/UL (ref 1.6–5.5)
NEUTROPHILS NFR BLD AUTO: 75.1 %
NEUTROPHILS NFR FLD MANUAL: 9 %
NRBC BLD-RTO: 0 /100 WBCS (ref 0–0)
OTHER CELLS NFR FLD MANUAL: 3 %
PLASMA CELLS NFR FLD MANUAL: 0 %
PLATELET # BLD AUTO: 54 X10*3/UL (ref 150–450)
POTASSIUM SERPL-SCNC: 4.5 MMOL/L (ref 3.5–5.3)
PROT FLD-MCNC: <0.5 G/DL
PROT SERPL-MCNC: 4.9 G/DL (ref 6.4–8.2)
PROTHROMBIN TIME: 19.8 SECONDS (ref 9.8–12.4)
RBC # BLD AUTO: 2.64 X10*6/UL (ref 4.5–5.9)
RBC # FLD AUTO: 298 /UL
SODIUM SERPL-SCNC: 134 MMOL/L (ref 136–145)
TOTAL CELLS COUNTED FLD: 100
WBC # BLD AUTO: 9.5 X10*3/UL (ref 4.4–11.3)
WBC # FLD AUTO: 148 /UL

## 2025-03-31 PROCEDURE — 80053 COMPREHEN METABOLIC PANEL: CPT

## 2025-03-31 PROCEDURE — 82042 OTHER SOURCE ALBUMIN QUAN EA: CPT | Mod: ELYLAB | Performed by: HOSPITALIST

## 2025-03-31 PROCEDURE — 84157 ASSAY OF PROTEIN OTHER: CPT | Mod: ELYLAB | Performed by: HOSPITALIST

## 2025-03-31 PROCEDURE — 2500000002 HC RX 250 W HCPCS SELF ADMINISTERED DRUGS (ALT 637 FOR MEDICARE OP, ALT 636 FOR OP/ED)

## 2025-03-31 PROCEDURE — 36415 COLL VENOUS BLD VENIPUNCTURE: CPT

## 2025-03-31 PROCEDURE — 0W9G3ZZ DRAINAGE OF PERITONEAL CAVITY, PERCUTANEOUS APPROACH: ICD-10-PCS | Performed by: RADIOLOGY

## 2025-03-31 PROCEDURE — 82947 ASSAY GLUCOSE BLOOD QUANT: CPT

## 2025-03-31 PROCEDURE — 97161 PT EVAL LOW COMPLEX 20 MIN: CPT | Mod: GP

## 2025-03-31 PROCEDURE — 2500000004 HC RX 250 GENERAL PHARMACY W/ HCPCS (ALT 636 FOR OP/ED)

## 2025-03-31 PROCEDURE — 2500000004 HC RX 250 GENERAL PHARMACY W/ HCPCS (ALT 636 FOR OP/ED): Performed by: RADIOLOGY

## 2025-03-31 PROCEDURE — 97165 OT EVAL LOW COMPLEX 30 MIN: CPT | Mod: GO

## 2025-03-31 PROCEDURE — 89051 BODY FLUID CELL COUNT: CPT | Performed by: HOSPITALIST

## 2025-03-31 PROCEDURE — 49083 ABD PARACENTESIS W/IMAGING: CPT

## 2025-03-31 PROCEDURE — 85610 PROTHROMBIN TIME: CPT

## 2025-03-31 PROCEDURE — 2720000007 HC OR 272 NO HCPCS

## 2025-03-31 PROCEDURE — 85025 COMPLETE CBC W/AUTO DIFF WBC: CPT

## 2025-03-31 PROCEDURE — C1729 CATH, DRAINAGE: HCPCS

## 2025-03-31 PROCEDURE — 87070 CULTURE OTHR SPECIMN AEROBIC: CPT | Mod: ELYLAB | Performed by: HOSPITALIST

## 2025-03-31 PROCEDURE — 2500000001 HC RX 250 WO HCPCS SELF ADMINISTERED DRUGS (ALT 637 FOR MEDICARE OP)

## 2025-03-31 PROCEDURE — 82150 ASSAY OF AMYLASE: CPT | Mod: ELYLAB | Performed by: HOSPITALIST

## 2025-03-31 RX ORDER — PANTOPRAZOLE SODIUM 40 MG/1
40 TABLET, DELAYED RELEASE ORAL
Status: DISCONTINUED | OUTPATIENT
Start: 2025-03-31 | End: 2025-03-31 | Stop reason: HOSPADM

## 2025-03-31 RX ORDER — LIDOCAINE HYDROCHLORIDE 10 MG/ML
INJECTION, SOLUTION EPIDURAL; INFILTRATION; INTRACAUDAL; PERINEURAL
Status: COMPLETED | OUTPATIENT
Start: 2025-03-31 | End: 2025-03-31

## 2025-03-31 RX ORDER — BENZONATATE 100 MG/1
100 CAPSULE ORAL 3 TIMES DAILY PRN
Status: DISCONTINUED | OUTPATIENT
Start: 2025-03-31 | End: 2025-03-31 | Stop reason: HOSPADM

## 2025-03-31 RX ADMIN — CETIRIZINE HYDROCHLORIDE 10 MG: 10 TABLET ORAL at 08:47

## 2025-03-31 RX ADMIN — BICALUTAMIDE 50 MG: 50 TABLET ORAL at 08:47

## 2025-03-31 RX ADMIN — FINASTERIDE 5 MG: 5 TABLET, FILM COATED ORAL at 08:47

## 2025-03-31 RX ADMIN — LACTULOSE 20 G: 20 SOLUTION ORAL at 08:48

## 2025-03-31 RX ADMIN — PANTOPRAZOLE SODIUM 40 MG: 40 INJECTION, POWDER, FOR SOLUTION INTRAVENOUS at 08:47

## 2025-03-31 RX ADMIN — SPIRONOLACTONE 50 MG: 25 TABLET ORAL at 08:47

## 2025-03-31 RX ADMIN — CEFTRIAXONE SODIUM 1 G: 1 INJECTION, SOLUTION INTRAVENOUS at 08:47

## 2025-03-31 RX ADMIN — LIDOCAINE HYDROCHLORIDE 6 ML: 10 INJECTION, SOLUTION EPIDURAL; INFILTRATION; INTRACAUDAL; PERINEURAL at 14:44

## 2025-03-31 ASSESSMENT — COGNITIVE AND FUNCTIONAL STATUS - GENERAL
DRESSING REGULAR LOWER BODY CLOTHING: A LITTLE
WALKING IN HOSPITAL ROOM: A LITTLE
DAILY ACTIVITIY SCORE: 13
CLIMB 3 TO 5 STEPS WITH RAILING: A LOT
DRESSING REGULAR UPPER BODY CLOTHING: A LOT
TURNING FROM BACK TO SIDE WHILE IN FLAT BAD: A LITTLE
TOILETING: A LITTLE
HELP NEEDED FOR BATHING: A LOT
DRESSING REGULAR UPPER BODY CLOTHING: A LITTLE
MOVING FROM LYING ON BACK TO SITTING ON SIDE OF FLAT BED WITH BEDRAILS: A LITTLE
STANDING UP FROM CHAIR USING ARMS: A LITTLE
PERSONAL GROOMING: A LOT
TOILETING: A LOT
TURNING FROM BACK TO SIDE WHILE IN FLAT BAD: A LITTLE
MOBILITY SCORE: 17
EATING MEALS: A LITTLE
MOVING TO AND FROM BED TO CHAIR: A LITTLE
STANDING UP FROM CHAIR USING ARMS: A LOT
DRESSING REGULAR LOWER BODY CLOTHING: A LOT
MOVING FROM LYING ON BACK TO SITTING ON SIDE OF FLAT BED WITH BEDRAILS: A LITTLE
MOBILITY SCORE: 15
MOVING TO AND FROM BED TO CHAIR: A LITTLE
WALKING IN HOSPITAL ROOM: A LOT
CLIMB 3 TO 5 STEPS WITH RAILING: A LOT
DAILY ACTIVITIY SCORE: 19
HELP NEEDED FOR BATHING: A LITTLE
PERSONAL GROOMING: A LITTLE

## 2025-03-31 ASSESSMENT — ACTIVITIES OF DAILY LIVING (ADL): BATHING_ASSISTANCE: MINIMAL

## 2025-03-31 ASSESSMENT — PAIN SCALES - GENERAL
PAINLEVEL_OUTOF10: 0 - NO PAIN

## 2025-03-31 ASSESSMENT — PAIN - FUNCTIONAL ASSESSMENT: PAIN_FUNCTIONAL_ASSESSMENT: 0-10

## 2025-03-31 NOTE — CARE PLAN
The patient's goals for the shift include      The clinical goals for the shift include Patient to be HDS throughout this shift

## 2025-03-31 NOTE — NURSING NOTE
RRN 12hr follow-up:  Down graded 03/30/25. VSS. 92% RA. NSR on tele. A&Ox4. No new bleeding reported. No concerns from nursing staff at this time.

## 2025-03-31 NOTE — PROGRESS NOTES
Physical Therapy    Physical Therapy    Physical Therapy Evaluation    Patient Name: Alfonso Toscano  MRN: [id number]  Today's Date: 3/31/2025   Time Calculation  Start Time: 1106  Stop Time: 1138  Time Calculation (min): 32 min  1025/1025-A    Assessment/Plan   PT Assessment  PT Assessment Results: Decreased strength, Decreased endurance, Impaired balance, Decreased mobility  Rehab Prognosis: Fair  Barriers to Discharge Home: Physical needs  Physical Needs: Intermittent mobility assistance needed, High falls risk due to function or environment, Intermittent ADL assistance needed  Evaluation/Treatment Tolerance: Patient tolerated treatment well  Medical Staff Made Aware: Yes  End of Session Communication: Bedside nurse  Assessment Comment: patient would benefit from PT to address functional impairments and faciliate safe functional mobility  End of Session Patient Position: Up in chair, Alarm on (wife present)  IP OR SWING BED PT PLAN  Inpatient or Swing Bed: Inpatient  PT Plan  Treatment/Interventions: Bed mobility, Transfer training, Gait training, Stair training, Balance training, Neuromuscular re-education, Endurance training, Therapeutic exercise, Therapeutic activity  PT Plan: Ongoing PT  PT Frequency: 3 times per week  PT Discharge Recommendations: Low intensity level of continued care  PT Recommended Transfer Status: Assist x1  PT - OK to Discharge: (when medically appropriate)    Subjective     Current Problem:  1. Gastrointestinal hemorrhage, unspecified gastrointestinal hemorrhage type        2. Hypotension, unspecified hypotension type        3. Anemia, unspecified type        4. Elevated troponin        5. Elevated lactic acid level        6. Hematochezia  Colonoscopy Diagnostic    Colonoscopy Diagnostic    CANCELED: Flexible Sigmoidoscopy    CANCELED: Flexible Sigmoidoscopy      7. Acute blood loss anemia  Esophagogastroduodenoscopy (EGD) w Banding    Esophagogastroduodenoscopy (EGD) w Banding     Colonoscopy Diagnostic    Colonoscopy Diagnostic    CANCELED: Flexible Sigmoidoscopy    CANCELED: Flexible Sigmoidoscopy      8. Alcoholic cirrhosis of liver with ascites (Multi)  Esophagogastroduodenoscopy (EGD) w Banding    Esophagogastroduodenoscopy (EGD) w Banding    Colonoscopy Diagnostic    Colonoscopy Diagnostic    CANCELED: Flexible Sigmoidoscopy    CANCELED: Flexible Sigmoidoscopy        Patient Active Problem List   Diagnosis    Allergic rhinitis due to pollen    Anatomical narrow angle, bilateral    Anemia    Anxiety    Bilateral impacted cerumen    Cobalamin deficiency    Colon polyps    Esophageal varices    Essential hypertension    Excessive cerumen in ear canal, right    Gastric ulcer    Gastrointestinal hemorrhage associated with anorectal source    Hepatic cirrhosis (Multi)    History of laser iridotomy    Hyperglycemia    Iron deficiency anemia secondary to inadequate dietary iron intake    Leukocytosis    Hypocalcemia    Malignant neoplasm of liver, not specified as primary or secondary (Multi)    Malignant neoplasm of prostate (Multi)    Heart murmur    PND (post-nasal drip)    Pseudophakia    Right foot pain    Sinus pressure    Hordeolum externum    Thrombocytopenia (CMS-HCC)    Vitamin D deficiency    Mixed hyperlipidemia    Hepatocellular carcinoma (Multi)    Nontraumatic hemorrhagic shock (Multi)    Cancer (Multi)    Gastrointestinal hemorrhage, unspecified gastrointestinal hemorrhage type       General Visit Information:  General  Reason for Referral: impaired mobility  Referred By: Sam OT/PT  Past Medical History Relevant to Rehab: HTN, HLD, Anxiety, Anemia, hepatic cirrhosis, colon polyps, hepatocellular carcinoma (active treatment). ETOH cirrhosis.   Family/Caregiver Present: Yes  Caregiver Feedback: wife present and involved in care  Co-Treatment: OT  Co-Treatment Reason: to faciliate safe mobility for patient and staff  Prior to Session Communication: Bedside nurse  Patient  Position Received: Bed, 4 rail up, Alarm on  General Comment: Pt. is 79 y/o male to ED 3/28 with weakness, GI bleeding, and inabilty to get off the toilet at home. dx: GI hemorrhage, elevated troponin, anemia. BP was 84/39 in ED. Patient with decrease Hgb to 6.8, to 7.4, s/p 1 unit of PRBC. Patient with hepatocellular carcinoma currently in treatment. 3/28 CT angio abdomen- no active GI bleed, colonic diverticulosis, stable endplate deformity L4, coronary artery calcification. CT liver 3/27 with cirrhosis with ascites and splenomegaly and varices from portal HTN.   Patient with suspected hemorrhoidal bleeding treated with banding, XRT proctitis treated.   Acute on chronic blood loss anemia and thrombocytopenia in setting of liver disease.     Home Living:  Home Living  Home Living Comments: Lives with wife in one story home with 3 ABNER, no rails but short steps. Tub shower with shower chair, raised toilet seat with grab bars. Using st cane, has a walker.    Prior Level of Function:  Prior Function Per Pt/Caregiver Report  Prior Function Comments: patient reports indep with ADLs. Wife completed IADLs. Wife will assist patient as needed and supervises him with mobility when needed.    Precautions:  Precautions  Medical Precautions: Fall precautions    Vital Signs:     Objective     Pain:       Cognition:  Cognition  Orientation Level: Oriented X4    General Assessments:  General Observation  General Observation: patient semi supine in bed. Has feet propped on foot board, exteneded foot of bed to allow feet to remain on the mattress. Wife present in room and involved in patients care   Activity Tolerance  Endurance:  (patient fatigued with activity)                 Dynamic Sitting Balance  Dynamic Sitting-Comments: sits at edge of bed with distant supervision  Dynamic Standing Balance  Dynamic Standing-Comments: stands with one UE assist with CGA for static balance and min assist for dynamic balance    Functional  Assessments:     Bed Mobility  Bed Mobility:  (min assist supine to sit, head of bed slightly raised)  Transfers  Transfer:  (sit to stand with min assist with straight cane. Unsteady with initial stand with wide base of support and reaching with left hand to steady self, cane in right hand)  Ambulation/Gait Training  Ambulation/Gait Training Performed:  (patient ambulated 80' with straight cane and min hand held assist. Patient mildly unsteady with assist to keep straight path ambulation. Patient would benefit from use of walker, patient is somewhat resistant to use.)          Extremity/Trunk Assessments:  RUE   RUE : Within Functional Limits  LUE   LUE: Within Functional Limits  RLE   RLE : Within Functional Limits  LLE   LLE : Within Functional Limits    Outcome Measures:     [facility] Basic Mobility  Turning from your back to your side while in a flat bed without using bedrails: A little  Moving from lying on your back to sitting on the side of a flat bed without using bedrails: A little  Moving to and from bed to chair (including a wheelchair): A little  Standing up from a chair using your arms (e.g. wheelchair or bedside chair): A little  To walk in hospital room: A little  Climbing 3-5 steps with railing: A lot  Basic Mobility - Total Score: 17                                                             Goals:  Encounter Problems       Encounter Problems (Active)       PT Problem       Patient will perform bed mobility indep (Progressing)       Start:  03/31/25    Expected End:  04/28/25            Patient will perform sit to stand and stand pivot transfers indep with least restrictive devivce (Progressing)       Start:  03/31/25    Expected End:  04/28/25            Patient will ambulate 200' with least restrictive device with supervision (Progressing)       Start:  03/31/25    Expected End:  04/28/25                 Education Documentation  Mobility Training, taught by Shreya Ruiz, PT at 3/31/2025  2:24  PM.  Learner: Significant Other, Patient  Readiness: Acceptance  Method: Explanation, Demonstration  Response: Verbalizes Understanding, Needs Reinforcement  Comment: safe transfer techniques, call for assist for mobility. Use of walker for more support and increased safety    Education Comments  No comments found.

## 2025-03-31 NOTE — DISCHARGE SUMMARY
Discharge Diagnosis  Nontraumatic hemorrhagic shock (Multi)    Discharge Meds     Your medication list        CHANGE how you take these medications        Instructions Last Dose Given Next Dose Due   lactulose 20 gram/30 mL oral solution  What changed:   how much to take  how to take this  when to take this      30ML three times daily              CONTINUE taking these medications        Instructions Last Dose Given Next Dose Due   bicalutamide 50 mg tablet  Commonly known as: Casodex           cholecalciferol 25 mcg (1000 units) tablet  Commonly known as: Vitamin D-3           dutasteride 0.5 mg capsule  Commonly known as: Avodart           fexofenadine 180 mg tablet  Commonly known as: Allegra           furosemide 20 mg tablet  Commonly known as: Lasix      TAKE 1 TABLET BY MOUTH ONCE DAILY       nadolol 20 mg tablet  Commonly known as: Corgard      Take 0.5 tablets (10 mg) by mouth once daily.       pantoprazole 40 mg EC tablet  Commonly known as: ProtoNix      TAKE 1 TABLET ONCE DAILY       prochlorperazine 10 mg tablet  Commonly known as: Compazine      Take 1 tablet (10 mg) by mouth every 6 hours if needed for nausea or vomiting.       spironolactone 50 mg tablet  Commonly known as: Aldactone      TAKE 1 TABLET BY MOUTH ONCE DAILY              STOP taking these medications      loperamide 2 mg capsule  Commonly known as: Imodium                 Test Results Pending At Discharge  Pending Labs       Order Current Status    Albumin, Body Fluid In process    Albumin, Body Fluid In process    Amylase, Body Fluid In process    Amylase, Body Fluid In process    Body Fluid Cell Count In process    Body Fluid Cell Count With Differential In process    Pathologist Review-Cell Count,Fluid In process    Protein, Total, Body Fluid In process    Protein, Total, Body Fluid In process    Sterile Fluid Culture/Smear In process    Body Fluid Differential Preliminary result            Hospital Course 80 y.o. year old male  patient with PMHx of HCC dx 2021, prostate CA dx 2008 treated with radiation, liver cirrhosis, [facility] ER for evaluation of weakness and GIB      -Initially in ICU due to concern for hemorrhagic shock  -received two units of PRBCs and FFB  -GI signed off, s/p EGD and colonoscopy and suspected hemorrhoidal bleeding and treated with banding   -recommended surgical consul if has more bleeding   -EGD showed small grade 1 varices, will stop abx  -on regular diet     Hx of Cirrhosis with grade 1 varices: ascites on CT scan, no abdominal pain, slightly more distended than yesterday  -IR drained 1L of fluid today, labs sent, low suspicion for SBP, eating and drinking after procedure and feeling much better   -continue lasix and aldactone  -continue lactulose  -continue nadolol      Acute on chronic blood loss anemia and thrombocytopenia: setting of liver disease and above, stable     Ambulatory dysfunction: PT/OT recommended home care wife would like to discuss with PCP first     Follows with oncology Dr Kat outpatient. Discharged home in stable condition. Greater than 30 minutes of clinical time spent caring for this patient.         Shelton Dodd MD

## 2025-03-31 NOTE — POST-PROCEDURE NOTE
Interventional Radiology Brief Postprocedure Note    Attending: Schoenberger    Assistant: None    Diagnosis: Ascites    Description of procedure: US Paracentesis     Anesthesia:  Local    Complications: None    Estimated Blood Loss: none    Medications  As of 03/31/25 1453      pantoprazole (Protonix) injection 40 mg (mg) Total dose:  280 mg Dosing weight:  87.5      Date/Time Rate/Dose/Volume Action       03/28/25  0826 40 mg Given      2112 40 mg Given     03/29/25  0905 40 mg Given      2104 40 mg Given     03/30/25  0907 40 mg Given      1359 *Not included in total MAR Hold      1404 *Not included in total MAR Unhold      2031 40 mg Given     03/31/25  0847 40 mg Given               lactated Ringer's bolus 500 mL (mL/hr) Total volume:  Not documented* Dosing weight:  87.5   *Total volume has not been documented. View each administration to see the amount administered.     Date/Time Rate/Dose/Volume Action       03/28/25  0407 500 mL - 250 mL/hr (over 120 min) New Bag      0541  (over 120 min) Stopped               lactated Ringer's bolus 1,000 mL (mL/hr) Total volume:  1,000 mL* Dosing weight:  88.1   *From user-documented volume     Date/Time Rate/Dose/Volume Action       03/28/25  1307 1,000 mL - 999 mL/hr (over 60 min) New Bag      1344 1,000 mL Stopped               lactated Ringer's bolus 500 mL (mL/hr) Total volume:  562.5 mL* Dosing weight:  88.1   *From user-documented volume     Date/Time Rate/Dose/Volume Action       03/28/25  1409 500 mL - 250 mL/hr (over 120 min) New Bag      1424 250 mL/hr - 62.5 mL Rate Verify      1730 500 mL Stopped               iohexol (OMNIPaque) 350 mg iodine/mL solution 100 mL (mL) Total volume:  100 mL Dosing weight:  87.5      Date/Time Rate/Dose/Volume Action       03/28/25  0433 100 mL Given               octreotide (SandoSTATIN) injection 50 mcg (mcg) Total dose:  50 mcg Dosing weight:  87.5      Date/Time Rate/Dose/Volume Action       03/28/25  0905 50 mcg Given                octreotide (SandoSTATIN) 500 mcg in sodium chloride 0.9% 100 mL (5 mcg/mL) infusion (mcg/hr) Total dose:  2,518.4 mcg* Dosing weight:  87.5   *From user-documented volume     Date/Time Rate/Dose/Volume Action       03/28/25  0930 50 mcg/hr - 10 mL/hr New Bag      1424 50 mcg/hr - 10 mL/hr Rate Verify      1825 50 mcg/hr - 10 mL/hr Rate Verify      1905 50 mcg/hr - 10 mL/hr New Bag     03/29/25  0515 50 mcg/hr - 10 mL/hr New Bag      1749 50 mcg/hr - 10 mL/hr New Bag      1756 50 mcg/hr - 10 mL/hr Rate Verify      2143 50 mcg/hr - 10 mL/hr Rate Verify      2348 50 mcg/hr - 10 mL/hr Rate Verify     03/30/25  0234 50 mcg/hr - 10 mL/hr Rate Verify      0250 50 mcg/hr - 10 mL/hr New Bag      0435 50 mcg/hr - 10 mL/hr Rate Verify      0700 50 mcg/hr - 10 mL/hr Rate Verify      0800 50 mcg/hr - 10 mL/hr Rate Verify      0900 50 mcg/hr - 10 mL/hr Rate Verify      1000 50 mcg/hr - 10 mL/hr Rate Verify      1100 50 mcg/hr - 10 mL/hr Rate Verify      1152  Stopped               cefTRIAXone (Rocephin) 1 g in dextrose (iso) IV 50 mL (mL/hr) Total volume:  Not documented* Dosing weight:  87.5   *Total volume has not been documented. View each administration to see the amount administered.     Date/Time Rate/Dose/Volume Action       03/28/25  0904 1 g - 100 mL/hr (over 30 min) New Bag      0931  (over 30 min) Stopped               cefTRIAXone (Rocephin) 1 g in dextrose (iso) IV 50 mL (mL/hr) Total dose:  3 g* Dosing weight:  87.5   *From user-documented volume     Date/Time Rate/Dose/Volume Action       03/29/25  0905 1 g - 100 mL/hr (over 30 min) New Bag      0941 50 mL Stopped     03/30/25  0907 1 g - 100 mL/hr (over 30 min) New Bag      1042 50 mL Stopped     03/31/25  0847 1 g - 100 mL/hr (over 30 min) New Bag      1144 50 mL Stopped               lactated Ringer's infusion (mL/hr) Total volume:  2,356.67 mL* Dosing weight:  88.1   *From user-documented volume     Date/Time Rate/Dose/Volume Action       03/28/25  9372  100 mL/hr New Bag      1424 100 mL/hr - 46.67 mL Rate Verify      1825 100 mL/hr - 401.67 mL Rate Verify      1900 58.33 mL       1908 100 mL/hr New Bag      2000 100 mL       2100 100 mL       2200 100 mL       2300 100 mL      03/29/25  0000 100 mL       0100 100 mL       0200 100 mL       0300 100 mL       0400 100 mL       0500 100 mL       0600 100 mL       0657 100 mL/hr New Bag      0700 100 mL       1326 650 mL Stopped               norepinephrine (Levophed) 8 mg in dextrose 5% 250 mL (0.032 mg/mL) infusion (premix) (mcg/kg/min) Total dose:  1,643.2 mcg* Dosing weight:  88.1   *From user-documented volume     Date/Time Rate/Dose/Volume Action       03/28/25  1356 0.01 mcg/kg/min - 1.65 mL/hr New Bag      1404 0.02 mcg/kg/min - 3.3 mL/hr Rate Change      1418 0.01 mcg/kg/min - 1.65 mL/hr Rate Change      1424 0.01 mcg/kg/min - 1.65 mL/hr Rate Verify      1438  Stopped      1806 0.01 mcg/kg/min - 1.65 mL/hr Restarted      1905 0.02 mcg/kg/min - 3.3 mL/hr Rate Change     03/29/25  0745 0.01 mcg/kg/min - 1.65 mL/hr Rate Change      0845 0 mcg/kg/min - 0 mL/hr Rate Change      1944  Stopped               albumin human 5 % infusion 25 g (mL/hr) Total volume:  Not documented* Dosing weight:  88.1   *Total volume has not been documented. View each administration to see the amount administered.     Date/Time Rate/Dose/Volume Action       03/28/25  1417 25 g - 999 mL/hr (over 30 min) New Bag      1523  (over 30 min) Stopped               bicalutamide (Casodex) tablet 50 mg (mg) Total dose:  Cannot be calculated*   *Administration dose not documented     Date/Time Rate/Dose/Volume Action       03/28/25  2121 *Not included in total Held by provider     03/29/25  0900 *Not included in total Automatically Held      0926 *Not included in total Unheld by provider               bicalutamide (Casodex) tablet 50 mg (mg) Total dose:  150 mg      Date/Time Rate/Dose/Volume Action       03/29/25  1132 50 mg Given     03/30/25   0907 50 mg Given      1359 *Not included in total MAR Hold      1404 *Not included in total MAR Unhold     03/31/25  0847 50 mg Given               finasteride (Proscar) tablet 5 mg (mg) Total dose:  15 mg Dosing weight:  88.1      Date/Time Rate/Dose/Volume Action       03/28/25 2121 *Not included in total Held by provider     03/29/25 0900 *Not included in total Automatically Held      0926 *Not included in total Unheld by provider      0949 5 mg Given     03/30/25  0907 5 mg Given      1359 *Not included in total MAR Hold      1404 *Not included in total MAR Unhold     03/31/25  0847 5 mg Given               cetirizine (ZyrTEC) tablet 10 mg (mg) Total dose:  30 mg Dosing weight:  88.1      Date/Time Rate/Dose/Volume Action       03/28/25 2121 *Not included in total Held by provider     03/29/25 0900 *Not included in total Automatically Held      0926 *Not included in total Unheld by provider      0949 10 mg Given     03/30/25  0907 10 mg Given      1359 *Not included in total MAR Hold      1404 *Not included in total MAR Unhold     03/31/25  0847 10 mg Given               furosemide (Lasix) tablet 20 mg (mg) Total dose:  Cannot be calculated*   *Administration dose not documented     Date/Time Rate/Dose/Volume Action       03/28/25 2121 *Not included in total Held by provider     03/29/25 0900 *20 mg Missed     03/30/25 0900 *20 mg Missed     03/31/25 0900 *20 mg Missed      1303 *Not included in total Unheld by provider               lactulose 20 gram/30 mL oral solution 20 g (g) Total volume:  120 mL*   *Administration not included in total     Date/Time Rate/Dose/Volume Action       03/28/25 2121 *Not included in total Held by provider      2145 *20 g Missed     03/29/25 0900 *Not included in total Automatically Held      0926 *Not included in total Unheld by provider      0949 20 g Given      1506 20 g Given      2104 20 g Given     03/30/25  0907 20 g Given               lactulose 20 gram/30 mL  oral solution 20 g (g) Total dose:  Cannot be calculated* Dosing weight:  92.5   *Administration dose not documented     Date/Time Rate/Dose/Volume Action       03/30/25  1359 *Not included in total MAR Hold      1404 *Not included in total MAR Unhold               lactulose 20 gram/30 mL oral solution 20 g (g) Total dose:  40 g Dosing weight:  92.5      Date/Time Rate/Dose/Volume Action       03/30/25  2031 20 g Given     03/31/25  0848 20 g Given               nadolol (Corgard) tablet 10 mg (mg) Total dose:  Cannot be calculated*   *Administration dose not documented     Date/Time Rate/Dose/Volume Action       03/28/25 2121 *Not included in total Held by provider     03/29/25  0900 *Not included in total Automatically Held      0926 *Not included in total Unheld by provider      0949 *10 mg Missed               nadolol (Corgard) tablet 10 mg (mg) Total dose:  20 mg      Date/Time Rate/Dose/Volume Action       03/29/25  2104 10 mg Given     03/30/25  1359 *Not included in total MAR Hold      1404 *Not included in total MAR Unhold      2030 10 mg Given               spironolactone (Aldactone) tablet 50 mg (mg) Total dose:  100 mg*   *Administration not included in total     Date/Time Rate/Dose/Volume Action       03/28/25 2121 *Not included in total Held by provider     03/29/25  0900 *50 mg Missed      1308 *Not included in total Unheld by provider     03/30/25  0907 50 mg Given      1359 *Not included in total MAR Hold      1404 *Not included in total MAR Unhold     03/31/25  0847 50 mg Given               midodrine (Proamatine) tablet 5 mg (mg) Total dose:  10 mg* Dosing weight:  92.5   *Administration not included in total     Date/Time Rate/Dose/Volume Action       03/29/25  1132 5 mg Given      1752 5 mg Given      1944 *Not included in total Held by provider     03/30/25  0800 *5 mg Missed      1039 *Not included in total Unheld by provider               phytonadione (Vitamin K) tablet 5 mg (mg) Total dose:   5 mg Dosing weight:  92.5      Date/Time Rate/Dose/Volume Action       03/30/25  1151 5 mg Given               lidocaine PF (Xylocaine) 10 mg/mL (1 %) injection (mL) Total volume:  6 mL      Date/Time Rate/Dose/Volume Action       03/31/25  1444 6 mL Given                   Straw colored fluid from peritoneal space with US guidance. Sample sent to lab      See detailed result report with images in PACS.    The patient tolerated the procedure well without incident or complication and is in stable condition.

## 2025-03-31 NOTE — CARE PLAN
The patient's goals for the shift include      The clinical goals for the shift include Patient will remain HDS      Problem: Skin  Goal: Promote/optimize nutrition  Outcome: Progressing     Problem: Discharge Planning  Goal: Discharge to home or other facility with appropriate resources  Outcome: Progressing     Problem: Chronic Conditions and Co-morbidities  Goal: Patient's chronic conditions and co-morbidity symptoms are monitored and maintained or improved  Outcome: Progressing     Problem: Nutrition  Goal: Nutrient intake appropriate for maintaining nutritional needs  Outcome: Progressing     Problem: Fall/Injury  Goal: Not fall by end of shift  Outcome: Progressing  Goal: Be free from injury by end of the shift  Outcome: Progressing  Goal: Verbalize understanding of personal risk factors for fall in the hospital  Outcome: Progressing

## 2025-03-31 NOTE — NURSING NOTE
RRN note; ICU tx follow up: Patient will be d/c'd home today. Patient comfortable and VSS. No concerns at this time.

## 2025-03-31 NOTE — PROGRESS NOTES
03/31/25 1301   Discharge Planning   Living Arrangements Spouse/significant other   Support Systems Spouse/significant other;Children;Family members   Assistance Needed PTA - lives with spouse in a 1 level home, 3 ABNER no handrails. Has a tub shower with chair and hand held shower head. Raised toilet with grab bars. At baseline - reports being independent for ambulation, uses a cane. Also owns a walker. Spouse assists with ADLs and completes all IADLs.   Type of Residence Private residence   Number of Stairs to Enter Residence 3   Number of Stairs Within Residence 0   Home or Post Acute Services In home services   Type of Home Care Services Home PT;Home OT;Home nursing visits   Expected Discharge Disposition Home H   Does the patient need discharge transport arranged? No   Patient Choice   Provider Choice list and CMS website (https://medicare.gov/care-compare#search) for post-acute Quality and Resource Measure Data were provided and reviewed with: Patient   Patient / Family choosing to utilize agency / facility established prior to hospitalization No   Intensity of Service   Intensity of Service 0-30 min     PT/OT [facility] 17/19, recommending low intensity therapy needs at [place]. Pt and spouse declining [facility]. Pt to go HOME no needs.

## 2025-03-31 NOTE — PROGRESS NOTES
Occupational Therapy    Evaluation    Patient Name: Alfonso Toscano  MRN: [id number]  Department: [facility]  Room: 1025/1025-A  Today's Date: 3/31/2025  Time Calculation  Start Time: 1107  Stop Time: 1139  Time Calculation (min): 32 min        Assessment:  OT Assessment: Impaired balance, safety, and generalized strength impacting independence with ADLs and functional transfers. Would benefit from continued OT to address deficits.  Prognosis: Good  Barriers to Discharge Home: No anticipated barriers (Pt. has one story house, owns WW and cane and wife home 24/7 to assist. wife states that they don't want to bring him anywhere but home.)  Evaluation/Treatment Tolerance: Patient tolerated treatment well  End of Session Communication: Bedside nurse  End of Session Patient Position: Up in chair, Alarm on  OT Assessment Results: Decreased ADL status, Decreased functional mobility, Decreased trunk control for functional activities  Prognosis: Good  Evaluation/Treatment Tolerance: Patient tolerated treatment well  Plan:  Treatment Interventions: ADL retraining, Functional transfer training  OT Frequency: 2 times per week  OT Discharge Recommendations: Low intensity level of continued care  OT Recommended Transfer Status: Assist of 1  OT - OK to Discharge: Yes (when medically stable/cleared)    Subjective   Current Problem:  1. Gastrointestinal hemorrhage, unspecified gastrointestinal hemorrhage type        2. Hypotension, unspecified hypotension type        3. Anemia, unspecified type        4. Elevated troponin        5. Elevated lactic acid level        6. Hematochezia  Colonoscopy Diagnostic    Colonoscopy Diagnostic    CANCELED: Flexible Sigmoidoscopy    CANCELED: Flexible Sigmoidoscopy      7. Acute blood loss anemia  Esophagogastroduodenoscopy (EGD) w Banding    Esophagogastroduodenoscopy (EGD) w Banding    Colonoscopy Diagnostic    Colonoscopy Diagnostic    CANCELED: Flexible Sigmoidoscopy    CANCELED: Flexible  Sigmoidoscopy      8. 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  Family/Caregiver Present: Yes  Caregiver Feedback: wife present and supportive  Co-Treatment: PT  Co-Treatment Reason: to maximize pt. safety  Prior to Session Communication: Bedside nurse  Patient Position Received: Bed, 3 rail up, Alarm off, not on at start of session  General Comment: Pt. is 81 y/o male to ED 3/28 with weakness, GI bleeding, and inabilty to get off the toilet at home. dx: GI hemorrhage, elevated troponin, anemia. BP was 84/39 in ED. EGD/Colonoscopy showed hemorrhoidal bleeding and treated with banding. Recieved 1 unit PRBC; hgb 3/31 7.4. CT liver: cirrhosis with ascites, and splenomegaly, liver lesions. CT angio abd: mild colitis, cirrhotic liver  Precautions:  Medical Precautions: Fall precautions    Pain:  Pain Assessment  Pain Assessment: 0-10  0-10 (Numeric) Pain Score: 0 - No pain    Objective   Cognition:  Overall Cognitive Status: Within Functional Limits  Orientation Level: Oriented X4    Home Living:  Home Living Comments: Pt. lives with his wife in one story house. Has 3 ABNER. Walk in shower, seat, and grab bars. Has raised toilet seat.  Prior Function:  Prior Function Comments: Pt is independent with ADLs, wife does all IADLs. Uses cane for ambulation, owns WW. No falls. Does not drive; wife drives.    ADL:  Eating Assistance: Independent  Grooming Assistance: Minimal  Bathing Assistance: Minimal  UE Dressing Assistance: Stand by  LE Dressing Assistance: Minimal  Toileting Assistance with Device:  Minimal  Activity Tolerance:  Activity Tolerance Comments: WFL  Bed Mobility/Transfers: Bed Mobility  Bed Mobility: Yes  Bed Mobility 1  Bed Mobility 1: Supine to sitting  Level of Assistance 1: Minimum assistance  Bed Mobility Comments 1: assistance to bring trunk to upright sit; head of bed elevated    Transfers  Transfer: Yes  Transfer 1  Technique 1: Sit to stand  Transfer Device 1: Cane  Trials/Comments 1: Min A from bed level; VC for hand placement  Transfers 2  Technique 2: Sit to stand  Transfer Device 2: Cane  Transfer Level of Assistance 2: Contact guard  Trials/Comments 2: cane use and grab bar use from raised toilet seat.    Functional Mobility:  Functional Mobility  Functional Mobility Performed:  (>50 feet. cane use. Min A to steady and balance, pt. tends to move quickly. Would benefit from use of WW for improved balance and safety)  Sitting Balance:  Static Sitting Balance  Static Sitting-Level of Assistance: Distant supervision  Standing Balance:  Static Standing Balance  Static Standing-Level of Assistance: Minimum assistance  Dynamic Standing Balance  Dynamic Standing-Comments: Min A; Fair -     Sensation:  Sensation Comment: WNL per pt. report.  Strength:  Strength Comments: BUEs 4+/5 MMT    Coordination:  Coordination Comment: WFL   Hand Function:  Gross Grasp: Functional  Extremities: RUE   RUE : Within Functional Limits and LUE   LUE: Within Functional Limits    Outcome Measures:[facility] Daily Activity  Putting on and taking off regular lower body clothing: A little  Bathing (including washing, rinsing, drying): A little  Putting on and taking off regular upper body clothing: A little  Toileting, which includes using toilet, bedpan or urinal: A little  Taking care of personal grooming such as brushing teeth: A little  Eating Meals: None  Daily Activity - Total Score: 19      Education Documentation  Body Mechanics, taught by Latia Tan OT at 3/31/2025  2:34 PM.  Learner:  Patient  Readiness: Acceptance  Method: Explanation  Response: Verbalizes Understanding, Needs Reinforcement    ADL Training, taught by Latia Tan OT at 3/31/2025  2:34 PM.  Learner: Patient  Readiness: Acceptance  Method: Explanation  Response: Verbalizes Understanding, Needs Reinforcement    P EDUCATION:  Education  Individual(s) Educated: Patient, Spouse  Education Provided: Fall precautons, Risk and benefits of OT discussed with patient or other, POC discussed and agreed upon  Plan of Care Discussed and Agreed Upon: yes  Patient Response to Education: Patient/Caregiver Verbalized Understanding of Information    Goals:  Encounter Problems       Encounter Problems (Active)       OT Goals       Mod I for all functional transfers with WW vs. cane  (Progressing)       Start:  03/31/25    Expected End:  04/14/25            Mod I for LB dressing  (Progressing)       Start:  03/31/25    Expected End:  04/14/25            Mod I for toileting tasks and clothing mgmt  (Progressing)       Start:  03/31/25    Expected End:  04/14/25            Fair + dyn standing balance during ADLs and functional activities  (Progressing)       Start:  03/31/25    Expected End:  04/14/25

## 2025-03-31 NOTE — PROGRESS NOTES
"Alfonso Toscano is a 80 y.o. male on day 3 of admission presenting with GI bleed       Subjective transferred from ICU on 3/30, appears to be doing well, no complaints.       Objective     Last Recorded Vitals  /68   Pulse 56   Temp 36.8 °C (98.2 °F)   Resp 18   Ht 1.727 m (5' 8\")   Wt 94.8 kg (208 lb 15.9 oz)   SpO2 90%   BMI 31.78 kg/m²      Intake/Output last 3 Shifts:    Intake/Output Summary (Last 24 hours) at 3/31/2025 1303  Last data filed at 3/31/2025 1144  Gross per 24 hour   Intake 800 ml   Output --   Net 800 ml       Scheduled medications  bicalutamide, 50 mg, oral, Daily  cefTRIAXone, 1 g, intravenous, q24h  cetirizine, 10 mg, oral, Daily  finasteride, 5 mg, oral, Daily  furosemide, 20 mg, oral, Daily  lactulose, 20 g, oral, BID  nadolol, 10 mg, oral, q PM  pantoprazole, 40 mg, intravenous, BID  spironolactone, 50 mg, oral, Daily      Continuous medications     PRN medications      Physical Exam   Gen: NAD  HEENT: EOM, MMM  CV: RRR, no murmurs rubs or gallops  Resp: coarse rhonchi b/l   Abdomen: soft, distended, positive for ascites   LE: No edema      Relevant Results  Lab Results   Component Value Date    WBC 9.5 03/31/2025    HGB 7.9 (L) 03/31/2025    HCT 23.5 (L) 03/31/2025    MCV 89 03/31/2025    PLT 54 (L) 03/31/2025     Lab Results   Component Value Date    GLUCOSE 118 (H) 03/31/2025    CALCIUM 7.7 (L) 03/31/2025     (L) 03/31/2025    K 4.5 03/31/2025    CO2 23 03/31/2025     03/31/2025    BUN 20 03/31/2025    CREATININE 1.01 03/31/2025     Assessment/Plan 80 y.o. year old male patient with PMHx of HCC dx 2021, prostate CA dx 2008 treated with radiation, liver cirrhosis, EM ER for evaluation of weakness and GIB     -Initially in ICU due to concern for hemorrhagic shock  -received two units of PRBCs and FFB  -GI signed off, s/p EGD and colonoscopy and suspected hemorrhoidal bleeding and treated with banding   -recommended surgical consul if has more bleeding   -EGD " showed small grade 1 varices, will stop abx  -on regular diet    Hx of Cirrhosis with grade 1 varices: ascites on CT scan, no abdominal pain, slightly more distended than yesterday  -will see if IR and drain  -continue lasix and aldactone  -continue lactulose  -continue nadolol     Acute on chronic blood loss anemia and thrombocytopenia: setting of liver disease and above, stable    Ambulatory dysfunction: PT/OT    DVT ppx: SCD      Shelton Dodd MD

## 2025-04-01 ENCOUNTER — TELEPHONE (OUTPATIENT)
Dept: HEMATOLOGY/ONCOLOGY | Facility: CLINIC | Age: 80
End: 2025-04-01
Payer: MEDICARE

## 2025-04-01 ENCOUNTER — PATIENT OUTREACH (OUTPATIENT)
Dept: PRIMARY CARE | Facility: CLINIC | Age: 80
End: 2025-04-01
Payer: MEDICARE

## 2025-04-01 DIAGNOSIS — C22.0 HEPATOCELLULAR CARCINOMA: Primary | ICD-10-CM

## 2025-04-01 LAB — PATH REVIEW-CELL CT,FLUID: NORMAL

## 2025-04-01 NOTE — PROGRESS NOTES
Spoke with patients wife about scheduling TCM on or before 4/11. She stated that they only want to see Dr. Patel and after 4/8. I advised that she does not have any openings in that time. Please advise

## 2025-04-01 NOTE — TELEPHONE ENCOUNTER
I called and spoke with Julieta about Alfonso. I did let her know that I spoke with Dr. Alfredo and she would like to see Alfonso on Tuesday April 8th after tumor board on 04/07. Julieta was agreeable and will be here 04/08 at 2:20. All questions were answered and she was appreciative.

## 2025-04-01 NOTE — PROGRESS NOTES
Discharge facility: [facility]  Discharge diagnosis: Nontraumatic hemorrhagic shock     Admission date: 3/28/25  Discharge date: 3/31/25    PCP Appointment Date: declines to schedule at this time, plans to see when oncologist follow up is and plan with further infusions and then wife will call to schedule follow up  Specialist Appointment Date: Wife has call to oncology office to schedule  Hospital Encounter and Summary: Linked    ED to Hosp-Admission (Discharged) with Shelton Dodd MD; Nicolás Wiley DO; Eli Jauregui MD (03/28/2025)     See Discharge assessment below for further details    Wrap Up  Wrap Up Additional Comments: Spoke with patient's wife. She states since patient has returned home last evening, he has been doing alright. She reports one episode of very small amount of rectal bleeding, amount of pencil eraser.  He is still weak but they are working on that. They have exercises from previous illness about 3 years ago she is planning to do with him. She has a call to patient's oncologist as initially, they felt bleeding could have been caused from infusion. He was scheduled for another infuaion but it was canceled due to patient's blood work results. They would like to discuss patient's further plan of care with oncologist and then schedule follow up with PCP. She states patient is otherwise eating well. Moving bowels with no problems. His ammonia level she states was slightly elevated at hospital. They were instructed to increase lactulose to 3 x daily if he can tolerate it. She has no other questions or concerns at this time. (4/1/2025 10:33 AM)    Medications  Medications reviewed with patient/caregiver?: Yes (Patient's wife) (4/1/2025 10:33 AM)  Is the patient having any side effects they believe may be caused by any medication additions or changes?: No (4/1/2025 10:33 AM)  Does the patient have all medications ordered at discharge?: Yes (4/1/2025 10:33 AM)  Care Management  Interventions: Provided patient education (4/1/2025 10:33 AM)  Prescription Comments: No new medications prescribed (4/1/2025 10:33 AM)  Is the patient taking all medications as directed (includes completed medication regime)?: Yes (4/1/2025 10:33 AM)  Care Management Interventions: Provided patient education (4/1/2025 10:33 AM)  Medication Comments: Instructed change medication of lactulose 20 gram/30 mL oral solution  30ML three times daily and STOP taking:  loperamide 2 mg capsule (Imodium)  (Wife states patient never needed to start taking this) (4/1/2025 10:33 AM)  Follow Up Tasks: -- (n/a) (4/1/2025 10:33 AM)    Appointments  Does the patient have a primary care provider?: Yes (4/1/2025 10:33 AM)  Care Management Interventions: Educated patient on importance of making appointment (Declines to schedule until after having follow up with oncology) (4/1/2025 10:33 AM)  Has the patient kept scheduled appointments due by today?: Yes (4/1/2025 10:33 AM)  Care Management Interventions: Advised patient to keep appointment; Advised to schedule with specialist; Educated on importance of keeping appointment (4/1/2025 10:33 AM)  Follow Up Tasks: -- (n/a) (4/1/2025 10:33 AM)    Self Management  What is the home health agency?: n/a-declines need at this time (4/1/2025 10:33 AM)  Has home health visited the patient within 72 hours of discharge?: Not applicable (4/1/2025 10:33 AM)  Home Health Interventions: -- (n/a) (4/1/2025 10:33 AM)  What Durable Medical Equipment (DME) was ordered?: n/a (4/1/2025 10:33 AM)  Has all Durable Medical Equipment (DME) been delivered?: -- (n/a) (4/1/2025 10:33 AM)  DME Interventions: -- (n/a) (4/1/2025 10:33 AM)  Care Management Interventions: Notified PCP/provider (4/1/2025 10:33 AM)  Follow Up Tasks: -- (n/a) (4/1/2025 10:33 AM)    Patient Teaching  Does the patient have access to their discharge instructions?: Yes (4/1/2025 10:33 AM)  Care Management Interventions: Reviewed instructions with  patient (4/1/2025 10:33 AM)  What is the patient's perception of their health status since discharge?: Improving (4/1/2025 10:33 AM)  Is the patient/caregiver able to teach back the hierarchy of who to call/visit for symptoms/problems? PCP, Specialist, Home Health nurse, Urgent Care, ED, 911: Yes (4/1/2025 10:33 AM)  Patient/Caregiver Education Comments: Instructed on hospital discharge instructions. Instructed on new and changed medications. Instructed if increased shortness of breath or chest pains to call 911. Provided my contact information and encouraged to call with any questions. (4/1/2025 10:33 AM)

## 2025-04-01 NOTE — TELEPHONE ENCOUNTER
Spoke with the patient's wife- Julieta. States patient went to the ED for rectal bleeding and was admitted. Pt was discharged last night. Julieta is now calling what next steps are- as patient does not have any further treatments or FUVs scheduled. Explained I would update the team and then call her back once I receive a response.

## 2025-04-02 ENCOUNTER — HOSPITAL ENCOUNTER (INPATIENT)
Facility: HOSPITAL | Age: 80
End: 2025-04-02
Attending: STUDENT IN AN ORGANIZED HEALTH CARE EDUCATION/TRAINING PROGRAM | Admitting: INTERNAL MEDICINE
Payer: MEDICARE

## 2025-04-02 ENCOUNTER — APPOINTMENT (OUTPATIENT)
Dept: CARDIOLOGY | Facility: HOSPITAL | Age: 80
DRG: 348 | End: 2025-04-02
Payer: MEDICARE

## 2025-04-02 DIAGNOSIS — Z87.19 S/P HEMORRHOIDECTOMY: ICD-10-CM

## 2025-04-02 DIAGNOSIS — Z98.890 S/P HEMORRHOIDECTOMY: ICD-10-CM

## 2025-04-02 DIAGNOSIS — K92.2 GASTROINTESTINAL HEMORRHAGE, UNSPECIFIED GASTROINTESTINAL HEMORRHAGE TYPE: Primary | ICD-10-CM

## 2025-04-02 PROBLEM — E87.20 LACTIC ACIDOSIS: Status: ACTIVE | Noted: 2025-04-02

## 2025-04-02 LAB
ABO GROUP (TYPE) IN BLOOD: NORMAL
ALBUMIN SERPL BCP-MCNC: 2.6 G/DL (ref 3.4–5)
ALP SERPL-CCNC: 116 U/L (ref 33–136)
ALT SERPL W P-5'-P-CCNC: 18 U/L (ref 10–52)
AMMONIA PLAS-SCNC: 30 UMOL/L (ref 16–53)
ANION GAP SERPL CALC-SCNC: 9 MMOL/L (ref 10–20)
ANTIBODY SCREEN: NORMAL
AST SERPL W P-5'-P-CCNC: 31 U/L (ref 9–39)
ATRIAL RATE: 52 BPM
ATRIAL RATE: 59 BPM
BASOPHILS # BLD AUTO: 0.03 X10*3/UL (ref 0–0.1)
BASOPHILS NFR BLD AUTO: 0.3 %
BILIRUB SERPL-MCNC: 2.4 MG/DL (ref 0–1.2)
BLOOD EXPIRATION DATE: NORMAL
BUN SERPL-MCNC: 14 MG/DL (ref 6–23)
CALCIUM SERPL-MCNC: 7.5 MG/DL (ref 8.6–10.3)
CHLORIDE SERPL-SCNC: 103 MMOL/L (ref 98–107)
CO2 SERPL-SCNC: 25 MMOL/L (ref 21–32)
CREAT SERPL-MCNC: 0.98 MG/DL (ref 0.5–1.3)
DISPENSE STATUS: NORMAL
EGFRCR SERPLBLD CKD-EPI 2021: 79 ML/MIN/1.73M*2
EOSINOPHIL # BLD AUTO: 0.36 X10*3/UL (ref 0–0.4)
EOSINOPHIL NFR BLD AUTO: 3.4 %
ERYTHROCYTE [DISTWIDTH] IN BLOOD BY AUTOMATED COUNT: 17.9 % (ref 11.5–14.5)
GLUCOSE SERPL-MCNC: 157 MG/DL (ref 74–99)
HCT VFR BLD AUTO: 25.5 % (ref 41–52)
HCT VFR BLD AUTO: 29 % (ref 41–52)
HCT VFR BLD AUTO: 29.6 % (ref 41–52)
HGB BLD-MCNC: 8.3 G/DL (ref 13.5–17.5)
HGB BLD-MCNC: 9.8 G/DL (ref 13.5–17.5)
HGB BLD-MCNC: 9.9 G/DL (ref 13.5–17.5)
IMM GRANULOCYTES # BLD AUTO: 0.05 X10*3/UL (ref 0–0.5)
IMM GRANULOCYTES NFR BLD AUTO: 0.5 % (ref 0–0.9)
INR PPP: 1.9 (ref 0.9–1.1)
LACTATE SERPL-SCNC: 1.7 MMOL/L (ref 0.4–2)
LACTATE SERPL-SCNC: 1.8 MMOL/L (ref 0.4–2)
LACTATE SERPL-SCNC: 2.6 MMOL/L (ref 0.4–2)
LIPASE SERPL-CCNC: 32 U/L (ref 9–82)
LYMPHOCYTES # BLD AUTO: 1.16 X10*3/UL (ref 0.8–3)
LYMPHOCYTES NFR BLD AUTO: 10.8 %
MCH RBC QN AUTO: 30.3 PG (ref 26–34)
MCHC RBC AUTO-ENTMCNC: 32.5 G/DL (ref 32–36)
MCV RBC AUTO: 93 FL (ref 80–100)
MONOCYTES # BLD AUTO: 2.07 X10*3/UL (ref 0.05–0.8)
MONOCYTES NFR BLD AUTO: 19.3 %
NEUTROPHILS # BLD AUTO: 7.04 X10*3/UL (ref 1.6–5.5)
NEUTROPHILS NFR BLD AUTO: 65.7 %
NRBC BLD-RTO: 0 /100 WBCS (ref 0–0)
P AXIS: 67 DEGREES
P AXIS: 75 DEGREES
P OFFSET: 166 MS
P OFFSET: 176 MS
P ONSET: 107 MS
P ONSET: 114 MS
PLATELET # BLD AUTO: 56 X10*3/UL (ref 150–450)
POTASSIUM SERPL-SCNC: 4.8 MMOL/L (ref 3.5–5.3)
PR INTERVAL: 218 MS
PR INTERVAL: 238 MS
PRODUCT BLOOD TYPE: 5100
PRODUCT CODE: NORMAL
PROT SERPL-MCNC: 4.5 G/DL (ref 6.4–8.2)
PROTHROMBIN TIME: 21 SECONDS (ref 9.8–12.4)
Q ONSET: 223 MS
Q ONSET: 226 MS
QRS COUNT: 10 BEATS
QRS COUNT: 9 BEATS
QRS DURATION: 54 MS
QRS DURATION: 66 MS
QT INTERVAL: 452 MS
QT INTERVAL: 466 MS
QTC CALCULATION(BAZETT): 433 MS
QTC CALCULATION(BAZETT): 447 MS
QTC FREDERICIA: 444 MS
QTC FREDERICIA: 449 MS
R AXIS: 48 DEGREES
R AXIS: 49 DEGREES
RBC # BLD AUTO: 2.74 X10*6/UL (ref 4.5–5.9)
RH FACTOR (ANTIGEN D): NORMAL
SODIUM SERPL-SCNC: 132 MMOL/L (ref 136–145)
T AXIS: -66 DEGREES
T AXIS: 35 DEGREES
T OFFSET: 452 MS
T OFFSET: 456 MS
UNIT ABO: NORMAL
UNIT NUMBER: NORMAL
UNIT RH: NORMAL
UNIT VOLUME: 350
VENTRICULAR RATE: 52 BPM
VENTRICULAR RATE: 59 BPM
WBC # BLD AUTO: 10.7 X10*3/UL (ref 4.4–11.3)
XM INTEP: NORMAL

## 2025-04-02 PROCEDURE — 83605 ASSAY OF LACTIC ACID: CPT | Performed by: INTERNAL MEDICINE

## 2025-04-02 PROCEDURE — 93005 ELECTROCARDIOGRAM TRACING: CPT

## 2025-04-02 PROCEDURE — 82140 ASSAY OF AMMONIA: CPT

## 2025-04-02 PROCEDURE — P9016 RBC LEUKOCYTES REDUCED: HCPCS

## 2025-04-02 PROCEDURE — 96361 HYDRATE IV INFUSION ADD-ON: CPT

## 2025-04-02 PROCEDURE — 2500000001 HC RX 250 WO HCPCS SELF ADMINISTERED DRUGS (ALT 637 FOR MEDICARE OP): Performed by: INTERNAL MEDICINE

## 2025-04-02 PROCEDURE — 36415 COLL VENOUS BLD VENIPUNCTURE: CPT | Performed by: PHYSICIAN ASSISTANT

## 2025-04-02 PROCEDURE — 83690 ASSAY OF LIPASE: CPT | Performed by: PHYSICIAN ASSISTANT

## 2025-04-02 PROCEDURE — 99222 1ST HOSP IP/OBS MODERATE 55: CPT | Performed by: NURSE PRACTITIONER

## 2025-04-02 PROCEDURE — 86923 COMPATIBILITY TEST ELECTRIC: CPT

## 2025-04-02 PROCEDURE — 86901 BLOOD TYPING SEROLOGIC RH(D): CPT | Performed by: PHYSICIAN ASSISTANT

## 2025-04-02 PROCEDURE — 83605 ASSAY OF LACTIC ACID: CPT | Performed by: PHYSICIAN ASSISTANT

## 2025-04-02 PROCEDURE — 85025 COMPLETE CBC W/AUTO DIFF WBC: CPT | Performed by: PHYSICIAN ASSISTANT

## 2025-04-02 PROCEDURE — 2500000002 HC RX 250 W HCPCS SELF ADMINISTERED DRUGS (ALT 637 FOR MEDICARE OP, ALT 636 FOR OP/ED): Performed by: INTERNAL MEDICINE

## 2025-04-02 PROCEDURE — 2500000004 HC RX 250 GENERAL PHARMACY W/ HCPCS (ALT 636 FOR OP/ED): Performed by: INTERNAL MEDICINE

## 2025-04-02 PROCEDURE — 36430 TRANSFUSION BLD/BLD COMPNT: CPT

## 2025-04-02 PROCEDURE — 96374 THER/PROPH/DIAG INJ IV PUSH: CPT

## 2025-04-02 PROCEDURE — 2500000004 HC RX 250 GENERAL PHARMACY W/ HCPCS (ALT 636 FOR OP/ED): Performed by: PHYSICIAN ASSISTANT

## 2025-04-02 PROCEDURE — 99223 1ST HOSP IP/OBS HIGH 75: CPT | Performed by: INTERNAL MEDICINE

## 2025-04-02 PROCEDURE — G0378 HOSPITAL OBSERVATION PER HR: HCPCS

## 2025-04-02 PROCEDURE — 80053 COMPREHEN METABOLIC PANEL: CPT | Performed by: PHYSICIAN ASSISTANT

## 2025-04-02 PROCEDURE — 85610 PROTHROMBIN TIME: CPT | Performed by: PHYSICIAN ASSISTANT

## 2025-04-02 PROCEDURE — 99291 CRITICAL CARE FIRST HOUR: CPT | Mod: 25 | Performed by: PHYSICIAN ASSISTANT

## 2025-04-02 PROCEDURE — 85014 HEMATOCRIT: CPT | Performed by: INTERNAL MEDICINE

## 2025-04-02 RX ORDER — ONDANSETRON 4 MG/1
4 TABLET, ORALLY DISINTEGRATING ORAL EVERY 8 HOURS PRN
Status: DISCONTINUED | OUTPATIENT
Start: 2025-04-02 | End: 2025-04-04

## 2025-04-02 RX ORDER — LACTULOSE 10 G/15ML
30 SOLUTION ORAL 3 TIMES DAILY
Status: DISPENSED | OUTPATIENT
Start: 2025-04-02

## 2025-04-02 RX ORDER — FINASTERIDE 5 MG/1
5 TABLET, FILM COATED ORAL DAILY
Status: DISPENSED | OUTPATIENT
Start: 2025-04-02

## 2025-04-02 RX ORDER — TALC
3 POWDER (GRAM) TOPICAL NIGHTLY PRN
Status: DISPENSED | OUTPATIENT
Start: 2025-04-02

## 2025-04-02 RX ORDER — SPIRONOLACTONE 25 MG/1
50 TABLET ORAL DAILY
Status: DISPENSED | OUTPATIENT
Start: 2025-04-02

## 2025-04-02 RX ORDER — FUROSEMIDE 40 MG/1
20 TABLET ORAL DAILY
Status: DISPENSED | OUTPATIENT
Start: 2025-04-02

## 2025-04-02 RX ORDER — ONDANSETRON HYDROCHLORIDE 2 MG/ML
4 INJECTION, SOLUTION INTRAVENOUS EVERY 8 HOURS PRN
Status: DISCONTINUED | OUTPATIENT
Start: 2025-04-02 | End: 2025-04-04

## 2025-04-02 RX ORDER — LOPERAMIDE HYDROCHLORIDE 2 MG/1
1 CAPSULE ORAL 4 TIMES DAILY PRN
Status: ON HOLD | COMMUNITY

## 2025-04-02 RX ORDER — ACETAMINOPHEN 160 MG/5ML
650 SOLUTION ORAL EVERY 4 HOURS PRN
Status: ACTIVE | OUTPATIENT
Start: 2025-04-02

## 2025-04-02 RX ORDER — ACETAMINOPHEN 650 MG/1
650 SUPPOSITORY RECTAL EVERY 4 HOURS PRN
Status: ACTIVE | OUTPATIENT
Start: 2025-04-02

## 2025-04-02 RX ORDER — NADOLOL 20 MG/1
10 TABLET ORAL DAILY
Status: DISPENSED | OUTPATIENT
Start: 2025-04-02

## 2025-04-02 RX ORDER — PANTOPRAZOLE SODIUM 40 MG/10ML
40 INJECTION, POWDER, LYOPHILIZED, FOR SOLUTION INTRAVENOUS DAILY
Status: DISPENSED | OUTPATIENT
Start: 2025-04-02

## 2025-04-02 RX ORDER — BICALUTAMIDE 50 MG/1
50 TABLET, FILM COATED ORAL DAILY
Status: DISPENSED | OUTPATIENT
Start: 2025-04-02

## 2025-04-02 RX ORDER — ACETAMINOPHEN 325 MG/1
650 TABLET ORAL EVERY 4 HOURS PRN
Status: DISPENSED | OUTPATIENT
Start: 2025-04-02

## 2025-04-02 RX ADMIN — FUROSEMIDE 20 MG: 40 TABLET ORAL at 09:25

## 2025-04-02 RX ADMIN — SPIRONOLACTONE 50 MG: 25 TABLET ORAL at 17:31

## 2025-04-02 RX ADMIN — LACTULOSE 20 G: 20 SOLUTION ORAL at 17:22

## 2025-04-02 RX ADMIN — BICALUTAMIDE 50 MG: 50 TABLET ORAL at 17:30

## 2025-04-02 RX ADMIN — FINASTERIDE 5 MG: 5 TABLET, FILM COATED ORAL at 17:22

## 2025-04-02 RX ADMIN — SODIUM CHLORIDE 1000 ML: 9 INJECTION, SOLUTION INTRAVENOUS at 04:14

## 2025-04-02 RX ADMIN — PANTOPRAZOLE SODIUM 40 MG: 40 INJECTION, POWDER, FOR SOLUTION INTRAVENOUS at 09:25

## 2025-04-02 SDOH — SOCIAL STABILITY: SOCIAL INSECURITY: DOES ANYONE TRY TO KEEP YOU FROM HAVING/CONTACTING OTHER FRIENDS OR DOING THINGS OUTSIDE YOUR HOME?: NO

## 2025-04-02 SDOH — SOCIAL STABILITY: SOCIAL INSECURITY: HAS ANYONE EVER THREATENED TO HURT YOUR FAMILY OR YOUR PETS?: NO

## 2025-04-02 SDOH — SOCIAL STABILITY: SOCIAL INSECURITY: DO YOU FEEL ANYONE HAS EXPLOITED OR TAKEN ADVANTAGE OF YOU FINANCIALLY OR OF YOUR PERSONAL PROPERTY?: NO

## 2025-04-02 SDOH — SOCIAL STABILITY: SOCIAL INSECURITY: DO YOU FEEL UNSAFE GOING BACK TO THE PLACE WHERE YOU ARE LIVING?: NO

## 2025-04-02 SDOH — SOCIAL STABILITY: SOCIAL INSECURITY: ARE THERE ANY APPARENT SIGNS OF INJURIES/BEHAVIORS THAT COULD BE RELATED TO ABUSE/NEGLECT?: NO

## 2025-04-02 SDOH — SOCIAL STABILITY: SOCIAL INSECURITY: HAVE YOU HAD ANY THOUGHTS OF HARMING ANYONE ELSE?: NO

## 2025-04-02 SDOH — SOCIAL STABILITY: SOCIAL INSECURITY: ARE YOU OR HAVE YOU BEEN THREATENED OR ABUSED PHYSICALLY, EMOTIONALLY, OR SEXUALLY BY ANYONE?: NO

## 2025-04-02 SDOH — SOCIAL STABILITY: SOCIAL INSECURITY: ABUSE: ADULT

## 2025-04-02 SDOH — SOCIAL STABILITY: SOCIAL INSECURITY: HAVE YOU HAD THOUGHTS OF HARMING ANYONE ELSE?: NO

## 2025-04-02 ASSESSMENT — ENCOUNTER SYMPTOMS
ARTHRALGIAS: 0
VOMITING: 0
DIZZINESS: 0
EYE PAIN: 0
ABDOMINAL PAIN: 0
HEMATURIA: 0
DIARRHEA: 0
NAUSEA: 0
COUGH: 0
DYSURIA: 0
WOUND: 0
DYSPHORIC MOOD: 0
NERVOUS/ANXIOUS: 0
FEVER: 0
HEADACHES: 0
SORE THROAT: 0
PALPITATIONS: 0
CHILLS: 0
BLOOD IN STOOL: 1
SHORTNESS OF BREATH: 0
BACK PAIN: 0

## 2025-04-02 ASSESSMENT — LIFESTYLE VARIABLES
AUDIT-C TOTAL SCORE: 0
HOW OFTEN DO YOU HAVE A DRINK CONTAINING ALCOHOL: NEVER
AUDIT-C TOTAL SCORE: 0
HAVE YOU EVER FELT YOU SHOULD CUT DOWN ON YOUR DRINKING: NO
SUBSTANCE_ABUSE_PAST_12_MONTHS: NO
PRESCIPTION_ABUSE_PAST_12_MONTHS: NO
TOTAL SCORE: 0
EVER FELT BAD OR GUILTY ABOUT YOUR DRINKING: NO
HOW OFTEN DO YOU HAVE 6 OR MORE DRINKS ON ONE OCCASION: NEVER
HOW MANY STANDARD DRINKS CONTAINING ALCOHOL DO YOU HAVE ON A TYPICAL DAY: PATIENT DOES NOT DRINK
HAVE PEOPLE ANNOYED YOU BY CRITICIZING YOUR DRINKING: NO
EVER HAD A DRINK FIRST THING IN THE MORNING TO STEADY YOUR NERVES TO GET RID OF A HANGOVER: NO
SKIP TO QUESTIONS 9-10: 1

## 2025-04-02 ASSESSMENT — COGNITIVE AND FUNCTIONAL STATUS - GENERAL
HELP NEEDED FOR BATHING: A LOT
CLIMB 3 TO 5 STEPS WITH RAILING: TOTAL
TURNING FROM BACK TO SIDE WHILE IN FLAT BAD: A LITTLE
DAILY ACTIVITIY SCORE: 13
DRESSING REGULAR LOWER BODY CLOTHING: A LOT
PATIENT BASELINE BEDBOUND: NO
DRESSING REGULAR UPPER BODY CLOTHING: A LOT
STANDING UP FROM CHAIR USING ARMS: A LOT
MOVING TO AND FROM BED TO CHAIR: A LITTLE
TOILETING: A LOT
WALKING IN HOSPITAL ROOM: A LOT
PERSONAL GROOMING: A LOT
EATING MEALS: A LITTLE
MOBILITY SCORE: 15

## 2025-04-02 ASSESSMENT — PAIN SCALES - GENERAL
PAINLEVEL_OUTOF10: 0 - NO PAIN

## 2025-04-02 ASSESSMENT — PAIN - FUNCTIONAL ASSESSMENT: PAIN_FUNCTIONAL_ASSESSMENT: 0-10

## 2025-04-02 ASSESSMENT — COLUMBIA-SUICIDE SEVERITY RATING SCALE - C-SSRS
1. IN THE PAST MONTH, HAVE YOU WISHED YOU WERE DEAD OR WISHED YOU COULD GO TO SLEEP AND NOT WAKE UP?: NO
1. IN THE PAST MONTH, HAVE YOU WISHED YOU WERE DEAD OR WISHED YOU COULD GO TO SLEEP AND NOT WAKE UP?: NO
6. HAVE YOU EVER DONE ANYTHING, STARTED TO DO ANYTHING, OR PREPARED TO DO ANYTHING TO END YOUR LIFE?: NO
6. HAVE YOU EVER DONE ANYTHING, STARTED TO DO ANYTHING, OR PREPARED TO DO ANYTHING TO END YOUR LIFE?: NO
2. HAVE YOU ACTUALLY HAD ANY THOUGHTS OF KILLING YOURSELF?: NO

## 2025-04-02 ASSESSMENT — PAIN DESCRIPTION - PAIN TYPE: TYPE: ACUTE PAIN

## 2025-04-02 ASSESSMENT — ACTIVITIES OF DAILY LIVING (ADL)
ASSISTIVE_DEVICE: WALKER
HEARING - RIGHT EAR: HEARING AID
DRESSING YOURSELF: NEEDS ASSISTANCE
HEARING - LEFT EAR: HEARING AID
FEEDING YOURSELF: INDEPENDENT
WALKS IN HOME: NEEDS ASSISTANCE
ADEQUATE_TO_COMPLETE_ADL: YES
PATIENT'S MEMORY ADEQUATE TO SAFELY COMPLETE DAILY ACTIVITIES?: YES
TOILETING: NEEDS ASSISTANCE
BATHING: NEEDS ASSISTANCE
JUDGMENT_ADEQUATE_SAFELY_COMPLETE_DAILY_ACTIVITIES: YES
GROOMING: INDEPENDENT

## 2025-04-02 ASSESSMENT — PATIENT HEALTH QUESTIONNAIRE - PHQ9
SUM OF ALL RESPONSES TO PHQ9 QUESTIONS 1 & 2: 0
2. FEELING DOWN, DEPRESSED OR HOPELESS: NOT AT ALL
1. LITTLE INTEREST OR PLEASURE IN DOING THINGS: NOT AT ALL

## 2025-04-02 NOTE — PROGRESS NOTES
04/02/25 1631   Discharge Planning   Living Arrangements Spouse/significant other   Support Systems Spouse/significant other   Assistance Needed PTA - lives with spouse in a 1 level home, 3 ABNER no handrails. Has a tub shower with chair and hand held shower head. Raised toilet with grab bars. At baseline - reports being independent for ambulation, uses a cane. Also owns a walker. Spouse assists with ADLs and completes all IADLs.   Type of Residence Private residence   Number of Stairs to Enter Residence 3   Number of Stairs Within Residence 0   Home or Post Acute Services None   Expected Discharge Disposition Home   Intensity of Service   Intensity of Service 0-30 min     Reports Rectal bleed, was recently admitted 3/28-3/31 for GI bleed. EGD/Colonoscopy on 3/28, found mild proctitis s/p radiation for Hepatocellular Carcinoma. GI consulted, now recommending possible gen surgery consult if bleeding continues. Prefers HOME at [place].

## 2025-04-02 NOTE — ED PROVIDER NOTES
HPI   Chief Complaint   Patient presents with    Rectal Bleeding     Dx from hospital yesterday.         A 80-year-old male patient with history of liver and prostate cancer in the past, esophageal varices comes in the emergency department today with complaints of bloody bowel movement.  He states he was just released from the hospital 2 days ago for the same.  States he did some tests and procedures on him.  Denies any blood thinner use.  Denies any associated pain with this including abdominal pain.  States is having a bowel movement this evening therefore he came to the emergency department for further evaluation.  Otherwise has no complaints present time.              Patient History   Past Medical History:   Diagnosis Date    Aortic ectasia, 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    Aortic dilatation    Cancer (Multi)     Esophageal varices     Hepatocellular carcinoma (Multi)     Personal history of diseases of the blood and blood-forming organs and certain disorders involving the immune mechanism 07/01/2020    History of anemia    Personal history of other medical treatment     H/O echocardiogram     Past Surgical History:   Procedure Laterality Date    COLONOSCOPY  10/22/2018    Colonoscopy    OTHER SURGICAL HISTORY  07/01/2020    Cataract surgery     Family History   Problem Relation Name Age of Onset    Colon cancer Mother      Liver cancer Mother       Social History     Tobacco Use    Smoking status: Never    Smokeless tobacco: Never   Substance Use Topics    Alcohol use: Not Currently    Drug use: Not on file       Physical Exam   ED Triage Vitals   Temperature Heart Rate Respirations BP   04/02/25 0142 04/02/25 0137 04/02/25 0137 04/02/25 0137   36.1 °C (97 °F) 58 16 111/55      Pulse Ox Temp Source Heart Rate Source Patient Position   04/02/25 0140 04/02/25 0142 04/02/25 0137 --   94 % Temporal Monitor       BP Location FiO2 (%)     -- --             Physical Exam  Constitutional:       General: He is  not in acute distress.     Appearance: Normal appearance. He is ill-appearing. He is not diaphoretic.   HENT:      Head: Normocephalic and atraumatic.      Nose: Nose normal.   Eyes:      Extraocular Movements: Extraocular movements intact.      Conjunctiva/sclera: Conjunctivae normal.      Pupils: Pupils are equal, round, and reactive to light.   Cardiovascular:      Rate and Rhythm: Normal rate and regular rhythm.   Pulmonary:      Effort: Pulmonary effort is normal. No respiratory distress.      Breath sounds: Normal breath sounds. No stridor. No wheezing.   Abdominal:      General: There is distension.      Tenderness: There is no abdominal tenderness. There is no guarding.   Musculoskeletal:         General: Normal range of motion.      Cervical back: Normal range of motion.   Skin:     General: Skin is warm and dry.   Neurological:      General: No focal deficit present.      Mental Status: He is alert and oriented to person, place, and time. Mental status is at baseline.   Psychiatric:         Mood and Affect: Mood normal.           ED Course & MDM   Diagnoses as of 04/02/25 0554   Gastrointestinal hemorrhage, unspecified gastrointestinal hemorrhage type                 No data recorded     Heena Coma Scale Score: 15 (04/02/25 0415 : Janet Arroyo RN)                           Medical Decision Making  A 80-year-old male patient with history of liver and prostate cancer in the past, esophageal varices comes in the emergency department today with complaints of bloody bowel movement.  He states he was just released from the hospital 2 days ago for the same.  States he did some tests and procedures on him.  Denies any blood thinner use.  Denies any associated pain with this including abdominal pain.  States is having a bowel movement this evening therefore he came to the emergency department for further evaluation.  Otherwise has no complaints present time.    EKG, laboratory studies are ordered to  rule out arrhythmias, acute anemia, kidney injuries, electrolyte abnormalities.    Patient's lipase 32 lactate 2.6, no acute renal injury, patient's hemoglobin 8.3 hematocrit 25.5 stable from discharge the patient's blood pressures are soft.  Some IV fluids ordered for the patient as well as patient consented and a unit of blood ordered for the patient.  Reviewing patient's charting from recent stay patient had colonoscopy where he had multiple banding of multiple bleeding hemorrhoids.  Patient was admitted for hemorrhagic shock.    Plan is to admit the patient to the hospital for further evaluation.  Patient agrees with this plan expressed verbal understanding.  All questions were answered.    Historian is the patient    Diagnosis: GI bleed    Discussed case with Dr. Chua the hospitalist agrees with the patient under service      Labs Reviewed   CBC WITH AUTO DIFFERENTIAL - Abnormal       Result Value    WBC 10.7      nRBC 0.0      RBC 2.74 (*)     Hemoglobin 8.3 (*)     Hematocrit 25.5 (*)     MCV 93      MCH 30.3      MCHC 32.5      RDW 17.9 (*)     Platelets 56 (*)     Neutrophils % 65.7      Immature Granulocytes %, Automated 0.5      Lymphocytes % 10.8      Monocytes % 19.3      Eosinophils % 3.4      Basophils % 0.3      Neutrophils Absolute 7.04 (*)     Immature Granulocytes Absolute, Automated 0.05      Lymphocytes Absolute 1.16      Monocytes Absolute 2.07 (*)     Eosinophils Absolute 0.36      Basophils Absolute 0.03     COMPREHENSIVE METABOLIC PANEL - Abnormal    Glucose 157 (*)     Sodium 132 (*)     Potassium 4.8      Chloride 103      Bicarbonate 25      Anion Gap 9 (*)     Urea Nitrogen 14      Creatinine 0.98      eGFR 79      Calcium 7.5 (*)     Albumin 2.6 (*)     Alkaline Phosphatase 116      Total Protein 4.5 (*)     AST 31      Bilirubin, Total 2.4 (*)     ALT 18     LACTATE - Abnormal    Lactate 2.6 (*)     Narrative:     Venipuncture immediately after or during the administration of  Metamizole may lead to falsely low results. Testing should be performed immediately prior to Metamizole dosing.   PROTIME-INR - Abnormal    Protime 21.0 (*)     INR 1.9 (*)    LIPASE - Normal    Lipase 32      Narrative:     Venipuncture immediately after or during the administration of Metamizole may lead to falsely low results. Testing should be performed immediately prior to Metamizole dosing.   TYPE AND SCREEN    ABO TYPE O      Rh TYPE POS      ANTIBODY SCREEN NEG     LACTATE   PREPARE RBC    PRODUCT CODE Z1936O02      Unit Number [id number]      Unit ABO O      Unit RH POS      XM INTEP COMP      Dispense Status IS      Blood Expiration Date 4/17/2025 11:59:00 PM EDT      PRODUCT BLOOD TYPE 5100      UNIT VOLUME 350          No orders to display         Procedure  ECG 12 lead    Performed by: Maldonado Du PA-C  Authorized by: Maldonado Du PA-C    Interpretation:     Details:  My EKG interpretation  Rate:     ECG rate:  57    ECG rate assessment: bradycardic    Rhythm:     Rhythm: sinus bradycardia    ST segments:     ST segments:  Normal  T waves:     T waves: normal    Critical Care    Performed by: Maldonado Du PA-C  Authorized by: Almas Goldberg MD    Critical care provider statement:     Critical care time (minutes):  30    Critical care time was exclusive of:  Separately billable procedures and treating other patients    Critical care was necessary to treat or prevent imminent or life-threatening deterioration of the following conditions: GI bleed.    Critical care was time spent personally by me on the following activities:  Evaluation of patient's response to treatment, examination of patient, ordering and performing treatments and interventions, ordering and review of laboratory studies, re-evaluation of patient's condition and review of old charts    Care discussed with: admitting provider         Maldonado Du PA-C  04/02/25 2145

## 2025-04-02 NOTE — PROGRESS NOTES
TRAY. Called to schedule and spoke to patients wife. She stated that he went back to the ED last night and is being admitted again so he will need another TCM outreach after he gets discharged.

## 2025-04-02 NOTE — CONSULTS
Department of Internal Medicine  Gastroenterology  Consult note    IMPRESSION/RECOMMENDATIONS  Alfonso Toscano is a 80 y.o. 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  Patient was just discharged from the hospital 2 days ago for GIB 2/2 hemorrhoid bleeding treated with banding and radiation proctitis that is treated as well.  Presents with 1 episode of hematochezia at home.  Hgb 8.3, was 7.9 on discharge days ago.    Hematochezia -likely related to hemorrhoidal bleed and/or radiation proctitis noted on colonoscopy earlier this week  Normocytic anemia 2/2 acute blood loss- Hgb 8.3, baseline 9.5.   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  MELD-Na scope 18 points     -No further rectal bleeding since presenting to ED.  Rectal bleeding most likely related to hemorrhoidal bleed and or radiation proctitis in the setting of thrombocytopenia and coagulopathy.  If recurrent bleeding persists, would recommend surgery consult.  -Trend H&H and transfuse PRBC for Hgb <7.0  -Monitor stool color and consistency and document  -Continue PPI 40 once daily  -Continue nadolol 10 mg p.o. once daily  -Continue lactulose 20 mg p.o. 3 times daily, aim for 3-4 bowel movements daily  -Continue Lasix 20 mg p.o. once daily, spironolactone 50 mg p.o. once daily.  Goal dose for diuretics would be Lasix 40 mg once daily and spironolactone 100 mg p.o. once daily.  -Trend trend CBC, CMP and INR daily    Discussed with   Gerardo    Reason for Consult: hematochezia    History of Present Illness    Alfonso Toscano is a 80 y.o. 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  Patient was just discharged from the hospital 2 days ago for GIB 2/2 hemorrhoid bleeding treated with banding and radiation proctitis that is treated as well.  Patient was having brown stool post colonoscopy and was on lactulose for constipation and HE symptoms.  Hemoglobin stable at 7.9 on day of discharge.  Patient had 1 large bloody bowel movement at home and came to the ED to be evaluated.    On arrival to ED, patient was bradycardic and normotensive.  No fever.  Initial blood work shows no leukocytosis, Hgb 8.3, HCT 25.5%, MCV 93, platelets 56.  WBC normal.  CMP shows BUN 14, creatinine 0.98.  Albumin 2.6.  Total bilirubin 2.4, otherwise LFTs normal.  INR 1.9.  No new imaging was obtained.  Received 1 L IV fluid bolus and 1 unit PRBCs.  Patient was admitted by the hospitalist service for hematochezia and in which GI has been consulted.    Patient was seen and examined in ED bed 4.  Patient sleepy.  Denies abdominal pain, nausea or vomiting.  Heart rate 50s, BP stable.  Patient is on nadolol 10 mg p.o. once daily for varices.  Abdomen soft and nontender.  No fluid wave.  Mild lower leg edema.  No further rectal bleeding since presenting to ED.  Rectal bleeding most likely related to hemorrhoidal bleed and or radiation proctitis.  Recurrent bleeding persists, would recommend surgery consult.      ENDOSCOPIC REVIEW  EGD: 3/28/2025 with Dr. Mckoy  Findings/Impression  No active or recent stigmata of bleeding.  Minimal Grade I varices in the mid esophagus + scarring from prior ?banding.   Small hiatal hernia.  Gastric inflammatory polyp (~2 mm)  that started to ooze with suction irritation s/p 2 clips.   The stomach was otherwise normal.  The duodenum appeared normal.    Colonoscopy: 3/28/2025 with Dr. Mckoy  Findings/Impression  Fair prep.  The terminal ileum appeared normal.  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 After this was completed, patient had spurting from a hemorrhoid. Colonoscope was switched to an EGD scope with  (bleeding stopped) and 3 bands were placed in the region of the bleeding (particularly over one site that looked like it had recent stigmata of bleeding).      Allergies  Patient has no known allergies.    Current Medication    Current Facility-Administered Medications:     acetaminophen (Tylenol) tablet 650 mg, 650 mg, oral, q4h PRN **OR** acetaminophen (Tylenol) oral liquid 650 mg, 650 mg, oral, q4h PRN **OR** acetaminophen (Tylenol) suppository 650 mg, 650 mg, rectal, q4h PRN, Maldonado Chua MD    bicalutamide (Casodex) tablet 50 mg, 50 mg, oral, Daily, Maldonado Chua MD    finasteride (Proscar) tablet 5 mg, 5 mg, oral, Daily, Maldonado Chua MD    furosemide (Lasix) tablet 20 mg, 20 mg, oral, Daily, Maldonado Chua MD, 20 mg at 04/02/25 0925    lactulose 20 gram/30 mL oral solution 20 g, 30 mL, oral, TID, Maldonado Chua MD    melatonin tablet 3 mg, 3 mg, oral, Nightly PRN, Maldonado Chua MD    nadolol (Corgard) tablet 10 mg, 10 mg, oral, Daily, Maldonado Chua MD    ondansetron ODT (Zofran-ODT) disintegrating tablet 4 mg, 4 mg, oral, q8h PRN **OR** ondansetron (Zofran) injection 4 mg, 4 mg, intravenous, q8h PRN, Maldonado Chua MD    pantoprazole (Protonix) injection 40 mg, 40 mg, intravenous, Daily, Maldonado Chua MD, 40 mg at 04/02/25 0925    spironolactone (Aldactone) tablet 50 mg, 50 mg, oral, Daily, Maldonado Chua MD    Past Medical History  Active Ambulatory Problems     Diagnosis Date Noted    Allergic rhinitis due to pollen 11/28/2023    Anatomical narrow angle, bilateral 06/25/2021    Anemia  06/04/2021    Anxiety 11/28/2023    Bilateral impacted cerumen 11/28/2023    Cobalamin deficiency 06/04/2021    Colon polyps 11/28/2023    Esophageal varices 12/03/2019    Essential hypertension 10/13/2017    Excessive cerumen in ear canal, right 11/28/2023    Gastric ulcer 11/28/2023    Gastrointestinal hemorrhage associated with anorectal source 10/24/2021    Hepatic cirrhosis (Multi) 12/03/2019    History of laser iridotomy 06/25/2021    Hyperglycemia 11/28/2023    Iron deficiency anemia secondary to inadequate dietary iron intake 11/28/2023    Leukocytosis 10/13/2017    Hypocalcemia 11/28/2023    Malignant neoplasm of liver, not specified as primary or secondary (Multi) 11/28/2023    Malignant neoplasm of prostate (Multi) 10/10/2018    Heart murmur 11/28/2023    PND (post-nasal drip) 11/28/2023    Pseudophakia 06/25/2021    Right foot pain 11/28/2023    Sinus pressure 11/28/2023    Hordeolum externum 11/28/2023    Thrombocytopenia (CMS-HCC) 10/14/2017    Vitamin D deficiency 11/28/2023    Mixed hyperlipidemia 11/28/2023    Hepatocellular carcinoma 02/25/2025    Nontraumatic hemorrhagic shock (Multi) 03/28/2025    Cancer (Multi)     Gastrointestinal hemorrhage, unspecified gastrointestinal hemorrhage type 03/28/2025     Resolved Ambulatory Problems     Diagnosis Date Noted    Abnormal CBC 11/28/2023    Acute blood loss anemia 10/29/2021    Back pain 11/28/2023    Malnutrition of moderate degree (Multi) 11/03/2021    Portal hypertension (Multi) 11/07/2021    Rectal hemorrhage 11/28/2023    Injury of toe 11/28/2023    Pain 11/28/2023     Past Medical History:   Diagnosis Date    Aortic ectasia, 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    Personal history of diseases of the blood and blood-forming organs and certain disorders involving the immune mechanism 07/01/2020    Personal history of other medical treatment        Past Surgical History  Past Surgical History:   Procedure Laterality Date    COLONOSCOPY  10/22/2018  "   Colonoscopy    OTHER SURGICAL HISTORY  07/01/2020    Cataract surgery       Family History  Family History   Problem Relation Name Age of Onset    Colon cancer Mother      Liver cancer Mother       Social History  TOBACCO:  reports that he has never smoked. He has never used smokeless tobacco.  ETOH:  reports that he does not currently use alcohol.  DRUGS:  has no history on file for drug use.    Review of Systems  Constitutional:  Negative for chills and fever.   HENT:  Negative for postnasal drip and sore throat.    Eyes:  Negative for pain and visual disturbance.   Respiratory:  Negative for cough and shortness of breath.    Cardiovascular:  Negative for chest pain, palpitations and leg swelling.   Gastrointestinal:  Positive for blood in stool. Negative for abdominal pain, diarrhea, nausea and vomiting.   Genitourinary:  Negative for dysuria and hematuria.   Musculoskeletal:  Negative for arthralgias and back pain.   Skin:  Negative for rash and wound.   Neurological:  Negative for dizziness and headaches.   Psychiatric/Behavioral:  Negative for dysphoric mood. The patient is not nervous/anxious.      PHYSICAL EXAM  VS: /58   Pulse (!) 48   Temp 36.2 °C (97.2 °F)   Resp 13   Ht 1.727 m (5' 8\")   Wt 86.2 kg (190 lb)   SpO2 96%   BMI 28.89 kg/m²  Body mass index is 28.89 kg/m².  Constitutional:       General: He is not in acute distress.     Appearance: He is ill-appearing.   HENT:      Head: Normocephalic and atraumatic.      Right Ear: External ear normal.      Left Ear: External ear normal.      Nose: Nose normal. No rhinorrhea.      Mouth/Throat:      Mouth: Mucous membranes are moist.      Pharynx: Oropharynx is clear. No oropharyngeal exudate.   Eyes:      General: No scleral icterus.        Right eye: No discharge.         Left eye: No discharge.      Conjunctiva/sclera: Conjunctivae normal.   Cardiovascular:      Rate and Rhythm: Regular rhythm. Bradycardia present.      Pulses: Normal " pulses.      Heart sounds: Murmur heard.   Pulmonary:      Effort: Pulmonary effort is normal. No respiratory distress.      Breath sounds: Normal breath sounds. No wheezing or rales.   Abdominal:      General: Abdomen is flat. There is distension.      Palpations: Abdomen is soft.      Tenderness: There is no abdominal tenderness. There is no guarding.   Musculoskeletal:         General: No tenderness.      Right lower leg: Edema present.      Left lower leg: Edema present.   Skin:     General: Skin is warm and dry.      Capillary Refill: Capillary refill takes less than 2 seconds.      Coloration: Skin is pale.      Findings: No rash.   Neurological:      General: No focal deficit present.      Mental Status: He is lethargic.      Comments: Patient sleepy but arousable.    Psychiatric:         Mood and Affect: Mood normal.         Behavior: Behavior normal.         Thought Content: Thought content normal.         Judgment: Judgment normal.     DATA  Recent blood work and relevant radiology and endoscopic studies were reviewed and discussed with the patient   Results from last 7 days   Lab Units 04/02/25  1156 04/02/25  0217   WBC AUTO x10*3/uL  --  10.7   RBC AUTO x10*6/uL  --  2.74*   HEMOGLOBIN g/dL 9.8* 8.3*   HEMATOCRIT % 29.0* 25.5*   MCV fL  --  93   MCHC g/dL  --  32.5   RDW %  --  17.9*   PLATELETS AUTO x10*3/uL  --  56*       Results from last 72 hours   Lab Units 04/02/25  0217   SODIUM mmol/L 132*   POTASSIUM mmol/L 4.8   CHLORIDE mmol/L 103   CO2 mmol/L 25   BUN mg/dL 14   CREATININE mg/dL 0.98   CALCIUM mg/dL 7.5*   PROTEIN TOTAL g/dL 4.5*   BILIRUBIN TOTAL mg/dL 2.4*   ALK PHOS U/L 116   AST U/L 31   ALT U/L 18       Results from last 72 hours   Lab Units 04/02/25  0217   INR  1.9*         RADIOLOGY REVIEW  === 03/28/25 ===    CT ABDOMEN PELVIS ANGIOGRAM W AND/OR WO IV CONTRAST    - Impression -  1.  No evidence of active GI bleed. There is new mild colitis of the  ascending and proximal transverse  colon which may be infectious,  inflammatory, or chemotherapy related.    2. Cirrhotic appearing liver with bilateral masses again noted and  stigmata of portal hypertension. There is stable appearance of  subocclusive thrombus within the portal vein. There is also interval  development of moderate amount of ascites within the abdomen and  pelvis.    3. Diffuse partially calcified atherosclerotic disease without  significant stenosis. 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    4. Colonic diverticulosis without diverticulitis.    5. Coronary artery calcifications.    6. Stable superior endplate deformity of the L4 vertebral body.        Signed by: Nikos Metcalf 3/28/2025 7:58 AM  Dictation workstation:   [id number]       (Electronically signed byKEKE Copeland on 4/2/2025 at 2:49 PM)

## 2025-04-02 NOTE — H&P
Chief complaint  Large bloody bowel movement    History Of Present Illness  Alfonso Toscano is a 80 y.o. male with a history of liver cancer, prostate cancer, esophageal varices, anxiety disorder, colon polyps, hypertension, peptic ulcer disease, GERD, cirrhosis, and hyperlipidemia.  He was just discharged from the hospital on 3/31.  He presented at that time with a traumatic hemorrhagic shock and was initially admitted to the ICU.  He received 2 units packed red blood cells and FFP.  He underwent EGD and colonoscopy at that time and had suspected hemorrhoidal bleeding treated with banding.  EGD showed small grade 1 varices.  He noted after discharge doing well until the day of presentation when he had a large bloody bowel movement at home.  He denied any abdominal pain, nausea, vomiting.  No black tarry stools.  He denied any dizziness or lightheadedness.  He denied chest pain, shortness of breath, or palpitations.  Vital signs on arrival to the emergency department were unremarkable.  Patient did have a asim heart rate of 49 in the ED as well as a asim systolic blood pressure of 104.    Laboratory evaluation in the emergency department was notable for glucose 157, total bilirubin 2.4, lactic acid 2.6, INR 1.9, hemoglobin 8.3, and platelet count 56.  White blood cell count, transaminases, creatinine, and lipase were unremarkable.  EKG tracing in the ED was personally reviewed and interpreted.  This showed sinus bradycardia with normal axis.  Rate 57.  No significant ST changes noted.  There is no evidence of acute ischemia or arrhythmia.  Therapeutic interventions in the emergency department included 1 L bolus of 0.9% normal saline and 1 unit packed red blood cell transfusion.  Patient was admitted for further evaluation and treatment.     Past Medical History  He has a past medical history of Aortic ectasia, unspecified site (CMS-HCC) (03/16/2021), Cancer (Multi), Esophageal varices, Hepatocellular carcinoma  (Multi), Personal history of diseases of the blood and blood-forming organs and certain disorders involving the immune mechanism (07/01/2020), and Personal history of other medical treatment.    Surgical History  He has a past surgical history that includes Colonoscopy (10/22/2018) and Other surgical history (07/01/2020).     Social History  He reports that he has never smoked. He has never used smokeless tobacco. He reports that he does not currently use alcohol. No history on file for drug use.    Family History  Family History   Problem Relation Name Age of Onset    Colon cancer Mother      Liver cancer Mother          Allergies  Patient has no known allergies.    Review of Systems   Constitutional:  Negative for chills and fever.   HENT:  Negative for postnasal drip and sore throat.    Eyes:  Negative for pain and visual disturbance.   Respiratory:  Negative for cough and shortness of breath.    Cardiovascular:  Negative for chest pain, palpitations and leg swelling.   Gastrointestinal:  Positive for blood in stool. Negative for abdominal pain, diarrhea, nausea and vomiting.   Genitourinary:  Negative for dysuria and hematuria.   Musculoskeletal:  Negative for arthralgias and back pain.   Skin:  Negative for rash and wound.   Neurological:  Negative for dizziness and headaches.   Psychiatric/Behavioral:  Negative for dysphoric mood. The patient is not nervous/anxious.         Physical Exam  Vitals and nursing note reviewed.   Constitutional:       General: He is not in acute distress.     Appearance: He is ill-appearing.   HENT:      Head: Normocephalic and atraumatic.      Right Ear: External ear normal.      Left Ear: External ear normal.      Nose: Nose normal. No rhinorrhea.      Mouth/Throat:      Mouth: Mucous membranes are moist.      Pharynx: Oropharynx is clear. No oropharyngeal exudate.   Eyes:      General: No scleral icterus.        Right eye: No discharge.         Left eye: No discharge.       Conjunctiva/sclera: Conjunctivae normal.   Cardiovascular:      Rate and Rhythm: Regular rhythm. Bradycardia present.      Pulses: Normal pulses.      Heart sounds: Murmur heard.   Pulmonary:      Effort: Pulmonary effort is normal. No respiratory distress.      Breath sounds: Normal breath sounds. No wheezing or rales.   Abdominal:      General: Abdomen is flat. There is distension.      Palpations: Abdomen is soft.      Tenderness: There is no abdominal tenderness. There is no guarding.   Musculoskeletal:         General: No tenderness.      Right lower leg: Edema present.      Left lower leg: Edema present.   Skin:     General: Skin is warm and dry.      Capillary Refill: Capillary refill takes less than 2 seconds.      Coloration: Skin is pale.      Findings: No rash.   Neurological:      General: No focal deficit present.      Mental Status: He is lethargic.      Comments: Patient sleepy but arousable.    Psychiatric:         Mood and Affect: Mood normal.         Behavior: Behavior normal.         Thought Content: Thought content normal.         Judgment: Judgment normal.          Last Recorded Vitals  /53 (BP Location: Right arm, Patient Position: Lying)   Pulse 53   Temp 36.1 °C (97 °F) (Temporal)   Resp 18   Wt 86.2 kg (190 lb)   SpO2 99%     Relevant Results        Results for orders placed or performed during the hospital encounter of 04/02/25 (from the past 24 hours)   CBC and Auto Differential   Result Value Ref Range    WBC 10.7 4.4 - 11.3 x10*3/uL    nRBC 0.0 0.0 - 0.0 /100 WBCs    RBC 2.74 (L) 4.50 - 5.90 x10*6/uL    Hemoglobin 8.3 (L) 13.5 - 17.5 g/dL    Hematocrit 25.5 (L) 41.0 - 52.0 %    MCV 93 80 - 100 fL    MCH 30.3 26.0 - 34.0 pg    MCHC 32.5 32.0 - 36.0 g/dL    RDW 17.9 (H) 11.5 - 14.5 %    Platelets 56 (L) 150 - 450 x10*3/uL    Neutrophils % 65.7 40.0 - 80.0 %    Immature Granulocytes %, Automated 0.5 0.0 - 0.9 %    Lymphocytes % 10.8 13.0 - 44.0 %    Monocytes % 19.3 2.0 - 10.0 %     Eosinophils % 3.4 0.0 - 6.0 %    Basophils % 0.3 0.0 - 2.0 %    Neutrophils Absolute 7.04 (H) 1.60 - 5.50 x10*3/uL    Immature Granulocytes Absolute, Automated 0.05 0.00 - 0.50 x10*3/uL    Lymphocytes Absolute 1.16 0.80 - 3.00 x10*3/uL    Monocytes Absolute 2.07 (H) 0.05 - 0.80 x10*3/uL    Eosinophils Absolute 0.36 0.00 - 0.40 x10*3/uL    Basophils Absolute 0.03 0.00 - 0.10 x10*3/uL   Comprehensive metabolic panel   Result Value Ref Range    Glucose 157 (H) 74 - 99 mg/dL    Sodium 132 (L) 136 - 145 mmol/L    Potassium 4.8 3.5 - 5.3 mmol/L    Chloride 103 98 - 107 mmol/L    Bicarbonate 25 21 - 32 mmol/L    Anion Gap 9 (L) 10 - 20 mmol/L    Urea Nitrogen 14 6 - 23 mg/dL    Creatinine 0.98 0.50 - 1.30 mg/dL    eGFR 79 >60 mL/min/1.73m*2    Calcium 7.5 (L) 8.6 - 10.3 mg/dL    Albumin 2.6 (L) 3.4 - 5.0 g/dL    Alkaline Phosphatase 116 33 - 136 U/L    Total Protein 4.5 (L) 6.4 - 8.2 g/dL    AST 31 9 - 39 U/L    Bilirubin, Total 2.4 (H) 0.0 - 1.2 mg/dL    ALT 18 10 - 52 U/L   Lipase   Result Value Ref Range    Lipase 32 9 - 82 U/L   Lactate   Result Value Ref Range    Lactate 2.6 (H) 0.4 - 2.0 mmol/L   Protime-INR   Result Value Ref Range    Protime 21.0 (H) 9.8 - 12.4 seconds    INR 1.9 (H) 0.9 - 1.1   Type And Screen   Result Value Ref Range    ABO TYPE O     Rh TYPE POS     ANTIBODY SCREEN NEG    Prepare RBC: 1 Units   Result Value Ref Range    PRODUCT CODE X1656X26     Unit Number [id number]     Unit ABO O     Unit RH POS     XM INTEP COMP     Dispense Status IS     Blood Expiration Date 4/17/2025 11:59:00 PM EDT     PRODUCT BLOOD TYPE 5100     UNIT VOLUME 350        Assessment/Plan     Hematochezia  -Large bloody bowel movement prior to arrival, no recurrent episodes while in the ED  -Hemoglobin stable from prior discharge before 1 unit packed red blood cell transfusion  -Recheck hemoglobin at noon today to ensure stability  -Consult gastroenterology for evaluation  -Monitor on telemetry with continuous pulse  oximetry  -Keep n.p.o. for now except meds in case intervention needed    Cirrhosis of the liver  Thrombocytopenia  -Bilirubin, platelet count, and INR are stable from prior studies  -Last EGD showed grade 1 varices  -Status post paracentesis on 3/31  -Continue home diuretics and nadolol with hold parameters  -Continue home lactulose  -Patient somewhat sleepy on my evaluation, will check ammonia level now    Lactic Acidosis  -Mild elevation in lactic acid level noted in the ED  -Recheck now to trend as well as post blood transfusion  -Patient appears nontoxic without significant abdominal pain    GERD  -Will give PPI IV daily for now       Maldonado Chua MD

## 2025-04-02 NOTE — PROGRESS NOTES
"Daily Progress Note    Alfonso Toscano is a 80 y.o. male on day 0 of admission presenting with Gastrointestinal hemorrhage, unspecified gastrointestinal hemorrhage type.    Subjective   Patient seen and examined, resting comfortably in bed.  States that he feels much better than when he presented to the ED.  Denies chest pain, shortness of breath, abdominal pain, nausea, vomiting, diarrhea.  Has not had another BM since admission, does not think he has had any melena/hematochezia.  BP and HR remain soft today.  GI consulted, appreciate recs.       Objective   Physical Exam  Constitutional:       Appearance: He is obese. He is ill-appearing.   HENT:      Head: Normocephalic.      Mouth/Throat:      Mouth: Mucous membranes are dry.   Eyes:      Pupils: Pupils are equal, round, and reactive to light.   Cardiovascular:      Rate and Rhythm: Regular rhythm. Bradycardia present.      Heart sounds: S1 normal and S2 normal. Murmur heard.   Pulmonary:      Effort: Pulmonary effort is normal.      Breath sounds: Normal breath sounds.   Abdominal:      General: Bowel sounds are normal. There is distension.      Palpations: Abdomen is soft.      Tenderness: There is no abdominal tenderness.   Musculoskeletal:         General: Normal range of motion.      Cervical back: Neck supple.      Right lower leg: No edema.      Left lower leg: No edema.   Skin:     General: Skin is warm.      Coloration: Skin is pale.   Neurological:      Mental Status: He is alert and oriented to person, place, and time.      Motor: Weakness present.   Psychiatric:         Mood and Affect: Mood normal.         Behavior: Behavior normal.         Last Recorded Vitals  Blood pressure (!) 112/48, pulse (!) 48, temperature 36.5 °C (97.7 °F), temperature source Temporal, resp. rate 18, height 1.727 m (5' 8\"), weight 86.2 kg (190 lb), SpO2 97%.  Intake/Output last 3 Shifts:  I/O last 3 completed shifts:  In: 1193.8 (13.9 mL/kg) [Blood:693.8; IV " Piggyback:500]  Out: - (0 mL/kg)   Weight: 86.2 kg     Medications  Scheduled medications  bicalutamide, 50 mg, oral, Daily  finasteride, 5 mg, oral, Daily  furosemide, 20 mg, oral, Daily  lactulose, 30 mL, oral, TID  nadolol, 10 mg, oral, Daily  pantoprazole, 40 mg, intravenous, Daily  spironolactone, 50 mg, oral, Daily      Continuous medications     PRN medications  PRN medications: acetaminophen **OR** acetaminophen **OR** acetaminophen, melatonin, ondansetron ODT **OR** ondansetron    Labs  CBC:   Results from last 7 days   Lab Units 04/02/25 0217 03/31/25 0417 03/30/25  0706   WBC AUTO x10*3/uL 10.7 9.5 11.2   RBC AUTO x10*6/uL 2.74* 2.64* 2.34*   HEMOGLOBIN g/dL 8.3* 7.9* 6.9*   HEMATOCRIT % 25.5* 23.5* 20.5*   MCV fL 93 89 88   MCH pg 30.3 29.9 29.5   MCHC g/dL 32.5 33.6 33.7   RDW % 17.9* 17.3* 17.2*   PLATELETS AUTO x10*3/uL 56* 54* 61*     CMP:    Results from last 7 days   Lab Units 04/02/25 0217 03/31/25 0417 03/30/25  0345   SODIUM mmol/L 132* 134* 133*   POTASSIUM mmol/L 4.8 4.5 4.4   CHLORIDE mmol/L 103 105 106   CO2 mmol/L 25 23 22   BUN mg/dL 14 20 27*   CREATININE mg/dL 0.98 1.01 1.16   GLUCOSE mg/dL 157* 118* 118*   PROTEIN TOTAL g/dL 4.5* 4.9* 4.8*   CALCIUM mg/dL 7.5* 7.7* 7.3*   BILIRUBIN TOTAL mg/dL 2.4* 3.1* 2.4*   ALK PHOS U/L 116 102 93   AST U/L 31 36 32   ALT U/L 18 19 18     BMP:    Results from last 7 days   Lab Units 04/02/25 0217 03/31/25 0417 03/30/25  0345   SODIUM mmol/L 132* 134* 133*   POTASSIUM mmol/L 4.8 4.5 4.4   CHLORIDE mmol/L 103 105 106   CO2 mmol/L 25 23 22   BUN mg/dL 14 20 27*   CREATININE mg/dL 0.98 1.01 1.16   CALCIUM mg/dL 7.5* 7.7* 7.3*   GLUCOSE mg/dL 157* 118* 118*     Magnesium:    Troponin:    Results from last 7 days   Lab Units 03/28/25  1427 03/28/25  0404   TROPHS ng/L 361* 582*     BNP:     Lipid Panel:  Results from last 7 days   Lab Units 04/02/25  0217 03/31/25  0417 03/30/25  0345   INR  1.9* 1.8* 2.0*   PROTIME seconds 21.0* 19.8* 21.8*         Nutrition             Relevant Results  Results from last 7 days   Lab Units 04/02/25  0217 03/31/25  0419 03/31/25  0417 03/30/25  0345 03/29/25  1202 03/29/25  0752 03/29/25  0420 03/29/25  0011 03/28/25  2122 03/28/25  1407 03/28/25  0404   POCT GLUCOSE mg/dL  --  140*  --   --  134* 118*  --  115* 112*   < >  --    GLUCOSE mg/dL 157*  --  118* 118*  --   --  125*  --   --   --  130*    < > = values in this interval not displayed.     Lab Results   Component Value Date    HGBA1C 4.1 10/08/2024        Assessment/Plan    Hematochezia  -Large bloody bowel movement prior to arrival, no recurrent episodes while in the ED  -Hemoglobin stable from prior to discharge, did receive 1 unit PRBCs yesterday  -Repeat hemoglobin this afternoon  -GI consult pending  -Monitor on telemetry with continuous pulse oximetry  -Keep n.p.o. for now except meds in case intervention needed     Cirrhosis of the liver  Thrombocytopenia  -Bilirubin, platelet count, and INR are stable from prior studies  -Last EGD showed grade 1 varices  -Status post paracentesis on 3/31  -Continue home diuretics and nadolol with hold parameters  -Continue home lactulose  -Ammonia level pending     Lactic Acidosis  -Lactate improved  -Patient appears nontoxic without significant abdominal pain     GERD  -Continue IV PPI for now    Bradycardia  -Continue nadolol with hold parameters  -Is asymptomatic, monitor on telemetry  -If continues to be bradycardic despite holding beta-blocker, consult EP      DVTp: Defer  PLAN: Anticipate home, consider [facility]    Cherise Waldron PA-C    Plan of care was discussed extensively with patient.  Patient verbalized understanding through teach back method.  All question and concerns addressed upon examination.    Of note, this documentation is completed using the Dragon Dictation system (voice recognition software). There may be spelling and/or grammatical errors that were not corrected prior to final submission.

## 2025-04-03 LAB
ALBUMIN SERPL BCP-MCNC: 2.6 G/DL (ref 3.4–5)
ALP SERPL-CCNC: 109 U/L (ref 33–136)
ALT SERPL W P-5'-P-CCNC: 17 U/L (ref 10–52)
ANION GAP SERPL CALC-SCNC: 12 MMOL/L (ref 10–20)
AST SERPL W P-5'-P-CCNC: 31 U/L (ref 9–39)
ATRIAL RATE: 57 BPM
BASOPHILS # BLD AUTO: 0.04 X10*3/UL (ref 0–0.1)
BASOPHILS NFR BLD AUTO: 0.4 %
BILIRUB SERPL-MCNC: 4.4 MG/DL (ref 0–1.2)
BUN SERPL-MCNC: 15 MG/DL (ref 6–23)
CALCIUM SERPL-MCNC: 7.6 MG/DL (ref 8.6–10.3)
CHLORIDE SERPL-SCNC: 102 MMOL/L (ref 98–107)
CO2 SERPL-SCNC: 23 MMOL/L (ref 21–32)
CREAT SERPL-MCNC: 0.84 MG/DL (ref 0.5–1.3)
EGFRCR SERPLBLD CKD-EPI 2021: 89 ML/MIN/1.73M*2
EOSINOPHIL # BLD AUTO: 0.34 X10*3/UL (ref 0–0.4)
EOSINOPHIL NFR BLD AUTO: 3.2 %
ERYTHROCYTE [DISTWIDTH] IN BLOOD BY AUTOMATED COUNT: 18 % (ref 11.5–14.5)
GLUCOSE SERPL-MCNC: 84 MG/DL (ref 74–99)
HCT VFR BLD AUTO: 27.9 % (ref 41–52)
HCT VFR BLD AUTO: 29.4 % (ref 41–52)
HGB BLD-MCNC: 9.4 G/DL (ref 13.5–17.5)
HGB BLD-MCNC: 9.9 G/DL (ref 13.5–17.5)
HOLD SPECIMEN: NORMAL
IMM GRANULOCYTES # BLD AUTO: 0.05 X10*3/UL (ref 0–0.5)
IMM GRANULOCYTES NFR BLD AUTO: 0.5 % (ref 0–0.9)
INR PPP: 1.8 (ref 0.9–1.1)
LYMPHOCYTES # BLD AUTO: 1.49 X10*3/UL (ref 0.8–3)
LYMPHOCYTES NFR BLD AUTO: 13.9 %
MAGNESIUM SERPL-MCNC: 1.47 MG/DL (ref 1.6–2.4)
MCH RBC QN AUTO: 29.8 PG (ref 26–34)
MCHC RBC AUTO-ENTMCNC: 33.7 G/DL (ref 32–36)
MCV RBC AUTO: 89 FL (ref 80–100)
MONOCYTES # BLD AUTO: 1.85 X10*3/UL (ref 0.05–0.8)
MONOCYTES NFR BLD AUTO: 17.2 %
NEUTROPHILS # BLD AUTO: 6.98 X10*3/UL (ref 1.6–5.5)
NEUTROPHILS NFR BLD AUTO: 64.8 %
NRBC BLD-RTO: 0 /100 WBCS (ref 0–0)
P AXIS: 12 DEGREES
P OFFSET: 168 MS
P ONSET: 125 MS
PLATELET # BLD AUTO: 42 X10*3/UL (ref 150–450)
POTASSIUM SERPL-SCNC: 4.6 MMOL/L (ref 3.5–5.3)
PR INTERVAL: 184 MS
PROT SERPL-MCNC: 4.4 G/DL (ref 6.4–8.2)
PROTHROMBIN TIME: 19.7 SECONDS (ref 9.8–12.4)
Q ONSET: 217 MS
QRS COUNT: 9 BEATS
QRS DURATION: 70 MS
QT INTERVAL: 426 MS
QTC CALCULATION(BAZETT): 414 MS
QTC FREDERICIA: 418 MS
R AXIS: 53 DEGREES
RBC # BLD AUTO: 3.15 X10*6/UL (ref 4.5–5.9)
SODIUM SERPL-SCNC: 132 MMOL/L (ref 136–145)
T AXIS: 35 DEGREES
T OFFSET: 430 MS
VENTRICULAR RATE: 57 BPM
WBC # BLD AUTO: 10.8 X10*3/UL (ref 4.4–11.3)

## 2025-04-03 PROCEDURE — 99221 1ST HOSP IP/OBS SF/LOW 40: CPT | Performed by: SURGERY

## 2025-04-03 PROCEDURE — 36415 COLL VENOUS BLD VENIPUNCTURE: CPT

## 2025-04-03 PROCEDURE — 97161 PT EVAL LOW COMPLEX 20 MIN: CPT | Mod: GP

## 2025-04-03 PROCEDURE — 85014 HEMATOCRIT: CPT

## 2025-04-03 PROCEDURE — 83735 ASSAY OF MAGNESIUM: CPT | Performed by: INTERNAL MEDICINE

## 2025-04-03 PROCEDURE — 2500000001 HC RX 250 WO HCPCS SELF ADMINISTERED DRUGS (ALT 637 FOR MEDICARE OP): Performed by: INTERNAL MEDICINE

## 2025-04-03 PROCEDURE — 85610 PROTHROMBIN TIME: CPT

## 2025-04-03 PROCEDURE — 1200000002 HC GENERAL ROOM WITH TELEMETRY DAILY

## 2025-04-03 PROCEDURE — 85018 HEMOGLOBIN: CPT

## 2025-04-03 PROCEDURE — 2500000002 HC RX 250 W HCPCS SELF ADMINISTERED DRUGS (ALT 637 FOR MEDICARE OP, ALT 636 FOR OP/ED): Performed by: INTERNAL MEDICINE

## 2025-04-03 PROCEDURE — 80053 COMPREHEN METABOLIC PANEL: CPT | Performed by: INTERNAL MEDICINE

## 2025-04-03 PROCEDURE — 2500000004 HC RX 250 GENERAL PHARMACY W/ HCPCS (ALT 636 FOR OP/ED): Performed by: INTERNAL MEDICINE

## 2025-04-03 PROCEDURE — 2500000004 HC RX 250 GENERAL PHARMACY W/ HCPCS (ALT 636 FOR OP/ED)

## 2025-04-03 PROCEDURE — 85025 COMPLETE CBC W/AUTO DIFF WBC: CPT | Performed by: INTERNAL MEDICINE

## 2025-04-03 PROCEDURE — 97165 OT EVAL LOW COMPLEX 30 MIN: CPT | Mod: GO

## 2025-04-03 PROCEDURE — 99232 SBSQ HOSP IP/OBS MODERATE 35: CPT

## 2025-04-03 PROCEDURE — 36415 COLL VENOUS BLD VENIPUNCTURE: CPT | Performed by: INTERNAL MEDICINE

## 2025-04-03 RX ORDER — MAGNESIUM SULFATE HEPTAHYDRATE 40 MG/ML
2 INJECTION, SOLUTION INTRAVENOUS ONCE
Status: COMPLETED | OUTPATIENT
Start: 2025-04-03 | End: 2025-04-03

## 2025-04-03 RX ADMIN — FUROSEMIDE 20 MG: 40 TABLET ORAL at 09:53

## 2025-04-03 RX ADMIN — BICALUTAMIDE 50 MG: 50 TABLET ORAL at 09:53

## 2025-04-03 RX ADMIN — FINASTERIDE 5 MG: 5 TABLET, FILM COATED ORAL at 09:55

## 2025-04-03 RX ADMIN — LACTULOSE 20 G: 20 SOLUTION ORAL at 20:58

## 2025-04-03 RX ADMIN — SPIRONOLACTONE 50 MG: 25 TABLET ORAL at 09:53

## 2025-04-03 RX ADMIN — LACTULOSE 20 G: 20 SOLUTION ORAL at 09:53

## 2025-04-03 RX ADMIN — PANTOPRAZOLE SODIUM 40 MG: 40 INJECTION, POWDER, FOR SOLUTION INTRAVENOUS at 09:53

## 2025-04-03 RX ADMIN — NADOLOL 10 MG: 20 TABLET ORAL at 10:00

## 2025-04-03 RX ADMIN — MAGNESIUM SULFATE HEPTAHYDRATE 2 G: 40 INJECTION, SOLUTION INTRAVENOUS at 11:45

## 2025-04-03 RX ADMIN — LACTULOSE 20 G: 20 SOLUTION ORAL at 14:53

## 2025-04-03 RX ADMIN — LACTULOSE 20 G: 20 SOLUTION ORAL at 00:04

## 2025-04-03 ASSESSMENT — PAIN SCALES - GENERAL
PAINLEVEL_OUTOF10: 0 - NO PAIN

## 2025-04-03 ASSESSMENT — COGNITIVE AND FUNCTIONAL STATUS - GENERAL
MOVING TO AND FROM BED TO CHAIR: A LITTLE
DAILY ACTIVITIY SCORE: 13
PERSONAL GROOMING: A LOT
HELP NEEDED FOR BATHING: A LOT
WALKING IN HOSPITAL ROOM: A LOT
MOBILITY SCORE: 15
WALKING IN HOSPITAL ROOM: A LOT
DRESSING REGULAR LOWER BODY CLOTHING: A LOT
CLIMB 3 TO 5 STEPS WITH RAILING: TOTAL
MOBILITY SCORE: 15
MOBILITY SCORE: 16
TURNING FROM BACK TO SIDE WHILE IN FLAT BAD: A LITTLE
DAILY ACTIVITIY SCORE: 13
HELP NEEDED FOR BATHING: A LOT
DRESSING REGULAR LOWER BODY CLOTHING: A LOT
MOVING TO AND FROM BED TO CHAIR: A LITTLE
TOILETING: A LOT
TURNING FROM BACK TO SIDE WHILE IN FLAT BAD: A LITTLE
DRESSING REGULAR UPPER BODY CLOTHING: A LOT
CLIMB 3 TO 5 STEPS WITH RAILING: TOTAL
DRESSING REGULAR UPPER BODY CLOTHING: A LOT
DRESSING REGULAR LOWER BODY CLOTHING: A LOT
CLIMB 3 TO 5 STEPS WITH RAILING: TOTAL
EATING MEALS: A LITTLE
DAILY ACTIVITIY SCORE: 16
STANDING UP FROM CHAIR USING ARMS: A LOT
TOILETING: A LOT
DRESSING REGULAR UPPER BODY CLOTHING: A LITTLE
HELP NEEDED FOR BATHING: A LOT
TOILETING: A LOT
MOVING TO AND FROM BED TO CHAIR: A LITTLE
EATING MEALS: A LITTLE
PERSONAL GROOMING: A LITTLE
PERSONAL GROOMING: A LOT
STANDING UP FROM CHAIR USING ARMS: A LITTLE
MOVING FROM LYING ON BACK TO SITTING ON SIDE OF FLAT BED WITH BEDRAILS: A LITTLE
WALKING IN HOSPITAL ROOM: A LITTLE
STANDING UP FROM CHAIR USING ARMS: A LOT
TURNING FROM BACK TO SIDE WHILE IN FLAT BAD: A LITTLE

## 2025-04-03 ASSESSMENT — ACTIVITIES OF DAILY LIVING (ADL): BATHING_ASSISTANCE: MODERATE

## 2025-04-03 ASSESSMENT — ENCOUNTER SYMPTOMS
WEAKNESS: 1
BLOOD IN STOOL: 1
ANAL BLEEDING: 1

## 2025-04-03 ASSESSMENT — PAIN - FUNCTIONAL ASSESSMENT
PAIN_FUNCTIONAL_ASSESSMENT: 0-10
PAIN_FUNCTIONAL_ASSESSMENT: 0-10

## 2025-04-03 NOTE — PROGRESS NOTES
Occupational Therapy    Evaluation    Patient Name: Alfonso Toscano  MRN: [id number]  Department: [facility]  Room: [facility]1024-B  Today's Date: 4/3/2025  Time Calculation  Start Time: 1029  Stop Time: 1046  Time Calculation (min): 17 min        Assessment:  OT Assessment: Pt. presents with impaired balance, gen. strength, endurance, and safety with ADLs, mobility. Pt. woul dbenefit from continued OT to address deficits and progress towards independence with ADLs and transfers.  Prognosis: Good  Barriers to Discharge Home: Caregiver assistance  Caregiver Assistance: Caregiver assistance needed per identified barriers - however, level of patient's required assistance exceeds assistance available at home  Evaluation/Treatment Tolerance: Patient tolerated treatment well  End of Session Communication: Bedside nurse  End of Session Patient Position: Alarm on, Up in chair  OT Assessment Results: Decreased ADL status, Decreased safe judgment during ADL, Decreased endurance, Decreased functional mobility, Decreased trunk control for functional activities  Prognosis: Good  Evaluation/Treatment Tolerance: Patient tolerated treatment well  Plan:  Treatment Interventions: ADL retraining, Functional transfer training, Endurance training  OT Frequency: 2 times per week  OT Discharge Recommendations: Moderate intensity level of continued care, Low intensity level of continued care  OT - OK to Discharge: Yes (when medically stable/cleared)    Subjective   Current Problem:  1. Gastrointestinal hemorrhage, unspecified gastrointestinal hemorrhage type          General:  General  Reason for Referral: ADL impairment  Referred By: OT/PT [place] 4/2  Past Medical History Relevant to Rehab: HTN, HLD, Anxiety, Anemia, hepatic cirrhosis, colon polyps, hepatocellular carcinoma (active treatment).  Family/Caregiver Present: Yes  Caregiver Feedback: wife present  Co-Treatment: PT  Co-Treatment Reason: to maximize pt. safety  Prior to Session  Communication: Bedside nurse  Patient Position Received: Bed, 3 rail up, Alarm on  General Comment: Pt. is 79 y/o male to ED 4/2 for bloody stool at home. Pt. was recently admitted and discharged with same complaint. Per last admission, pt. underwent EGD/Colonoscopy which showed hemorrhoidal bleeding and treated with banding. Gastro following with potential consult to general sx.  Precautions:  Medical Precautions: Fall precautions    Pain:  Pain Assessment  Pain Assessment: 0-10  0-10 (Numeric) Pain Score: 0 - No pain    Objective   Cognition:  Overall Cognitive Status: Within Functional Limits  Orientation Level: Oriented X4    Home Living:  Home Living Comments: Pt. lives with his wife in one story house. Has 3 ABNER. Walk in shower, seat, and grab bars. Has raised toilet seat.  Prior Function:  Prior Function Comments: Pt is independent with ADLs, wife does all IADLs. Uses cane for ambulation, owns WW. No falls. Does not drive; wife drives    ADL:  Eating Assistance: Independent  Grooming Assistance: Stand by  Bathing Assistance: Moderate  UE Dressing Assistance: Stand by  LE Dressing Assistance: Moderate  Toileting Assistance with Device: Moderate  Activity Tolerance:  Endurance: Decreased tolerance for upright activites  Bed Mobility/Transfers: Bed Mobility  Bed Mobility: Yes  Bed Mobility 1  Bed Mobility 1: Supine to sitting  Level of Assistance 1: Minimum assistance  Bed Mobility Comments 1: assistance to bring trunk to upright sit    Transfers  Transfer: Yes  Transfer 1  Technique 1: Sit to stand  Transfer Device 1: Walker  Transfer Level of Assistance 1: Minimum assistance  Trials/Comments 1: Assistance to boost, steady and balance over walker.  Transfers 2  Technique 2: Stand pivot  Transfer Device 2: Walker  Transfer Level of Assistance 2: Minimum assistance  Trials/Comments 2: bed>BSC, BSC>recliner chair. Min A for steadying/balance.    Functional Mobility:  Functional Mobility  Functional Mobility  Performed:  (short distance in room. Min A with WW for safety and steadying, balance.)    Standing Balance:  Static Standing Balance  Static Standing-Level of Assistance: Minimum assistance  Dynamic Standing Balance  Dynamic Standing-Comments: Min A ; Fair -     Strength:  Strength Comments: BUEs 4/5 MMT    Coordination:  Movements are Fluid and Coordinated: Yes   Hand Function:  Gross Grasp: Functional  Extremities: RUE   RUE : Within Functional Limits and LUE   LUE: Within Functional Limits    Outcome Measures:[facility] Daily Activity  Putting on and taking off regular lower body clothing: A lot  Bathing (including washing, rinsing, drying): A lot  Putting on and taking off regular upper body clothing: A little  Toileting, which includes using toilet, bedpan or urinal: A lot  Taking care of personal grooming such as brushing teeth: A little  Eating Meals: None  Daily Activity - Total Score: 16    Education Documentation  Body Mechanics, taught by Latia Tan OT at 4/3/2025  2:55 PM.  Learner: Patient  Readiness: Acceptance  Method: Explanation  Response: Verbalizes Understanding, Needs Reinforcement    ADL Training, taught by Latia Tan OT at 4/3/2025  2:55 PM.  Learner: Patient  Readiness: Acceptance  Method: Explanation  Response: Verbalizes Understanding, Needs Reinforcement    IP EDUCATION:  Education  Individual(s) Educated: Patient  Education Provided: Fall precautons, Risk and benefits of OT discussed with patient or other, POC discussed and agreed upon    Goals:  Encounter Problems       Encounter Problems (Active)       OT Goals       SBA for all functional transfers  (Progressing)       Start:  04/03/25    Expected End:  04/17/25            SBA for Lb dressing  (Progressing)       Start:  04/03/25    Expected End:  04/17/25            SBA for toileting tasks and clothing mgmt  (Progressing)       Start:  04/03/25    Expected End:  04/17/25            Fair + dyn standing balance  during ADLs and functional activities  (Progressing)       Start:  04/03/25    Expected End:  04/17/25

## 2025-04-03 NOTE — PROGRESS NOTES
"Daily Progress Note    Alfonso Toscano is a 80 y.o. male on day 0 of admission presenting with Gastrointestinal hemorrhage, unspecified gastrointestinal hemorrhage type.    Subjective   Patient seen and examined, family at bedside.  Patient states that he feels well today, slightly improved from admission.  Has had 1 episode of hematochezia since arrival to the floor.  Denies chest pain, shortness of breath, abdominal pain, nausea, vomiting.  GI following, consider flexible sigmoidoscopy if hemoglobin continues to drop.  General surgery consulted, appreciate recs.       Objective   Physical Exam  Constitutional:       Appearance: He is ill-appearing.   HENT:      Head: Normocephalic.      Mouth/Throat:      Mouth: Mucous membranes are moist.   Eyes:      General: Scleral icterus present.      Pupils: Pupils are equal, round, and reactive to light.   Cardiovascular:      Rate and Rhythm: Normal rate and regular rhythm.      Heart sounds: Normal heart sounds, S1 normal and S2 normal.   Pulmonary:      Effort: Pulmonary effort is normal.      Breath sounds: Normal breath sounds.   Abdominal:      General: Bowel sounds are normal.      Palpations: Abdomen is soft.   Musculoskeletal:         General: Normal range of motion.      Cervical back: Neck supple.      Right lower leg: No edema.      Left lower leg: No edema.   Skin:     General: Skin is warm.      Coloration: Skin is pale.   Neurological:      Mental Status: He is alert. Mental status is at baseline.      Motor: Weakness present.   Psychiatric:         Mood and Affect: Mood normal.         Behavior: Behavior normal.         Last Recorded Vitals  Blood pressure 131/63, pulse 55, temperature 36.3 °C (97.3 °F), temperature source Temporal, resp. rate 17, height 1.727 m (5' 8\"), weight 86.2 kg (190 lb), SpO2 96%.  Intake/Output last 3 Shifts:  I/O last 3 completed shifts:  In: 1293.8 (15 mL/kg) [P.O.:100; Blood:693.8; IV Piggyback:500]  Out: 1600 (18.6 mL/kg) " [Urine:1600 (0.5 mL/kg/hr)]  Weight: 86.2 kg     Medications  Scheduled medications  bicalutamide, 50 mg, oral, Daily  finasteride, 5 mg, oral, Daily  furosemide, 20 mg, oral, Daily  lactulose, 30 mL, oral, TID  nadolol, 10 mg, oral, Daily  pantoprazole, 40 mg, intravenous, Daily  spironolactone, 50 mg, oral, Daily      Continuous medications     PRN medications  PRN medications: acetaminophen **OR** acetaminophen **OR** acetaminophen, melatonin, ondansetron ODT **OR** ondansetron    Labs  CBC:   Results from last 7 days   Lab Units 04/03/25  0408 04/02/25  2016 04/02/25  1156 04/02/25 0217 03/31/25 0417   WBC AUTO x10*3/uL 10.8  --   --  10.7 9.5   RBC AUTO x10*6/uL 3.15*  --   --  2.74* 2.64*   HEMOGLOBIN g/dL 9.4* 9.9* 9.8* 8.3* 7.9*   HEMATOCRIT % 27.9* 29.6* 29.0* 25.5* 23.5*   MCV fL 89  --   --  93 89   MCH pg 29.8  --   --  30.3 29.9   MCHC g/dL 33.7  --   --  32.5 33.6   RDW % 18.0*  --   --  17.9* 17.3*   PLATELETS AUTO x10*3/uL 42*  --   --  56* 54*     CMP:    Results from last 7 days   Lab Units 04/03/25 0408 04/02/25 0217 03/31/25 0417   SODIUM mmol/L 132* 132* 134*   POTASSIUM mmol/L 4.6 4.8 4.5   CHLORIDE mmol/L 102 103 105   CO2 mmol/L 23 25 23   BUN mg/dL 15 14 20   CREATININE mg/dL 0.84 0.98 1.01   GLUCOSE mg/dL 84 157* 118*   PROTEIN TOTAL g/dL 4.4* 4.5* 4.9*   CALCIUM mg/dL 7.6* 7.5* 7.7*   BILIRUBIN TOTAL mg/dL 4.4* 2.4* 3.1*   ALK PHOS U/L 109 116 102   AST U/L 31 31 36   ALT U/L 17 18 19     BMP:    Results from last 7 days   Lab Units 04/03/25  0408 04/02/25  0217 03/31/25  0417   SODIUM mmol/L 132* 132* 134*   POTASSIUM mmol/L 4.6 4.8 4.5   CHLORIDE mmol/L 102 103 105   CO2 mmol/L 23 25 23   BUN mg/dL 15 14 20   CREATININE mg/dL 0.84 0.98 1.01   CALCIUM mg/dL 7.6* 7.5* 7.7*   GLUCOSE mg/dL 84 157* 118*     Magnesium:  Results from last 7 days   Lab Units 04/03/25  0408   MAGNESIUM mg/dL 1.47*     Troponin:    Results from last 7 days   Lab Units 03/28/25  1427 03/28/25  0404   ANN-MARIE  ng/L 361* 582*     BNP:     Lipid Panel:  Results from last 7 days   Lab Units 04/03/25  1102 04/02/25  0217 03/31/25  0417   INR  1.8* 1.9* 1.8*   PROTIME seconds 19.7* 21.0* 19.8*        Nutrition             Relevant Results  Results from last 7 days   Lab Units 04/03/25  0408 04/02/25  0217 03/31/25  0419 03/31/25  0417 03/30/25  0345 03/29/25  1202 03/29/25  0752 03/29/25  0420 03/29/25  0011 03/28/25 2122   POCT GLUCOSE mg/dL  --   --  140*  --   --  134* 118*  --  115* 112*   GLUCOSE mg/dL 84 157*  --  118* 118*  --   --  125*  --   --      Lab Results   Component Value Date    HGBA1C 4.1 10/08/2024        Assessment/Plan    Hematochezia  Anemia secondary to acute blood loss  -Hematochezia likely due to hemorrhoidal bleeding versus radiation proctitis  -Received 1 unit PRBCs on admission, Hgb remains stable  -Repeat hemoglobin this afternoon  -GI consult, trend hemoglobin, consider flexible sigmoidoscopy if continues to downtrend  -Monitor on telemetry with continuous pulse oximetry  -Advance diet as tolerated  -General Surgery consult, recommend addressing the coagulopathy/platelets  -Consult hematology     Alcoholic cirrhosis of the liver  Thrombocytopenia  History of HCC s/p ablation in 2021 with recent chemotherapy  -Bilirubin, platelet count, and INR are stable from prior studies  -Last EGD showed grade 1 varices  -s/p paracentesis on 3/31  -Continue home diuretics and nadolol with hold parameters  -Continue home lactulose  -Ammonia level normal  -Daily INR, CMP     Lactic Acidosis  -Lactate improved  -Patient appears nontoxic without significant abdominal pain     GERD  -Continue IV PPI for now     Bradycardia  Generalized weakness  -Continue nadolol with hold parameters  -Is asymptomatic, monitor on telemetry  -PT/OT eval and treat     DVTp: Defer  PLAN: Anticipate home, consider [facility]    Cherise Waldron PA-C    Plan of care was discussed extensively with patient.  Patient verbalized understanding  through teach back method.  All question and concerns addressed upon examination.    Of note, this documentation is completed using the Dragon Dictation system (voice recognition software). There may be spelling and/or grammatical errors that were not corrected prior to final submission.

## 2025-04-03 NOTE — PROGRESS NOTES
Physical Therapy    Physical Therapy Evaluation    Patient Name: Alfonso Toscano  MRN: [id number]  Today's Date: 4/3/2025   Time Calculation  Start Time: 1028  Stop Time: 1045  Time Calculation (min): 17 min  1024/1024-B    Assessment/Plan   PT Assessment  PT Assessment Results: Decreased strength, Decreased endurance, Impaired balance, Decreased mobility, Decreased coordination  Rehab Prognosis: Good  Barriers to Discharge Home: No anticipated barriers  Evaluation/Treatment Tolerance: Patient limited by fatigue  Strengths: Attitude of self, Coping skills, Premorbid level of function, Support and attitude of living partners  Barriers to Participation: Comorbidities  End of Session Communication: Bedside nurse  Assessment Comment: pt with decreased mobility /gait strength balance endurance pt to benefit from skilled PT to address deficits and improve functional mobility  End of Session Patient Position: Up in chair, Alarm on  IP OR SWING BED PT PLAN  Inpatient or Swing Bed: Inpatient  PT Plan  Treatment/Interventions: Bed mobility, Transfer training, Gait training, Balance training, Strengthening, Endurance training, Therapeutic exercise, Therapeutic activity, Stair training  PT Plan: Ongoing PT  PT Frequency: 3 times per week  PT Discharge Recommendations: Low intensity level of continued care  Equipment Recommended upon Discharge: Wheeled walker (pt has walker)  PT Recommended Transfer Status: Assist x1, Assistive device  PT - OK to Discharge: Yes (once medically ready for discharge to next level of care.)    Subjective     Current Problem:  1. Gastrointestinal hemorrhage, unspecified gastrointestinal hemorrhage type          General Visit Information:  General  Reason for Referral: weakness/ impaired mobility  Referred By: OT/PT Chivo 4/2  Past Medical History Relevant to Rehab: HLD,HTN,Anxiety,anemia,GERD,prostate CA, liver CA,ascites hepatic cirrhosis. GIBs .  Co-Treatment: OT  Co-Treatment Reason: to maximize pt  safety  Prior to Session Communication: Bedside nurse (cleared to see for therapy eval)  Patient Position Received: Bed, 3 rail up, Alarm on (pt has purwick)  General Comment: pt is a 79 yo male came to the hospital on 4/2 with reports of bloody BMs at home.   dx : GIB .  pt was recently admitted 3/31 with with the same bleeding.   test/ labs : hgb 9.4.  Home Living:  Home Living  Home Living Comments: Lives with wife in one story home with 3 ABNER, no rails but short steps. Tub shower with shower chair, raised toilet seat with grab bars. Using st cane, has a walker.  Prior Level of Function:  Prior Function Per Pt/Caregiver Report  Prior Function Comments: Pt/Caregiver Report  Prior Function Comments: patient reports indep with ADLs. Wife completed IADLs. Wife will assist patient as needed and supervises him with mobility when needed.  Precautions:  Precautions  Medical Precautions: Fall precautions  Objective   Pain:  Pain Assessment  Pain Assessment: 0-10  0-10 (Numeric) Pain Score: 0 - No pain  Cognition:  Cognition  Overall Cognitive Status: Within Functional Limits  General Assessments:  Activity Tolerance  Endurance: Decreased tolerance for upright activites  Sensation  Sensation Comment: intact BLes  Strength  Strength Comments: BLEs 4-/5     Coordination  Movements are Fluid and Coordinated: Yes  Static Sitting Balance  Static Sitting-Comment/Number of Minutes: fair  Dynamic Sitting Balance  Dynamic Sitting-Comments: fair  Static Standing Balance  Static Standing-Comment/Number of Minutes: fair  Dynamic Standing Balance  Dynamic Standing-Comments: fair    Functional Assessments:  Bed Mobility  Bed Mobility: Yes  Bed Mobility 1  Bed Mobility 1: Supine to sitting  Level of Assistance 1: Minimum assistance, Minimal verbal cues  Bed Mobility Comments 1: min A to EOB assistance with trunk to EOB  Transfers  Transfer: Yes  Transfer 1  Technique 1: Sit to stand, Stand to sit  Transfer Device 1: Walker  (FWW)  Transfer Level of Assistance 1: Minimum assistance, Minimal verbal cues  Trials/Comments 1: Mauri with FWW cues to push up from bed  Ambulation/Gait Training  Ambulation/Gait Training Performed: Yes  Ambulation/Gait Training 1  Surface 1: Level tile  Device 1: Rolling walker  Assistance 1: Minimum assistance, Minimal verbal cues  Quality of Gait 1: Decreased step length, Inconsistent stride length, Forward flexed posture  Comments/Distance (ft) 1: 10ft with FWW Mauri shuffled flexed posture .  Extremity/Trunk Assessments:  RLE   RLE : Within Functional Limits  LLE   LLE : Within Functional Limits    Outcome Measures:     [facility] Basic Mobility  Turning from your back to your side while in a flat bed without using bedrails: A little  Moving from lying on your back to sitting on the side of a flat bed without using bedrails: A little  Moving to and from bed to chair (including a wheelchair): A little  Standing up from a chair using your arms (e.g. wheelchair or bedside chair): A little  To walk in hospital room: A little  Climbing 3-5 steps with railing: Total  Basic Mobility - Total Score: 16    Goals:  Encounter Problems       Encounter Problems (Active)       PT Problem       Pt will demonstrate mod I  with bed mobility to edge of bed.   (Progressing)       Start:  04/03/25    Expected End:  04/17/25            Pt will demonstrate mod I  with sit to stand/chair transfers with FWW.   (Progressing)       Start:  04/03/25    Expected End:  04/17/25            Pt will ambulate 30 feet with FWW SBA .   (Progressing)       Start:  04/03/25    Expected End:  04/17/25            Pt to demo improved BLE strength by being able to complete supine/seated thera ex 2x20 BLEs with 4 or less rest breaks .   (Progressing)       Start:  04/03/25    Expected End:  04/17/25               Pain - Adult            Education Documentation  Mobility Training, taught by Dani Riggs, PT at 4/3/2025 11:49 AM.  Learner:  Patient  Readiness: Acceptance  Method: Explanation  Response: Verbalizes Understanding  Comment: safety with FWW    Education Comments  No comments found.

## 2025-04-03 NOTE — NURSING NOTE
At 1200, Lalita TAY from surgery was in to assess patient and to discuss plan of care. Wife was at bedside at time of visit.

## 2025-04-03 NOTE — NURSING NOTE
At  1420, Doctor Quinn was in to assess patient and to discuss plan of care. Wife was present at time of visit.

## 2025-04-03 NOTE — PROGRESS NOTES
Department of Internal Medicine  Gastroenterology  Progress note      Subjective  GI is following for hematochezia   Complaints from nursing staff of bright red blood with bowel movements.  Tolerating p.o. without nausea or vomiting.  Unable to obtain ROS from the patient, due to mentation.  Patient is alert and oriented x 1-2.  The wife, son-in-law and daughter are at the bedside.  Wife feels that the patient has not been seen by GI, despite GI note from 4/2/2025.  Wife does not feel that the patient is safe to come home with active GI bleeding.  The spouse is requesting each provider call her with an update.  -> Hemoglobin is stable.  Current Medication    Current Facility-Administered Medications:     acetaminophen (Tylenol) tablet 650 mg, 650 mg, oral, q4h PRN **OR** acetaminophen (Tylenol) oral liquid 650 mg, 650 mg, oral, q4h PRN **OR** acetaminophen (Tylenol) suppository 650 mg, 650 mg, rectal, q4h PRN, Maldonado Chua MD    bicalutamide (Casodex) tablet 50 mg, 50 mg, oral, Daily, Maldonado Chua MD, 50 mg at 04/03/25 0953    finasteride (Proscar) tablet 5 mg, 5 mg, oral, Daily, Maldonado Chua MD, 5 mg at 04/03/25 0955    furosemide (Lasix) tablet 20 mg, 20 mg, oral, Daily, Maldonado Chua MD, 20 mg at 04/03/25 0953    lactulose 20 gram/30 mL oral solution 20 g, 30 mL, oral, TID, Maldonado Chua MD, 20 g at 04/03/25 0953    melatonin tablet 3 mg, 3 mg, oral, Nightly PRN, Maldonado Chua MD    nadolol (Corgard) tablet 10 mg, 10 mg, oral, Daily, Maldonado Chua MD, 10 mg at 04/03/25 1000    ondansetron ODT (Zofran-ODT) disintegrating tablet 4 mg, 4 mg, oral, q8h PRN **OR** ondansetron (Zofran) injection 4 mg, 4 mg, intravenous, q8h PRN, Maldonado Chua MD    pantoprazole (Protonix) injection 40 mg, 40 mg, intravenous, Daily, Maldonado Chua MD, 40 mg at 04/03/25 0953    spironolactone (Aldactone) tablet 50 mg, 50 mg, oral, Daily, Maldonado Chua MD, 50 mg at 04/03/25 0953    [facility] Medical  History  Active Ambulatory Problems     Diagnosis Date Noted    Allergic rhinitis due to pollen 11/28/2023    Anatomical narrow angle, bilateral 06/25/2021    Anemia 06/04/2021    Anxiety 11/28/2023    Bilateral impacted cerumen 11/28/2023    Cobalamin deficiency 06/04/2021    Colon polyps 11/28/2023    Esophageal varices 12/03/2019    Essential hypertension 10/13/2017    Excessive cerumen in ear canal, right 11/28/2023    Gastric ulcer 11/28/2023    Gastrointestinal hemorrhage associated with anorectal source 10/24/2021    Hepatic cirrhosis (Multi) 12/03/2019    History of laser iridotomy 06/25/2021    Hyperglycemia 11/28/2023    Iron deficiency anemia secondary to inadequate dietary iron intake 11/28/2023    Leukocytosis 10/13/2017    Hypocalcemia 11/28/2023    Malignant neoplasm of liver, not specified as primary or secondary (Multi) 11/28/2023    Malignant neoplasm of prostate (Multi) 10/10/2018    Heart murmur 11/28/2023    PND (post-nasal drip) 11/28/2023    Pseudophakia 06/25/2021    Right foot pain 11/28/2023    Sinus pressure 11/28/2023    Hordeolum externum 11/28/2023    Thrombocytopenia (CMS-HCC) 10/14/2017    Vitamin D deficiency 11/28/2023    Mixed hyperlipidemia 11/28/2023    Hepatocellular carcinoma 02/25/2025    Nontraumatic hemorrhagic shock (Multi) 03/28/2025    Cancer (Multi)     Gastrointestinal hemorrhage, unspecified gastrointestinal hemorrhage type 03/28/2025     Resolved Ambulatory Problems     Diagnosis Date Noted    Abnormal CBC 11/28/2023    Acute blood loss anemia 10/29/2021    Back pain 11/28/2023    Malnutrition of moderate degree (Multi) 11/03/2021    Portal hypertension (Multi) 11/07/2021    Rectal hemorrhage 11/28/2023    Injury of toe 11/28/2023    Pain 11/28/2023     Past Medical History:   Diagnosis Date    Aortic ectasia, 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    Personal history of diseases of the blood and blood-forming organs and certain disorders involving the immune mechanism  "07/01/2020    Personal history of other medical treatment        PHYSICAL EXAM  VS: /63 (BP Location: Right arm, Patient Position: Lying)   Pulse 55   Temp 36.3 °C (97.3 °F) (Temporal)   Resp 17   Ht 1.727 m (5' 8\")   Wt 86.2 kg (190 lb)   SpO2 96%   BMI 28.89 kg/m²  Body mass index is 28.89 kg/m².  Physical Exam  Vitals reviewed.   Constitutional:       General: He is awake. He is not in acute distress.     Appearance: He is ill-appearing (chronic). He is not toxic-appearing or diaphoretic.      Comments: Sitting up in chair    HENT:      Head: Normocephalic and atraumatic.   Eyes:      General: Scleral icterus present.   Cardiovascular:      Rate and Rhythm: Normal rate and regular rhythm.      Heart sounds: Normal heart sounds. No murmur heard.  Pulmonary:      Effort: Pulmonary effort is normal.   Abdominal:      General: Abdomen is protuberant. Bowel sounds are normal.      Palpations: Abdomen is soft. There is no hepatomegaly.      Tenderness: There is no abdominal tenderness.   Musculoskeletal:         General: Normal range of motion.      Cervical back: Normal range of motion.      Right lower leg: No edema.      Left lower leg: No edema.   Skin:     General: Skin is warm and dry.      Coloration: Skin is not jaundiced or pale.   Neurological:      Mental Status: He is alert. Mental status is at baseline.      Motor: Weakness (global) present.      Gait: Gait abnormal (uses walker).      Comments: A/Ox1-2   Psychiatric:         Mood and Affect: Mood normal.         Behavior: Behavior normal. Behavior is cooperative.      Comments: Forgetful           DATA  Recent blood work and relevant radiology and endoscopic studies were reviewed and discussed with the patient   Results from last 7 days   Lab Units 04/03/25  0408   WBC AUTO x10*3/uL 10.8   RBC AUTO x10*6/uL 3.15*   HEMOGLOBIN g/dL 9.4*   HEMATOCRIT % 27.9*   MCV fL 89   MCHC g/dL 33.7   RDW % 18.0*   PLATELETS AUTO x10*3/uL 42*       Results " from last 72 hours   Lab Units 04/03/25  0408   SODIUM mmol/L 132*   POTASSIUM mmol/L 4.6   CHLORIDE mmol/L 102   CO2 mmol/L 23   BUN mg/dL 15   CREATININE mg/dL 0.84   CALCIUM mg/dL 7.6*   PROTEIN TOTAL g/dL 4.4*   BILIRUBIN TOTAL mg/dL 4.4*   ALK PHOS U/L 109   AST U/L 31   ALT U/L 17       Results from last 72 hours   Lab Units 04/03/25  1102   INR  1.8*       Results from last 72 hours   Lab Units 04/02/25  0217   LIPASE U/L 32       RADIOLOGY REVIEW  03/28/25  CT ABDOMEN PELVIS ANGIOGRAM W AND/OR WO IV CONTRAST  1.  No evidence of active GI bleed. There is new mild colitis of the  ascending and proximal transverse colon which may be infectious,  inflammatory, or chemotherapy related.     2. Cirrhotic appearing liver with bilateral masses again noted and  stigmata of portal hypertension. There is stable appearance of  subocclusive thrombus within the portal vein. There is also interval  development of moderate amount of ascites within the abdomen and  pelvis.     3. Diffuse partially calcified atherosclerotic disease without  significant stenosis. 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     4. Colonic diverticulosis without diverticulitis.     5. Coronary artery calcifications.     6. Stable superior endplate deformity of the L4 vertebral body.          ENDOSCOPIC REVIEW  EGD: 3/28/2025 with Dr. Mckoy  Findings/Impression  No active or recent stigmata of bleeding.  Minimal Grade I varices in the mid esophagus + scarring from prior ?banding.   Small hiatal hernia.  Gastric inflammatory polyp (~2 mm) that started to ooze with suction irritation s/p 2 clips.   The stomach was otherwise normal.  The duodenum appeared normal.     Colonoscopy: 3/28/2025 with Dr. Mckoy  Findings/Impression  Fair prep.  The terminal ileum appeared normal.  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 After this was completed, patient had spurting from a hemorrhoid.  Colonoscope was switched to an EGD scope with  (bleeding stopped) and 3 bands were placed in the region of the bleeding (particularly over one site that looked like it had recent stigmata of bleeding).        IMPRESSION/RECOMMENDATIONS  80 y.o. 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  Patient was just discharged from the hospital 2 days ago for GIB 2/2 hemorrhoid bleeding treated with banding,banding, along with radiation proctitis treated with APC.  Presents with 1 episode of hematochezia at home.  It was recommended to consult general surgery, which seen him this afternoon.    GI following, reviewed plan of care from a GI standpoint with the family today.  Patient having soft stools with bright red blood, but JOSE D was negative for heme.  Hemoglobin has remained stable.    -> Hematochezia likely related to hemorrhoid bleeding, sloughed off hemorrhoid band versus radiation proctitis.  -> Normocytic anemia likely multifactorial, patient with multiple comorbidities.  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      Plan:  -> No need for repeat scope at this time, HGB stable and bleeding likely 2/2 hemorrhoid /banding. Will monitor closely. If he continue to bleed with drop in HGB we may consider flexible sigmoidoscopy.   -> Appreciate general surgery recs  -> Supportive care per primary team  -> Continue PPI daily  -Continue nadolol 10 mg p.o. once daily  -Continue lactulose 20 mg p.o. 3 times  daily, aim for 3-4 bowel movements daily  -Continue Lasix 20 mg p.o. once daily, spironolactone 50 mg p.o. once daily.  Goal dose for diuretics would be Lasix 40 mg once daily and spironolactone 100 mg p.o. once daily.  -Trend trend CBC, CMP and INR daily         Reviewed with Dr. Meneses, will follow         (Electronically signed KEKE Swanson on 4/3/2025 at 12:57 PM)

## 2025-04-03 NOTE — CONSULTS
Inpatient consult to Acute Care Surgery  Consult performed by: Lalita Echevarria, APRN-CNP  Consult ordered by: Cherise Waldron PA-C  Reason for consult: hematochezia      General Surgery Consult Note  Patient: Alfonso Toscano  Unit/Bed: 1024/1024-B  YOB: 1945  MRN: [id number]  Acct: [id number]   Admitting Diagnosis: Gastrointestinal hemorrhage, unspecified gastrointestinal hemorrhage type [K92.2]  Date:  4/2/2025  Hospital Day: 0  Attending: Candelario Thakkar MD    Complaint:  Chief Complaint   Patient presents with    Rectal Bleeding     Dx from hospital yesterday.        History of Present Illness:  Patient is a 80-year-old male who presented to [facility] ED on April 2, 2025 with complaints of rectal bleeding.  Patient was recently admitted on March 28, 2025 with complaints of rectal bleeding.  Patient had received platelets, multiple blood transfusions, had a colonoscopy with banding and EGD with banding, and paracentesis with one liter fluid removed with discharge on March 31, 2025. Family reported that patient had  a chemotherapy infusion on March 6, 2025 but when returned for a second chemotherapy infusion Patient has a history of Liver cancer, alcoholic cirrhosis with ascites, hepatocellular carcinoma, pseudophakia, right foot pain, heart murmur, mixed hyperlipidemia, malikgnant neoplasm of liver, hypocalcemia, iron deficiency anemia, hyperglycemia, laser iridotomy, gastrointestinal hemorrhage, essential hypertension, esophageal varies, colon polyps, cobalamin deficiency, anemia, and allergic rhinitis.  Patient family states that 3 1/2 years ago patient developed GI bleeding resulting in multiple tests, 8 transfusions and a paracentesis but no source of bleeding was ever identified at that time.  Family states he has taken a dose of lactulose daily since that admission.  Family stated after the recent discharge on March 31,2025 patient was initially doing well but at bedtime he had his first episode  of bloody bowel movement and became so weak that his wife stated that she called 911.  Patient denies smoking, denies EtOH use, last drink in 1988, and denies illicit drugs including marijuana use.  Allergies:  No Known Allergies   PMHx:  Past Medical History:   Diagnosis Date    Aortic ectasia, 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    Aortic dilatation    Cancer (Multi)     Esophageal varices     Hepatocellular carcinoma (Multi)     Personal history of diseases of the blood and blood-forming organs and certain disorders involving the immune mechanism 07/01/2020    History of anemia    Personal history of other medical treatment     H/O echocardiogram     PSHx:  Past Surgical History:   Procedure Laterality Date    COLONOSCOPY  10/22/2018    Colonoscopy    OTHER SURGICAL HISTORY  07/01/2020    Cataract surgery     Social Hx:  Social History     Socioeconomic History    Marital status:    Tobacco Use    Smoking status: Never    Smokeless tobacco: Never   Substance and Sexual Activity    Alcohol use: Not Currently     Social Drivers of Health     Financial Resource Strain: Low Risk  (3/28/2025)    Overall Financial Resource Strain (CARDIA)     Difficulty of Paying Living Expenses: Not hard at all   Food Insecurity: No Food Insecurity (3/28/2025)    Hunger Vital Sign     Worried About Running Out of Food in the Last Year: Never true     Ran Out of Food in the Last Year: Never true   Transportation Needs: No Transportation Needs (3/28/2025)    PRAPARE - Transportation     Lack of Transportation (Medical): No     Lack of Transportation (Non-Medical): No   Intimate Partner Violence: Not At Risk (3/28/2025)    Humiliation, Afraid, Rape, and Kick questionnaire     Fear of Current or Ex-Partner: No     Emotionally Abused: No     Physically Abused: No     Sexually Abused: No   Housing Stability: Low Risk  (3/28/2025)    Housing Stability Vital Sign     Unable to Pay for Housing in the Last Year: No     Number of Times  Moved in the Last Year: 0     Homeless in the Last Year: No     Family Hx:  Family History   Problem Relation Name Age of Onset    Colon cancer Mother      Liver cancer Mother       Review of Systems:   Review of Systems   Gastrointestinal:  Positive for anal bleeding and blood in stool.   Neurological:  Positive for weakness.   All other systems reviewed and are negative.    Physical Examination:    Visit Vitals  /63 (BP Location: Right arm, Patient Position: Lying)   Pulse 55   Temp 36.3 °C (97.3 °F) (Temporal)   Resp 17      Physical Exam  Vitals and nursing note reviewed. Exam conducted with a chaperone present (Noy HEALY and Dr. Womack).   Constitutional:       General: He is awake.      Appearance: Normal appearance. He is overweight. He is ill-appearing.   HENT:      Head: Normocephalic.      Right Ear: Decreased hearing noted.      Left Ear: Decreased hearing noted.      Nose: Nose normal.      Mouth/Throat:      Mouth: Mucous membranes are moist.   Cardiovascular:      Rate and Rhythm: Normal rate and regular rhythm.      Heart sounds: S1 normal and S2 normal. Murmur heard.   Pulmonary:      Effort: Pulmonary effort is normal.      Breath sounds: Normal breath sounds.   Abdominal:      General: Abdomen is protuberant. Bowel sounds are normal.      Palpations: Abdomen is soft.   Genitourinary:     Comments: Bleeding from rectum  Skin:     General: Skin is warm and dry.      Capillary Refill: Capillary refill takes less than 2 seconds.   Neurological:      General: No focal deficit present.      Mental Status: He is alert and oriented to person, place, and time.   Psychiatric:         Mood and Affect: Mood normal.         Speech: Speech normal.         Behavior: Behavior is cooperative.       LABS:  CBC:   Lab Results   Component Value Date    WBC 10.8 04/03/2025    RBC 3.15 (L) 04/03/2025    HGB 9.4 (L) 04/03/2025    HCT 27.9 (L) 04/03/2025    MCV 89 04/03/2025    MCH 29.8 04/03/2025    MCHC 33.7  "04/03/2025    RDW 18.0 (H) 04/03/2025    PLT 42 (L) 04/03/2025     CBC with Differential:    Lab Results   Component Value Date    WBC 10.8 04/03/2025    RBC 3.15 (L) 04/03/2025    HGB 9.4 (L) 04/03/2025    HCT 27.9 (L) 04/03/2025    PLT 42 (L) 04/03/2025    MCV 89 04/03/2025    MCH 29.8 04/03/2025    MCHC 33.7 04/03/2025    RDW 18.0 (H) 04/03/2025    NRBC 0.0 04/03/2025    LYMPHOPCT 13.9 04/03/2025    MONOPCT 17.2 04/03/2025    EOSPCT 3.2 04/03/2025    BASOPCT 0.4 04/03/2025    MONOSABS 1.85 (H) 04/03/2025    LYMPHSABS 1.49 04/03/2025    EOSABS 0.34 04/03/2025    BASOSABS 0.04 04/03/2025     CMP:    Lab Results   Component Value Date     (L) 04/03/2025    K 4.6 04/03/2025     04/03/2025    CO2 23 04/03/2025    BUN 15 04/03/2025    CREATININE 0.84 04/03/2025    GLUCOSE 84 04/03/2025    PROT 4.4 (L) 04/03/2025    CALCIUM 7.6 (L) 04/03/2025    BILITOT 4.4 (H) 04/03/2025    ALKPHOS 109 04/03/2025    AST 31 04/03/2025    ALT 17 04/03/2025     BMP:    Lab Results   Component Value Date     (L) 04/03/2025    K 4.6 04/03/2025     04/03/2025    CO2 23 04/03/2025    BUN 15 04/03/2025    CREATININE 0.84 04/03/2025    CALCIUM 7.6 (L) 04/03/2025    GLUCOSE 84 04/03/2025     Magnesium:  Lab Results   Component Value Date    MG 1.47 (L) 04/03/2025     Troponin:  No results found for: \"TROPHS\"  Lipid Panel:  No results found for: \"HDL\", \"CHHDL\", \"VLDL\", \"TRIG\", \"NHDL\"   Current Medications:    Current Facility-Administered Medications:     acetaminophen (Tylenol) tablet 650 mg, 650 mg, oral, q4h PRN **OR** acetaminophen (Tylenol) oral liquid 650 mg, 650 mg, oral, q4h PRN **OR** acetaminophen (Tylenol) suppository 650 mg, 650 mg, rectal, q4h PRN, Maldonado Chua MD    bicalutamide (Casodex) tablet 50 mg, 50 mg, oral, Daily, Maldonado Chua MD, 50 mg at 04/03/25 0953    finasteride (Proscar) tablet 5 mg, 5 mg, oral, Daily, Maldonado Chua MD, 5 mg at 04/03/25 0955    furosemide (Lasix) tablet 20 mg, 20 mg, " oral, Daily, Maldonado Chua MD, 20 mg at 04/03/25 0953    lactulose 20 gram/30 mL oral solution 20 g, 30 mL, oral, TID, Maldonado Chua MD, 20 g at 04/03/25 0953    magnesium sulfate 2 g in sterile water for injection 50 mL, 2 g, intravenous, Once, Cherise Waldron PA-C, Last Rate: 25 mL/hr at 04/03/25 1145, 2 g at 04/03/25 1145    melatonin tablet 3 mg, 3 mg, oral, Nightly PRN, Maldonado Chua MD    nadolol (Corgard) tablet 10 mg, 10 mg, oral, Daily, Maldonado Chua MD, 10 mg at 04/03/25 1000    ondansetron ODT (Zofran-ODT) disintegrating tablet 4 mg, 4 mg, oral, q8h PRN **OR** ondansetron (Zofran) injection 4 mg, 4 mg, intravenous, q8h PRN, Maldonado Chua MD    pantoprazole (Protonix) injection 40 mg, 40 mg, intravenous, Daily, Maldonado Chua MD, 40 mg at 04/03/25 0953    spironolactone (Aldactone) tablet 50 mg, 50 mg, oral, Daily, Maldonado Chua MD, 50 mg at 04/03/25 0953  ECG 12 lead    Result Date: 4/3/2025  Sinus bradycardia Otherwise normal ECG When compared with ECG of 28-MAR-2025 04:41, (unconfirmed) VT interval has decreased Criteria for Septal infarct are no longer Present ST no longer depressed in Inferior leads T wave inversion no longer evident in Inferior leads See ED provider note for full interpretation and clinical correlation Confirmed by Vangie Morin ([id number]) on 4/3/2025 11:06:08 AM     No results found for this or any previous visit from the past 1095 days.           Assessment:    Hematochezia    On exam patient appears ill and reports weakness.  Abdomen protuberant, soft and nontender.  Bowel sounds present.  Dr. Quinn and Noy HEALY at bedside for examination of rectum which was noted to have  scant bloody drainage.  Recent bowel movement noted to have blood and stool in commode.  Heart rate normal, rhythm regular, murmur noted.  Respirations equal nonlabored.  Lung sounds clear.  Laboratory results show no leukocytosis and hemoglobin 9.4 down from 9.9 yesterday.   Afebrile.    Plan:  -Clear liquid diet  -Pain control  -Nausea control  -Continue PPI  -DVT prophylaxis- SCD  -Pulmonary toileting -C&DB  -Encourage ambulation- PT/OT  -Continue care per primary team     Further recommendations per Dr. Womack     Time spent  60  minutes obtaining labs, imaging, recommendations, interview, assessment, examination, medication review/ordering, and EMR review.    Plan of care was discussed extensively with patient. Patient verbalized understanding through teach back method. All questions and concerns addressed upon examination.     Of note, this documentation is completed using the Dragon Dictation system (voice recognition software). There may be spelling and/or grammatical errors that were not corrected prior to final submission.    Electronically signed by KEKE Marsh on 4/3/2025 at 12:31 PM

## 2025-04-04 LAB
ALBUMIN SERPL BCP-MCNC: 2.4 G/DL (ref 3.4–5)
ALP SERPL-CCNC: 120 U/L (ref 33–136)
ALT SERPL W P-5'-P-CCNC: 17 U/L (ref 10–52)
ANION GAP SERPL CALC-SCNC: 11 MMOL/L (ref 10–20)
AST SERPL W P-5'-P-CCNC: 32 U/L (ref 9–39)
BACTERIA FLD CULT: NORMAL
BASOPHILS # BLD AUTO: 0.02 X10*3/UL (ref 0–0.1)
BASOPHILS NFR BLD AUTO: 0.2 %
BILIRUB SERPL-MCNC: 3.6 MG/DL (ref 0–1.2)
BUN SERPL-MCNC: 13 MG/DL (ref 6–23)
CALCIUM SERPL-MCNC: 7.4 MG/DL (ref 8.6–10.3)
CHLORIDE SERPL-SCNC: 100 MMOL/L (ref 98–107)
CO2 SERPL-SCNC: 23 MMOL/L (ref 21–32)
CREAT SERPL-MCNC: 0.99 MG/DL (ref 0.5–1.3)
EGFRCR SERPLBLD CKD-EPI 2021: 78 ML/MIN/1.73M*2
EOSINOPHIL # BLD AUTO: 0.29 X10*3/UL (ref 0–0.4)
EOSINOPHIL NFR BLD AUTO: 2.9 %
ERYTHROCYTE [DISTWIDTH] IN BLOOD BY AUTOMATED COUNT: 18.2 % (ref 11.5–14.5)
GLUCOSE SERPL-MCNC: 105 MG/DL (ref 74–99)
GRAM STN SPEC: NORMAL
GRAM STN SPEC: NORMAL
HCT VFR BLD AUTO: 26 % (ref 41–52)
HGB BLD-MCNC: 8.8 G/DL (ref 13.5–17.5)
HOLD SPECIMEN: NORMAL
IMM GRANULOCYTES # BLD AUTO: 0.05 X10*3/UL (ref 0–0.5)
IMM GRANULOCYTES NFR BLD AUTO: 0.5 % (ref 0–0.9)
INR PPP: 1.9 (ref 0.9–1.1)
LYMPHOCYTES # BLD AUTO: 1.38 X10*3/UL (ref 0.8–3)
LYMPHOCYTES NFR BLD AUTO: 14 %
MAGNESIUM SERPL-MCNC: 1.79 MG/DL (ref 1.6–2.4)
MCH RBC QN AUTO: 30.2 PG (ref 26–34)
MCHC RBC AUTO-ENTMCNC: 33.8 G/DL (ref 32–36)
MCV RBC AUTO: 89 FL (ref 80–100)
MONOCYTES # BLD AUTO: 1.65 X10*3/UL (ref 0.05–0.8)
MONOCYTES NFR BLD AUTO: 16.7 %
NEUTROPHILS # BLD AUTO: 6.47 X10*3/UL (ref 1.6–5.5)
NEUTROPHILS NFR BLD AUTO: 65.7 %
NRBC BLD-RTO: 0 /100 WBCS (ref 0–0)
PLATELET # BLD AUTO: 43 X10*3/UL (ref 150–450)
POTASSIUM SERPL-SCNC: 4.5 MMOL/L (ref 3.5–5.3)
PROT SERPL-MCNC: 4.5 G/DL (ref 6.4–8.2)
PROTHROMBIN TIME: 20.5 SECONDS (ref 9.8–12.4)
RBC # BLD AUTO: 2.91 X10*6/UL (ref 4.5–5.9)
SODIUM SERPL-SCNC: 129 MMOL/L (ref 136–145)
WBC # BLD AUTO: 9.9 X10*3/UL (ref 4.4–11.3)

## 2025-04-04 PROCEDURE — 36415 COLL VENOUS BLD VENIPUNCTURE: CPT | Performed by: NURSE PRACTITIONER

## 2025-04-04 PROCEDURE — 99232 SBSQ HOSP IP/OBS MODERATE 35: CPT

## 2025-04-04 PROCEDURE — 97530 THERAPEUTIC ACTIVITIES: CPT | Mod: GP,CQ

## 2025-04-04 PROCEDURE — 80048 BASIC METABOLIC PNL TOTAL CA: CPT | Performed by: INTERNAL MEDICINE

## 2025-04-04 PROCEDURE — 85027 COMPLETE CBC AUTOMATED: CPT | Performed by: NURSE PRACTITIONER

## 2025-04-04 PROCEDURE — 85610 PROTHROMBIN TIME: CPT

## 2025-04-04 PROCEDURE — 85025 COMPLETE CBC W/AUTO DIFF WBC: CPT | Performed by: INTERNAL MEDICINE

## 2025-04-04 PROCEDURE — 2500000002 HC RX 250 W HCPCS SELF ADMINISTERED DRUGS (ALT 637 FOR MEDICARE OP, ALT 636 FOR OP/ED): Performed by: INTERNAL MEDICINE

## 2025-04-04 PROCEDURE — 99232 SBSQ HOSP IP/OBS MODERATE 35: CPT | Performed by: NURSE PRACTITIONER

## 2025-04-04 PROCEDURE — 80053 COMPREHEN METABOLIC PANEL: CPT | Performed by: INTERNAL MEDICINE

## 2025-04-04 PROCEDURE — 2500000001 HC RX 250 WO HCPCS SELF ADMINISTERED DRUGS (ALT 637 FOR MEDICARE OP): Performed by: INTERNAL MEDICINE

## 2025-04-04 PROCEDURE — 2500000004 HC RX 250 GENERAL PHARMACY W/ HCPCS (ALT 636 FOR OP/ED): Performed by: NURSE PRACTITIONER

## 2025-04-04 PROCEDURE — 1200000002 HC GENERAL ROOM WITH TELEMETRY DAILY

## 2025-04-04 PROCEDURE — 36415 COLL VENOUS BLD VENIPUNCTURE: CPT

## 2025-04-04 PROCEDURE — 2500000001 HC RX 250 WO HCPCS SELF ADMINISTERED DRUGS (ALT 637 FOR MEDICARE OP): Performed by: NURSE PRACTITIONER

## 2025-04-04 PROCEDURE — 83735 ASSAY OF MAGNESIUM: CPT | Performed by: INTERNAL MEDICINE

## 2025-04-04 PROCEDURE — 2500000004 HC RX 250 GENERAL PHARMACY W/ HCPCS (ALT 636 FOR OP/ED): Performed by: INTERNAL MEDICINE

## 2025-04-04 PROCEDURE — 99231 SBSQ HOSP IP/OBS SF/LOW 25: CPT | Performed by: SURGERY

## 2025-04-04 RX ORDER — HYDROMORPHONE HYDROCHLORIDE 0.2 MG/ML
0.2 INJECTION INTRAMUSCULAR; INTRAVENOUS; SUBCUTANEOUS
Status: COMPLETED | OUTPATIENT
Start: 2025-04-04 | End: 2025-04-06

## 2025-04-04 RX ORDER — ONDANSETRON 4 MG/1
4 TABLET, ORALLY DISINTEGRATING ORAL EVERY 8 HOURS PRN
Status: ACTIVE | OUTPATIENT
Start: 2025-04-04

## 2025-04-04 RX ORDER — OXYCODONE HYDROCHLORIDE 5 MG/1
5 TABLET ORAL ONCE
Status: COMPLETED | OUTPATIENT
Start: 2025-04-04 | End: 2025-04-04

## 2025-04-04 RX ORDER — ONDANSETRON HYDROCHLORIDE 2 MG/ML
4 INJECTION, SOLUTION INTRAVENOUS EVERY 6 HOURS PRN
Status: ACTIVE | OUTPATIENT
Start: 2025-04-04

## 2025-04-04 RX ADMIN — BICALUTAMIDE 50 MG: 50 TABLET ORAL at 07:50

## 2025-04-04 RX ADMIN — NADOLOL 10 MG: 20 TABLET ORAL at 07:50

## 2025-04-04 RX ADMIN — ONDANSETRON 4 MG: 2 INJECTION INTRAMUSCULAR; INTRAVENOUS at 17:52

## 2025-04-04 RX ADMIN — HYDROMORPHONE HYDROCHLORIDE 0.2 MG: 0.2 INJECTION, SOLUTION INTRAMUSCULAR; INTRAVENOUS; SUBCUTANEOUS at 23:44

## 2025-04-04 RX ADMIN — SPIRONOLACTONE 50 MG: 25 TABLET ORAL at 07:50

## 2025-04-04 RX ADMIN — FINASTERIDE 5 MG: 5 TABLET, FILM COATED ORAL at 07:50

## 2025-04-04 RX ADMIN — LACTULOSE 20 G: 20 SOLUTION ORAL at 16:52

## 2025-04-04 RX ADMIN — FUROSEMIDE 20 MG: 40 TABLET ORAL at 07:51

## 2025-04-04 RX ADMIN — ONDANSETRON 4 MG: 2 INJECTION INTRAMUSCULAR; INTRAVENOUS at 23:44

## 2025-04-04 RX ADMIN — PANTOPRAZOLE SODIUM 40 MG: 40 INJECTION, POWDER, FOR SOLUTION INTRAVENOUS at 07:51

## 2025-04-04 RX ADMIN — OXYCODONE HYDROCHLORIDE 5 MG: 5 TABLET ORAL at 19:57

## 2025-04-04 RX ADMIN — LACTULOSE 20 G: 20 SOLUTION ORAL at 07:51

## 2025-04-04 ASSESSMENT — PAIN SCALES - GENERAL
PAINLEVEL_OUTOF10: 7
PAINLEVEL_OUTOF10: 0 - NO PAIN
PAINLEVEL_OUTOF10: 10 - WORST POSSIBLE PAIN
PAINLEVEL_OUTOF10: 0 - NO PAIN

## 2025-04-04 ASSESSMENT — PAIN DESCRIPTION - ORIENTATION: ORIENTATION: LEFT;LOWER

## 2025-04-04 ASSESSMENT — COGNITIVE AND FUNCTIONAL STATUS - GENERAL
MOBILITY SCORE: 16
MOVING TO AND FROM BED TO CHAIR: A LITTLE
WALKING IN HOSPITAL ROOM: A LITTLE
STANDING UP FROM CHAIR USING ARMS: A LITTLE
TURNING FROM BACK TO SIDE WHILE IN FLAT BAD: A LITTLE
MOVING FROM LYING ON BACK TO SITTING ON SIDE OF FLAT BED WITH BEDRAILS: A LITTLE
CLIMB 3 TO 5 STEPS WITH RAILING: TOTAL

## 2025-04-04 ASSESSMENT — PAIN - FUNCTIONAL ASSESSMENT
PAIN_FUNCTIONAL_ASSESSMENT: 0-10

## 2025-04-04 ASSESSMENT — PAIN DESCRIPTION - LOCATION: LOCATION: ABDOMEN

## 2025-04-04 NOTE — PROGRESS NOTES
Daily Progress Note    Alfonso Toscano is a 80 y.o. male on day 1 of admission presenting with Gastrointestinal hemorrhage, unspecified gastrointestinal hemorrhage type.    Subjective   Patient seen and examined, resting comfortably in bed.  Wife at bedside.  Patient states he feels overall well today.  Denies chest pain, shortness of breath, abdominal pain, nausea, vomiting, diarrhea.  Had an episode of soft stool with significant hematochezia this morning.  General surgery following, will monitor labs over the weekend and potentially recommend intervention early next week.  GI following, appreciate recs.  Reached out to patient's primary heme-onc provider, Dr. Alfredo, who recommends platelets and FFP prior to any procedure.       Objective   Physical Exam  Constitutional:       Appearance: He is ill-appearing.   HENT:      Head: Normocephalic.      Mouth/Throat:      Mouth: Mucous membranes are moist.   Eyes:      General: Scleral icterus present.      Pupils: Pupils are equal, round, and reactive to light.   Cardiovascular:      Rate and Rhythm: Normal rate and regular rhythm.      Heart sounds: Normal heart sounds, S1 normal and S2 normal.   Pulmonary:      Effort: Pulmonary effort is normal.      Breath sounds: Normal breath sounds.   Abdominal:      General: Bowel sounds are normal.      Palpations: Abdomen is soft.      Tenderness: There is no abdominal tenderness.   Musculoskeletal:         General: Normal range of motion.      Cervical back: Neck supple.      Right lower leg: No edema.      Left lower leg: No edema.   Skin:     General: Skin is warm.      Coloration: Skin is pale.   Neurological:      Mental Status: He is alert and oriented to person, place, and time.      Motor: Weakness present.   Psychiatric:         Mood and Affect: Mood normal.         Behavior: Behavior normal.         Last Recorded Vitals  Blood pressure 125/58, pulse 68, temperature 36.7 °C (98.1 °F), resp. rate 18, height 1.727 m  "(5' 8\"), weight 90.6 kg (199 lb 12.8 oz), SpO2 91%.  Intake/Output last 3 Shifts:  I/O last 3 completed shifts:  In: 390 (4.3 mL/kg) [P.O.:340; I.V.:50 (0.6 mL/kg)]  Out: 2400 (26.5 mL/kg) [Urine:2400 (0.7 mL/kg/hr)]  Weight: 90.6 kg     Medications  Scheduled medications  bicalutamide, 50 mg, oral, Daily  finasteride, 5 mg, oral, Daily  furosemide, 20 mg, oral, Daily  lactulose, 30 mL, oral, TID  nadolol, 10 mg, oral, Daily  pantoprazole, 40 mg, intravenous, Daily  spironolactone, 50 mg, oral, Daily      Continuous medications     PRN medications  PRN medications: acetaminophen **OR** acetaminophen **OR** acetaminophen, melatonin, ondansetron ODT **OR** ondansetron    Labs  CBC:   Results from last 7 days   Lab Units 04/04/25  0410 04/03/25  1525 04/03/25  0408 04/02/25  1156 04/02/25  0217   WBC AUTO x10*3/uL 9.9  --  10.8  --  10.7   RBC AUTO x10*6/uL 2.91*  --  3.15*  --  2.74*   HEMOGLOBIN g/dL 8.8* 9.9* 9.4*   < > 8.3*   HEMATOCRIT % 26.0* 29.4* 27.9*   < > 25.5*   MCV fL 89  --  89  --  93   MCH pg 30.2  --  29.8  --  30.3   MCHC g/dL 33.8  --  33.7  --  32.5   RDW % 18.2*  --  18.0*  --  17.9*   PLATELETS AUTO x10*3/uL 43*  --  42*  --  56*    < > = values in this interval not displayed.     CMP:    Results from last 7 days   Lab Units 04/04/25  0410 04/03/25  0408 04/02/25  0217   SODIUM mmol/L 129* 132* 132*   POTASSIUM mmol/L 4.5 4.6 4.8   CHLORIDE mmol/L 100 102 103   CO2 mmol/L 23 23 25   BUN mg/dL 13 15 14   CREATININE mg/dL 0.99 0.84 0.98   GLUCOSE mg/dL 105* 84 157*   PROTEIN TOTAL g/dL 4.5* 4.4* 4.5*   CALCIUM mg/dL 7.4* 7.6* 7.5*   BILIRUBIN TOTAL mg/dL 3.6* 4.4* 2.4*   ALK PHOS U/L 120 109 116   AST U/L 32 31 31   ALT U/L 17 17 18     BMP:    Results from last 7 days   Lab Units 04/04/25  0410 04/03/25  0408 04/02/25  0217   SODIUM mmol/L 129* 132* 132*   POTASSIUM mmol/L 4.5 4.6 4.8   CHLORIDE mmol/L 100 102 103   CO2 mmol/L 23 23 25   BUN mg/dL 13 15 14   CREATININE mg/dL 0.99 0.84 0.98 "   CALCIUM mg/dL 7.4* 7.6* 7.5*   GLUCOSE mg/dL 105* 84 157*     Magnesium:  Results from last 7 days   Lab Units 04/04/25  0410 04/03/25  0408   MAGNESIUM mg/dL 1.79 1.47*     Troponin:    Results from last 7 days   Lab Units 03/28/25  1427   TROPHS ng/L 361*     BNP:     Lipid Panel:  Results from last 7 days   Lab Units 04/04/25  0410 04/03/25  1102 04/02/25  0217   INR  1.9* 1.8* 1.9*   PROTIME seconds 20.5* 19.7* 21.0*        Nutrition             Relevant Results  Results from last 7 days   Lab Units 04/04/25  0410 04/03/25  0408 04/02/25  0217 03/31/25  0419 03/31/25  0417 03/30/25  0345 03/29/25  1202 03/29/25  0752 03/29/25  0420 03/29/25  0011 03/28/25 2122   POCT GLUCOSE mg/dL  --   --   --  140*  --   --  134* 118*  --  115* 112*   GLUCOSE mg/dL 105* 84 157*  --  118* 118*  --   --    < >  --   --     < > = values in this interval not displayed.     Lab Results   Component Value Date    HGBA1C 4.1 10/08/2024        Assessment/Plan    Hematochezia  Anemia secondary to acute blood loss  -Hematochezia likely due to hemorrhoidal bleeding vs radiation proctitis vs varices  -Received 1 unit PRBCs on admission, Hgb remains stable but had slight drop since yesterday  -GI consult, trend hemoglobin, consider flexible sigmoidoscopy if hgb continues to downtrend  -Monitor on telemetry with continuous pulse oximetry  -Advance diet as tolerated  -General Surgery consult, recommend addressing the coagulopathy/platelets prior to intervention, will reassess early next week  -Consult hematology at [facility] [place]  -Spoke with Dr. Alfredo who recommends platelets and FFP prior to procedures     Alcoholic cirrhosis of the liver  Thrombocytopenia  History of HCC s/p ablation in 2021 with recent chemotherapy  -Bilirubin and INR are stable from prior studies  -Last EGD showed grade 1 varices  -s/p paracentesis on 3/31  -Continue home diuretics and nadolol with hold parameters  -Continue home lactulose  -Ammonia level normal  -Platelets  stable in the 40s, baseline ~50-60  -Daily INR, CMP     GERD  -Continue IV PPI for now     Bradycardia  Generalized weakness  -Continue nadolol with hold parameters  -Is asymptomatic, monitor on telemetry  -PT/OT eval and treat     DVTp: Defer  PLAN: Anticipate home, consider [facility]    Cherise Waldron PA-C    Plan of care was discussed extensively with patient.  Patient verbalized understanding through teach back method.  All question and concerns addressed upon examination.    Of note, this documentation is completed using the Dragon Dictation system (voice recognition software). There may be spelling and/or grammatical errors that were not corrected prior to final submission.

## 2025-04-04 NOTE — PROGRESS NOTES
Department of Internal Medicine  Gastroenterology  Progress note      Subjective  GI was following for hematochezia. Hematochezia likely related to hemorrhoidal bleed after failed banding in the setting of thrombocytopenia and coagulopathy. General surgery has been consulted for recurrent bleeding and plan for ligation of vessels and possible hemorrhoidopexy once coagulopathy has improved.     Lethargic, 1 episode of hematochezia this morning. No new complaints.     Current Medication    Current Facility-Administered Medications:     acetaminophen (Tylenol) tablet 650 mg, 650 mg, oral, q4h PRN **OR** acetaminophen (Tylenol) oral liquid 650 mg, 650 mg, oral, q4h PRN **OR** acetaminophen (Tylenol) suppository 650 mg, 650 mg, rectal, q4h PRN, Maldonado Chua MD    bicalutamide (Casodex) tablet 50 mg, 50 mg, oral, Daily, Maldonado Chua MD, 50 mg at 04/04/25 0750    finasteride (Proscar) tablet 5 mg, 5 mg, oral, Daily, Maldonado Chua MD, 5 mg at 04/04/25 0750    furosemide (Lasix) tablet 20 mg, 20 mg, oral, Daily, Maldonado Chua MD, 20 mg at 04/04/25 0751    lactulose 20 gram/30 mL oral solution 20 g, 30 mL, oral, TID, Maldonado Chua MD, 20 g at 04/04/25 0751    melatonin tablet 3 mg, 3 mg, oral, Nightly PRN, Maldonado Chua MD    nadolol (Corgard) tablet 10 mg, 10 mg, oral, Daily, Maldonado Chua MD, 10 mg at 04/04/25 0750    ondansetron ODT (Zofran-ODT) disintegrating tablet 4 mg, 4 mg, oral, q8h PRN **OR** ondansetron (Zofran) injection 4 mg, 4 mg, intravenous, q8h PRN, Maldonado Chua MD    pantoprazole (Protonix) injection 40 mg, 40 mg, intravenous, Daily, Maldonado Chua MD, 40 mg at 04/04/25 0751    spironolactone (Aldactone) tablet 50 mg, 50 mg, oral, Daily, Maldonado Chua MD, 50 mg at 04/04/25 0750    Past Medical History  Active Ambulatory Problems     Diagnosis Date Noted    Allergic rhinitis due to pollen 11/28/2023    Anatomical narrow angle, bilateral 06/25/2021    Anemia 06/04/2021     "Anxiety 11/28/2023    Bilateral impacted cerumen 11/28/2023    Cobalamin deficiency 06/04/2021    Colon polyps 11/28/2023    Esophageal varices 12/03/2019    Essential hypertension 10/13/2017    Excessive cerumen in ear canal, right 11/28/2023    Gastric ulcer 11/28/2023    Gastrointestinal hemorrhage associated with anorectal source 10/24/2021    Hepatic cirrhosis (Multi) 12/03/2019    History of laser iridotomy 06/25/2021    Hyperglycemia 11/28/2023    Iron deficiency anemia secondary to inadequate dietary iron intake 11/28/2023    Leukocytosis 10/13/2017    Hypocalcemia 11/28/2023    Malignant neoplasm of liver, not specified as primary or secondary (Multi) 11/28/2023    Malignant neoplasm of prostate (Multi) 10/10/2018    Heart murmur 11/28/2023    PND (post-nasal drip) 11/28/2023    Pseudophakia 06/25/2021    Right foot pain 11/28/2023    Sinus pressure 11/28/2023    Hordeolum externum 11/28/2023    Thrombocytopenia (CMS-HCC) 10/14/2017    Vitamin D deficiency 11/28/2023    Mixed hyperlipidemia 11/28/2023    Hepatocellular carcinoma 02/25/2025    Nontraumatic hemorrhagic shock (Multi) 03/28/2025    Cancer (Multi)     Gastrointestinal hemorrhage, unspecified gastrointestinal hemorrhage type 03/28/2025     Resolved Ambulatory Problems     Diagnosis Date Noted    Abnormal CBC 11/28/2023    Acute blood loss anemia 10/29/2021    Back pain 11/28/2023    Malnutrition of moderate degree (Multi) 11/03/2021    Portal hypertension (Multi) 11/07/2021    Rectal hemorrhage 11/28/2023    Injury of toe 11/28/2023    Pain 11/28/2023     Past Medical History:   Diagnosis Date    Aortic ectasia, 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    Personal history of diseases of the blood and blood-forming organs and certain disorders involving the immune mechanism 07/01/2020    Personal history of other medical treatment        PHYSICAL EXAM  VS: /58   Pulse 68   Temp 36.7 °C (98.1 °F)   Resp 18   Ht 1.727 m (5' 8\")   Wt 90.6 kg " (199 lb 12.8 oz)   SpO2 91%   BMI 30.38 kg/m²  Body mass index is 30.38 kg/m².  Constitutional:       Appearance: He is ill-appearing.   HENT:      Mouth: Mucous membranes are moist.   Eyes:      General: Scleral icterus present.   Cardiovascular:      Rate and Rhythm: Normal rate and regular rhythm.      Heart sounds: Normal heart sounds, S1 normal and S2 normal.   Pulmonary:      Effort: Pulmonary effort is normal.      Breath sounds: Normal breath sounds.   Abdominal:      General: Bowel sounds are normal.      Palpations: Abdomen is soft.      Tenderness: There is no abdominal tenderness.   Skin:     General: Skin is warm.      Coloration: Skin is pale.   Neurological:      Mental Status: He is lethargic.      Motor: Weakness present.     DATA  Recent blood work and relevant radiology and endoscopic studies were reviewed and discussed with the patient   Results from last 7 days   Lab Units 04/04/25  0410   WBC AUTO x10*3/uL 9.9   RBC AUTO x10*6/uL 2.91*   HEMOGLOBIN g/dL 8.8*   HEMATOCRIT % 26.0*   MCV fL 89   MCHC g/dL 33.8   RDW % 18.2*   PLATELETS AUTO x10*3/uL 43*       Results from last 72 hours   Lab Units 04/04/25  0410   SODIUM mmol/L 129*   POTASSIUM mmol/L 4.5   CHLORIDE mmol/L 100   CO2 mmol/L 23   BUN mg/dL 13   CREATININE mg/dL 0.99   CALCIUM mg/dL 7.4*   PROTEIN TOTAL g/dL 4.5*   BILIRUBIN TOTAL mg/dL 3.6*   ALK PHOS U/L 120   AST U/L 32   ALT U/L 17       Results from last 72 hours   Lab Units 04/04/25  0410   INR  1.9*       Results from last 72 hours   Lab Units 04/02/25  0217   LIPASE U/L 32       RADIOLOGY REVIEW  03/28/25  CT ABDOMEN PELVIS ANGIOGRAM W AND/OR WO IV CONTRAST  1.  No evidence of active GI bleed. There is new mild colitis of the  ascending and proximal transverse colon which may be infectious,  inflammatory, or chemotherapy related.     2. Cirrhotic appearing liver with bilateral masses again noted and  stigmata of portal hypertension. There is stable appearance  of  subocclusive thrombus within the portal vein. There is also interval  development of moderate amount of ascites within the abdomen and  pelvis.     3. Diffuse partially calcified atherosclerotic disease without  significant stenosis. 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     4. Colonic diverticulosis without diverticulitis.     5. Coronary artery calcifications.     6. Stable superior endplate deformity of the L4 vertebral body.          ENDOSCOPIC REVIEW  EGD: 3/28/2025 with Dr. Mckoy  Findings/Impression  No active or recent stigmata of bleeding.  Minimal Grade I varices in the mid esophagus + scarring from prior ?banding.   Small hiatal hernia.  Gastric inflammatory polyp (~2 mm) that started to ooze with suction irritation s/p 2 clips.   The stomach was otherwise normal.  The duodenum appeared normal.     Colonoscopy: 3/28/2025 with Dr. Mckoy  Findings/Impression  Fair prep.  The terminal ileum appeared normal.  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 After this was completed, patient had spurting from a hemorrhoid. Colonoscope was switched to an EGD scope with  (bleeding stopped) and 3 bands were placed in the region of the bleeding (particularly over one site that looked like it had recent stigmata of bleeding).        IMPRESSION/RECOMMENDATIONS  Alfonso Toscano is a 80 y.o. 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  Patient was just discharged from the hospital 2 days ago for GIB 2/2 hemorrhoid bleeding treated with banding and radiation proctitis that is treated as well.  Presents with 1 episode of hematochezia at home.  Hgb 8.3, was 7.9 on discharge days ago.     Hematochezia -likely related to  hemorrhoidal bleed and/or radiation proctitis noted on colonoscopy earlier this week  Normocytic anemia 2/2 acute blood loss- Hgb 8.3, baseline 9.5.   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  MELD-Na scope 18 points     - Rectal bleeding most likely related to hemorrhoidal bleed and or radiation proctitis in the setting of thrombocytopenia and coagulopathy. General surgery has been consulted for recurrent bleeding. Recommendations appreciated. Plan for ligation of vessels and possible hemorrhoidopexy once coagulopathy has improved per Dr. Quinn. Consider TIPS if there is concern for ruptured varices.  No endoscopic needs at this time. GI will sign off case.     -Other supportive care per medicine team  -Continue PPI 40 once daily  -Continue nadolol 10 mg p.o. once daily  -Continue lactulose 20 mg p.o. 3 times daily, aim for 3-4 bowel movements daily  -Continue Lasix 20 mg p.o. once daily, spironolactone 50 mg p.o. once daily.  Goal dose for diuretics would be Lasix 40 mg once daily and spironolactone 100 mg p.o. once daily.  -Follow up with GI/hepatology 2 weeks after discharge.      Discussed case with Dr. Meneses.   (Electronically signed KEKE Cook on 4/4/2025 at 2:31 PM)

## 2025-04-04 NOTE — NURSING NOTE
At 1055, Doctor Quinn was in to assess  patient and to discuss plan of care. Wife was at bedside at time of visit.

## 2025-04-04 NOTE — PROGRESS NOTES
General Surgery Progress Note    Patient: Alfonso Toscano  Unit/Bed: 1024/1024-B  YOB: 1945  MRN: [id number]  Acct: [id number]   Admitting Diagnosis: Gastrointestinal hemorrhage, unspecified gastrointestinal hemorrhage type [K92.2]  Date:  4/2/2025  Hospital Day: 1  Attending: Candelario Thakkar MD    Complaint:  Chief Complaint   Patient presents with    Rectal Bleeding     Dx from hospital yesterday.        Subjective  Patient seen and examined this morning. No acute events overnight. Patient states he had broken up sleep last night and that he feels tired.  Patient states he had 3 bowel movements last night.  Patient denies pain.     PHYSICAL EXAM:  Physical Exam  Vitals and nursing note reviewed. Exam conducted with a chaperone present.   Constitutional:       General: He is awake.      Appearance: Normal appearance. He is overweight.   HENT:      Head: Normocephalic.      Nose: Nose normal.      Mouth/Throat:      Mouth: Mucous membranes are moist.   Cardiovascular:      Rate and Rhythm: Normal rate and regular rhythm.      Heart sounds: Murmur heard.   Pulmonary:      Effort: Pulmonary effort is normal.      Breath sounds: Normal breath sounds.   Abdominal:      General: Abdomen is protuberant. Bowel sounds are normal.      Palpations: Abdomen is soft.   Genitourinary:     Comments: Bloody drainage noted around anus  Skin:     General: Skin is warm and dry.      Capillary Refill: Capillary refill takes less than 2 seconds.   Neurological:      General: No focal deficit present.      Mental Status: He is alert and oriented to person, place, and time.      GCS: GCS eye subscore is 4. GCS verbal subscore is 5. GCS motor subscore is 6.   Psychiatric:         Mood and Affect: Mood normal.         Speech: Speech normal.         Behavior: Behavior is cooperative.       Vital signs in last 24 hours:  Vitals:    04/04/25 0734   BP: 125/58   Pulse: 68   Resp:    Temp: 36.7 °C (98.1 °F)   SpO2: 91%      Intake/Output this shift:    Intake/Output Summary (Last 24 hours) at 4/4/2025 1313  Last data filed at 4/4/2025 0256  Gross per 24 hour   Intake 290 ml   Output 800 ml   Net -510 ml      Allergies:  No Known Allergies   Medications:  Scheduled medications  bicalutamide, 50 mg, oral, Daily  finasteride, 5 mg, oral, Daily  furosemide, 20 mg, oral, Daily  lactulose, 30 mL, oral, TID  nadolol, 10 mg, oral, Daily  pantoprazole, 40 mg, intravenous, Daily  spironolactone, 50 mg, oral, Daily      Continuous medications     PRN medications  PRN medications: acetaminophen **OR** acetaminophen **OR** acetaminophen, melatonin, ondansetron ODT **OR** ondansetron  Labs:  Results for orders placed or performed during the hospital encounter of 04/02/25 (from the past 24 hours)   Hemoglobin and hematocrit, blood   Result Value Ref Range    Hemoglobin 9.9 (L) 13.5 - 17.5 g/dL    Hematocrit 29.4 (L) 41.0 - 52.0 %   SST TOP   Result Value Ref Range    Extra Tube Hold for add-ons.    Comprehensive metabolic panel   Result Value Ref Range    Glucose 105 (H) 74 - 99 mg/dL    Sodium 129 (L) 136 - 145 mmol/L    Potassium 4.5 3.5 - 5.3 mmol/L    Chloride 100 98 - 107 mmol/L    Bicarbonate 23 21 - 32 mmol/L    Anion Gap 11 10 - 20 mmol/L    Urea Nitrogen 13 6 - 23 mg/dL    Creatinine 0.99 0.50 - 1.30 mg/dL    eGFR 78 >60 mL/min/1.73m*2    Calcium 7.4 (L) 8.6 - 10.3 mg/dL    Albumin 2.4 (L) 3.4 - 5.0 g/dL    Alkaline Phosphatase 120 33 - 136 U/L    Total Protein 4.5 (L) 6.4 - 8.2 g/dL    AST 32 9 - 39 U/L    Bilirubin, Total 3.6 (H) 0.0 - 1.2 mg/dL    ALT 17 10 - 52 U/L   CBC and Auto Differential   Result Value Ref Range    WBC 9.9 4.4 - 11.3 x10*3/uL    nRBC 0.0 0.0 - 0.0 /100 WBCs    RBC 2.91 (L) 4.50 - 5.90 x10*6/uL    Hemoglobin 8.8 (L) 13.5 - 17.5 g/dL    Hematocrit 26.0 (L) 41.0 - 52.0 %    MCV 89 80 - 100 fL    MCH 30.2 26.0 - 34.0 pg    MCHC 33.8 32.0 - 36.0 g/dL    RDW 18.2 (H) 11.5 - 14.5 %    Platelets 43 (L) 150 - 450  x10*3/uL    Neutrophils % 65.7 40.0 - 80.0 %    Immature Granulocytes %, Automated 0.5 0.0 - 0.9 %    Lymphocytes % 14.0 13.0 - 44.0 %    Monocytes % 16.7 2.0 - 10.0 %    Eosinophils % 2.9 0.0 - 6.0 %    Basophils % 0.2 0.0 - 2.0 %    Neutrophils Absolute 6.47 (H) 1.60 - 5.50 x10*3/uL    Immature Granulocytes Absolute, Automated 0.05 0.00 - 0.50 x10*3/uL    Lymphocytes Absolute 1.38 0.80 - 3.00 x10*3/uL    Monocytes Absolute 1.65 (H) 0.05 - 0.80 x10*3/uL    Eosinophils Absolute 0.29 0.00 - 0.40 x10*3/uL    Basophils Absolute 0.02 0.00 - 0.10 x10*3/uL   Magnesium   Result Value Ref Range    Magnesium 1.79 1.60 - 2.40 mg/dL   Protime-INR   Result Value Ref Range    Protime 20.5 (H) 9.8 - 12.4 seconds    INR 1.9 (H) 0.9 - 1.1   SST TOP   Result Value Ref Range    Extra Tube Hold for add-ons.      *Note: Due to a large number of results and/or encounters for the requested time period, some results have not been displayed. A complete set of results can be found in Results Review.      Imaging:  Imaging  No results found.    Cardiology, Vascular, and Other Imaging  No other imaging results found for the past 2 days     Assessment  Hematochezia    On exam abdomen soft and nontender.  Bowel sounds present.  Bloody drainage noticed around the anus, chaperone present with assessment.  Respirations equal nonlabored.  Lung sounds clear.  Heart murmur noted.  Morning lab work shows no leukocytosis and hemoglobin 8.8 down from 9.9 yesterday.  Afebrile.      Plan  -Regular diet  -Pain control  -Nausea control  -Continue to monitor for rectal bleeding  -Appreciate GI recommendations  -DVT prophylaxis-SCD  -Pulmonary toileting - C&DB/IS  -Encourage ambulation- OOB to chair/walk in halls  -Continue care per primary team       Further recommendations per Dr. Vu Echevarria, APRN-CNP    Time spent  37  minutes obtaining labs, imaging, recommendations, interview, assessment, examination, medication review/ordering, and EMR  review.    Plan of care was discussed extensively with patient. Patient verbalized understanding through teach back method. All questions and concerns addressed upon examination.     Of note, this documentation is completed using the Dragon Dictation system (voice recognition software). There may be spelling and/or grammatical errors that were not corrected prior to final submission.

## 2025-04-04 NOTE — NURSING NOTE
At 1230, Cherise HEALY was in to assess patient and to discuss plan of care. Wife was present at time of visit.

## 2025-04-04 NOTE — PROGRESS NOTES
"Physical Therapy    Physical Therapy Treatment    Patient Name: Alfonso Toscano  MRN: [id number]  Today's Date: 4/4/2025  Time Calculation  Start Time: 1400  Stop Time: 1442  Time Calculation (min): 42 min     1024/1024-B    Assessment/Plan   Barriers to Discharge Home: Physical needs    End of Session Communication: Bedside nurse    Assessment Comment: pt with decreased mobility /gait strength balance endurance pt to benefit from skilled PT to address deficits and improve functional mobility    End of Session Patient Position:  (Patient in recliner after treatment. Call light in reach. Warm blankets provided. No alarm on at start or end of treatment. Family at bedside. Tray on other side of room next to spouse per patient request)    PT Plan  Inpatient/Swing Bed or Outpatient: Inpatient  Treatment/Interventions: Bed mobility, Transfer training, Gait training, Balance training, Strengthening, Endurance training, Therapeutic exercise, Therapeutic activity, Stair training  PT Plan: Ongoing PT  PT Frequency: 3 times per week  PT Discharge Recommendations: Low intensity level of continued care  Equipment Recommended upon Discharge: Wheeled walker (pt has walker) PT Recommended Transfer Status: Assist x1, Assistive device    General Visit Information:   PT  Visit  PT Received On: 04/04/25    General  Family/Caregiver Present:  (Multiple family members at bedside when therapy arrived.  They all left prior to treatment.  Spouse and son arrived mid-treatment and remained throughout.  Very supportvie of care)  Prior to Session Communication:  (cleared by RN for PT)  Patient Position Received:  (Patient presents in bed, agreeable to PT.  He reports he's been up to BSC today, but has not sat up in the chair.)    General Comment:  (Dx: GIB)    General Observations:   General Observation:  (tele; external male cathter)    Subjective \"I had a lot of blood in my stool\"    Precautions:  Precautions  Medical Precautions: Fall " "precautions      Pain:  Pain Assessment  Pain Assessment: 0-10  0-10 (Numeric) Pain Score: 0 - No pain  Pain Interventions:  (No interventions required)    Cognition:  Cognition  Overall Cognitive Status:  (Flat affect)  Orientation Level: Oriented X4  Insight: Mild  Processing Speed: Delayed      Balance:   Static Standing Balance  Static Standing-Level of Assistance:  (Fair-)  Static Standing-Comment/Number of Minutes:  (3 minutes total static stand while performing hygeine tasks)    Dynamic Standing Balance  Dynamic Standing-Balance:  (fair- dynamic stand with ww)    Activity Tolerance:  Activity Tolerance  Endurance: Decreased tolerance for upright activites        Bed Mobility  Bed Mobility:  (supine->sit: min x1  Bed flat to mimic home set up. Patient grabbing onto armrest of recliner chair next to bed to help pull himself up.  Assist for trunk needed. Increased time and effort to complete transfer.)    Ambulation/Gait Training  Ambulation/Gait Training Performed:  (Patient amb 20' x2 with ww and CGA x1. Slow mark, decreased step length bilat. Cues to remain inside walkers ALICIA.)    Transfers  Transfer:  (sit<->stand: min x1  Two stands performed (EOB; elevated toilet). Cues for hand placement. Patient also attempts to \"park\" ww off to the side when approaching chair.)             Outcome Measures:   [facility] Basic Mobility  Turning from your back to your side while in a flat bed without using bedrails: A little  Moving from lying on your back to sitting on the side of a flat bed without using bedrails: A little  Moving to and from bed to chair (including a wheelchair): A little  Standing up from a chair using your arms (e.g. wheelchair or bedside chair): A little  To walk in hospital room: A little  Climbing 3-5 steps with railing: Total  Basic Mobility - Total Score: 16          Education Documentation  Mobility Training, taught by Makayla Woo PTA at 4/4/2025  2:49 PM.  Learner: Patient  Readiness: " Acceptance  Method: Explanation, Demonstration  Response: Needs Reinforcement    Education Comments  Individual(s) Educated: Patient  Education Provided:  (Patient educated in bed mobility, transfers, gait and safety.)  Patient Response to Education:  (Receptive to education)    Encounter Problems       Encounter Problems (Active)       PT Problem       Pt will demonstrate mod I  with bed mobility to edge of bed.   (Progressing)       Start:  04/03/25    Expected End:  04/17/25            Pt will demonstrate mod I  with sit to stand/chair transfers with FWW.   (Progressing)       Start:  04/03/25    Expected End:  04/17/25            Pt will ambulate 30 feet with FWW SBA .   (Progressing)       Start:  04/03/25    Expected End:  04/17/25            Pt to demo improved BLE strength by being able to complete supine/seated thera ex 2x20 BLEs with 4 or less rest breaks .   (Not Progressing)       Start:  04/03/25    Expected End:  04/17/25               Pain - Adult

## 2025-04-04 NOTE — CARE PLAN
The patient's goals for the shift include      The clinical goals for the shift include patient will not fall throughout the shift

## 2025-04-04 NOTE — NURSING NOTE
At 1220, this nurse informed Cherise HEALY and Shereen Medrano CNP of patient having blood in his stool.

## 2025-04-04 NOTE — PROGRESS NOTES
Pt had more bloody stool last night. GI and General Surgery following. Hgb remaining stable. Planned to keep pt over the weekend to monitor labs. If an intervention needed, would be sometime next week.

## 2025-04-05 LAB
ALBUMIN SERPL BCP-MCNC: 2.5 G/DL (ref 3.4–5)
ALP SERPL-CCNC: 135 U/L (ref 33–136)
ALT SERPL W P-5'-P-CCNC: 20 U/L (ref 10–52)
ANION GAP SERPL CALC-SCNC: 11 MMOL/L (ref 10–20)
APTT PPP: 30 SECONDS (ref 26–36)
AST SERPL W P-5'-P-CCNC: 40 U/L (ref 9–39)
BASOPHILS # BLD AUTO: 0.02 X10*3/UL (ref 0–0.1)
BASOPHILS NFR BLD AUTO: 0.2 %
BILIRUB SERPL-MCNC: 3.9 MG/DL (ref 0–1.2)
BUN SERPL-MCNC: 16 MG/DL (ref 6–23)
CALCIUM SERPL-MCNC: 7.6 MG/DL (ref 8.6–10.3)
CHLORIDE SERPL-SCNC: 100 MMOL/L (ref 98–107)
CO2 SERPL-SCNC: 23 MMOL/L (ref 21–32)
CREAT SERPL-MCNC: 0.96 MG/DL (ref 0.5–1.3)
EGFRCR SERPLBLD CKD-EPI 2021: 81 ML/MIN/1.73M*2
EOSINOPHIL # BLD AUTO: 0.05 X10*3/UL (ref 0–0.4)
EOSINOPHIL NFR BLD AUTO: 0.4 %
ERYTHROCYTE [DISTWIDTH] IN BLOOD BY AUTOMATED COUNT: 18.4 % (ref 11.5–14.5)
ERYTHROCYTE [DISTWIDTH] IN BLOOD BY AUTOMATED COUNT: 18.6 % (ref 11.5–14.5)
FIBRINOGEN PPP-MCNC: 133 MG/DL (ref 200–400)
GLUCOSE SERPL-MCNC: 111 MG/DL (ref 74–99)
HCT VFR BLD AUTO: 27 % (ref 41–52)
HCT VFR BLD AUTO: 29.3 % (ref 41–52)
HGB BLD-MCNC: 8.8 G/DL (ref 13.5–17.5)
HGB BLD-MCNC: 9.9 G/DL (ref 13.5–17.5)
HOLD SPECIMEN: NORMAL
IMM GRANULOCYTES # BLD AUTO: 0.09 X10*3/UL (ref 0–0.5)
IMM GRANULOCYTES NFR BLD AUTO: 0.7 % (ref 0–0.9)
INR PPP: 1.7 (ref 0.9–1.1)
INR PPP: 1.8 (ref 0.9–1.1)
LYMPHOCYTES # BLD AUTO: 1.13 X10*3/UL (ref 0.8–3)
LYMPHOCYTES NFR BLD AUTO: 9.3 %
MAGNESIUM SERPL-MCNC: 1.76 MG/DL (ref 1.6–2.4)
MCH RBC QN AUTO: 29.8 PG (ref 26–34)
MCH RBC QN AUTO: 31 PG (ref 26–34)
MCHC RBC AUTO-ENTMCNC: 32.6 G/DL (ref 32–36)
MCHC RBC AUTO-ENTMCNC: 33.8 G/DL (ref 32–36)
MCV RBC AUTO: 92 FL (ref 80–100)
MCV RBC AUTO: 92 FL (ref 80–100)
MONOCYTES # BLD AUTO: 1.58 X10*3/UL (ref 0.05–0.8)
MONOCYTES NFR BLD AUTO: 13 %
NEUTROPHILS # BLD AUTO: 9.27 X10*3/UL (ref 1.6–5.5)
NEUTROPHILS NFR BLD AUTO: 76.4 %
NRBC BLD-RTO: 0 /100 WBCS (ref 0–0)
NRBC BLD-RTO: 0 /100 WBCS (ref 0–0)
PLATELET # BLD AUTO: 47 X10*3/UL (ref 150–450)
PLATELET # BLD AUTO: 77 X10*3/UL (ref 150–450)
POTASSIUM SERPL-SCNC: 5.1 MMOL/L (ref 3.5–5.3)
PROT SERPL-MCNC: 4.7 G/DL (ref 6.4–8.2)
PROTHROMBIN TIME: 19.2 SECONDS (ref 9.8–12.4)
PROTHROMBIN TIME: 19.8 SECONDS (ref 9.8–12.4)
RBC # BLD AUTO: 2.95 X10*6/UL (ref 4.5–5.9)
RBC # BLD AUTO: 3.19 X10*6/UL (ref 4.5–5.9)
SODIUM SERPL-SCNC: 129 MMOL/L (ref 136–145)
WBC # BLD AUTO: 12.1 X10*3/UL (ref 4.4–11.3)
WBC # BLD AUTO: 18.4 X10*3/UL (ref 4.4–11.3)

## 2025-04-05 PROCEDURE — 2500000004 HC RX 250 GENERAL PHARMACY W/ HCPCS (ALT 636 FOR OP/ED): Mod: JZ | Performed by: NURSE PRACTITIONER

## 2025-04-05 PROCEDURE — 2500000001 HC RX 250 WO HCPCS SELF ADMINISTERED DRUGS (ALT 637 FOR MEDICARE OP): Performed by: INTERNAL MEDICINE

## 2025-04-05 PROCEDURE — 85260 CLOT FACTOR X STUART-POWER: CPT | Mod: ELYLAB | Performed by: INTERNAL MEDICINE

## 2025-04-05 PROCEDURE — 2500000004 HC RX 250 GENERAL PHARMACY W/ HCPCS (ALT 636 FOR OP/ED): Performed by: INTERNAL MEDICINE

## 2025-04-05 PROCEDURE — 85610 PROTHROMBIN TIME: CPT

## 2025-04-05 PROCEDURE — 99233 SBSQ HOSP IP/OBS HIGH 50: CPT | Performed by: INTERNAL MEDICINE

## 2025-04-05 PROCEDURE — 85384 FIBRINOGEN ACTIVITY: CPT | Performed by: INTERNAL MEDICINE

## 2025-04-05 PROCEDURE — 85220 BLOOC CLOT FACTOR V TEST: CPT | Mod: ELYLAB | Performed by: INTERNAL MEDICINE

## 2025-04-05 PROCEDURE — 80053 COMPREHEN METABOLIC PANEL: CPT

## 2025-04-05 PROCEDURE — 36415 COLL VENOUS BLD VENIPUNCTURE: CPT | Performed by: INTERNAL MEDICINE

## 2025-04-05 PROCEDURE — 36415 COLL VENOUS BLD VENIPUNCTURE: CPT

## 2025-04-05 PROCEDURE — 99231 SBSQ HOSP IP/OBS SF/LOW 25: CPT | Performed by: SURGERY

## 2025-04-05 PROCEDURE — 85240 CLOT FACTOR VIII AHG 1 STAGE: CPT | Mod: ELYLAB | Performed by: INTERNAL MEDICINE

## 2025-04-05 PROCEDURE — 85025 COMPLETE CBC W/AUTO DIFF WBC: CPT

## 2025-04-05 PROCEDURE — 2500000002 HC RX 250 W HCPCS SELF ADMINISTERED DRUGS (ALT 637 FOR MEDICARE OP, ALT 636 FOR OP/ED): Performed by: INTERNAL MEDICINE

## 2025-04-05 PROCEDURE — 83735 ASSAY OF MAGNESIUM: CPT

## 2025-04-05 PROCEDURE — 1200000002 HC GENERAL ROOM WITH TELEMETRY DAILY

## 2025-04-05 PROCEDURE — 85610 PROTHROMBIN TIME: CPT | Performed by: INTERNAL MEDICINE

## 2025-04-05 PROCEDURE — 85230 CLOT FACTOR VII PROCONVERTIN: CPT | Mod: ELYLAB | Performed by: INTERNAL MEDICINE

## 2025-04-05 RX ADMIN — HYDROMORPHONE HYDROCHLORIDE 0.2 MG: 0.2 INJECTION, SOLUTION INTRAMUSCULAR; INTRAVENOUS; SUBCUTANEOUS at 09:04

## 2025-04-05 RX ADMIN — SPIRONOLACTONE 50 MG: 25 TABLET ORAL at 09:04

## 2025-04-05 RX ADMIN — LACTULOSE 20 G: 20 SOLUTION ORAL at 21:49

## 2025-04-05 RX ADMIN — PANTOPRAZOLE SODIUM 40 MG: 40 INJECTION, POWDER, FOR SOLUTION INTRAVENOUS at 09:04

## 2025-04-05 RX ADMIN — FINASTERIDE 5 MG: 5 TABLET, FILM COATED ORAL at 09:04

## 2025-04-05 RX ADMIN — FUROSEMIDE 20 MG: 40 TABLET ORAL at 09:03

## 2025-04-05 RX ADMIN — NADOLOL 10 MG: 20 TABLET ORAL at 09:04

## 2025-04-05 RX ADMIN — BICALUTAMIDE 50 MG: 50 TABLET ORAL at 09:08

## 2025-04-05 ASSESSMENT — COGNITIVE AND FUNCTIONAL STATUS - GENERAL
MOVING TO AND FROM BED TO CHAIR: A LOT
WALKING IN HOSPITAL ROOM: TOTAL
DRESSING REGULAR LOWER BODY CLOTHING: A LITTLE
MOBILITY SCORE: 12
EATING MEALS: A LITTLE
HELP NEEDED FOR BATHING: A LOT
EATING MEALS: A LITTLE
TURNING FROM BACK TO SIDE WHILE IN FLAT BAD: A LITTLE
DAILY ACTIVITIY SCORE: 14
TOILETING: A LOT
TOILETING: A LOT
WALKING IN HOSPITAL ROOM: TOTAL
DRESSING REGULAR UPPER BODY CLOTHING: A LOT
STANDING UP FROM CHAIR USING ARMS: TOTAL
DRESSING REGULAR LOWER BODY CLOTHING: TOTAL
MOBILITY SCORE: 12
PERSONAL GROOMING: A LOT
DRESSING REGULAR UPPER BODY CLOTHING: A LOT
HELP NEEDED FOR BATHING: A LOT
CLIMB 3 TO 5 STEPS WITH RAILING: TOTAL
TURNING FROM BACK TO SIDE WHILE IN FLAT BAD: A LITTLE
MOVING TO AND FROM BED TO CHAIR: A LOT
PERSONAL GROOMING: A LOT
CLIMB 3 TO 5 STEPS WITH RAILING: TOTAL
STANDING UP FROM CHAIR USING ARMS: TOTAL
DAILY ACTIVITIY SCORE: 12

## 2025-04-05 ASSESSMENT — PAIN SCALES - GENERAL
PAINLEVEL_OUTOF10: 0 - NO PAIN
PAINLEVEL_OUTOF10: 0 - NO PAIN
PAINLEVEL_OUTOF10: 6

## 2025-04-05 ASSESSMENT — PAIN DESCRIPTION - ORIENTATION: ORIENTATION: LEFT;UPPER

## 2025-04-05 ASSESSMENT — PAIN - FUNCTIONAL ASSESSMENT
PAIN_FUNCTIONAL_ASSESSMENT: 0-10
PAIN_FUNCTIONAL_ASSESSMENT: 0-10

## 2025-04-05 ASSESSMENT — PAIN DESCRIPTION - LOCATION: LOCATION: ABDOMEN

## 2025-04-05 NOTE — PROGRESS NOTES
Alfonso Toscano is a 80 y.o. male on day 2 of admission presenting with Gastrointestinal hemorrhage, unspecified gastrointestinal hemorrhage type.      Subjective   The pt is seen and evaluated today.  The patient is laying in bed complains of intermittent abdominal pain.  He does have abdominal distention from his cirrhosis.  He refused to take his lactulose yesterday.  Per wife he only had 1 session of chemotherapy and subsequently oncology is not planning on any further chemotherapy.  Per the wife there is a meeting with the tumor board on Tuesday.  The patient denies any further rectal bleeding at this time.       Objective     Last Recorded Vitals  /60 (BP Location: Right arm, Patient Position: Lying)   Pulse 60   Temp 36.2 °C (97.2 °F) (Temporal)   Resp 16   Wt 90.8 kg (200 lb 3.2 oz)   SpO2 92%   Intake/Output last 3 Shifts:    Intake/Output Summary (Last 24 hours) at 4/5/2025 1440  Last data filed at 4/5/2025 0236  Gross per 24 hour   Intake 240 ml   Output 700 ml   Net -460 ml       Admission Weight  Weight: 86.2 kg (190 lb) (04/02/25 0137)    Daily Weight  04/05/25 : 90.8 kg (200 lb 3.2 oz)    Image Results  ECG 12 lead  Sinus bradycardia  Otherwise normal ECG  When compared with ECG of 28-MAR-2025 04:41, (unconfirmed)  CT interval has decreased  Criteria for Septal infarct are no longer Present  ST no longer depressed in Inferior leads  T wave inversion no longer evident in Inferior leads  See ED provider note for full interpretation and clinical correlation  Confirmed by Vangie Morin ([id number]) on 4/3/2025 11:06:08 AM      Physical Exam  Constitutional:       Appearance: He is ill-appearing.   HENT:      Head: Normocephalic and atraumatic.      Nose: Nose normal.      Mouth/Throat:      Mouth: Mucous membranes are dry.   Eyes:      Extraocular Movements: Extraocular movements intact.      Conjunctiva/sclera: Conjunctivae normal.   Cardiovascular:      Rate and Rhythm: Normal rate and regular  rhythm.      Pulses: Normal pulses.      Heart sounds: Normal heart sounds.   Pulmonary:      Effort: Pulmonary effort is normal.      Breath sounds: Normal breath sounds.   Abdominal:      General: Bowel sounds are normal. There is distension.      Palpations: Abdomen is soft.   Musculoskeletal:         General: Normal range of motion.      Cervical back: Normal range of motion and neck supple.      Right lower leg: Edema present.      Left lower leg: Edema present.   Skin:     General: Skin is warm and dry.      Coloration: Skin is pale.   Neurological:      General: No focal deficit present.      Mental Status: He is alert and oriented to person, place, and time. Mental status is at baseline.   Psychiatric:         Mood and Affect: Mood normal.         Relevant Results               Assessment/Plan          Alfonso Toscano is a 80 y.o. male with a history of liver cancer, prostate cancer, esophageal varices, anxiety disorder, colon polyps, hypertension, peptic ulcer disease, GERD, cirrhosis, and hyperlipidemia.  He was just discharged from the hospital on 3/31. He presented at that time with a traumatic hemorrhagic shock and was initially admitted to the ICU. He received 2 units packed red blood cells and FFP. He underwent EGD and colonoscopy at that time and had suspected hemorrhoidal bleeding treated with banding. EGD showed small grade 1 varices.  Admitted again this time with rectal bleeding.         Assessment & Plan  Gastrointestinal hemorrhage, unspecified gastrointestinal hemorrhage type    Hepatic cirrhosis (Multi)    Thrombocytopenia (CMS-HCC)    Lactic acidosis    Hematochezia  Anemia secondary to acute blood loss  -Hematochezia likely due to hemorrhoidal bleeding vs radiation proctitis vs varices  -Received 1 unit PRBCs on admission, Hgb remains stable.  He has been banded before.  Surgery is recommending correcting coagulopathy and platelet count which will be difficult to achieve given underlying  cirrhosis.  Will give of IV vitamin K although vitamin K is unlikely to fix the INR.  Platelets prior to surgery and 5 days of Avatrombopag with hematology recommendations here.  Pharmacy is going to order Avatrombopag and will be arriving probably on Monday.  -General Surgery consult, recommend addressing the coagulopathy/platelets prior to intervention, will reassess early next week for possible vessel ligation.  Currently rectal bleeding seems to have improved.  -EMS hematology is on board  -Spoke with Dr. Alfredo who recommends platelets and FFP prior to procedures  -Overall prognosis is poor.  Per the wife there is a tumor board meeting on Tuesday.  After the meeting palliative care should be involved.     Alcoholic cirrhosis of the liver  Thrombocytopenia  History of HCC s/p ablation in 2021 with recent chemotherapy  -Last EGD showed grade 1 varices  -s/p paracentesis on 3/31.  Continue with as needed paracentesis.  -Continue home diuretics and nadolol with hold parameters  -Continue home lactulose  -Ammonia level normal  -Platelets stable in the 40s, baseline ~50-60  -Daily INR, CMP     GERD  -Continue IV PPI for now     Bradycardia  Generalized weakness  -Continue nadolol with hold parameters  -Is asymptomatic, monitor on telemetry  -PT/OT eval and treat     DVTp: Defer  Overall prognosis is poor.  Pending tumor board on Tuesday.  Recommend palliative care subsequent to that.              Damaris Willard MD

## 2025-04-05 NOTE — CARE PLAN
The patient's goals for the shift include      The clinical goals for the shift include Safety maintained throughout this shift      Problem: Pain - Adult  Goal: Verbalizes/displays adequate comfort level or baseline comfort level  Flowsheets (Taken 4/5/2025 1323)  Verbalizes/displays adequate comfort level or baseline comfort level:   Encourage patient to monitor pain and request assistance   Assess pain using appropriate pain scale   Administer analgesics based on type and severity of pain and evaluate response     Problem: Skin  Goal: Prevent/manage excess moisture  Flowsheets (Taken 4/5/2025 1325)  Prevent/manage excess moisture:   Cleanse incontinence/protect with barrier cream   Moisturize dry skin   Monitor for/manage infection if present

## 2025-04-05 NOTE — CARE PLAN
The patient's goals for the shift include      The clinical goals for the shift include Safety maintained throughout this shift    Problem: Pain - Adult  Goal: Verbalizes/displays adequate comfort level or baseline comfort level  Outcome: Progressing

## 2025-04-05 NOTE — PROGRESS NOTES
"Alfonso Toscano is a 80 y.o. male on day 2 of admission presenting with Gastrointestinal hemorrhage, unspecified gastrointestinal hemorrhage type.    Subjective   No acute events overnight, patient had bowel movement without any evidence of blood, no other issues       Objective     Physical Exam  Gen: NAD, Aax3  Abd; Soft, distended, NTTP, rectum has no evidence of bleeding  Last Recorded Vitals  Blood pressure 153/65, pulse 54, temperature 36.7 °C (98.1 °F), temperature source Temporal, resp. rate 16, height 1.727 m (5' 8\"), weight 90.8 kg (200 lb 3.2 oz), SpO2 94%.  Intake/Output last 3 Shifts:  I/O last 3 completed shifts:  In: 290 (3.2 mL/kg) [P.O.:240; I.V.:50 (0.6 mL/kg)]  Out: 800 (8.8 mL/kg) [Urine:800 (0.2 mL/kg/hr)]  Weight: 90.6 kg     Relevant Results                              Assessment/Plan   Assessment & Plan  Gastrointestinal hemorrhage, unspecified gastrointestinal hemorrhage type    Hepatic cirrhosis (Multi)    Thrombocytopenia (CMS-HCC)    Lactic acidosis    80-year-old male with cirrhosis and hemorrhoids with concern for bright red blood per rectum due to hemorrhoids, has been banded but is still bleeding a little bit.  Patient has significant coagulopathy including platelet count of 43 and INR of 1.9  -Recommend correcting coagulopathy and platelet count first  -If this does not work, we will consider vessel ligation next week but these numbers need to be corrected over the weekend otherwise he is at risk for complication including intraoperative bleeding despite this being a relatively low risk procedure       I spent 38 minutes in the professional and overall care of this patient.      Sumanth Womack MD  04/05/25  12:42 AM      "

## 2025-04-06 VITALS
OXYGEN SATURATION: 94 % | HEIGHT: 68 IN | BODY MASS INDEX: 30.17 KG/M2 | SYSTOLIC BLOOD PRESSURE: 119 MMHG | DIASTOLIC BLOOD PRESSURE: 56 MMHG | HEART RATE: 55 BPM | WEIGHT: 199.08 LBS | TEMPERATURE: 99.1 F | RESPIRATION RATE: 18 BRPM

## 2025-04-06 LAB
ALBUMIN SERPL BCP-MCNC: 2.3 G/DL (ref 3.4–5)
ALP SERPL-CCNC: 123 U/L (ref 33–136)
ALT SERPL W P-5'-P-CCNC: 19 U/L (ref 10–52)
ANION GAP SERPL CALC-SCNC: 8 MMOL/L (ref 10–20)
AST SERPL W P-5'-P-CCNC: 39 U/L (ref 9–39)
BASOPHILS # BLD AUTO: 0.03 X10*3/UL (ref 0–0.1)
BASOPHILS NFR BLD AUTO: 0.3 %
BILIRUB SERPL-MCNC: 3.2 MG/DL (ref 0–1.2)
BUN SERPL-MCNC: 17 MG/DL (ref 6–23)
CALCIUM SERPL-MCNC: 7.3 MG/DL (ref 8.6–10.3)
CHLORIDE SERPL-SCNC: 102 MMOL/L (ref 98–107)
CO2 SERPL-SCNC: 25 MMOL/L (ref 21–32)
CREAT SERPL-MCNC: 1.03 MG/DL (ref 0.5–1.3)
EGFRCR SERPLBLD CKD-EPI 2021: 74 ML/MIN/1.73M*2
EOSINOPHIL # BLD AUTO: 0.19 X10*3/UL (ref 0–0.4)
EOSINOPHIL NFR BLD AUTO: 2.1 %
ERYTHROCYTE [DISTWIDTH] IN BLOOD BY AUTOMATED COUNT: 18.9 % (ref 11.5–14.5)
GLUCOSE SERPL-MCNC: 76 MG/DL (ref 74–99)
HCT VFR BLD AUTO: 25.2 % (ref 41–52)
HGB BLD-MCNC: 8.3 G/DL (ref 13.5–17.5)
HOLD SPECIMEN: NORMAL
IMM GRANULOCYTES # BLD AUTO: 0.04 X10*3/UL (ref 0–0.5)
IMM GRANULOCYTES NFR BLD AUTO: 0.4 % (ref 0–0.9)
INR PPP: 1.8 (ref 0.9–1.1)
LYMPHOCYTES # BLD AUTO: 1.43 X10*3/UL (ref 0.8–3)
LYMPHOCYTES NFR BLD AUTO: 15.5 %
MAGNESIUM SERPL-MCNC: 1.73 MG/DL (ref 1.6–2.4)
MCH RBC QN AUTO: 30.6 PG (ref 26–34)
MCHC RBC AUTO-ENTMCNC: 32.9 G/DL (ref 32–36)
MCV RBC AUTO: 93 FL (ref 80–100)
MONOCYTES # BLD AUTO: 1.76 X10*3/UL (ref 0.05–0.8)
MONOCYTES NFR BLD AUTO: 19.1 %
NEUTROPHILS # BLD AUTO: 5.77 X10*3/UL (ref 1.6–5.5)
NEUTROPHILS NFR BLD AUTO: 62.6 %
NRBC BLD-RTO: 0 /100 WBCS (ref 0–0)
PLATELET # BLD AUTO: 52 X10*3/UL (ref 150–450)
POTASSIUM SERPL-SCNC: 4.7 MMOL/L (ref 3.5–5.3)
PROT SERPL-MCNC: 4.2 G/DL (ref 6.4–8.2)
PROTHROMBIN TIME: 19.9 SECONDS (ref 9.8–12.4)
RBC # BLD AUTO: 2.71 X10*6/UL (ref 4.5–5.9)
SODIUM SERPL-SCNC: 130 MMOL/L (ref 136–145)
WBC # BLD AUTO: 9.2 X10*3/UL (ref 4.4–11.3)

## 2025-04-06 PROCEDURE — 99233 SBSQ HOSP IP/OBS HIGH 50: CPT | Performed by: INTERNAL MEDICINE

## 2025-04-06 PROCEDURE — 36415 COLL VENOUS BLD VENIPUNCTURE: CPT

## 2025-04-06 PROCEDURE — 83735 ASSAY OF MAGNESIUM: CPT

## 2025-04-06 PROCEDURE — 2500000002 HC RX 250 W HCPCS SELF ADMINISTERED DRUGS (ALT 637 FOR MEDICARE OP, ALT 636 FOR OP/ED): Performed by: INTERNAL MEDICINE

## 2025-04-06 PROCEDURE — 2500000005 HC RX 250 GENERAL PHARMACY W/O HCPCS: Performed by: INTERNAL MEDICINE

## 2025-04-06 PROCEDURE — 2500000001 HC RX 250 WO HCPCS SELF ADMINISTERED DRUGS (ALT 637 FOR MEDICARE OP): Performed by: INTERNAL MEDICINE

## 2025-04-06 PROCEDURE — 85610 PROTHROMBIN TIME: CPT

## 2025-04-06 PROCEDURE — 85025 COMPLETE CBC W/AUTO DIFF WBC: CPT

## 2025-04-06 PROCEDURE — 80053 COMPREHEN METABOLIC PANEL: CPT

## 2025-04-06 PROCEDURE — 2500000004 HC RX 250 GENERAL PHARMACY W/ HCPCS (ALT 636 FOR OP/ED): Mod: JZ | Performed by: NURSE PRACTITIONER

## 2025-04-06 PROCEDURE — 2500000004 HC RX 250 GENERAL PHARMACY W/ HCPCS (ALT 636 FOR OP/ED): Performed by: INTERNAL MEDICINE

## 2025-04-06 PROCEDURE — 99231 SBSQ HOSP IP/OBS SF/LOW 25: CPT | Performed by: SURGERY

## 2025-04-06 PROCEDURE — 1200000002 HC GENERAL ROOM WITH TELEMETRY DAILY

## 2025-04-06 RX ADMIN — BICALUTAMIDE 50 MG: 50 TABLET ORAL at 08:50

## 2025-04-06 RX ADMIN — LACTULOSE 20 G: 20 SOLUTION ORAL at 08:50

## 2025-04-06 RX ADMIN — LACTULOSE 20 G: 20 SOLUTION ORAL at 15:24

## 2025-04-06 RX ADMIN — SPIRONOLACTONE 50 MG: 25 TABLET ORAL at 08:51

## 2025-04-06 RX ADMIN — LACTULOSE 20 G: 20 SOLUTION ORAL at 20:00

## 2025-04-06 RX ADMIN — FUROSEMIDE 20 MG: 40 TABLET ORAL at 08:51

## 2025-04-06 RX ADMIN — PANTOPRAZOLE SODIUM 40 MG: 40 INJECTION, POWDER, FOR SOLUTION INTRAVENOUS at 08:50

## 2025-04-06 RX ADMIN — Medication 3 MG: at 20:00

## 2025-04-06 RX ADMIN — ACETAMINOPHEN 650 MG: 325 TABLET ORAL at 20:19

## 2025-04-06 RX ADMIN — HYDROMORPHONE HYDROCHLORIDE 0.2 MG: 0.2 INJECTION, SOLUTION INTRAMUSCULAR; INTRAVENOUS; SUBCUTANEOUS at 20:14

## 2025-04-06 RX ADMIN — FINASTERIDE 5 MG: 5 TABLET, FILM COATED ORAL at 08:35

## 2025-04-06 RX ADMIN — NADOLOL 10 MG: 20 TABLET ORAL at 08:50

## 2025-04-06 ASSESSMENT — PAIN SCALES - GENERAL
PAINLEVEL_OUTOF10: 3
PAINLEVEL_OUTOF10: 0 - NO PAIN
PAINLEVEL_OUTOF10: 8
PAINLEVEL_OUTOF10: 3
PAINLEVEL_OUTOF10: 1

## 2025-04-06 ASSESSMENT — COGNITIVE AND FUNCTIONAL STATUS - GENERAL
PERSONAL GROOMING: A LOT
TURNING FROM BACK TO SIDE WHILE IN FLAT BAD: A LITTLE
TOILETING: A LOT
HELP NEEDED FOR BATHING: A LOT
WALKING IN HOSPITAL ROOM: TOTAL
CLIMB 3 TO 5 STEPS WITH RAILING: TOTAL
DRESSING REGULAR UPPER BODY CLOTHING: A LOT
STANDING UP FROM CHAIR USING ARMS: TOTAL
TOILETING: TOTAL
DAILY ACTIVITIY SCORE: 12
MOBILITY SCORE: 12
DRESSING REGULAR LOWER BODY CLOTHING: TOTAL
TURNING FROM BACK TO SIDE WHILE IN FLAT BAD: A LITTLE
MOVING TO AND FROM BED TO CHAIR: A LOT
CLIMB 3 TO 5 STEPS WITH RAILING: TOTAL
MOBILITY SCORE: 12
DRESSING REGULAR LOWER BODY CLOTHING: TOTAL
HELP NEEDED FOR BATHING: A LOT
DRESSING REGULAR UPPER BODY CLOTHING: A LOT
PERSONAL GROOMING: A LOT
MOVING TO AND FROM BED TO CHAIR: A LOT
WALKING IN HOSPITAL ROOM: TOTAL
EATING MEALS: A LITTLE
STANDING UP FROM CHAIR USING ARMS: TOTAL
DAILY ACTIVITIY SCORE: 11
EATING MEALS: A LITTLE

## 2025-04-06 ASSESSMENT — PAIN DESCRIPTION - DESCRIPTORS
DESCRIPTORS: HEADACHE
DESCRIPTORS: HEADACHE
DESCRIPTORS: DISCOMFORT

## 2025-04-06 ASSESSMENT — PAIN - FUNCTIONAL ASSESSMENT
PAIN_FUNCTIONAL_ASSESSMENT: 0-10

## 2025-04-06 NOTE — PROGRESS NOTES
General Surgery Progress Note    Patient: Alfonso Toscano  Unit/Bed: 1024/1024-B  YOB: 1945  MRN: [id number]  Acct: [id number]   Admitting Diagnosis: Gastrointestinal hemorrhage, unspecified gastrointestinal hemorrhage type [K92.2]  Date:  4/2/2025  Hospital Day: 3  Attending: Damaris Willard MD    Complaint:  Chief Complaint   Patient presents with    Rectal Bleeding     Dx from hospital yesterday.        Subjective  Patient seen and examined this morning. No acute events overnight.  Patient states he did not sleep well last night and is tired today.  Nursing reports slight jaundice to bilateral eyes.  Nursing reports no blood noted in bowel movements.     PHYSICAL EXAM:  Physical Exam  Vitals and nursing note reviewed. Exam conducted with a chaperone present.   Constitutional:       General: He is awake.      Appearance: Normal appearance. He is overweight.   HENT:      Head: Normocephalic.      Nose: Nose normal.      Mouth/Throat:      Mouth: Mucous membranes are moist.   Eyes:      General: Scleral icterus (slightly) present.   Cardiovascular:      Rate and Rhythm: Normal rate and regular rhythm.      Heart sounds: Murmur heard.   Pulmonary:      Effort: Pulmonary effort is normal.      Breath sounds: Normal breath sounds.   Abdominal:      General: Abdomen is protuberant. Bowel sounds are normal.      Palpations: Abdomen is soft.   Skin:     General: Skin is warm and dry.      Capillary Refill: Capillary refill takes less than 2 seconds.   Neurological:      General: No focal deficit present.      Mental Status: He is alert and oriented to person, place, and time.   Psychiatric:         Mood and Affect: Mood normal.         Speech: Speech normal.         Behavior: Behavior is cooperative.       Vital signs in last 24 hours:  Vitals:    04/06/25 0802   BP: 119/57   Pulse: 51   Resp: 16   Temp: 36.7 °C (98.1 °F)   SpO2: 93%     Intake/Output this shift:    Intake/Output Summary (Last 24 hours)  at 4/6/2025 1045  Last data filed at 4/6/2025 0400  Gross per 24 hour   Intake 240 ml   Output 800 ml   Net -560 ml      Allergies:  No Known Allergies   Medications:  Scheduled medications  bicalutamide, 50 mg, oral, Daily  finasteride, 5 mg, oral, Daily  furosemide, 20 mg, oral, Daily  lactulose, 30 mL, oral, TID  nadolol, 10 mg, oral, Daily  pantoprazole, 40 mg, intravenous, Daily  phytonadione, 5 mg, intravenous, Once  spironolactone, 50 mg, oral, Daily      Continuous medications     PRN medications  PRN medications: acetaminophen **OR** acetaminophen **OR** acetaminophen, HYDROmorphone, melatonin, ondansetron ODT **OR** ondansetron  Labs:  Results for orders placed or performed during the hospital encounter of 04/02/25 (from the past 24 hours)   Fibrinogen   Result Value Ref Range    Fibrinogen 133 (L) 200 - 400 mg/dL   Coagulation Screen   Result Value Ref Range    Protime 19.2 (H) 9.8 - 12.4 seconds    INR 1.7 (H) 0.9 - 1.1    aPTT 30 26 - 36 seconds   SST TOP   Result Value Ref Range    Extra Tube Hold for add-ons.    Protime-INR   Result Value Ref Range    Protime 19.9 (H) 9.8 - 12.4 seconds    INR 1.8 (H) 0.9 - 1.1   CBC and Auto Differential   Result Value Ref Range    WBC 9.2 4.4 - 11.3 x10*3/uL    nRBC 0.0 0.0 - 0.0 /100 WBCs    RBC 2.71 (L) 4.50 - 5.90 x10*6/uL    Hemoglobin 8.3 (L) 13.5 - 17.5 g/dL    Hematocrit 25.2 (L) 41.0 - 52.0 %    MCV 93 80 - 100 fL    MCH 30.6 26.0 - 34.0 pg    MCHC 32.9 32.0 - 36.0 g/dL    RDW 18.9 (H) 11.5 - 14.5 %    Platelets 52 (L) 150 - 450 x10*3/uL    Neutrophils % 62.6 40.0 - 80.0 %    Immature Granulocytes %, Automated 0.4 0.0 - 0.9 %    Lymphocytes % 15.5 13.0 - 44.0 %    Monocytes % 19.1 2.0 - 10.0 %    Eosinophils % 2.1 0.0 - 6.0 %    Basophils % 0.3 0.0 - 2.0 %    Neutrophils Absolute 5.77 (H) 1.60 - 5.50 x10*3/uL    Immature Granulocytes Absolute, Automated 0.04 0.00 - 0.50 x10*3/uL    Lymphocytes Absolute 1.43 0.80 - 3.00 x10*3/uL    Monocytes Absolute 1.76 (H)  0.05 - 0.80 x10*3/uL    Eosinophils Absolute 0.19 0.00 - 0.40 x10*3/uL    Basophils Absolute 0.03 0.00 - 0.10 x10*3/uL   Comprehensive metabolic panel   Result Value Ref Range    Glucose 76 74 - 99 mg/dL    Sodium 130 (L) 136 - 145 mmol/L    Potassium 4.7 3.5 - 5.3 mmol/L    Chloride 102 98 - 107 mmol/L    Bicarbonate 25 21 - 32 mmol/L    Anion Gap 8 (L) 10 - 20 mmol/L    Urea Nitrogen 17 6 - 23 mg/dL    Creatinine 1.03 0.50 - 1.30 mg/dL    eGFR 74 >60 mL/min/1.73m*2    Calcium 7.3 (L) 8.6 - 10.3 mg/dL    Albumin 2.3 (L) 3.4 - 5.0 g/dL    Alkaline Phosphatase 123 33 - 136 U/L    Total Protein 4.2 (L) 6.4 - 8.2 g/dL    AST 39 9 - 39 U/L    Bilirubin, Total 3.2 (H) 0.0 - 1.2 mg/dL    ALT 19 10 - 52 U/L   Magnesium   Result Value Ref Range    Magnesium 1.73 1.60 - 2.40 mg/dL      Imaging:  Imaging  No results found.    Cardiology, Vascular, and Other Imaging  No other imaging results found for the past 2 days     Assessment  Hematochezia    On exam abdomen soft and nontender.  Bowel sounds present.  Visual exam of anus revealed no drainage.  Respirations equal nonlabored.  Lung sounds clear.  Slight jaundice noted to bilateral eyes.  Heart murmur noted.  Morning lab work shows no leukocytosis and hemoglobin 8.3 down from 8.8 yesterday.  Afebrile.      Plan  -Regular diet  -Pain control  -Nausea control  -Continue to monitor for rectal bleeding  -Monitor stool for blood  -DVT prophylaxis-SCD  -Pulmonary toileting - C&DB/IS  -Encourage ambulation- OOB to chair/walk in halls  -Continue care per primary team     Further recommendations per Dr. Vu Echevarria, APRN-CNP    Time spent  37  minutes obtaining labs, imaging, recommendations, interview, assessment, examination, medication review/ordering, and EMR review.    Plan of care was discussed extensively with patient. Patient verbalized understanding through teach back method. All questions and concerns addressed upon examination.     Of note, this  documentation is completed using the Dragon Dictation system (voice recognition software). There may be spelling and/or grammatical errors that were not corrected prior to final submission.

## 2025-04-06 NOTE — PROGRESS NOTES
Patient ID: Alfonso Toscano is a 80 y.o. male.    Oncology History   Hepatocellular carcinoma   2/25/2025 Initial Diagnosis    Hepatocellular carcinoma (Multi)     3/6/2025 -  Chemotherapy    Atezolizumab + Bevacizumab, 21 Day Cycles           Subjective    HPI  Mr. Alfonso Toscano is a 80 y.o. male presents for follow up of hepatocellular cancer.        {[facility]:[id number]}      Patient's past medical history, surgical history, family history and social history reviewed.    Review of Systems:   Review of Systems:    Positive per HPI, otherwise negative.     Objective    BSA: There is no height or weight on file to calculate BSA.  There were no vitals taken for this visit.      Physical Exam  Gen: appears well in clinic, NAD  HEENT: atraumatic head, normocephalic, EOMI, conjunctiva normal  LUNG: no increased WOB, CTAB  CV: No JVD. RRR  GI: soft, NT, ND  LE: no LE edema  Skin: no obvious rashes or lesions on visible skin  Neuro: interactive, no focal deficits noted  Psych: normal mood and affect    Performance Status:  Symptomatic; fully ambulatory    Labs/Imaging/Pathology: Personally reviewed reports and images in Epic electronic medical record system. Pertinent results as it related to the plan represented in below in assessment and plan.     Assessment/Plan   History of hepatocellular adenocarcinoma  - Initially diagnosed in May 2021.   - Ultrasound showed 3 liver lesions.  - MRI liver done.   - He underwent ablation at the [facility] 10/11/21 with complications and a prolonged hospitalization for GI bleed. He was admitted 10/24/21-11/9/21 with persistent GI blood loss.   - Since then he has seen gastroenterology and undergoes banding on a regular basis for esophageal varicosities.   - Last colonoscopy 11/3/21 that showed diverticulitis in the sigmoid colon.  - Last EGD 10/26/21.  - We discussed there has been no imaging on the liver lesions since 2021.  - It is hard to understand if lesions are new or  worsening and that he has a new sub centimeter lesion that is concerning for hepatocellular adenocarcinoma and we will plan for repeat MRI in 6 weeks.   - Regarding his anemia we will plan for SPEP and serum free light chains to assess for plasma cell dyscrasia.  - Will plan to check haptoglobin to rule out hemolysis.  - We will repeat blood counts today.  - If all are normal we will plan for follow-up with imaging in 6 weeks.   - We discussed given he is not interested in liver directed therapy that we could consider systemic therapy with avastin +atezolizumab.  - Reviewed side effects and consent signed today.  - RTC in 6 weeks.     2/25/25:   - New lesions in segment VI is concerning for disease progression.    - His AFP on 1/23/25, 48 ng/mL, was also elevated.    - We discussed localized treatment options. However, he is reluctant to undergo radiation or ablation.    - We discussed alternatively, we could consider systemic treatment.   - Will plan to check labs today   - We discussed bevacizumab vs atezolizumab   - Will plan to start cycle 1 next week   - RTC for toxicity check with Eric, then RTC with Dr. Alfredo for cycle 2   - We reviewed side effects and consent signed    3/27/25:   - Patient has been having some increased weight gain and increased ammonia leading to AMS after initial therapy; concern for toxicity from immunotherapy versus obstructive process   - Will plan for stat CT of liver to determine etiology   - Hold treatment today   - Will call patient with CT results   - We did discuss we would consider localized SBRT if there is no other site of disease and will reach out to Dr. Alexandra if needed.   - RTC TBD    4/8/25:  ***      Reviewed ongoing medical problems and how they relate to his malignancy, will continue long term monitoring.    RTC in ***  This note has been transcribed using a medical scribe and there is a possibility of unintentional typing  misprints    {Assess/[email or web address]:[id number]}      Laura Alfredo MD  Hematology/Oncology  [facility]  By signing my name below, I, Moise Ocampo, attest that this documentation has been prepared under the direction and in the presence of Laura Alfredo MD.

## 2025-04-06 NOTE — PROGRESS NOTES
Alfonso Toscano is a 80 y.o. male on day 3 of admission presenting with Gastrointestinal hemorrhage, unspecified gastrointestinal hemorrhage type.      Subjective   The pt is seen and evaluated today.  The patient states he feels a bit better.  He states his bloody bowel movements are improving.  He is resting comfortably does have intermittent abdominal pain.  Denies any nausea vomiting fever chills at this time.       Objective     Last Recorded Vitals  /57 (BP Location: Right arm, Patient Position: Lying)   Pulse 51   Temp 36.7 °C (98.1 °F) (Temporal)   Resp 16   Wt 90.3 kg (199 lb 1.2 oz)   SpO2 93%   Intake/Output last 3 Shifts:    Intake/Output Summary (Last 24 hours) at 4/6/2025 1353  Last data filed at 4/6/2025 0400  Gross per 24 hour   Intake 240 ml   Output 800 ml   Net -560 ml       Admission Weight  Weight: 86.2 kg (190 lb) (04/02/25 0137)    Daily Weight  04/06/25 : 90.3 kg (199 lb 1.2 oz)    Image Results  ECG 12 lead  Sinus bradycardia  Otherwise normal ECG  When compared with ECG of 28-MAR-2025 04:41, (unconfirmed)  AK interval has decreased  Criteria for Septal infarct are no longer Present  ST no longer depressed in Inferior leads  T wave inversion no longer evident in Inferior leads  See ED provider note for full interpretation and clinical correlation  Confirmed by Vangie Morin ([id number]) on 4/3/2025 11:06:08 AM      Physical Exam  Constitutional:       Appearance: He is ill-appearing.   HENT:      Head: Normocephalic and atraumatic.      Nose: Nose normal.      Mouth/Throat:      Mouth: Mucous membranes are dry.   Eyes:      Extraocular Movements: Extraocular movements intact.      Conjunctiva/sclera: Conjunctivae normal.   Cardiovascular:      Rate and Rhythm: Normal rate and regular rhythm.      Pulses: Normal pulses.      Heart sounds: Normal heart sounds.   Pulmonary:      Effort: Pulmonary effort is normal.      Breath sounds: Normal breath sounds.   Abdominal:      General: Bowel  sounds are normal. There is distension.      Palpations: Abdomen is soft.   Musculoskeletal:         General: Normal range of motion.      Cervical back: Normal range of motion and neck supple.      Right lower leg: Edema present.      Left lower leg: Edema present.   Skin:     General: Skin is warm and dry.      Coloration: Skin is pale.   Neurological:      General: No focal deficit present.      Mental Status: He is alert and oriented to person, place, and time. Mental status is at baseline.   Psychiatric:         Mood and Affect: Mood normal.         Relevant Results               Assessment/Plan          Alfonso Toscano is a 80 y.o. male with a history of liver cancer, prostate cancer, esophageal varices, anxiety disorder, colon polyps, hypertension, peptic ulcer disease, GERD, cirrhosis, and hyperlipidemia.  He was just discharged from the hospital on 3/31. He presented at that time with a traumatic hemorrhagic shock and was initially admitted to the ICU. He received 2 units packed red blood cells and FFP. He underwent EGD and colonoscopy at that time and had suspected hemorrhoidal bleeding treated with banding. EGD showed small grade 1 varices.  Admitted again this time with rectal bleeding.         Assessment & Plan  Gastrointestinal hemorrhage, unspecified gastrointestinal hemorrhage type    Hepatic cirrhosis (Multi)    Thrombocytopenia (CMS-HCC)    Lactic acidosis    Hematochezia  Anemia secondary to acute blood loss  -Hematochezia likely due to hemorrhoidal bleeding vs radiation proctitis vs varices  -Received 1 unit PRBCs on admission, Hgb is gradually trending down.  He has been banded before.  Surgery is recommending correcting coagulopathy and platelet count which will be difficult to achieve given underlying cirrhosis.  Will give of IV vitamin K although vitamin K is unlikely to fix the INR.  Platelets prior to surgery and 5 days of Avatrombopag with hematology recommendations here.  Pharmacy unable  to order at this time.  Platelet count is above 50 at this time.  Will do as needed platelet infusion prior to surgery.  Will also do FFP's prior to surgery as well.  -General Surgery consult, recommend addressing the coagulopathy/platelets prior to intervention, will reassess early next week for possible vessel ligation.  Currently rectal bleeding seems to have improved.  Continue to monitor H&H  -EMS hematology is on board  -Spoke with Dr. Alfredo who recommends platelets and FFP prior to procedures     Alcoholic cirrhosis of the liver  Thrombocytopenia  History of HCC s/p ablation in 2021 with recent chemotherapy  -Last EGD showed grade 1 varices  -s/p paracentesis on 3/31.  Continue with as needed paracentesis.  -Continue home diuretics and nadolol with hold parameters  -Continue home lactulose  -Platelets stable in the 40s, baseline ~50-60  -Daily INR, CMP     GERD  -Continue IV PPI for now     Bradycardia  Generalized weakness  -Continue nadolol with hold parameters  -Is asymptomatic, monitor on telemetry  -PT/OT eval and treat     DVTp: Defer  Monitor hemoglobin and further surgical plan.               Damaris Willard MD

## 2025-04-06 NOTE — CARE PLAN
The patient's goals for the shift include Labs WNL    The clinical goals for the shift include Will  remain HDS      Problem: Pain - Adult  Goal: Verbalizes/displays adequate comfort level or baseline comfort level  Outcome: Progressing     Problem: Safety - Adult  Goal: Free from fall injury  Outcome: Progressing     Problem: Discharge Planning  Goal: Discharge to home or other facility with appropriate resources  Outcome: Progressing     Problem: Chronic Conditions and Co-morbidities  Goal: Patient's chronic conditions and co-morbidity symptoms are monitored and maintained or improved  Outcome: Progressing     Problem: Nutrition  Goal: Nutrient intake appropriate for maintaining nutritional needs  Outcome: Progressing

## 2025-04-06 NOTE — CARE PLAN
The patient's goals for the shift include Labs WNL    The clinical goals for the shift include Will  remain HDS  Problem: Skin  Goal: Participates in plan/prevention/treatment measures  Outcome: Progressing  Goal: Prevent/manage excess moisture  Outcome: Progressing  Goal: Prevent/minimize sheer/friction injuries  4/6/2025 1328 by Renetta Long RN  Flowsheets (Taken 4/6/2025 1328)  Prevent/minimize sheer/friction injuries:   Turn/reposition every 2 hours/use positioning/transfer devices   Use pull sheet  4/6/2025 1028 by Renetta Long RN  Outcome: Progressing  Goal: Promote/optimize nutrition  Outcome: Progressing

## 2025-04-06 NOTE — CARE PLAN
The patient's goals for the shift include Labs WNL    The clinical goals for the shift include Labs WNL      Problem: Pain - Adult  Goal: Verbalizes/displays adequate comfort level or baseline comfort level  Outcome: Progressing     Problem: Safety - Adult  Goal: Free from fall injury  Outcome: Progressing     Problem: Discharge Planning  Goal: Discharge to home or other facility with appropriate resources  Outcome: Progressing     Problem: Chronic Conditions and Co-morbidities  Goal: Patient's chronic conditions and co-morbidity symptoms are monitored and maintained or improved  Outcome: Progressing     Problem: Nutrition  Goal: Nutrient intake appropriate for maintaining nutritional needs  Outcome: Progressing     Problem: Skin  Goal: Participates in plan/prevention/treatment measures  4/6/2025 0449 by Gisele Elizalde RN  Outcome: Progressing  4/5/2025 2143 by Gisele Elizalde RN  Flowsheets (Taken 4/5/2025 2143)  Participates in plan/prevention/treatment measures:   Increase activity/out of bed for meals   Discuss with provider PT/OT consult   Elevate heels  Goal: Prevent/manage excess moisture  4/6/2025 0449 by Gisele Elizalde RN  Outcome: Progressing  4/5/2025 2143 by Gisele Elizalde RN  Flowsheets (Taken 4/5/2025 2143)  Prevent/manage excess moisture:   Use wicking fabric (obtain order)   Moisturize dry skin   Cleanse incontinence/protect with barrier cream   Monitor for/manage infection if present   Follow provider orders for dressing changes  Goal: Prevent/minimize sheer/friction injuries  4/6/2025 0449 by Gisele Elizalde RN  Outcome: Progressing  4/5/2025 2143 by Gisele Elizalde RN  Flowsheets (Taken 4/5/2025 2143)  Prevent/minimize sheer/friction injuries:   Utilize specialty bed per algorithm   Use pull sheet   Turn/reposition every 2 hours/use positioning/transfer devices   Increase activity/out of bed for meals   HOB 30 degrees or less   Complete micro-shifts as needed if patient  unable. Adjust patient position to relieve pressure points, not a full turn  Goal: Promote/optimize nutrition  4/6/2025 0449 by Gisele Elizalde RN  Outcome: Progressing  4/5/2025 2143 by Gisele Elizalde RN  Flowsheets (Taken 4/5/2025 2143)  Promote/optimize nutrition:   Offer water/supplements/favorite foods   Discuss with provider if NPO > 2 days   Assist with feeding   Reassess MST if dietician not consulted   Monitor/record intake including meals   Consume > 50% meals/supplements     Problem: Fall/Injury  Goal: Not fall by end of shift  Outcome: Progressing  Goal: Be free from injury by end of the shift  Outcome: Progressing  Goal: Verbalize understanding of personal risk factors for fall in the hospital  Outcome: Progressing  Goal: Verbalize understanding of risk factor reduction measures to prevent injury from fall in the home  Outcome: Progressing  Goal: Use assistive devices by end of the shift  Outcome: Progressing  Goal: Pace activities to prevent fatigue by end of the shift  Outcome: Progressing     Problem: Pain  Goal: Takes deep breaths with improved pain control throughout the shift  Outcome: Progressing  Goal: Turns in bed with improved pain control throughout the shift  Outcome: Progressing  Goal: Walks with improved pain control throughout the shift  Outcome: Progressing  Goal: Performs ADL's with improved pain control throughout shift  Outcome: Progressing  Goal: Participates in PT with improved pain control throughout the shift  Outcome: Progressing  Goal: Free from opioid side effects throughout the shift  Outcome: Progressing  Goal: Free from acute confusion related to pain meds throughout the shift  Outcome: Progressing

## 2025-04-07 ENCOUNTER — TELEPHONE (OUTPATIENT)
Dept: ADMISSION | Facility: HOSPITAL | Age: 80
End: 2025-04-07

## 2025-04-07 ENCOUNTER — TUMOR BOARD CONFERENCE (OUTPATIENT)
Dept: HEMATOLOGY/ONCOLOGY | Facility: HOSPITAL | Age: 80
End: 2025-04-07
Payer: MEDICARE

## 2025-04-07 LAB
ALBUMIN SERPL BCP-MCNC: 2.3 G/DL (ref 3.4–5)
ALP SERPL-CCNC: 134 U/L (ref 33–136)
ALT SERPL W P-5'-P-CCNC: 21 U/L (ref 10–52)
ANION GAP SERPL CALC-SCNC: 10 MMOL/L (ref 10–20)
AST SERPL W P-5'-P-CCNC: 42 U/L (ref 9–39)
BASOPHILS # BLD AUTO: 0.03 X10*3/UL (ref 0–0.1)
BASOPHILS NFR BLD AUTO: 0.4 %
BILIRUB SERPL-MCNC: 2.9 MG/DL (ref 0–1.2)
BUN SERPL-MCNC: 16 MG/DL (ref 6–23)
CALCIUM SERPL-MCNC: 7.3 MG/DL (ref 8.6–10.3)
CHLORIDE SERPL-SCNC: 101 MMOL/L (ref 98–107)
CO2 SERPL-SCNC: 24 MMOL/L (ref 21–32)
CREAT SERPL-MCNC: 1.18 MG/DL (ref 0.5–1.3)
EGFRCR SERPLBLD CKD-EPI 2021: 63 ML/MIN/1.73M*2
EOSINOPHIL # BLD AUTO: 0.16 X10*3/UL (ref 0–0.4)
EOSINOPHIL NFR BLD AUTO: 2 %
ERYTHROCYTE [DISTWIDTH] IN BLOOD BY AUTOMATED COUNT: 18.5 % (ref 11.5–14.5)
FACT V ACT/NOR PPP: 26 % (ref 65–140)
FACT VII ACT/NOR PPP: 24 % (ref 50–150)
FACT VIII ACT/NOR PPP: 234 % (ref 55–180)
FACT X ACT/NOR PPP: 38 % (ref 75–130)
GLUCOSE SERPL-MCNC: 83 MG/DL (ref 74–99)
HCT VFR BLD AUTO: 25.2 % (ref 41–52)
HGB BLD-MCNC: 8.2 G/DL (ref 13.5–17.5)
HOLD SPECIMEN: NORMAL
IMM GRANULOCYTES # BLD AUTO: 0.03 X10*3/UL (ref 0–0.5)
IMM GRANULOCYTES NFR BLD AUTO: 0.4 % (ref 0–0.9)
INR PPP: 1.8 (ref 0.9–1.1)
LYMPHOCYTES # BLD AUTO: 1.27 X10*3/UL (ref 0.8–3)
LYMPHOCYTES NFR BLD AUTO: 15.7 %
MAGNESIUM SERPL-MCNC: 1.71 MG/DL (ref 1.6–2.4)
MCH RBC QN AUTO: 30 PG (ref 26–34)
MCHC RBC AUTO-ENTMCNC: 32.5 G/DL (ref 32–36)
MCV RBC AUTO: 92 FL (ref 80–100)
MONOCYTES # BLD AUTO: 1.67 X10*3/UL (ref 0.05–0.8)
MONOCYTES NFR BLD AUTO: 20.6 %
NEUTROPHILS # BLD AUTO: 4.94 X10*3/UL (ref 1.6–5.5)
NEUTROPHILS NFR BLD AUTO: 60.9 %
NRBC BLD-RTO: 0 /100 WBCS (ref 0–0)
PLATELET # BLD AUTO: 52 X10*3/UL (ref 150–450)
POTASSIUM SERPL-SCNC: 4.5 MMOL/L (ref 3.5–5.3)
PROT SERPL-MCNC: 4.3 G/DL (ref 6.4–8.2)
PROTHROMBIN TIME: 19.7 SECONDS (ref 9.8–12.4)
RBC # BLD AUTO: 2.73 X10*6/UL (ref 4.5–5.9)
SODIUM SERPL-SCNC: 130 MMOL/L (ref 136–145)
WBC # BLD AUTO: 8.1 X10*3/UL (ref 4.4–11.3)

## 2025-04-07 PROCEDURE — 2500000004 HC RX 250 GENERAL PHARMACY W/ HCPCS (ALT 636 FOR OP/ED): Performed by: INTERNAL MEDICINE

## 2025-04-07 PROCEDURE — 2500000002 HC RX 250 W HCPCS SELF ADMINISTERED DRUGS (ALT 637 FOR MEDICARE OP, ALT 636 FOR OP/ED): Performed by: INTERNAL MEDICINE

## 2025-04-07 PROCEDURE — 2500000005 HC RX 250 GENERAL PHARMACY W/O HCPCS: Performed by: INTERNAL MEDICINE

## 2025-04-07 PROCEDURE — 99233 SBSQ HOSP IP/OBS HIGH 50: CPT | Performed by: INTERNAL MEDICINE

## 2025-04-07 PROCEDURE — 83735 ASSAY OF MAGNESIUM: CPT | Performed by: INTERNAL MEDICINE

## 2025-04-07 PROCEDURE — 36415 COLL VENOUS BLD VENIPUNCTURE: CPT | Performed by: INTERNAL MEDICINE

## 2025-04-07 PROCEDURE — 97116 GAIT TRAINING THERAPY: CPT | Mod: GP,CQ

## 2025-04-07 PROCEDURE — 2500000001 HC RX 250 WO HCPCS SELF ADMINISTERED DRUGS (ALT 637 FOR MEDICARE OP): Performed by: INTERNAL MEDICINE

## 2025-04-07 PROCEDURE — 85610 PROTHROMBIN TIME: CPT

## 2025-04-07 PROCEDURE — 1200000002 HC GENERAL ROOM WITH TELEMETRY DAILY

## 2025-04-07 PROCEDURE — 85025 COMPLETE CBC W/AUTO DIFF WBC: CPT | Performed by: INTERNAL MEDICINE

## 2025-04-07 PROCEDURE — 80053 COMPREHEN METABOLIC PANEL: CPT | Performed by: INTERNAL MEDICINE

## 2025-04-07 PROCEDURE — 97535 SELF CARE MNGMENT TRAINING: CPT | Mod: GO,CO

## 2025-04-07 PROCEDURE — 99231 SBSQ HOSP IP/OBS SF/LOW 25: CPT | Performed by: SURGERY

## 2025-04-07 RX ADMIN — LACTULOSE 20 G: 10 SOLUTION ORAL at 20:40

## 2025-04-07 RX ADMIN — BICALUTAMIDE 50 MG: 50 TABLET ORAL at 08:37

## 2025-04-07 RX ADMIN — LACTULOSE 20 G: 10 SOLUTION ORAL at 08:27

## 2025-04-07 RX ADMIN — ACETAMINOPHEN 650 MG: 325 TABLET ORAL at 20:41

## 2025-04-07 RX ADMIN — LACTULOSE 20 G: 10 SOLUTION ORAL at 17:52

## 2025-04-07 RX ADMIN — SPIRONOLACTONE 50 MG: 25 TABLET ORAL at 08:14

## 2025-04-07 RX ADMIN — Medication 3 MG: at 20:41

## 2025-04-07 RX ADMIN — FINASTERIDE 5 MG: 5 TABLET, FILM COATED ORAL at 08:15

## 2025-04-07 RX ADMIN — FUROSEMIDE 20 MG: 40 TABLET ORAL at 08:14

## 2025-04-07 RX ADMIN — PANTOPRAZOLE SODIUM 40 MG: 40 INJECTION, POWDER, FOR SOLUTION INTRAVENOUS at 08:32

## 2025-04-07 ASSESSMENT — COGNITIVE AND FUNCTIONAL STATUS - GENERAL
CLIMB 3 TO 5 STEPS WITH RAILING: A LOT
WALKING IN HOSPITAL ROOM: A LITTLE
HELP NEEDED FOR BATHING: A LITTLE
TURNING FROM BACK TO SIDE WHILE IN FLAT BAD: A LITTLE
TOILETING: A LITTLE
MOBILITY SCORE: 17
TURNING FROM BACK TO SIDE WHILE IN FLAT BAD: A LITTLE
DRESSING REGULAR UPPER BODY CLOTHING: A LITTLE
PERSONAL GROOMING: A LITTLE
TOILETING: A LOT
TOILETING: TOTAL
DAILY ACTIVITIY SCORE: 11
DRESSING REGULAR UPPER BODY CLOTHING: A LOT
WALKING IN HOSPITAL ROOM: A LOT
CLIMB 3 TO 5 STEPS WITH RAILING: TOTAL
DRESSING REGULAR LOWER BODY CLOTHING: A LOT
EATING MEALS: A LITTLE
TURNING FROM BACK TO SIDE WHILE IN FLAT BAD: A LITTLE
CLIMB 3 TO 5 STEPS WITH RAILING: A LOT
DAILY ACTIVITIY SCORE: 15
MOBILITY SCORE: 15
STANDING UP FROM CHAIR USING ARMS: A LOT
DRESSING REGULAR LOWER BODY CLOTHING: A LITTLE
PERSONAL GROOMING: A LITTLE
STANDING UP FROM CHAIR USING ARMS: A LITTLE
EATING MEALS: A LITTLE
STANDING UP FROM CHAIR USING ARMS: A LITTLE
WALKING IN HOSPITAL ROOM: A LITTLE
DRESSING REGULAR LOWER BODY CLOTHING: TOTAL
MOVING FROM LYING ON BACK TO SITTING ON SIDE OF FLAT BED WITH BEDRAILS: A LITTLE
MOBILITY SCORE: 16
PERSONAL GROOMING: A LITTLE
MOVING FROM LYING ON BACK TO SITTING ON SIDE OF FLAT BED WITH BEDRAILS: A LITTLE
MOVING TO AND FROM BED TO CHAIR: A LITTLE
MOVING TO AND FROM BED TO CHAIR: A LOT
DAILY ACTIVITIY SCORE: 17
EATING MEALS: A LITTLE
DRESSING REGULAR UPPER BODY CLOTHING: A LOT
HELP NEEDED FOR BATHING: A LOT
MOVING TO AND FROM BED TO CHAIR: A LITTLE
HELP NEEDED FOR BATHING: TOTAL

## 2025-04-07 ASSESSMENT — PAIN SCALES - GENERAL
PAINLEVEL_OUTOF10: 3
PAINLEVEL_OUTOF10: 0 - NO PAIN

## 2025-04-07 ASSESSMENT — PAIN - FUNCTIONAL ASSESSMENT
PAIN_FUNCTIONAL_ASSESSMENT: 0-10

## 2025-04-07 ASSESSMENT — PAIN SCALES - WONG BAKER: WONGBAKER_NUMERICALRESPONSE: NO HURT

## 2025-04-07 NOTE — PROGRESS NOTES
General Surgery Progress Note    Patient: Alfonso Toscano  Unit/Bed: 1024/1024-B  YOB: 1945  MRN: [id number]  Acct: [id number]   Admitting Diagnosis: Gastrointestinal hemorrhage, unspecified gastrointestinal hemorrhage type [K92.2]  Date:  4/2/2025  Hospital Day: 4  Attending: Damaris Willard MD    Complaint:  Chief Complaint   Patient presents with    Rectal Bleeding     Dx from hospital yesterday.        Subjective  Patient seen and examined this morning. No acute events overnight.  Patient states he is passing flatus.  Patient reports bowel movement overnight.  Patient states he feels tired today.     PHYSICAL EXAM:  Physical Exam  Vitals and nursing note reviewed. Exam conducted with a chaperone present.   Constitutional:       General: He is awake.      Appearance: Normal appearance. He is overweight.   HENT:      Head: Normocephalic.      Nose: Nose normal.      Mouth/Throat:      Mouth: Mucous membranes are moist.   Eyes:      General: Scleral icterus (slightly) present.   Cardiovascular:      Rate and Rhythm: Normal rate and regular rhythm.      Heart sounds: Murmur heard.   Pulmonary:      Effort: Pulmonary effort is normal.      Breath sounds: Normal breath sounds.   Abdominal:      General: Abdomen is protuberant. Bowel sounds are normal.      Palpations: Abdomen is soft.   Skin:     General: Skin is warm and dry.      Capillary Refill: Capillary refill takes less than 2 seconds.   Neurological:      General: No focal deficit present.      Mental Status: He is alert and oriented to person, place, and time.   Psychiatric:         Mood and Affect: Mood normal.         Speech: Speech normal.         Behavior: Behavior is cooperative.       Vital signs in last 24 hours:  Vitals:    04/07/25 0843   BP: 115/56   Pulse: 53   Resp:    Temp:    SpO2: 93%     Intake/Output this shift:    Intake/Output Summary (Last 24 hours) at 4/7/2025 1029  Last data filed at 4/7/2025 0500  Gross per 24 hour    Intake 480 ml   Output 1250 ml   Net -770 ml      Allergies:  No Known Allergies   Medications:  Scheduled medications  bicalutamide, 50 mg, oral, Daily  finasteride, 5 mg, oral, Daily  furosemide, 20 mg, oral, Daily  lactulose, 30 mL, oral, TID  nadolol, 10 mg, oral, Daily  pantoprazole, 40 mg, intravenous, Daily  phytonadione, 5 mg, intravenous, Once  spironolactone, 50 mg, oral, Daily      Continuous medications     PRN medications  PRN medications: acetaminophen **OR** acetaminophen **OR** acetaminophen, melatonin, ondansetron ODT **OR** ondansetron  Labs:  Results for orders placed or performed during the hospital encounter of 04/02/25 (from the past 24 hours)   Protime-INR   Result Value Ref Range    Protime 19.7 (H) 9.8 - 12.4 seconds    INR 1.8 (H) 0.9 - 1.1   CBC and Auto Differential   Result Value Ref Range    WBC 8.1 4.4 - 11.3 x10*3/uL    nRBC 0.0 0.0 - 0.0 /100 WBCs    RBC 2.73 (L) 4.50 - 5.90 x10*6/uL    Hemoglobin 8.2 (L) 13.5 - 17.5 g/dL    Hematocrit 25.2 (L) 41.0 - 52.0 %    MCV 92 80 - 100 fL    MCH 30.0 26.0 - 34.0 pg    MCHC 32.5 32.0 - 36.0 g/dL    RDW 18.5 (H) 11.5 - 14.5 %    Platelets 52 (L) 150 - 450 x10*3/uL    Neutrophils % 60.9 40.0 - 80.0 %    Immature Granulocytes %, Automated 0.4 0.0 - 0.9 %    Lymphocytes % 15.7 13.0 - 44.0 %    Monocytes % 20.6 2.0 - 10.0 %    Eosinophils % 2.0 0.0 - 6.0 %    Basophils % 0.4 0.0 - 2.0 %    Neutrophils Absolute 4.94 1.60 - 5.50 x10*3/uL    Immature Granulocytes Absolute, Automated 0.03 0.00 - 0.50 x10*3/uL    Lymphocytes Absolute 1.27 0.80 - 3.00 x10*3/uL    Monocytes Absolute 1.67 (H) 0.05 - 0.80 x10*3/uL    Eosinophils Absolute 0.16 0.00 - 0.40 x10*3/uL    Basophils Absolute 0.03 0.00 - 0.10 x10*3/uL   Comprehensive metabolic panel   Result Value Ref Range    Glucose 83 74 - 99 mg/dL    Sodium 130 (L) 136 - 145 mmol/L    Potassium 4.5 3.5 - 5.3 mmol/L    Chloride 101 98 - 107 mmol/L    Bicarbonate 24 21 - 32 mmol/L    Anion Gap 10 10 - 20 mmol/L  "   Urea Nitrogen 16 6 - 23 mg/dL    Creatinine 1.18 0.50 - 1.30 mg/dL    eGFR 63 >60 mL/min/1.73m*2    Calcium 7.3 (L) 8.6 - 10.3 mg/dL    Albumin 2.3 (L) 3.4 - 5.0 g/dL    Alkaline Phosphatase 134 33 - 136 U/L    Total Protein 4.3 (L) 6.4 - 8.2 g/dL    AST 42 (H) 9 - 39 U/L    Bilirubin, Total 2.9 (H) 0.0 - 1.2 mg/dL    ALT 21 10 - 52 U/L   Magnesium   Result Value Ref Range    Magnesium 1.71 1.60 - 2.40 mg/dL   SST TOP   Result Value Ref Range    Extra Tube Hold for add-ons.       Imaging:  Imaging  No results found.    Cardiology, Vascular, and Other Imaging  No other imaging results found for the past 2 days     Assessment  Hematochezia    On exam abdomen soft and nontender.  Bowel sounds present.  Visual exam of anus revealed no drainage.  Patient had bowel movement overnight which had \"old blood\" in it.   Patient reports passing flatus.  Respirations equal nonlabored.  Lung sounds clear.  Slight jaundice noted to bilateral eyes.  Heart murmur noted.  Morning lab work shows no leukocytosis and hemoglobin 8.2 down from 8.3 yesterday.  Afebrile.      Plan  -Regular diet  -Pain control  -Nausea control  -Continue to monitor for rectal bleeding  -Monitor stool for blood  -DVT prophylaxis-SCD  -Pulmonary toileting - C&DB/IS  -Encourage ambulation- OOB to chair/walk in halls  -Continue care per primary team     Further recommendations per Dr. Vu Echevarria, APRN-CNP    Time spent  37  minutes obtaining labs, imaging, recommendations, interview, assessment, examination, medication review/ordering, and EMR review.    Plan of care was discussed extensively with patient. Patient verbalized understanding through teach back method. All questions and concerns addressed upon examination.     Of note, this documentation is completed using the Dragon Dictation system (voice recognition software). There may be spelling and/or grammatical errors that were not corrected prior to final submission.   "

## 2025-04-07 NOTE — NURSING NOTE
At 1050, Doctor Carl was in to assess patient and to discuss plan of care. Wife was present at time of visit.

## 2025-04-07 NOTE — TELEPHONE ENCOUNTER
Patients wife called the [facility] nurse line requesting to speak with Dr. Alfredo's nurse.  Alfonso is currently at [facility] since 4/2/25. Discharged 3/31/25, home for 1 day and readmitted.   Requesting call back at [phone].

## 2025-04-07 NOTE — PROGRESS NOTES
Alfonso Toscano is a 80 y.o. male on day 4 of admission presenting with Gastrointestinal hemorrhage, unspecified gastrointestinal hemorrhage type.      Subjective   The pt is seen and evaluated today. Bloody bowel movements have improved. The pt denies any specific complaints. The wife is at bedside and plan is monitor for any further bloody bowel movements and avoid surgical intervention if possible.        Objective     Last Recorded Vitals  /50   Pulse 54   Temp 36.2 °C (97.2 °F) (Temporal)   Resp 20   Wt 89.4 kg (197 lb 1.5 oz)   SpO2 96%   Intake/Output last 3 Shifts:    Intake/Output Summary (Last 24 hours) at 4/7/2025 1324  Last data filed at 4/7/2025 0500  Gross per 24 hour   Intake 480 ml   Output 1250 ml   Net -770 ml       Admission Weight  Weight: 86.2 kg (190 lb) (04/02/25 0137)    Daily Weight  04/07/25 : 89.4 kg (197 lb 1.5 oz)    Image Results  ECG 12 lead  Sinus bradycardia  Otherwise normal ECG  When compared with ECG of 28-MAR-2025 04:41, (unconfirmed)  W[place] interval has decreased  Criteria for Septal infarct are no longer Present  ST no longer depressed in Inferior leads  T wave inversion no longer evident in Inferior leads  See ED provider note for full interpretation and clinical correlation  Confirmed by Vangie Morin ([id number]) on 4/3/2025 11:06:08 AM      Physical Exam  Constitutional:       Appearance: He is ill-appearing.   HENT:      Head: Normocephalic and atraumatic.      Nose: Nose normal.      Mouth/Throat:      Mouth: Mucous membranes are dry.   Eyes:      Extraocular Movements: Extraocular movements intact.      Conjunctiva/sclera: Conjunctivae normal.   Cardiovascular:      Rate and Rhythm: Normal rate and regular rhythm.      Pulses: Normal pulses.      Heart sounds: Normal heart sounds.   Pulmonary:      Effort: Pulmonary effort is normal.      Breath sounds: Normal breath sounds.   Abdominal:      General: Bowel sounds are normal. There is distension.      Palpations:  Abdomen is soft.   Musculoskeletal:         General: Normal range of motion.      Cervical back: Normal range of motion and neck supple.      Right lower leg: Edema present.      Left lower leg: Edema present.   Skin:     General: Skin is warm and dry.      Coloration: Skin is pale.   Neurological:      General: No focal deficit present.      Mental Status: He is alert and oriented to person, place, and time. Mental status is at baseline.   Psychiatric:         Mood and Affect: Mood normal.         Relevant Results               Assessment/Plan          Alfonso Toscano is a 80 y.o. male with a history of liver cancer, prostate cancer, esophageal varices, anxiety disorder, colon polyps, hypertension, peptic ulcer disease, GERD, cirrhosis, and hyperlipidemia.  He was just discharged from the hospital on 3/31. He presented at that time with a traumatic hemorrhagic shock and was initially admitted to the ICU. He received 2 units packed red blood cells and FFP. He underwent EGD and colonoscopy at that time and had suspected hemorrhoidal bleeding treated with banding. EGD showed small grade 1 varices.  Admitted again this time with rectal bleeding. The bleeding is slowing down and holding off any surgical intervention. If bleeding stops and H&H stable, can be discharged back home.         Assessment & Plan  Gastrointestinal hemorrhage, unspecified gastrointestinal hemorrhage type    Hepatic cirrhosis (Multi)    Thrombocytopenia (CMS-HCC)    Lactic acidosis    Hematochezia  Anemia secondary to acute blood loss  -Hematochezia likely due to hemorrhoidal bleeding vs radiation proctitis vs varices  -Received 1 unit PRBCs on admission, Hgb is gradually trending down.  He has been banded before.  Surgery is recommending correcting coagulopathy and platelet count which will be difficult to achieve given underlying cirrhosis.  Will give of IV vitamin K although vitamin K is unlikely to fix the INR.  Platelets prior to surgery and  5 days of Avatrombopag with hematology recommendations here.  Pharmacy unable to order at this time.  Platelet count is above 50 at this time.  Will do as needed platelet infusion prior to surgery.  Will also do FFP's prior to surgery as well.  -General Surgery consult, recommend addressing the coagulopathy/platelets prior to intervention, will reassess early next week for possible vessel ligation.  Currently rectal bleeding seems to have improved.  Continue to monitor H&H  -EMS hematology is on board  -Initially Spoke with Dr. Alfredo who recommends platelets and FFP prior to procedures. But bleeding is slowing down. Plans to monitor and if bleeding stops, the pt can be discharged home. Cont to monitor H&H     Alcoholic cirrhosis of the liver  Thrombocytopenia  History of HCC s/p ablation in 2021 with recent chemotherapy  -Last EGD showed grade 1 varices  -s/p paracentesis on 3/31.  Continue with as needed paracentesis.  -Continue home diuretics and nadolol with hold parameters  -Continue home lactulose  -Platelets stable in the 40s, baseline ~50-60  -Daily INR, CMP     GERD  -Continue IV PPI for now     Bradycardia  Generalized weakness  -Continue nadolol with hold parameters  -Is asymptomatic, monitor on telemetry  -PT/OT eval and treat     DVTp: Defer  Monitor hemoglobin and if the bleeding stops, the pt can be discharged home. Per the wife the pt is DNR already and she will bring the paperwork. If bleeding worsens, please give platelets and FFP with surgical evaluation.               Damaris Willard MD

## 2025-04-07 NOTE — PROGRESS NOTES
Physical Therapy    Physical Therapy Treatment    Patient Name: Alfonso Toscano  MRN: [id number]  Today's Date: 4/7/2025  Time Calculation  Start Time: 1032  Stop Time: 1056  Time Calculation (min): 24 min     1024/1024-B    Assessment/Plan   Barriers to Discharge Home: Physical needs    End of Session Communication: Bedside nurse    Assessment Comment:  (Patient would benefit from continued PT to improve functional mobility.)    End of Session Patient Position:  (Patient sitting up in chair after treatment. Call light/tray in reach. Chair alarm on. Spouse at bedside)    PT Plan  Inpatient/Swing Bed or Outpatient: Inpatient  Treatment/Interventions: Bed mobility, Transfer training, Gait training, Balance training, Strengthening, Endurance training, Therapeutic exercise, Therapeutic activity, Stair training  PT Plan: Ongoing PT  PT Frequency: 3 times per week  PT Discharge Recommendations: Low intensity level of continued care  Equipment Recommended upon Discharge: Wheeled walker (pt has walker) PT Recommended Transfer Status: Assist x1, Assistive device    General Visit Information:   PT  Visit  PT Received On: 04/07/25    General  Family/Caregiver Present:  (Patient's spouse present, very supportive of care)  Co-Treatment: OT  Co-Treatment Reason:  (co-treatment performed with OT to maximize patient safety and function. One unit billed for PT)  Prior to Session Communication:  (Cleared by RN for PT)  Patient Position Received:  (Patient presents in bed, agreeable to PT.  He reports he's been up to BSC today, but has not sat up in the chair.)    General Comment:  (Dx: GIB)    General Observations:   General Observation:  (tele; external male cathter)      Precautions:  Precautions  Medical Precautions: Fall precautions      Pain:  Pain Assessment  Pain Assessment: 0-10  0-10 (Numeric) Pain Score:  (no numerical rating provided)  Pain Location:  (left side flank pain with bed  "mobility)    Cognition:  Cognition  Overall Cognitive Status:  (flat affect; directions need repeated several times for follow through)  Orientation Level: Oriented X4  Insight: Mild  Processing Speed: Delayed        Balance:   Static Standing Balance  Static Standing-Level of Assistance:  (Fair- standing with ww)  Static Standing-Comment/Number of Minutes:  (4 minutes standing with ww support)      Activity Tolerance:  Activity Tolerance  Endurance: Decreased tolerance for upright activites        Bed Mobility  Bed Mobility:  (supine->sit: CGAx1  Bed flat. Increased time and effort to complete transfer)    Ambulation/Gait Training  Ambulation/Gait Training Performed:  (Patient amb 60' with ww and CGAx1. Decreased step length bilat, wide turns.)    Transfers  Transfer:  (sit<->stand: CGAx1  Three stands performed (EOB; BSC; chair)  Cues needed for hand and ww placement. Patient attempts to \"park\" off to the side .)             Outcome Measures:     [facility] Basic Mobility  Turning from your back to your side while in a flat bed without using bedrails: A little  Moving from lying on your back to sitting on the side of a flat bed without using bedrails: A little  Moving to and from bed to chair (including a wheelchair): A little  Standing up from a chair using your arms (e.g. wheelchair or bedside chair): A little  To walk in hospital room: A little  Climbing 3-5 steps with railing: Total  Basic Mobility - Total Score: 16          Education Documentation  Mobility Training, taught by Makayla Woo PTA at 4/7/2025  4:32 PM.  Learner: Patient  Readiness: Acceptance  Method: Explanation, Demonstration  Response: Needs Reinforcement      Education Comments  Individual(s) Educated: Patient  Education Provided:  (Patient educated in bed mobility, transfers, gait and safety.)  Patient Response to Education:  (Receptive to education)    Encounter Problems       Encounter Problems (Active)       PT Problem       Pt will " demonstrate mod I  with bed mobility to edge of bed.   (Progressing)       Start:  04/03/25    Expected End:  04/17/25            Pt will demonstrate mod I  with sit to stand/chair transfers with FWW.   (Progressing)       Start:  04/03/25    Expected End:  04/17/25            Pt will ambulate 30 feet with FWW SBA .   (Progressing)       Start:  04/03/25    Expected End:  04/17/25            Pt to demo improved BLE strength by being able to complete supine/seated thera ex 2x20 BLEs with 4 or less rest breaks .   (Not Progressing)       Start:  04/03/25    Expected End:  04/17/25

## 2025-04-07 NOTE — CARE PLAN
The patient's goals for the shift include labs WNL    The clinical goals for the shift include Labs WNL      Problem: Pain - Adult  Goal: Verbalizes/displays adequate comfort level or baseline comfort level  Outcome: Progressing     Problem: Safety - Adult  Goal: Free from fall injury  Outcome: Progressing     Problem: Discharge Planning  Goal: Discharge to home or other facility with appropriate resources  Outcome: Progressing     Problem: Chronic Conditions and Co-morbidities  Goal: Patient's chronic conditions and co-morbidity symptoms are monitored and maintained or improved  Outcome: Progressing     Problem: Nutrition  Goal: Nutrient intake appropriate for maintaining nutritional needs  Outcome: Progressing     Problem: Skin  Goal: Participates in plan/prevention/treatment measures  4/7/2025 0629 by Gisele Elizalde RN  Outcome: Progressing  4/6/2025 2025 by Gisele Elizalde RN  Flowsheets (Taken 4/6/2025 2025)  Participates in plan/prevention/treatment measures:   Increase activity/out of bed for meals   Discuss with provider PT/OT consult   Elevate heels  Goal: Prevent/manage excess moisture  4/7/2025 0629 by Gisele Elizalde RN  Outcome: Progressing  4/6/2025 2025 by Gisele Elizalde RN  Flowsheets (Taken 4/6/2025 2025)  Prevent/manage excess moisture:   Use wicking fabric (obtain order)   Moisturize dry skin   Cleanse incontinence/protect with barrier cream   Monitor for/manage infection if present   Follow provider orders for dressing changes  Goal: Prevent/minimize sheer/friction injuries  4/7/2025 0629 by Gisele Elizalde RN  Outcome: Progressing  4/6/2025 2025 by Gisele Elizalde RN  Flowsheets (Taken 4/6/2025 2025)  Prevent/minimize sheer/friction injuries:   Utilize specialty bed per algorithm   Use pull sheet   Turn/reposition every 2 hours/use positioning/transfer devices   Increase activity/out of bed for meals   HOB 30 degrees or less   Complete micro-shifts as needed if patient  unable. Adjust patient position to relieve pressure points, not a full turn  Goal: Promote/optimize nutrition  4/7/2025 0629 by Gisele Elizalde RN  Outcome: Progressing  4/6/2025 2025 by Gisele Elizalde RN  Flowsheets (Taken 4/6/2025 2025)  Promote/optimize nutrition:   Offer water/supplements/favorite foods   Discuss with provider if NPO > 2 days   Assist with feeding   Reassess MST if dietician not consulted   Monitor/record intake including meals   Consume > 50% meals/supplements     Problem: Fall/Injury  Goal: Not fall by end of shift  Outcome: Progressing  Goal: Be free from injury by end of the shift  Outcome: Progressing  Goal: Verbalize understanding of personal risk factors for fall in the hospital  Outcome: Progressing  Goal: Verbalize understanding of risk factor reduction measures to prevent injury from fall in the home  Outcome: Progressing  Goal: Use assistive devices by end of the shift  Outcome: Progressing  Goal: Pace activities to prevent fatigue by end of the shift  Outcome: Progressing     Problem: Pain  Goal: Takes deep breaths with improved pain control throughout the shift  Outcome: Progressing  Goal: Turns in bed with improved pain control throughout the shift  Outcome: Progressing  Goal: Walks with improved pain control throughout the shift  Outcome: Progressing  Goal: Performs ADL's with improved pain control throughout shift  Outcome: Progressing  Goal: Participates in PT with improved pain control throughout the shift  Outcome: Progressing  Goal: Free from opioid side effects throughout the shift  Outcome: Progressing  Goal: Free from acute confusion related to pain meds throughout the shift  Outcome: Progressing

## 2025-04-07 NOTE — CARE PLAN
The patient's goals for the shift include labs WNL    The clinical goals for the shift include Labs WNL

## 2025-04-07 NOTE — TELEPHONE ENCOUNTER
Spoke with patient's wife- Julieta. States patient is inpatient- for rectal bleeding. Cancelled FUV with Dr. Alfredo tomorrow because patient is inpatient. Julieta does not know when patient will be discharged. Did ask Julieta please call the office once the patient has a discharge date or is discharged. Explained I would also update Dr. Alfredo on patient condition. Julieta verbalized understanding and had no further questions or concerns at this time.

## 2025-04-08 ENCOUNTER — APPOINTMENT (OUTPATIENT)
Dept: HEMATOLOGY/ONCOLOGY | Facility: CLINIC | Age: 80
End: 2025-04-08
Payer: MEDICARE

## 2025-04-08 LAB
ALBUMIN SERPL BCP-MCNC: 2.3 G/DL (ref 3.4–5)
ALP SERPL-CCNC: 142 U/L (ref 33–136)
ALT SERPL W P-5'-P-CCNC: 22 U/L (ref 10–52)
ANION GAP SERPL CALC-SCNC: 10 MMOL/L (ref 10–20)
AST SERPL W P-5'-P-CCNC: 44 U/L (ref 9–39)
BASOPHILS # BLD AUTO: 0.03 X10*3/UL (ref 0–0.1)
BASOPHILS NFR BLD AUTO: 0.3 %
BILIRUB SERPL-MCNC: 3.1 MG/DL (ref 0–1.2)
BUN SERPL-MCNC: 15 MG/DL (ref 6–23)
CALCIUM SERPL-MCNC: 7.3 MG/DL (ref 8.6–10.3)
CHLORIDE SERPL-SCNC: 100 MMOL/L (ref 98–107)
CO2 SERPL-SCNC: 25 MMOL/L (ref 21–32)
CREAT SERPL-MCNC: 1.13 MG/DL (ref 0.5–1.3)
EGFRCR SERPLBLD CKD-EPI 2021: 67 ML/MIN/1.73M*2
EOSINOPHIL # BLD AUTO: 0.19 X10*3/UL (ref 0–0.4)
EOSINOPHIL NFR BLD AUTO: 2.2 %
ERYTHROCYTE [DISTWIDTH] IN BLOOD BY AUTOMATED COUNT: 18.7 % (ref 11.5–14.5)
GLUCOSE SERPL-MCNC: 88 MG/DL (ref 74–99)
HCT VFR BLD AUTO: 24.9 % (ref 41–52)
HGB BLD-MCNC: 8.2 G/DL (ref 13.5–17.5)
HOLD SPECIMEN: NORMAL
IMM GRANULOCYTES # BLD AUTO: 0.04 X10*3/UL (ref 0–0.5)
IMM GRANULOCYTES NFR BLD AUTO: 0.5 % (ref 0–0.9)
INR PPP: 1.9 (ref 0.9–1.1)
LYMPHOCYTES # BLD AUTO: 1.28 X10*3/UL (ref 0.8–3)
LYMPHOCYTES NFR BLD AUTO: 14.9 %
MAGNESIUM SERPL-MCNC: 1.68 MG/DL (ref 1.6–2.4)
MCH RBC QN AUTO: 30.5 PG (ref 26–34)
MCHC RBC AUTO-ENTMCNC: 32.9 G/DL (ref 32–36)
MCV RBC AUTO: 93 FL (ref 80–100)
MONOCYTES # BLD AUTO: 1.62 X10*3/UL (ref 0.05–0.8)
MONOCYTES NFR BLD AUTO: 18.8 %
NEUTROPHILS # BLD AUTO: 5.45 X10*3/UL (ref 1.6–5.5)
NEUTROPHILS NFR BLD AUTO: 63.3 %
NRBC BLD-RTO: 0 /100 WBCS (ref 0–0)
PLATELET # BLD AUTO: 54 X10*3/UL (ref 150–450)
POTASSIUM SERPL-SCNC: 4.6 MMOL/L (ref 3.5–5.3)
PROT SERPL-MCNC: 4.3 G/DL (ref 6.4–8.2)
PROTHROMBIN TIME: 20.7 SECONDS (ref 9.8–12.4)
RBC # BLD AUTO: 2.69 X10*6/UL (ref 4.5–5.9)
SODIUM SERPL-SCNC: 130 MMOL/L (ref 136–145)
WBC # BLD AUTO: 8.6 X10*3/UL (ref 4.4–11.3)

## 2025-04-08 PROCEDURE — 99233 SBSQ HOSP IP/OBS HIGH 50: CPT | Performed by: INTERNAL MEDICINE

## 2025-04-08 PROCEDURE — 2500000001 HC RX 250 WO HCPCS SELF ADMINISTERED DRUGS (ALT 637 FOR MEDICARE OP): Performed by: INTERNAL MEDICINE

## 2025-04-08 PROCEDURE — 2500000004 HC RX 250 GENERAL PHARMACY W/ HCPCS (ALT 636 FOR OP/ED): Performed by: INTERNAL MEDICINE

## 2025-04-08 PROCEDURE — 1200000002 HC GENERAL ROOM WITH TELEMETRY DAILY

## 2025-04-08 PROCEDURE — 36415 COLL VENOUS BLD VENIPUNCTURE: CPT | Performed by: INTERNAL MEDICINE

## 2025-04-08 PROCEDURE — 83735 ASSAY OF MAGNESIUM: CPT | Performed by: INTERNAL MEDICINE

## 2025-04-08 PROCEDURE — 97116 GAIT TRAINING THERAPY: CPT | Mod: GP,CQ

## 2025-04-08 PROCEDURE — 2500000002 HC RX 250 W HCPCS SELF ADMINISTERED DRUGS (ALT 637 FOR MEDICARE OP, ALT 636 FOR OP/ED): Performed by: INTERNAL MEDICINE

## 2025-04-08 PROCEDURE — 85610 PROTHROMBIN TIME: CPT

## 2025-04-08 PROCEDURE — 85025 COMPLETE CBC W/AUTO DIFF WBC: CPT | Performed by: INTERNAL MEDICINE

## 2025-04-08 PROCEDURE — 99231 SBSQ HOSP IP/OBS SF/LOW 25: CPT | Performed by: SURGERY

## 2025-04-08 PROCEDURE — 80053 COMPREHEN METABOLIC PANEL: CPT | Performed by: INTERNAL MEDICINE

## 2025-04-08 PROCEDURE — 36415 COLL VENOUS BLD VENIPUNCTURE: CPT

## 2025-04-08 PROCEDURE — 97535 SELF CARE MNGMENT TRAINING: CPT | Mod: GO,CO

## 2025-04-08 RX ADMIN — SPIRONOLACTONE 50 MG: 25 TABLET ORAL at 08:57

## 2025-04-08 RX ADMIN — FUROSEMIDE 20 MG: 40 TABLET ORAL at 08:57

## 2025-04-08 RX ADMIN — LACTULOSE 20 G: 10 SOLUTION ORAL at 15:31

## 2025-04-08 RX ADMIN — LACTULOSE 20 G: 10 SOLUTION ORAL at 08:57

## 2025-04-08 RX ADMIN — PANTOPRAZOLE SODIUM 40 MG: 40 INJECTION, POWDER, FOR SOLUTION INTRAVENOUS at 08:57

## 2025-04-08 RX ADMIN — FINASTERIDE 5 MG: 5 TABLET, FILM COATED ORAL at 08:57

## 2025-04-08 RX ADMIN — NADOLOL 10 MG: 20 TABLET ORAL at 08:58

## 2025-04-08 RX ADMIN — BICALUTAMIDE 50 MG: 50 TABLET ORAL at 08:57

## 2025-04-08 ASSESSMENT — PAIN SCALES - GENERAL
PAINLEVEL_OUTOF10: 0 - NO PAIN
PAINLEVEL_OUTOF10: 2

## 2025-04-08 ASSESSMENT — COGNITIVE AND FUNCTIONAL STATUS - GENERAL
DRESSING REGULAR UPPER BODY CLOTHING: A LOT
TURNING FROM BACK TO SIDE WHILE IN FLAT BAD: A LITTLE
TURNING FROM BACK TO SIDE WHILE IN FLAT BAD: A LITTLE
WALKING IN HOSPITAL ROOM: A LITTLE
DRESSING REGULAR LOWER BODY CLOTHING: TOTAL
EATING MEALS: A LITTLE
DRESSING REGULAR LOWER BODY CLOTHING: A LOT
MOBILITY SCORE: 16
TOILETING: A LOT
STANDING UP FROM CHAIR USING ARMS: A LOT
WALKING IN HOSPITAL ROOM: A LOT
TOILETING: TOTAL
HELP NEEDED FOR BATHING: A LOT
MOVING FROM LYING ON BACK TO SITTING ON SIDE OF FLAT BED WITH BEDRAILS: A LITTLE
PERSONAL GROOMING: A LITTLE
PERSONAL GROOMING: A LITTLE
EATING MEALS: A LITTLE
STANDING UP FROM CHAIR USING ARMS: A LITTLE
MOVING TO AND FROM BED TO CHAIR: A LOT
HELP NEEDED FOR BATHING: TOTAL
CLIMB 3 TO 5 STEPS WITH RAILING: TOTAL
DAILY ACTIVITIY SCORE: 11
MOVING TO AND FROM BED TO CHAIR: A LITTLE
CLIMB 3 TO 5 STEPS WITH RAILING: A LOT
DAILY ACTIVITIY SCORE: 15
DRESSING REGULAR UPPER BODY CLOTHING: A LITTLE
MOBILITY SCORE: 15

## 2025-04-08 ASSESSMENT — ACTIVITIES OF DAILY LIVING (ADL)
HOME_MANAGEMENT_TIME_ENTRY: 12
HOME_MANAGEMENT_TIME_ENTRY: 12

## 2025-04-08 ASSESSMENT — PAIN - FUNCTIONAL ASSESSMENT: PAIN_FUNCTIONAL_ASSESSMENT: 0-10

## 2025-04-08 NOTE — PROGRESS NOTES
Pt still reported with GI bleed per MD. No plan for dc at this time. Pending if surgical intervention is needed. Preference at this time is home with possible HHC.

## 2025-04-08 NOTE — PROGRESS NOTES
General Surgery Progress Note    Patient: Alfonso Toscano  Unit/Bed: 1024/1024-B  YOB: 1945  MRN: [id number]  Acct: [id number]   Admitting Diagnosis: Gastrointestinal hemorrhage, unspecified gastrointestinal hemorrhage type [K92.2]  Date:  4/2/2025  Hospital Day: 5  Attending: Damaris Willard MD    Complaint:  Chief Complaint   Patient presents with    Rectal Bleeding     Dx from hospital yesterday.        Subjective  Patient seen and examined this morning. No acute events overnight.  Patient states he is passing flatus.  Nursing reports that patient had 2 bloody bowel movements overnight.  Patient out bed sitting in chair.     PHYSICAL EXAM:  Physical Exam  Vitals and nursing note reviewed. Exam conducted with a chaperone present.   Constitutional:       General: He is awake.      Appearance: Normal appearance. He is overweight.   HENT:      Head: Normocephalic.      Nose: Nose normal.      Mouth/Throat:      Mouth: Mucous membranes are moist.   Eyes:      General: Scleral icterus (slightly) present.   Cardiovascular:      Rate and Rhythm: Normal rate and regular rhythm.      Heart sounds: Murmur heard.   Pulmonary:      Effort: Pulmonary effort is normal.      Breath sounds: Normal breath sounds.   Abdominal:      General: Abdomen is protuberant. Bowel sounds are normal.      Palpations: Abdomen is soft.   Skin:     General: Skin is warm and dry.      Capillary Refill: Capillary refill takes less than 2 seconds.   Neurological:      General: No focal deficit present.      Mental Status: He is alert and oriented to person, place, and time.   Psychiatric:         Mood and Affect: Mood normal.         Speech: Speech normal.         Behavior: Behavior is cooperative.       Vital signs in last 24 hours:  Vitals:    04/08/25 0702   BP: 120/57   Pulse: 52   Resp: 16   Temp: 35.8 °C (96.4 °F)   SpO2: 94%     Intake/Output this shift:    Intake/Output Summary (Last 24 hours) at 4/8/2025 1345  Last data  filed at 4/8/2025 0629  Gross per 24 hour   Intake 240 ml   Output 1100 ml   Net -860 ml      Allergies:  No Known Allergies   Medications:  Scheduled medications  bicalutamide, 50 mg, oral, Daily  finasteride, 5 mg, oral, Daily  furosemide, 20 mg, oral, Daily  lactulose, 30 mL, oral, TID  nadolol, 10 mg, oral, Daily  pantoprazole, 40 mg, intravenous, Daily  phytonadione, 5 mg, intravenous, Once  spironolactone, 50 mg, oral, Daily      Continuous medications     PRN medications  PRN medications: acetaminophen **OR** acetaminophen **OR** acetaminophen, melatonin, ondansetron ODT **OR** ondansetron  Labs:  Results for orders placed or performed during the hospital encounter of 04/02/25 (from the past 24 hours)   SST TOP   Result Value Ref Range    Extra Tube Hold for add-ons.    CBC and Auto Differential   Result Value Ref Range    WBC 8.6 4.4 - 11.3 x10*3/uL    nRBC 0.0 0.0 - 0.0 /100 WBCs    RBC 2.69 (L) 4.50 - 5.90 x10*6/uL    Hemoglobin 8.2 (L) 13.5 - 17.5 g/dL    Hematocrit 24.9 (L) 41.0 - 52.0 %    MCV 93 80 - 100 fL    MCH 30.5 26.0 - 34.0 pg    MCHC 32.9 32.0 - 36.0 g/dL    RDW 18.7 (H) 11.5 - 14.5 %    Platelets 54 (L) 150 - 450 x10*3/uL    Neutrophils % 63.3 40.0 - 80.0 %    Immature Granulocytes %, Automated 0.5 0.0 - 0.9 %    Lymphocytes % 14.9 13.0 - 44.0 %    Monocytes % 18.8 2.0 - 10.0 %    Eosinophils % 2.2 0.0 - 6.0 %    Basophils % 0.3 0.0 - 2.0 %    Neutrophils Absolute 5.45 1.60 - 5.50 x10*3/uL    Immature Granulocytes Absolute, Automated 0.04 0.00 - 0.50 x10*3/uL    Lymphocytes Absolute 1.28 0.80 - 3.00 x10*3/uL    Monocytes Absolute 1.62 (H) 0.05 - 0.80 x10*3/uL    Eosinophils Absolute 0.19 0.00 - 0.40 x10*3/uL    Basophils Absolute 0.03 0.00 - 0.10 x10*3/uL   Comprehensive metabolic panel   Result Value Ref Range    Glucose 88 74 - 99 mg/dL    Sodium 130 (L) 136 - 145 mmol/L    Potassium 4.6 3.5 - 5.3 mmol/L    Chloride 100 98 - 107 mmol/L    Bicarbonate 25 21 - 32 mmol/L    Anion Gap 10 10 - 20  mmol/L    Urea Nitrogen 15 6 - 23 mg/dL    Creatinine 1.13 0.50 - 1.30 mg/dL    eGFR 67 >60 mL/min/1.73m*2    Calcium 7.3 (L) 8.6 - 10.3 mg/dL    Albumin 2.3 (L) 3.4 - 5.0 g/dL    Alkaline Phosphatase 142 (H) 33 - 136 U/L    Total Protein 4.3 (L) 6.4 - 8.2 g/dL    AST 44 (H) 9 - 39 U/L    Bilirubin, Total 3.1 (H) 0.0 - 1.2 mg/dL    ALT 22 10 - 52 U/L   Magnesium   Result Value Ref Range    Magnesium 1.68 1.60 - 2.40 mg/dL   Protime-INR   Result Value Ref Range    Protime 20.7 (H) 9.8 - 12.4 seconds    INR 1.9 (H) 0.9 - 1.1     *Note: Due to a large number of results and/or encounters for the requested time period, some results have not been displayed. A complete set of results can be found in Results Review.      Imaging:  Imaging  No results found.    Cardiology, Vascular, and Other Imaging  No other imaging results found for the past 2 days     Assessment  Hematochezia    On exam abdomen soft and nontender.  Bowel sounds present.  Patient out of bed sitting on bedpan in chair, blood noted on linen.  Nursing states patient had 2 bloody bowel movements today.    Patient reports passing flatus.  Respirations equal nonlabored.  Lung sounds clear.  Slight jaundice noted to bilateral eyes.  Heart murmur noted.  Morning lab work shows no leukocytosis and hemoglobin 8.2, unchanged from yesterday.  Afebrile.      Plan  -Regular diet  -Pain control  -Nausea control  -Continue to monitor for rectal bleeding  -Monitor stool for blood  -DVT prophylaxis-SCD  -Pulmonary toileting - C&DB/IS  -Encourage ambulation- OOB to chair/walk in halls  -Continue care per primary team     Further recommendations per Dr. Vu Echevarria, APRN-CNP    Time spent  37  minutes obtaining labs, imaging, recommendations, interview, assessment, examination, medication review/ordering, and EMR review.    Plan of care was discussed extensively with patient. Patient verbalized understanding through teach back method. All questions and  concerns addressed upon examination.     Of note, this documentation is completed using the Dragon Dictation system (voice recognition software). There may be spelling and/or grammatical errors that were not corrected prior to final submission.

## 2025-04-08 NOTE — CARE PLAN
The patient's goals for the shift include Labs WNL    The clinical goals for the shift include Labs WNL      Problem: Pain - Adult  Goal: Verbalizes/displays adequate comfort level or baseline comfort level  Outcome: Progressing     Problem: Safety - Adult  Goal: Free from fall injury  Outcome: Progressing     Problem: Discharge Planning  Goal: Discharge to home or other facility with appropriate resources  Outcome: Progressing     Problem: Chronic Conditions and Co-morbidities  Goal: Patient's chronic conditions and co-morbidity symptoms are monitored and maintained or improved  Outcome: Progressing     Problem: Nutrition  Goal: Nutrient intake appropriate for maintaining nutritional needs  Outcome: Progressing     Problem: Skin  Goal: Participates in plan/prevention/treatment measures  4/8/2025 0628 by Gisele Elizalde RN  Outcome: Progressing  4/7/2025 2038 by Gisele Elizalde RN  Flowsheets (Taken 4/7/2025 2038)  Participates in plan/prevention/treatment measures:   Increase activity/out of bed for meals   Discuss with provider PT/OT consult   Elevate heels  Goal: Prevent/manage excess moisture  4/8/2025 0628 by Gisele Elizalde RN  Outcome: Progressing  4/7/2025 2038 by Gisele Elizalde RN  Flowsheets (Taken 4/7/2025 2038)  Prevent/manage excess moisture:   Moisturize dry skin   Cleanse incontinence/protect with barrier cream   Use wicking fabric (obtain order)   Monitor for/manage infection if present   Follow provider orders for dressing changes  Goal: Prevent/minimize sheer/friction injuries  4/8/2025 0628 by Gisele Elizalde RN  Outcome: Progressing  4/7/2025 2038 by Gisele Elizalde RN  Flowsheets (Taken 4/7/2025 2038)  Prevent/minimize sheer/friction injuries:   Use pull sheet   Utilize specialty bed per algorithm   Turn/reposition every 2 hours/use positioning/transfer devices   Increase activity/out of bed for meals   HOB 30 degrees or less   Complete micro-shifts as needed if patient  unable. Adjust patient position to relieve pressure points, not a full turn  Goal: Promote/optimize nutrition  4/8/2025 0628 by Gisele Elizalde RN  Outcome: Progressing  4/7/2025 2038 by Gisele Elizalde RN  Flowsheets (Taken 4/7/2025 2038)  Promote/optimize nutrition:   Offer water/supplements/favorite foods   Discuss with provider if NPO > 2 days   Assist with feeding   Reassess MST if dietician not consulted   Monitor/record intake including meals   Consume > 50% meals/supplements     Problem: Fall/Injury  Goal: Not fall by end of shift  Outcome: Progressing  Goal: Be free from injury by end of the shift  Outcome: Progressing  Goal: Verbalize understanding of personal risk factors for fall in the hospital  Outcome: Progressing  Goal: Verbalize understanding of risk factor reduction measures to prevent injury from fall in the home  Outcome: Progressing  Goal: Use assistive devices by end of the shift  Outcome: Progressing  Goal: Pace activities to prevent fatigue by end of the shift  Outcome: Progressing     Problem: Pain  Goal: Takes deep breaths with improved pain control throughout the shift  Outcome: Progressing  Goal: Turns in bed with improved pain control throughout the shift  Outcome: Progressing  Goal: Walks with improved pain control throughout the shift  Outcome: Progressing  Goal: Performs ADL's with improved pain control throughout shift  Outcome: Progressing  Goal: Participates in PT with improved pain control throughout the shift  Outcome: Progressing  Goal: Free from opioid side effects throughout the shift  Outcome: Progressing  Goal: Free from acute confusion related to pain meds throughout the shift  Outcome: Progressing

## 2025-04-08 NOTE — PROGRESS NOTES
Physical Therapy    Physical Therapy Treatment    Patient Name: Alfonso Toscano  MRN: [id number]  Today's Date: 4/8/2025  Time Calculation  Start Time: 0818  Stop Time: 0846  Time Calculation (min): 28 min     1024/1024-B    Assessment/Plan   Barriers to Discharge Home: Caregiver assistance, Physical needs    End of Session Communication: Bedside nurse (Patients nurse notified of significant blood in toilet after patient had BM.)    Assessment Comment:  (Patient continues to demo limited strength and endrance. Decreased safety awareness. He would benefit from continued PT)    End of Session Patient Position:  (Patient sitting up in recliner chair. Call light/tray in reach. Chair alarm on. Warm blanket provided. Breakfast in reach.)    PT Plan  Inpatient/Swing Bed or Outpatient: Inpatient  Treatment/Interventions: Bed mobility, Transfer training, Gait training, Balance training, Strengthening, Endurance training, Therapeutic exercise, Therapeutic activity, Stair training  PT Plan: Ongoing PT  PT Frequency: 3 times per week  PT Discharge Recommendations: Low to mod intensity level of continued care  Equipment Recommended upon Discharge: Wheeled walker (pt has walker) PT Recommended Transfer Status: Assist x1, Assistive device    General Visit Information:   PT  Visit  PT Received On: 04/08/25    General  Co-Treatment: OT  Co-Treatment Reason:  (Co-treatment performed with OT to maximize patient safety and function. One unit billed for PT)  Prior to Session Communication:  (Cleared by RN for PT)  Patient Position Received:  (Patient presents in bed, on bed pan.  Encouraged him to get up and use either the BSC or amb to the bathroom as his plan is home at discharge. Agreeable to PT)    General Comment:  (Dx: GIB)    General Observations:   General Observation:  (tele; external male cather, alarm)      Precautions:  Precautions  Medical Precautions: Fall precautions      Pain:  Pain Assessment  Pain Assessment: 0-10  0-10  (Numeric) Pain Score:  (No numerical rating)  Pain Location:  (Intermittent left side flank pain with bed mobility)  Pain Interventions:  (mobilization)  Response to Interventions: Resting quietly    Cognition:  Cognition  Overall Cognitive Status:  (flat affect; directions needed to be repeated multiple times for follow through)  Safety/Judgement: Exceptions to WFL  Insight: Mild  Task Initiation: Delayed initiation  Processing Speed: Delayed      Balance:   Static Standing Balance  Static Standing-Level of Assistance:  (Fair- dynamic stand with ww)    Dynamic Standing Balance  Dynamic Standing-Balance:  (Fair- dynamic stand with ww)       Activity Tolerance:  Activity Tolerance  Endurance:  (Limited tolerance to activity. Fatigues quickly)       Bed Mobility  Bed Mobility:  (rolling: min x1 using bed pads to roll, cues for technique.  Patient utilizing bed rail to assist in rolling onto side.   sidelying->sit: CGAx1 HOB raised 40 degrees. Increased time and effort to complete transfer. Utilized bed rail. Pain reported increased left flank pain during transition from sidelying to sit.    Ambulation/Gait Training  Ambulation/Gait Training Performed:  (Patient amb 20'x2 with ww and min x1. Slow mark, tends to keep head down watching floor, despite cues to look up. Instructions to remain inside walkers ALICIA. Patient having difficulty following directional cues (left vs right))    Transfers  Transfer:  (sit<->stand: CGAx1 Two stands performed (EOB; elevated toilet)  Instructions for walker and hand placement with transfers.)             Outcome Measures:   [facility] Basic Mobility  Turning from your back to your side while in a flat bed without using bedrails: A little  Moving from lying on your back to sitting on the side of a flat bed without using bedrails: A little  Moving to and from bed to chair (including a wheelchair): A little  Standing up from a chair using your arms (e.g. wheelchair or bedside chair): A  little  To walk in hospital room: A little  Climbing 3-5 steps with railing: Total  Basic Mobility - Total Score: 16           Education Documentation  Mobility Training, taught by Makayla Woo PTA at 4/8/2025  8:48 AM.  Learner: Patient  Readiness: Acceptance  Method: Explanation, Demonstration  Response: Needs Reinforcement      Education Comments  Individual(s) Educated: Patient  Education Provided:  (Patient educated in bed mobility, transfers, gait and safety.)  Patient Response to Education:  (verbalized understanding but required cues for follow through)    Encounter Problems       Encounter Problems (Active)       PT Problem       Pt will demonstrate mod I  with bed mobility to edge of bed.   (Progressing)       Start:  04/03/25    Expected End:  04/17/25            Pt will demonstrate mod I  with sit to stand/chair transfers with FWW.   (Progressing)       Start:  04/03/25    Expected End:  04/17/25            Pt will ambulate 30 feet with FWW SBA .   (Progressing)       Start:  04/03/25    Expected End:  04/17/25            Pt to demo improved BLE strength by being able to complete supine/seated thera ex 2x20 BLEs with 4 or less rest breaks .   (Not Progressing)       Start:  04/03/25    Expected End:  04/17/25

## 2025-04-08 NOTE — PROGRESS NOTES
Alfonso Toscano is a 80 y.o. male on day 5 of admission presenting with Gastrointestinal hemorrhage, unspecified gastrointestinal hemorrhage type.      Subjective   The pt is seen and evaluated today. Bloody bowel movements were improving but per the staff the pt had another moderate sized bloody bowel movement. The pt denies any abdominal pain. H&H is stable >To reach out to surgery and monitor overnight for any worsening of Blood per rectum.        Objective     Last Recorded Vitals  /57   Pulse 52   Temp 35.8 °C (96.4 °F)   Resp 16   Wt 90 kg (198 lb 6.6 oz)   SpO2 94%   Intake/Output last 3 Shifts:    Intake/Output Summary (Last 24 hours) at 4/8/2025 1157  Last data filed at 4/8/2025 0629  Gross per 24 hour   Intake 240 ml   Output 1100 ml   Net -860 ml       Admission Weight  Weight: 86.2 kg (190 lb) (04/02/25 0137)    Daily Weight  04/08/25 : 90 kg (198 lb 6.6 oz)    Image Results  ECG 12 lead  Sinus bradycardia  Otherwise normal ECG  When compared with ECG of 28-MAR-2025 04:41, (unconfirmed)  NM interval has decreased  Criteria for Septal infarct are no longer Present  ST no longer depressed in Inferior leads  T wave inversion no longer evident in Inferior leads  See ED provider note for full interpretation and clinical correlation  Confirmed by Vangie Morin ([id number]) on 4/3/2025 11:06:08 AM      Physical Exam  Constitutional:       Appearance: He is ill-appearing.   HENT:      Head: Normocephalic and atraumatic.      Nose: Nose normal.      Mouth/Throat:      Mouth: Mucous membranes are dry.   Eyes:      Extraocular Movements: Extraocular movements intact.      Conjunctiva/sclera: Conjunctivae normal.   Cardiovascular:      Rate and Rhythm: Normal rate and regular rhythm.      Pulses: Normal pulses.      Heart sounds: Normal heart sounds.   Pulmonary:      Effort: Pulmonary effort is normal.      Breath sounds: Normal breath sounds.   Abdominal:      General: Bowel sounds are normal. There is  distension.      Palpations: Abdomen is soft.   Musculoskeletal:         General: Normal range of motion.      Cervical back: Normal range of motion and neck supple.      Right lower leg: Edema present.      Left lower leg: Edema present.   Skin:     General: Skin is warm and dry.      Coloration: Skin is pale.   Neurological:      General: No focal deficit present.      Mental Status: He is alert and oriented to person, place, and time. Mental status is at baseline.   Psychiatric:         Mood and Affect: Mood normal.         Relevant Results               Assessment/Plan          Alfonso Toscano is a 80 y.o. male with a history of liver cancer, prostate cancer, esophageal varices, anxiety disorder, colon polyps, hypertension, peptic ulcer disease, GERD, cirrhosis, and hyperlipidemia.  He was just discharged from the hospital on 3/31. He presented at that time with a traumatic hemorrhagic shock and was initially admitted to the ICU. He received 2 units packed red blood cells and FFP. He underwent EGD and colonoscopy at that time and had suspected hemorrhoidal bleeding treated with banding. EGD showed small grade 1 varices.  Admitted again this time with rectal bleeding. The bleeding is slowing down and holding off any surgical intervention. Plan was to discharge home but still having bloody bowel movement per staff it was moderate sized. Plan to reach out to surgery again and monitor overnight.         Assessment & Plan  Gastrointestinal hemorrhage, unspecified gastrointestinal hemorrhage type    Hepatic cirrhosis (Multi)    Thrombocytopenia (CMS-HCC)    Lactic acidosis    Hematochezia  Anemia secondary to acute blood loss  -Hematochezia likely due to hemorrhoidal bleeding vs radiation proctitis vs varices  -Received 1 unit PRBCs on admission, Hgb is gradually trending down.  He has been banded before.  Surgery is recommending correcting coagulopathy and platelet count which will be difficult to achieve given  underlying cirrhosis.  Will give of IV vitamin K although vitamin K is unlikely to fix the INR.  Platelets prior to surgery and 5 days of Avatrombopag with hematology recommendations here.  Pharmacy unable to order at this time.  Platelet count is above 50 at this time.  Will do as needed platelet infusion prior to surgery.  Will also do FFP's prior to surgery as well.  -General Surgery consult, recommend addressing the coagulopathy/platelets prior to intervention, will reassess early next week for possible vessel ligation.  Currently rectal bleeding seems to have improved.  Continue to monitor H&H  -EMS hematology is on board  -Initially Spoke with Dr. Alfredo who recommends platelets and FFP prior to procedures. But bleeding is slowing down. Plans was to discharge home with improvement but still having moderate sized bloody bowel movements.  Cont to monitor H&H     Alcoholic cirrhosis of the liver  Thrombocytopenia  History of HCC s/p ablation in 2021 with recent chemotherapy  -Last EGD showed grade 1 varices  -s/p paracentesis on 3/31.  Continue with as needed paracentesis.  -Continue home diuretics and nadolol with hold parameters  -Continue home lactulose  -Platelets stable in the 40s, baseline ~50-60  -Daily INR, CMP     GERD  -Continue IV PPI for now     Bradycardia  Generalized weakness  -Continue nadolol with hold parameters  -Is asymptomatic, monitor on telemetry  -PT/OT eval and treat     DVTp: Defer  Monitor hemoglobin and if the bleeding stops, the pt can be discharged home. Per the wife the pt is DNR already and she will bring the paperwork. If bleeding worsens, please give platelets and FFP with surgical evaluation/possible intervention..               Damaris Willard MD

## 2025-04-08 NOTE — PROGRESS NOTES
Occupational Therapy    OT Treatment    Patient Name: Alfonso Toscano  MRN: [id number]  Department: [facility]  Room: [facility]1024-B  Today's Date: 4/8/2025  Time Calculation  Start Time: 0817  Stop Time: 0841  Time Calculation (min): 24 min        Assessment:  OT Assessment: Pt demonstrates ADL impairment with deficits in functional transfers and functional mobility. Pt would benefit from skilled OT to address these deficits.  End of Session Communication: Bedside nurse (Patients nurse notified of significant blood in toilet after patient had BM.)  End of Session Patient Position:  (Patient sitting up in recliner chair. Call light/tray in reach. Chair alarm on. Warm blanket provided. Breakfast in reach.)     Plan:  Treatment Interventions: ADL retraining, Functional transfer training, Endurance training  OT Frequency: 2 times per week  OT Discharge Recommendations: Moderate intensity level of continued care, Low intensity level of continued care  OT - OK to Discharge: Yes (when medically stable/cleared)  Treatment Interventions: ADL retraining, Functional transfer training, Endurance training    Subjective   Previous Visit Info:  OT Last Visit  OT Received On: 04/08/25  General:  General  Reason for Referral: GIB  Past Medical History Relevant to Rehab: HTN, HLD, Anxiety, Anemia, hepatic cirrhosis, colon polyps, hepatocellular carcinoma (active treatment).  Co-Treatment: PT  Co-Treatment Reason: Maximize pt safety and functional outcomes while completing discipline specific treatments  Prior to Session Communication:  (Cleared by RN for therapy)  Patient Position Received:  (Patient presents in bed, on bed pan.  Encouraged him to get up and use either the BSC or amb to the bathroom as his plan is home at discharge.)  General Comment: Pt agreeable to OT, pleasant and cooperative (large amount of blood in stool while using bathroom.  Nsg notified)  Precautions:  Medical Precautions: Fall precautions            Pain:  Pain  Assessment  0-10 (Numeric) Pain Score:  (pain not rated)  Pain Location:  (Intermittent left side flank pain with bed mobility)  Pain Interventions:  (mobilization)  Response to Interventions: Resting quietly    Objective    Cognition:  Cognition  Overall Cognitive Status:  (flat affect; directions needed to be repeated multiple times for follow through)  Safety/Judgement: Exceptions to WFL  Insight: Mild  Task Initiation: Delayed initiation  Processing Speed: Delayed       Activities of Daily Living: Grooming  Grooming Comments: hand hygiene in stance with Min assist with 1<>0 UE support.  Fair - balance with reaching outside ALICIA    Toileting  Toileting Level of Assistance: Minimum assistance  Where Assessed: Toilet (elevated surface)  Toileting Comments: pt completed posterior hygiene in stance with 1 UE support and fair- balance.       Bed Mobility/Transfers: Bed Mobility  Bed Mobility:  (rolling: min x1 using bed pads to roll, cues for technique.  Patient utilizing bed rail to assist in rolling onto side. **sidelying->sit: CGAx1 HOB raised 40 degrees. Increased time and effort to complete transfer. Utilized bed rail.)  Bed Mobility 1  Bed Mobility Comments 1: rolling: min x1 using bed pads to roll, cues for technique. Patient utilizing bed rail to assist in rolling onto side. **sidelying->sit: CGAx1 HOB raised 40 degrees. Increased time and effort to complete transfer. Utilized bed rail. Pt reports L sided flank pain with transitional movement    Transfers  Transfer:  (sit<->stand: CGAx1 Two stands performed (EOB; elevated toilet)  Instructions for walker and hand placement with transfers.)      Functional Mobility: Functional mobility in room between adl tasks with fww and min assist       Standing Balance:  Static Standing Balance  Static Standing-Comment/Number of Minutes: fair  Dynamic Standing Balance  Dynamic Standing-Comments: fair-      Therapy/Activity:        Balance/Neuromuscular  Re-Education  Balance/Neuromuscular Re-Education Activity Performed:  (Static/dyn standing with 1-2 UE support. Standing trials performed to improve balance and tolerance for increased independence with adl's and transfers.)      Outcome Measures:[facility] Daily Activity  Putting on and taking off regular lower body clothing: A lot  Bathing (including washing, rinsing, drying): A lot  Putting on and taking off regular upper body clothing: A little  Toileting, which includes using toilet, bedpan or urinal: A lot  Taking care of personal grooming such as brushing teeth: A little  Eating Meals: A little  Daily Activity - Total Score: 15        Education Documentation  Body Mechanics, taught by Jennifer L Felty, OTA at 4/8/2025  1:47 PM.  Learner: Patient  Readiness: Acceptance  Method: Explanation, Demonstration  Response: Needs Reinforcement       EDUCATION: Pt educated on fall prevention techniques; safe transfer techniques including hand placement and positioning; techniques/strategies for balance improvement; compensatory adl techniques; safe body mechanics; energy conservation techniques.       Goals:  Encounter Problems       Encounter Problems (Active)       OT Goals       SBA for all functional transfers  (Progressing)       Start:  04/03/25    Expected End:  04/17/25            SBA for Lb dressing  (Progressing)       Start:  04/03/25    Expected End:  04/17/25            SBA for toileting tasks and clothing mgmt  (Progressing)       Start:  04/03/25    Expected End:  04/17/25            Fair + dyn standing balance during ADLs and functional activities  (Progressing)       Start:  04/03/25    Expected End:  04/17/25

## 2025-04-08 NOTE — PROGRESS NOTES
Occupational Therapy    OT Treatment    Patient Name: Alfonso Toscano  MRN: [id number]  Department: [facility]  Room: [facility]1024-B  Today's Date: 4/7/2025  Time Calculation  Start Time: 1033  Stop Time: 1057  Time Calculation (min): 24 min        Assessment:  OT Assessment: Pt presents with decreased strength, balance, and endurance which impact her ADL and functional transfer status. Pt would benefit from skilled OT to address deficits.  End of Session Communication: Bedside nurse  End of Session Patient Position:  (Patient sitting up in chair after treatment. Call light/tray in reach. Chair alarm on. Spouse at bedside)     Plan:  Treatment Interventions: ADL retraining, Functional transfer training, Endurance training  OT Frequency: 2 times per week  OT Discharge Recommendations: Moderate intensity level of continued care, Low intensity level of continued care  OT - OK to Discharge: Yes (when medically stable/cleared)  Treatment Interventions: ADL retraining, Functional transfer training, Endurance training    Subjective   Previous Visit Info:  OT Last Visit  OT Received On: 04/07/25  General:  General  Reason for Referral: GIB  Past Medical History Relevant to Rehab: HTN, HLD, Anxiety, Anemia, hepatic cirrhosis, colon polyps, hepatocellular carcinoma (active treatment).  Family/Caregiver Present:  (Patient's spouse present, very supportive of care)  Co-Treatment: PT  Co-Treatment Reason: Maximize pt safety and functional outcomes while completing discipline specific treatments  Prior to Session Communication: Bedside nurse (Cleared by RN for therapy)  General Comment: Pt agreeable to OT, pleasant and cooperative  Precautions:  Medical Precautions: Fall precautions      Pain:  Pain Assessment  Pain Assessment: 0-10  0-10 (Numeric) Pain Score:  (pain not rated)  Pain Location:  (left side flank pain with bed mobility)    Objective    Cognition:  Cognition  Overall Cognitive Status:  (flat affect; directions need repeated  "several times for follow through)    Activities of Daily Living: LE Dressing  LE Dressing: Yes  Pants Level of Assistance: Moderate assistance  LE Dressing Where Assessed: Edge of bed  LE Dressing Comments: Assist to thread LE's, assist to boost/steady and pull up clothing once in stance. Pt demo'd fair- stand balance.  Education provided on safetechniques and fall prevention       Bed Mobility/Transfers: Bed Mobility  Bed Mobility:  (supine->sit: CGAx1  Bed flat. Increased time and effort to complete transfer)    Transfers  Transfer:  (sit<->stand: CGAx1  Three stands performed (EOB; BSC; chair)  Cues needed for hand and ww placement. Patient attempts to \"park\" off to the side .)         Standing Balance:  Static Standing Balance  Static Standing-Comment/Number of Minutes: F-  Dynamic Standing Balance  Dynamic Standing-Comments: F-       Therapy/Activity: Balance/Neuromuscular Re-Education  Balance/Neuromuscular Re-Education Activity Performed:  (Stand tolerance x 4 min with 1-2 UE support.  Lateral weight shifting with functional reaching with 1 UE support.)      Outcome Measures:[facility] Daily Activity  Putting on and taking off regular lower body clothing: A lot  Bathing (including washing, rinsing, drying): A lot  Putting on and taking off regular upper body clothing: A little  Toileting, which includes using toilet, bedpan or urinal: A lot  Taking care of personal grooming such as brushing teeth: A little  Eating Meals: A little  Daily Activity - Total Score: 15        Education Documentation  Body Mechanics, taught by Jennifer L Felty, OTA at 4/7/2025 10:57 AM.  Learner: Patient  Readiness: Acceptance  Method: Explanation, Demonstration  Response: Needs Reinforcement    EDUCATION: Pt educated on fall prevention techniques; safe transfer techniques including hand placement and positioning; techniques/strategies for balance improvement; compensatory adl techniques; safe body mechanics; energy conservation " techniques.       Goals:  Encounter Problems       Encounter Problems (Active)       OT Goals       SBA for all functional transfers  (Progressing)       Start:  04/03/25    Expected End:  04/17/25            SBA for Lb dressing  (Progressing)       Start:  04/03/25    Expected End:  04/17/25            SBA for toileting tasks and clothing mgmt  (Progressing)       Start:  04/03/25    Expected End:  04/17/25            Fair + dyn standing balance during ADLs and functional activities  (Progressing)       Start:  04/03/25    Expected End:  04/17/25

## 2025-04-09 LAB
ALBUMIN SERPL BCP-MCNC: 2.3 G/DL (ref 3.4–5)
ALP SERPL-CCNC: 149 U/L (ref 33–136)
ALT SERPL W P-5'-P-CCNC: 23 U/L (ref 10–52)
ANION GAP SERPL CALC-SCNC: 8 MMOL/L (ref 10–20)
AST SERPL W P-5'-P-CCNC: 46 U/L (ref 9–39)
BASOPHILS # BLD AUTO: 0.03 X10*3/UL (ref 0–0.1)
BASOPHILS NFR BLD AUTO: 0.3 %
BILIRUB SERPL-MCNC: 2.9 MG/DL (ref 0–1.2)
BUN SERPL-MCNC: 13 MG/DL (ref 6–23)
CALCIUM SERPL-MCNC: 7.3 MG/DL (ref 8.6–10.3)
CHLORIDE SERPL-SCNC: 101 MMOL/L (ref 98–107)
CO2 SERPL-SCNC: 24 MMOL/L (ref 21–32)
CREAT SERPL-MCNC: 0.99 MG/DL (ref 0.5–1.3)
EGFRCR SERPLBLD CKD-EPI 2021: 78 ML/MIN/1.73M*2
EOSINOPHIL # BLD AUTO: 0.24 X10*3/UL (ref 0–0.4)
EOSINOPHIL NFR BLD AUTO: 2.6 %
ERYTHROCYTE [DISTWIDTH] IN BLOOD BY AUTOMATED COUNT: 18.6 % (ref 11.5–14.5)
GLUCOSE SERPL-MCNC: 100 MG/DL (ref 74–99)
HCT VFR BLD AUTO: 25 % (ref 41–52)
HGB BLD-MCNC: 8.2 G/DL (ref 13.5–17.5)
HOLD SPECIMEN: NORMAL
IMM GRANULOCYTES # BLD AUTO: 0.05 X10*3/UL (ref 0–0.5)
IMM GRANULOCYTES NFR BLD AUTO: 0.5 % (ref 0–0.9)
INR PPP: 1.9 (ref 0.9–1.1)
LYMPHOCYTES # BLD AUTO: 1.19 X10*3/UL (ref 0.8–3)
LYMPHOCYTES NFR BLD AUTO: 12.8 %
MAGNESIUM SERPL-MCNC: 1.7 MG/DL (ref 1.6–2.4)
MCH RBC QN AUTO: 30.4 PG (ref 26–34)
MCHC RBC AUTO-ENTMCNC: 32.8 G/DL (ref 32–36)
MCV RBC AUTO: 93 FL (ref 80–100)
MONOCYTES # BLD AUTO: 1.66 X10*3/UL (ref 0.05–0.8)
MONOCYTES NFR BLD AUTO: 17.9 %
NEUTROPHILS # BLD AUTO: 6.1 X10*3/UL (ref 1.6–5.5)
NEUTROPHILS NFR BLD AUTO: 65.9 %
NRBC BLD-RTO: 0 /100 WBCS (ref 0–0)
PLATELET # BLD AUTO: 62 X10*3/UL (ref 150–450)
POTASSIUM SERPL-SCNC: 4.7 MMOL/L (ref 3.5–5.3)
PROT SERPL-MCNC: 4.2 G/DL (ref 6.4–8.2)
PROTHROMBIN TIME: 20.8 SECONDS (ref 9.8–12.4)
RBC # BLD AUTO: 2.7 X10*6/UL (ref 4.5–5.9)
SODIUM SERPL-SCNC: 128 MMOL/L (ref 136–145)
WBC # BLD AUTO: 9.3 X10*3/UL (ref 4.4–11.3)

## 2025-04-09 PROCEDURE — 2500000001 HC RX 250 WO HCPCS SELF ADMINISTERED DRUGS (ALT 637 FOR MEDICARE OP): Performed by: INTERNAL MEDICINE

## 2025-04-09 PROCEDURE — 85025 COMPLETE CBC W/AUTO DIFF WBC: CPT | Performed by: INTERNAL MEDICINE

## 2025-04-09 PROCEDURE — 2500000005 HC RX 250 GENERAL PHARMACY W/O HCPCS: Performed by: INTERNAL MEDICINE

## 2025-04-09 PROCEDURE — 80053 COMPREHEN METABOLIC PANEL: CPT | Performed by: INTERNAL MEDICINE

## 2025-04-09 PROCEDURE — 85610 PROTHROMBIN TIME: CPT

## 2025-04-09 PROCEDURE — 2500000004 HC RX 250 GENERAL PHARMACY W/ HCPCS (ALT 636 FOR OP/ED): Performed by: INTERNAL MEDICINE

## 2025-04-09 PROCEDURE — 99233 SBSQ HOSP IP/OBS HIGH 50: CPT | Performed by: INTERNAL MEDICINE

## 2025-04-09 PROCEDURE — 1200000002 HC GENERAL ROOM WITH TELEMETRY DAILY

## 2025-04-09 PROCEDURE — 2500000001 HC RX 250 WO HCPCS SELF ADMINISTERED DRUGS (ALT 637 FOR MEDICARE OP): Performed by: NURSE PRACTITIONER

## 2025-04-09 PROCEDURE — 83735 ASSAY OF MAGNESIUM: CPT | Performed by: INTERNAL MEDICINE

## 2025-04-09 PROCEDURE — 36415 COLL VENOUS BLD VENIPUNCTURE: CPT

## 2025-04-09 PROCEDURE — 99231 SBSQ HOSP IP/OBS SF/LOW 25: CPT | Performed by: SURGERY

## 2025-04-09 PROCEDURE — 2500000002 HC RX 250 W HCPCS SELF ADMINISTERED DRUGS (ALT 637 FOR MEDICARE OP, ALT 636 FOR OP/ED): Performed by: INTERNAL MEDICINE

## 2025-04-09 RX ORDER — OXYCODONE HYDROCHLORIDE 5 MG/1
5 TABLET ORAL EVERY 6 HOURS PRN
Status: DISCONTINUED | OUTPATIENT
Start: 2025-04-09 | End: 2025-04-14 | Stop reason: HOSPADM

## 2025-04-09 RX ORDER — MENTHOL AND ZINC OXIDE .44; 20.625 G/100G; G/100G
1 OINTMENT TOPICAL 4 TIMES DAILY PRN
Status: DISCONTINUED | OUTPATIENT
Start: 2025-04-09 | End: 2025-04-14 | Stop reason: HOSPADM

## 2025-04-09 RX ORDER — CEFAZOLIN SODIUM 2 G/100ML
2 INJECTION, SOLUTION INTRAVENOUS
Status: COMPLETED | OUTPATIENT
Start: 2025-04-10 | End: 2025-04-10

## 2025-04-09 RX ADMIN — BICALUTAMIDE 50 MG: 50 TABLET ORAL at 08:39

## 2025-04-09 RX ADMIN — OXYCODONE HYDROCHLORIDE 5 MG: 5 TABLET ORAL at 22:22

## 2025-04-09 RX ADMIN — LACTULOSE 20 G: 10 SOLUTION ORAL at 14:51

## 2025-04-09 RX ADMIN — Medication 3 MG: at 21:34

## 2025-04-09 RX ADMIN — SPIRONOLACTONE 50 MG: 25 TABLET ORAL at 08:40

## 2025-04-09 RX ADMIN — Medication 1 APPLICATION: at 14:35

## 2025-04-09 RX ADMIN — FINASTERIDE 5 MG: 5 TABLET, FILM COATED ORAL at 08:40

## 2025-04-09 RX ADMIN — NADOLOL 10 MG: 20 TABLET ORAL at 08:40

## 2025-04-09 RX ADMIN — FUROSEMIDE 20 MG: 40 TABLET ORAL at 08:42

## 2025-04-09 RX ADMIN — PANTOPRAZOLE SODIUM 40 MG: 40 INJECTION, POWDER, FOR SOLUTION INTRAVENOUS at 13:26

## 2025-04-09 RX ADMIN — LACTULOSE 20 G: 10 SOLUTION ORAL at 08:39

## 2025-04-09 RX ADMIN — LACTULOSE 20 G: 10 SOLUTION ORAL at 21:34

## 2025-04-09 RX ADMIN — BENZOCAINE AND MENTHOL 1 LOZENGE: 15; 3.6 LOZENGE ORAL at 04:27

## 2025-04-09 ASSESSMENT — PAIN - FUNCTIONAL ASSESSMENT
PAIN_FUNCTIONAL_ASSESSMENT: 0-10
PAIN_FUNCTIONAL_ASSESSMENT: 0-10

## 2025-04-09 ASSESSMENT — COGNITIVE AND FUNCTIONAL STATUS - GENERAL
DAILY ACTIVITIY SCORE: 11
DRESSING REGULAR LOWER BODY CLOTHING: TOTAL
CLIMB 3 TO 5 STEPS WITH RAILING: A LOT
TURNING FROM BACK TO SIDE WHILE IN FLAT BAD: A LITTLE
TURNING FROM BACK TO SIDE WHILE IN FLAT BAD: A LITTLE
MOBILITY SCORE: 15
CLIMB 3 TO 5 STEPS WITH RAILING: A LOT
WALKING IN HOSPITAL ROOM: A LOT
PERSONAL GROOMING: A LITTLE
CLIMB 3 TO 5 STEPS WITH RAILING: A LOT
DRESSING REGULAR UPPER BODY CLOTHING: A LOT
MOBILITY SCORE: 15
DRESSING REGULAR LOWER BODY CLOTHING: TOTAL
STANDING UP FROM CHAIR USING ARMS: A LOT
STANDING UP FROM CHAIR USING ARMS: A LOT
DAILY ACTIVITIY SCORE: 11
EATING MEALS: A LITTLE
HELP NEEDED FOR BATHING: TOTAL
WALKING IN HOSPITAL ROOM: A LOT
STANDING UP FROM CHAIR USING ARMS: A LOT
DRESSING REGULAR UPPER BODY CLOTHING: A LOT
HELP NEEDED FOR BATHING: TOTAL
TOILETING: TOTAL
WALKING IN HOSPITAL ROOM: A LOT
MOVING TO AND FROM BED TO CHAIR: A LOT
EATING MEALS: A LITTLE
MOBILITY SCORE: 15
PERSONAL GROOMING: A LITTLE
TURNING FROM BACK TO SIDE WHILE IN FLAT BAD: A LITTLE
DRESSING REGULAR UPPER BODY CLOTHING: A LOT
DRESSING REGULAR LOWER BODY CLOTHING: TOTAL
MOVING TO AND FROM BED TO CHAIR: A LOT
PERSONAL GROOMING: A LITTLE
HELP NEEDED FOR BATHING: TOTAL
TOILETING: TOTAL
TOILETING: TOTAL
EATING MEALS: A LITTLE
DAILY ACTIVITIY SCORE: 11
MOVING TO AND FROM BED TO CHAIR: A LOT

## 2025-04-09 ASSESSMENT — PAIN SCALES - GENERAL
PAINLEVEL_OUTOF10: 0 - NO PAIN
PAINLEVEL_OUTOF10: 0 - NO PAIN
PAINLEVEL_OUTOF10: 7

## 2025-04-09 NOTE — PROGRESS NOTES
Per MD, pt planned for surgery tomorrow to attempt to stop GI bleed. Will cy FFP transfusion first.

## 2025-04-09 NOTE — CARE PLAN
The patient's goals for the shift include Labs WNL    The clinical goals for the shift include to remain hds      Problem: Pain - Adult  Goal: Verbalizes/displays adequate comfort level or baseline comfort level  Outcome: Progressing     Problem: Safety - Adult  Goal: Free from fall injury  Outcome: Progressing     Problem: Discharge Planning  Goal: Discharge to home or other facility with appropriate resources  Outcome: Progressing     Problem: Chronic Conditions and Co-morbidities  Goal: Patient's chronic conditions and co-morbidity symptoms are monitored and maintained or improved  Outcome: Progressing     Problem: Nutrition  Goal: Nutrient intake appropriate for maintaining nutritional needs  Outcome: Progressing     Problem: Skin  Goal: Participates in plan/prevention/treatment measures  Outcome: Progressing  Goal: Prevent/manage excess moisture  Outcome: Progressing  Flowsheets (Taken 4/9/2025 0120)  Prevent/manage excess moisture:   Cleanse incontinence/protect with barrier cream   Moisturize dry skin  Goal: Prevent/minimize sheer/friction injuries  Outcome: Progressing  Flowsheets (Taken 4/9/2025 0120)  Prevent/minimize sheer/friction injuries:   Use pull sheet   Complete micro-shifts as needed if patient unable. Adjust patient position to relieve pressure points, not a full turn   HOB 30 degrees or less   Increase activity/out of bed for meals  Goal: Promote/optimize nutrition  Outcome: Progressing     Problem: Fall/Injury  Goal: Not fall by end of shift  Outcome: Progressing  Goal: Be free from injury by end of the shift  Outcome: Progressing  Goal: Verbalize understanding of personal risk factors for fall in the hospital  Outcome: Progressing  Goal: Verbalize understanding of risk factor reduction measures to prevent injury from fall in the home  Outcome: Progressing  Goal: Use assistive devices by end of the shift  Outcome: Progressing  Goal: Pace activities to prevent fatigue by end of the  shift  Outcome: Progressing     Problem: Pain  Goal: Takes deep breaths with improved pain control throughout the shift  Outcome: Progressing  Goal: Turns in bed with improved pain control throughout the shift  Outcome: Progressing  Goal: Walks with improved pain control throughout the shift  Outcome: Progressing  Goal: Performs ADL's with improved pain control throughout shift  Outcome: Progressing  Goal: Participates in PT with improved pain control throughout the shift  Outcome: Progressing  Goal: Free from opioid side effects throughout the shift  Outcome: Progressing  Goal: Free from acute confusion related to pain meds throughout the shift  Outcome: Progressing

## 2025-04-09 NOTE — PROGRESS NOTES
General Surgery Progress Note    Patient: Alfonso Toscano  Unit/Bed: 1024/1024-B  YOB: 1945  MRN: [id number]  Acct: [id number]   Admitting Diagnosis: Gastrointestinal hemorrhage, unspecified gastrointestinal hemorrhage type [K92.2]  Date:  4/2/2025  Hospital Day: 6  Attending: Damaris Willard MD    Complaint:  Chief Complaint   Patient presents with    Rectal Bleeding     Dx from hospital yesterday.        Subjective  Patient seen and examined this morning. No acute events overnight.  He states he had multiple bowel movements overnight that were nonbloody.  He is still having left-sided abdominal pain.  PHYSICAL EXAM:  Physical Exam  Constitutional:       General: He is not in acute distress.  HENT:      Head: Normocephalic and atraumatic.      Mouth/Throat:      Mouth: Mucous membranes are moist.   Eyes:      Conjunctiva/sclera: Conjunctivae normal.   Pulmonary:      Effort: Pulmonary effort is normal. No respiratory distress.   Abdominal:      General: There is distension.      Palpations: Abdomen is soft.      Tenderness: There is no abdominal tenderness. There is no guarding.   Musculoskeletal:         General: No swelling.   Skin:     General: Skin is warm and dry.   Neurological:      Mental Status: He is alert.   Psychiatric:         Mood and Affect: Mood normal.         Behavior: Behavior normal.       Vital signs in last 24 hours:  Vitals:    04/09/25 1157   BP: (!) 102/48   Pulse: 54   Resp: 16   Temp:    SpO2: 94%     Intake/Output this shift:    Intake/Output Summary (Last 24 hours) at 4/9/2025 1344  Last data filed at 4/9/2025 1200  Gross per 24 hour   Intake 240 ml   Output 100 ml   Net 140 ml      Allergies:  No Known Allergies   Medications:  Scheduled medications  bicalutamide, 50 mg, oral, Daily  finasteride, 5 mg, oral, Daily  furosemide, 20 mg, oral, Daily  lactulose, 30 mL, oral, TID  nadolol, 10 mg, oral, Daily  pantoprazole, 40 mg, intravenous, Daily  phytonadione, 5 mg,  intravenous, Once  spironolactone, 50 mg, oral, Daily      Continuous medications     PRN medications  PRN medications: acetaminophen **OR** acetaminophen **OR** acetaminophen, benzocaine-menthol, melatonin, menthol-zinc oxide, ondansetron ODT **OR** ondansetron  Labs:  Results for orders placed or performed during the hospital encounter of 04/02/25 (from the past 24 hours)   SST TOP   Result Value Ref Range    Extra Tube Hold for add-ons.    Protime-INR   Result Value Ref Range    Protime 20.8 (H) 9.8 - 12.4 seconds    INR 1.9 (H) 0.9 - 1.1   CBC and Auto Differential   Result Value Ref Range    WBC 9.3 4.4 - 11.3 x10*3/uL    nRBC 0.0 0.0 - 0.0 /100 WBCs    RBC 2.70 (L) 4.50 - 5.90 x10*6/uL    Hemoglobin 8.2 (L) 13.5 - 17.5 g/dL    Hematocrit 25.0 (L) 41.0 - 52.0 %    MCV 93 80 - 100 fL    MCH 30.4 26.0 - 34.0 pg    MCHC 32.8 32.0 - 36.0 g/dL    RDW 18.6 (H) 11.5 - 14.5 %    Platelets 62 (L) 150 - 450 x10*3/uL    Neutrophils % 65.9 40.0 - 80.0 %    Immature Granulocytes %, Automated 0.5 0.0 - 0.9 %    Lymphocytes % 12.8 13.0 - 44.0 %    Monocytes % 17.9 2.0 - 10.0 %    Eosinophils % 2.6 0.0 - 6.0 %    Basophils % 0.3 0.0 - 2.0 %    Neutrophils Absolute 6.10 (H) 1.60 - 5.50 x10*3/uL    Immature Granulocytes Absolute, Automated 0.05 0.00 - 0.50 x10*3/uL    Lymphocytes Absolute 1.19 0.80 - 3.00 x10*3/uL    Monocytes Absolute 1.66 (H) 0.05 - 0.80 x10*3/uL    Eosinophils Absolute 0.24 0.00 - 0.40 x10*3/uL    Basophils Absolute 0.03 0.00 - 0.10 x10*3/uL   Comprehensive metabolic panel   Result Value Ref Range    Glucose 100 (H) 74 - 99 mg/dL    Sodium 128 (L) 136 - 145 mmol/L    Potassium 4.7 3.5 - 5.3 mmol/L    Chloride 101 98 - 107 mmol/L    Bicarbonate 24 21 - 32 mmol/L    Anion Gap 8 (L) 10 - 20 mmol/L    Urea Nitrogen 13 6 - 23 mg/dL    Creatinine 0.99 0.50 - 1.30 mg/dL    eGFR 78 >60 mL/min/1.73m*2    Calcium 7.3 (L) 8.6 - 10.3 mg/dL    Albumin 2.3 (L) 3.4 - 5.0 g/dL    Alkaline Phosphatase 149 (H) 33 - 136 U/L     Total Protein 4.2 (L) 6.4 - 8.2 g/dL    AST 46 (H) 9 - 39 U/L    Bilirubin, Total 2.9 (H) 0.0 - 1.2 mg/dL    ALT 23 10 - 52 U/L   Magnesium   Result Value Ref Range    Magnesium 1.70 1.60 - 2.40 mg/dL     *Note: Due to a large number of results and/or encounters for the requested time period, some results have not been displayed. A complete set of results can be found in Results Review.      Imaging:  Imaging  No results found.    Cardiology, Vascular, and Other Imaging  No other imaging results found for the past 2 days     Assessment  Hematochezia  Cirrhosis    No acute events overnight.  The patient did not have any blood per rectum last night, but he has been having rectal bleeding intermittently during his stay.  INR still elevated at 1.9.  Hemoglobin stable at 8.2.  WBC 9.3, no leukocytosis.    Plan  -OR tomorrow with Dr. Womack and Dr. Larson for arterial ligation of hemorrhoids  -Regular diet, NPO at midnight  -Ancef on-call to the OR  -Pain control  -Nausea control  -Continue to monitor for rectal bleeding  -Monitor stool for blood  -DVT prophylaxis-SCD only  -Pulmonary toileting - C&DB/IS  -Encourage ambulation- OOB to chair/walk in halls  -Continue care per primary team       Further recommendations per Dr. Vu Sky PA-C    Time spent  35  minutes obtaining labs, imaging, recommendations, interview, assessment, examination, medication review/ordering, and EMR review.    Plan of care was discussed extensively with patient. Patient verbalized understanding through teach back method. All questions and concerns addressed upon examination.     Of note, this documentation is completed using the Dragon Dictation system (voice recognition software). There may be spelling and/or grammatical errors that were not corrected prior to final submission.

## 2025-04-09 NOTE — PROGRESS NOTES
Alfonso Toscano is a 80 y.o. male on day 6 of admission presenting with Gastrointestinal hemorrhage, unspecified gastrointestinal hemorrhage type.      Subjective   The pt is seen and evaluated today. Bloody bowel movements are intermittent.  No bowel movement this morning.  Per staff he did have a brown bowel movement yesterday but again he did have a bloody bowel movement yesterday.  Surgery is on board and due to persistent intermittent bleeding we will likely need to do an intervention will likely do vessel ligation and possible pexy of hemorrhoids.       Objective     Last Recorded Vitals  BP (!) 102/48 (BP Location: Right arm, Patient Position: Sitting)   Pulse 54   Temp 36.8 °C (98.2 °F) (Temporal)   Resp 16   Wt 90 kg (198 lb 6.6 oz)   SpO2 94%   Intake/Output last 3 Shifts:    Intake/Output Summary (Last 24 hours) at 4/9/2025 1301  Last data filed at 4/9/2025 1200  Gross per 24 hour   Intake 240 ml   Output 100 ml   Net 140 ml       Admission Weight  Weight: 86.2 kg (190 lb) (04/02/25 0137)    Daily Weight  04/08/25 : 90 kg (198 lb 6.6 oz)    Image Results  ECG 12 lead  Sinus bradycardia  Otherwise normal ECG  When compared with ECG of 28-MAR-2025 04:41, (unconfirmed)  MO interval has decreased  Criteria for Septal infarct are no longer Present  ST no longer depressed in Inferior leads  T wave inversion no longer evident in Inferior leads  See ED provider note for full interpretation and clinical correlation  Confirmed by Vangie Morin ([id number]) on 4/3/2025 11:06:08 AM      Physical Exam  Constitutional:       Appearance: He is ill-appearing.   HENT:      Head: Normocephalic and atraumatic.      Nose: Nose normal.      Mouth/Throat:      Mouth: Mucous membranes are dry.   Eyes:      Extraocular Movements: Extraocular movements intact.      Conjunctiva/sclera: Conjunctivae normal.   Cardiovascular:      Rate and Rhythm: Normal rate and regular rhythm.      Pulses: Normal pulses.      Heart sounds: Normal  heart sounds.   Pulmonary:      Effort: Pulmonary effort is normal.      Breath sounds: Normal breath sounds.   Abdominal:      General: Bowel sounds are normal. There is distension.      Palpations: Abdomen is soft.   Musculoskeletal:         General: Normal range of motion.      Cervical back: Normal range of motion and neck supple.      Right lower leg: Edema present.      Left lower leg: Edema present.   Skin:     General: Skin is warm and dry.      Coloration: Skin is pale.   Neurological:      General: No focal deficit present.      Mental Status: He is alert and oriented to person, place, and time. Mental status is at baseline.   Psychiatric:         Mood and Affect: Mood normal.         Relevant Results               Assessment/Plan          Alfonso Toscano is a 80 y.o. male with a history of liver cancer, prostate cancer, esophageal varices, anxiety disorder, colon polyps, hypertension, peptic ulcer disease, GERD, cirrhosis, and hyperlipidemia.  He was just discharged from the hospital on 3/31. He presented at that time with a traumatic hemorrhagic shock and was initially admitted to the ICU. He received 2 units packed red blood cells and FFP. He underwent EGD and colonoscopy at that time and had suspected hemorrhoidal bleeding treated with banding. EGD showed small grade 1 varices.  Admitted again this time with rectal bleeding. The bleeding is slowing down and holding off any surgical intervention. Plan was to discharge home but still having bloody bowel movement per staff it was moderate sized.  The patient continues to have intermittent bleeding. Surgery is on board and due to persistent intermittent bleeding we will likely need to do an intervention will likely do vessel ligation and possible pexy of hemorrhoids.        Assessment & Plan  Gastrointestinal hemorrhage, unspecified gastrointestinal hemorrhage type    Hepatic cirrhosis (Multi)    Thrombocytopenia (CMS-HCC)    Lactic  acidosis    Hematochezia  Anemia secondary to acute blood loss  -Hematochezia likely due to hemorrhoidal bleeding vs radiation proctitis vs varices  -Received 1 unit PRBCs on admission, Hgb is gradually trending down.  He has been banded before.  Surgery is recommending correcting coagulopathy and platelet count which will be difficult to achieve given underlying cirrhosis.  Will give of IV vitamin K although vitamin K is unlikely to fix the INR.  Platelets prior to surgery and 5 days of Avatrombopag with hematology recommendations here.  Pharmacy unable to order at this time.  Platelet count is above 50 at this time.  Will do as needed platelet infusion prior to surgery.  Will also do FFP's prior to surgery as well.  -Surgery is on board and due to persistent intermittent bleeding we will likely need to do an intervention will likely do vessel ligation and possible pexy of hemorrhoids..  Continue to monitor H&H  -EMS hematology is on board  -Initially Spoke with Dr. Alfredo who recommends platelets and FFP prior to procedures. But bleeding is slowing down. Plans was to discharge home with improvement but still having moderate sized bloody bowel movements.  Cont to monitor H&H  -Platelets have improved in 60s now will need FFP's prior to procedure     Alcoholic cirrhosis of the liver  Thrombocytopenia  History of HCC s/p ablation in 2021 with recent chemotherapy  -Last EGD showed grade 1 varices  -s/p paracentesis on 3/31.  Continue with as needed paracentesis.  -Continue home diuretics and nadolol with hold parameters  -Continue home lactulose  -Platelets stable in the 40s, baseline ~50-60  -Daily INR, CMP     GERD  -Continue IV PPI for now     Bradycardia  Generalized weakness  -Continue nadolol with hold parameters  -Is asymptomatic, monitor on telemetry  -PT/OT eval and treat     DVTp: Defer  Per the wife the pt is DNR already and she will bring the paperwork. Surgery is on board and due to persistent intermittent  bleeding we will likely need to do an intervention will likely do vessel ligation and possible pexy of hemorrhoids.  Will need FFP's prior to procedure if platelets drop may need platelets as well              Damaris Willard MD

## 2025-04-09 NOTE — CARE PLAN
The patient's goals for the shift include Labs WNL    The clinical goals for the shift include Remain HDS    Over the shift, the patient did not make progress toward the following goals. Barriers to progression include Bleeding with bowel movements. Recommendations to address these barriers include monitor and document /notify MD as appropriate.

## 2025-04-10 ENCOUNTER — ANESTHESIA (OUTPATIENT)
Dept: OPERATING ROOM | Facility: HOSPITAL | Age: 80
End: 2025-04-10
Payer: MEDICARE

## 2025-04-10 ENCOUNTER — ANESTHESIA EVENT (OUTPATIENT)
Dept: OPERATING ROOM | Facility: HOSPITAL | Age: 80
End: 2025-04-10
Payer: MEDICARE

## 2025-04-10 LAB
ABO GROUP (TYPE) IN BLOOD: NORMAL
ANION GAP SERPL CALC-SCNC: 9 MMOL/L (ref 10–20)
ANTIBODY SCREEN: NORMAL
BASOPHILS # BLD AUTO: 0.04 X10*3/UL (ref 0–0.1)
BASOPHILS NFR BLD AUTO: 0.5 %
BUN SERPL-MCNC: 12 MG/DL (ref 6–23)
CALCIUM SERPL-MCNC: 7.5 MG/DL (ref 8.6–10.3)
CHLORIDE SERPL-SCNC: 102 MMOL/L (ref 98–107)
CO2 SERPL-SCNC: 23 MMOL/L (ref 21–32)
CREAT SERPL-MCNC: 0.96 MG/DL (ref 0.5–1.3)
EGFRCR SERPLBLD CKD-EPI 2021: 81 ML/MIN/1.73M*2
EOSINOPHIL # BLD AUTO: 0.23 X10*3/UL (ref 0–0.4)
EOSINOPHIL NFR BLD AUTO: 2.7 %
ERYTHROCYTE [DISTWIDTH] IN BLOOD BY AUTOMATED COUNT: 18.7 % (ref 11.5–14.5)
GLUCOSE SERPL-MCNC: 81 MG/DL (ref 74–99)
HCT VFR BLD AUTO: 25.9 % (ref 41–52)
HGB BLD-MCNC: 8.3 G/DL (ref 13.5–17.5)
HOLD SPECIMEN: NORMAL
HOLD SPECIMEN: NORMAL
IMM GRANULOCYTES # BLD AUTO: 0.03 X10*3/UL (ref 0–0.5)
IMM GRANULOCYTES NFR BLD AUTO: 0.3 % (ref 0–0.9)
INR PPP: 1.9 (ref 0.9–1.1)
LYMPHOCYTES # BLD AUTO: 1.29 X10*3/UL (ref 0.8–3)
LYMPHOCYTES NFR BLD AUTO: 14.9 %
MCH RBC QN AUTO: 30.1 PG (ref 26–34)
MCHC RBC AUTO-ENTMCNC: 32 G/DL (ref 32–36)
MCV RBC AUTO: 94 FL (ref 80–100)
MONOCYTES # BLD AUTO: 1.52 X10*3/UL (ref 0.05–0.8)
MONOCYTES NFR BLD AUTO: 17.6 %
NEUTROPHILS # BLD AUTO: 5.52 X10*3/UL (ref 1.6–5.5)
NEUTROPHILS NFR BLD AUTO: 64 %
NRBC BLD-RTO: 0 /100 WBCS (ref 0–0)
PLATELET # BLD AUTO: 57 X10*3/UL (ref 150–450)
POTASSIUM SERPL-SCNC: 4.8 MMOL/L (ref 3.5–5.3)
PROTHROMBIN TIME: 20.7 SECONDS (ref 9.8–12.4)
RBC # BLD AUTO: 2.76 X10*6/UL (ref 4.5–5.9)
RH FACTOR (ANTIGEN D): NORMAL
SODIUM SERPL-SCNC: 129 MMOL/L (ref 136–145)
WBC # BLD AUTO: 8.6 X10*3/UL (ref 4.4–11.3)

## 2025-04-10 PROCEDURE — 82374 ASSAY BLOOD CARBON DIOXIDE: CPT | Performed by: INTERNAL MEDICINE

## 2025-04-10 PROCEDURE — 36415 COLL VENOUS BLD VENIPUNCTURE: CPT | Performed by: SURGERY

## 2025-04-10 PROCEDURE — 36430 TRANSFUSION BLD/BLD COMPNT: CPT

## 2025-04-10 PROCEDURE — 2500000002 HC RX 250 W HCPCS SELF ADMINISTERED DRUGS (ALT 637 FOR MEDICARE OP, ALT 636 FOR OP/ED): Performed by: INTERNAL MEDICINE

## 2025-04-10 PROCEDURE — 7100000002 HC RECOVERY ROOM TIME - EACH INCREMENTAL 1 MINUTE: Performed by: SURGERY

## 2025-04-10 PROCEDURE — 2500000001 HC RX 250 WO HCPCS SELF ADMINISTERED DRUGS (ALT 637 FOR MEDICARE OP): Performed by: SURGERY

## 2025-04-10 PROCEDURE — 2500000005 HC RX 250 GENERAL PHARMACY W/O HCPCS: Performed by: SURGERY

## 2025-04-10 PROCEDURE — 2500000004 HC RX 250 GENERAL PHARMACY W/ HCPCS (ALT 636 FOR OP/ED): Performed by: NURSE ANESTHETIST, CERTIFIED REGISTERED

## 2025-04-10 PROCEDURE — 2500000004 HC RX 250 GENERAL PHARMACY W/ HCPCS (ALT 636 FOR OP/ED): Performed by: INTERNAL MEDICINE

## 2025-04-10 PROCEDURE — 3600000002 HC OR TIME - INITIAL BASE CHARGE - PROCEDURE LEVEL TWO: Performed by: SURGERY

## 2025-04-10 PROCEDURE — 2500000005 HC RX 250 GENERAL PHARMACY W/O HCPCS: Performed by: NURSE ANESTHETIST, CERTIFIED REGISTERED

## 2025-04-10 PROCEDURE — 06LY8CC OCCLUSION OF HEMORRHOIDAL PLEXUS WITH EXTRALUMINAL DEVICE, VIA NATURAL OR ARTIFICIAL OPENING ENDOSCOPIC: ICD-10-PCS | Performed by: SURGERY

## 2025-04-10 PROCEDURE — 2500000001 HC RX 250 WO HCPCS SELF ADMINISTERED DRUGS (ALT 637 FOR MEDICARE OP): Performed by: INTERNAL MEDICINE

## 2025-04-10 PROCEDURE — 1200000002 HC GENERAL ROOM WITH TELEMETRY DAILY

## 2025-04-10 PROCEDURE — 3700000001 HC GENERAL ANESTHESIA TIME - INITIAL BASE CHARGE: Performed by: SURGERY

## 2025-04-10 PROCEDURE — 46948 INT HRHC TRANAL DARTLZJ 2+: CPT | Performed by: SURGERY

## 2025-04-10 PROCEDURE — 3600000007 HC OR TIME - EACH INCREMENTAL 1 MINUTE - PROCEDURE LEVEL TWO: Performed by: SURGERY

## 2025-04-10 PROCEDURE — 2720000007 HC OR 272 NO HCPCS: Performed by: SURGERY

## 2025-04-10 PROCEDURE — 86901 BLOOD TYPING SEROLOGIC RH(D): CPT | Performed by: SURGERY

## 2025-04-10 PROCEDURE — 2780000003 HC OR 278 NO HCPCS: Performed by: SURGERY

## 2025-04-10 PROCEDURE — P9017 PLASMA 1 DONOR FRZ W/IN 8 HR: HCPCS

## 2025-04-10 PROCEDURE — 85610 PROTHROMBIN TIME: CPT

## 2025-04-10 PROCEDURE — 7100000001 HC RECOVERY ROOM TIME - INITIAL BASE CHARGE: Performed by: SURGERY

## 2025-04-10 PROCEDURE — 3700000002 HC GENERAL ANESTHESIA TIME - EACH INCREMENTAL 1 MINUTE: Performed by: SURGERY

## 2025-04-10 PROCEDURE — 85025 COMPLETE CBC W/AUTO DIFF WBC: CPT | Performed by: INTERNAL MEDICINE

## 2025-04-10 PROCEDURE — 2500000004 HC RX 250 GENERAL PHARMACY W/ HCPCS (ALT 636 FOR OP/ED)

## 2025-04-10 PROCEDURE — 36415 COLL VENOUS BLD VENIPUNCTURE: CPT

## 2025-04-10 RX ORDER — GLYCOPYRROLATE 0.2 MG/ML
INJECTION INTRAMUSCULAR; INTRAVENOUS AS NEEDED
Status: DISCONTINUED | OUTPATIENT
Start: 2025-04-10 | End: 2025-04-10

## 2025-04-10 RX ORDER — SODIUM CHLORIDE 0.9 G/100ML
INJECTION, SOLUTION IRRIGATION AS NEEDED
Status: DISCONTINUED | OUTPATIENT
Start: 2025-04-10 | End: 2025-04-10 | Stop reason: HOSPADM

## 2025-04-10 RX ORDER — PROPOFOL 10 MG/ML
INJECTION, EMULSION INTRAVENOUS AS NEEDED
Status: DISCONTINUED | OUTPATIENT
Start: 2025-04-10 | End: 2025-04-10

## 2025-04-10 RX ORDER — FENTANYL CITRATE 50 UG/ML
INJECTION, SOLUTION INTRAMUSCULAR; INTRAVENOUS AS NEEDED
Status: DISCONTINUED | OUTPATIENT
Start: 2025-04-10 | End: 2025-04-10

## 2025-04-10 RX ORDER — LIDOCAINE HYDROCHLORIDE 20 MG/ML
INJECTION, SOLUTION EPIDURAL; INFILTRATION; INTRACAUDAL; PERINEURAL AS NEEDED
Status: DISCONTINUED | OUTPATIENT
Start: 2025-04-10 | End: 2025-04-10

## 2025-04-10 RX ORDER — NORETHINDRONE AND ETHINYL ESTRADIOL 0.5-0.035
KIT ORAL AS NEEDED
Status: DISCONTINUED | OUTPATIENT
Start: 2025-04-10 | End: 2025-04-10

## 2025-04-10 RX ORDER — ONDANSETRON HYDROCHLORIDE 2 MG/ML
INJECTION, SOLUTION INTRAVENOUS AS NEEDED
Status: DISCONTINUED | OUTPATIENT
Start: 2025-04-10 | End: 2025-04-10

## 2025-04-10 RX ORDER — MIDAZOLAM HYDROCHLORIDE 1 MG/ML
INJECTION, SOLUTION INTRAMUSCULAR; INTRAVENOUS AS NEEDED
Status: DISCONTINUED | OUTPATIENT
Start: 2025-04-10 | End: 2025-04-10

## 2025-04-10 RX ADMIN — EPHEDRINE SULFATE 5 MG: 50 INJECTION, SOLUTION INTRAVENOUS at 13:46

## 2025-04-10 RX ADMIN — ONDANSETRON 4 MG: 2 INJECTION, SOLUTION INTRAMUSCULAR; INTRAVENOUS at 13:31

## 2025-04-10 RX ADMIN — EPHEDRINE SULFATE 5 MG: 50 INJECTION, SOLUTION INTRAVENOUS at 13:55

## 2025-04-10 RX ADMIN — FENTANYL CITRATE 25 MCG: 50 INJECTION INTRAMUSCULAR; INTRAVENOUS at 13:46

## 2025-04-10 RX ADMIN — PROPOFOL 25 MCG/KG/MIN: 10 INJECTION, EMULSION INTRAVENOUS at 13:24

## 2025-04-10 RX ADMIN — SPIRONOLACTONE 50 MG: 25 TABLET ORAL at 08:02

## 2025-04-10 RX ADMIN — NADOLOL 10 MG: 20 TABLET ORAL at 08:03

## 2025-04-10 RX ADMIN — FINASTERIDE 5 MG: 5 TABLET, FILM COATED ORAL at 08:02

## 2025-04-10 RX ADMIN — PANTOPRAZOLE SODIUM 40 MG: 40 INJECTION, POWDER, FOR SOLUTION INTRAVENOUS at 08:02

## 2025-04-10 RX ADMIN — CEFAZOLIN SODIUM 2 G: 2 INJECTION, SOLUTION INTRAVENOUS at 13:27

## 2025-04-10 RX ADMIN — DEXAMETHASONE SODIUM PHOSPHATE 4 MG: 4 INJECTION, SOLUTION INTRAMUSCULAR; INTRAVENOUS at 13:47

## 2025-04-10 RX ADMIN — LACTULOSE 20 G: 10 SOLUTION ORAL at 08:02

## 2025-04-10 RX ADMIN — EPHEDRINE SULFATE 5 MG: 50 INJECTION, SOLUTION INTRAVENOUS at 13:39

## 2025-04-10 RX ADMIN — FUROSEMIDE 20 MG: 40 TABLET ORAL at 08:02

## 2025-04-10 RX ADMIN — MIDAZOLAM 1 MG: 1 INJECTION INTRAMUSCULAR; INTRAVENOUS at 13:13

## 2025-04-10 RX ADMIN — FENTANYL CITRATE 25 MCG: 50 INJECTION INTRAMUSCULAR; INTRAVENOUS at 13:39

## 2025-04-10 RX ADMIN — GLYCOPYRROLATE 0.2 MG: 0.2 INJECTION, SOLUTION INTRAMUSCULAR; INTRAVENOUS at 13:28

## 2025-04-10 RX ADMIN — PROPOFOL 40 MG: 10 INJECTION, EMULSION INTRAVENOUS at 13:23

## 2025-04-10 RX ADMIN — GLYCOPYRROLATE 0.2 MG: 0.2 INJECTION, SOLUTION INTRAMUSCULAR; INTRAVENOUS at 13:30

## 2025-04-10 RX ADMIN — LACTULOSE 20 G: 10 SOLUTION ORAL at 20:33

## 2025-04-10 RX ADMIN — BICALUTAMIDE 50 MG: 50 TABLET ORAL at 08:02

## 2025-04-10 RX ADMIN — LIDOCAINE HYDROCHLORIDE 60 MG: 20 INJECTION, SOLUTION EPIDURAL; INFILTRATION; INTRACAUDAL; PERINEURAL at 13:23

## 2025-04-10 SDOH — HEALTH STABILITY: MENTAL HEALTH: CURRENT SMOKER: 0

## 2025-04-10 ASSESSMENT — COGNITIVE AND FUNCTIONAL STATUS - GENERAL
WALKING IN HOSPITAL ROOM: A LOT
HELP NEEDED FOR BATHING: TOTAL
DAILY ACTIVITIY SCORE: 11
PERSONAL GROOMING: A LITTLE
DRESSING REGULAR UPPER BODY CLOTHING: A LOT
TOILETING: TOTAL
MOVING TO AND FROM BED TO CHAIR: A LOT
DRESSING REGULAR LOWER BODY CLOTHING: TOTAL
MOBILITY SCORE: 15
EATING MEALS: A LITTLE
TURNING FROM BACK TO SIDE WHILE IN FLAT BAD: A LITTLE
STANDING UP FROM CHAIR USING ARMS: A LOT
CLIMB 3 TO 5 STEPS WITH RAILING: A LOT

## 2025-04-10 ASSESSMENT — PAIN SCALES - GENERAL
PAIN_LEVEL: 0
PAINLEVEL_OUTOF10: 0 - NO PAIN
PAINLEVEL_OUTOF10: 0 - NO PAIN

## 2025-04-10 NOTE — PROGRESS NOTES
Occupational Therapy                 Therapy Communication Note    Patient Name: Alfonso Toscano  MRN: [id number]  Department: [facility] PACU  Room: [facility] OR  Today's Date: 4/10/2025     Discipline: Occupational Therapy          Missed Visit Reason: Missed Visit Reason: Other (Surgery)    Comment: Patient is currently NPO for surgery. He will be undergoing arterial ligation of hemorrhoids with possible pexy. Will also require FFP transfusion prior to surgery.     12:30-Pt off floor for sx

## 2025-04-10 NOTE — ANESTHESIA POSTPROCEDURE EVALUATION
Patient: Alfonso Toscano    Procedure Summary       Date: 04/10/25 Room / Location: [facility] OR 10 / Virtual [facility] OR    Anesthesia Start: 1314 Anesthesia Stop: 1437    Procedure: DOPPLER HEMORRHOIDECTOMY Diagnosis:       Gastrointestinal hemorrhage, unspecified gastrointestinal hemorrhage type      (Gastrointestinal hemorrhage, unspecified gastrointestinal hemorrhage type [K92.2])    Surgeons: Sumanth Womack MD Responsible Provider: Xochilt Roque MD    Anesthesia Type: MAC ASA Status: 3 - Emergent            Anesthesia Type: MAC    Vitals Value Taken Time   BP 91/43 04/10/25 1437   Temp 36 04/10/25 1437   Pulse 54 04/10/25 1436   Resp 13 04/10/25 1436   SpO2 96 % 04/10/25 1436   Vitals shown include unfiled device data.    Anesthesia Post Evaluation    Patient location during evaluation: PACU  Patient participation: complete - patient participated  Level of consciousness: awake and alert  Pain score: 0  Pain management: adequate  Airway patency: patent  Cardiovascular status: acceptable  Respiratory status: acceptable  Hydration status: acceptable  Postoperative Nausea and Vomiting: none        No notable events documented.

## 2025-04-10 NOTE — PROGRESS NOTES
Alfonso Toscano is a 80 y.o. male on day 7 of admission presenting with Gastrointestinal hemorrhage, unspecified gastrointestinal hemorrhage type.      Subjective   Reports intermittent rectal bleeding       Objective     Last Recorded Vitals  /51   Pulse 52   Temp 36.1 °C (97 °F) (Temporal)   Resp 16   Wt 90 kg (198 lb 6.6 oz)   SpO2 97%   Intake/Output last 3 Shifts:    Intake/Output Summary (Last 24 hours) at 4/10/2025 1045  Last data filed at 4/9/2025 1200  Gross per 24 hour   Intake 240 ml   Output 100 ml   Net 140 ml       Admission Weight  Weight: 86.2 kg (190 lb) (04/02/25 0137)    Daily Weight  04/08/25 : 90 kg (198 lb 6.6 oz)    Image Results  ECG 12 lead  Sinus bradycardia  Otherwise normal ECG  When compared with ECG of 28-MAR-2025 04:41, (unconfirmed)  RI interval has decreased  Criteria for Septal infarct are no longer Present  ST no longer depressed in Inferior leads  T wave inversion no longer evident in Inferior leads  See ED provider note for full interpretation and clinical correlation  Confirmed by Vangie Morin ([id number]) on 4/3/2025 11:06:08 AM      Physical Exam  Alert oriented x 3  Lungs clear to auscultation bilaterally  Heart regular rhythm  Abdomen soft nontender  Extremities no leg edema  CNS no focal deficit    Relevant Results  Results for orders placed or performed during the hospital encounter of 04/02/25 (from the past 24 hours)   SST TOP   Result Value Ref Range    Extra Tube Hold for add-ons.    Protime-INR   Result Value Ref Range    Protime 20.7 (H) 9.8 - 12.4 seconds    INR 1.9 (H) 0.9 - 1.1   CBC and Auto Differential   Result Value Ref Range    WBC 8.6 4.4 - 11.3 x10*3/uL    nRBC 0.0 0.0 - 0.0 /100 WBCs    RBC 2.76 (L) 4.50 - 5.90 x10*6/uL    Hemoglobin 8.3 (L) 13.5 - 17.5 g/dL    Hematocrit 25.9 (L) 41.0 - 52.0 %    MCV 94 80 - 100 fL    MCH 30.1 26.0 - 34.0 pg    MCHC 32.0 32.0 - 36.0 g/dL    RDW 18.7 (H) 11.5 - 14.5 %    Platelets 57 (L) 150 - 450 x10*3/uL     Neutrophils % 64.0 40.0 - 80.0 %    Immature Granulocytes %, Automated 0.3 0.0 - 0.9 %    Lymphocytes % 14.9 13.0 - 44.0 %    Monocytes % 17.6 2.0 - 10.0 %    Eosinophils % 2.7 0.0 - 6.0 %    Basophils % 0.5 0.0 - 2.0 %    Neutrophils Absolute 5.52 (H) 1.60 - 5.50 x10*3/uL    Immature Granulocytes Absolute, Automated 0.03 0.00 - 0.50 x10*3/uL    Lymphocytes Absolute 1.29 0.80 - 3.00 x10*3/uL    Monocytes Absolute 1.52 (H) 0.05 - 0.80 x10*3/uL    Eosinophils Absolute 0.23 0.00 - 0.40 x10*3/uL    Basophils Absolute 0.04 0.00 - 0.10 x10*3/uL   Basic Metabolic Panel   Result Value Ref Range    Glucose 81 74 - 99 mg/dL    Sodium 129 (L) 136 - 145 mmol/L    Potassium 4.8 3.5 - 5.3 mmol/L    Chloride 102 98 - 107 mmol/L    Bicarbonate 23 21 - 32 mmol/L    Anion Gap 9 (L) 10 - 20 mmol/L    Urea Nitrogen 12 6 - 23 mg/dL    Creatinine 0.96 0.50 - 1.30 mg/dL    eGFR 81 >60 mL/min/1.73m*2    Calcium 7.5 (L) 8.6 - 10.3 mg/dL   Prepare Plasma: 1 Units   Result Value Ref Range    PRODUCT CODE [id number]     Unit Number [id number]     Unit ABO O     Unit RH POS     Dispense Status IS     Blood Expiration Date 4/15/2025  9:34:00 AM EDT     PRODUCT BLOOD TYPE 5100     UNIT VOLUME 294    Type and screen   Result Value Ref Range    ABO TYPE O     Rh TYPE POS     ANTIBODY SCREEN NEG      *Note: Due to a large number of results and/or encounters for the requested time period, some results have not been displayed. A complete set of results can be found in Results Review.         Assessment/Plan     Anemia secondary to acute blood loss  -Hematochezia likely due to hemorrhoidal bleeding vs radiation proctitis vs varices  -Received 1 unit PRBCs on admission, Hgb is gradually trending down.  He has been banded before.  Surgery is recommending correcting coagulopathy and platelet count which will be difficult to achieve given underlying cirrhosis.  Will give of IV vitamin K although vitamin K is unlikely to fix the INR.  Platelets prior to  surgery and 5 days of Avatrombopag with hematology recommendations here.  Pharmacy unable to order at this time.  Platelet count is above 50 at this time.  Will do as needed platelet infusion prior to surgery.  Will also do FFP's prior to surgery as well.  -Surgery is on board and due to persistent intermittent bleeding we will likely need to do an intervention will likely do vessel ligation and possible pexy of hemorrhoids..  Continue to monitor H&H  -EMS hematology is on board  - Dr. Alfredo recommends platelets and FFP prior to procedures. But bleeding is slowing down. Plans was to discharge home with improvement but still having moderate sized bloody bowel movements.  Cont to monitor H&H  -Platelets have improved in 60s now will need FFP's prior to procedure     Alcoholic cirrhosis of the liver  Thrombocytopenia  History of HCC s/p ablation in 2021 with recent chemotherapy  -Last EGD showed grade 1 varices  -s/p paracentesis on 3/31.  Continue with as needed paracentesis.  -Continue home diuretics and nadolol with hold parameters  -Continue home lactulose  -Platelets stable in the 40s, baseline ~50-60  -Daily INR, CMP     GERD  -Continue IV PPI for now     Bradycardia  Generalized weakness, hearing loss  -Continue nadolol with hold parameters  -Is asymptomatic, monitor on telemetry  -PT/OT eval and treat     DVTp: Defer  Per the wife the pt is DNR already and she will bring the paperwork. Surgery is on board and due to persistent intermittent bleeding we will likely need to do an intervention will likely do vessel ligation and possible pexy of hemorrhoids.  Will need FFP's prior to procedure if platelets drop may need platelets as well    H&H is stable  PT OT evaluation and treatment     Faith Mendiola MD

## 2025-04-10 NOTE — PROGRESS NOTES
Pt going to OR today for Doppler Hemorrhoidectomy with Dr Womack to help stop bleeding. PT/OT working with pt, recommends moderate to low level therapy needs. Preference is HOME, pending if needs HHC, [facility], or Hospice referral. Pt is DNR and spouse is suppose to be bringing in paperwork.    UPDATE 1642:  TCC looked through pt chart/records and found a DNR form that was completed in Dec 2021 and signed by pt and PCP Dr NELLY Patel. TCC brought copy to pt's room and showed it to the pt's spouse who confirmed that is the patient's signature and that there has been no updated form signed since. TCC made copy for spouse and placed hardcopy in pt's chart to be re-scanned in. MD updated and is changing code status on pt chart.

## 2025-04-10 NOTE — OP NOTE
DOPPLER HEMORRHOIDECTOMY Operative Note     Date: 2025 - 4/10/2025  OR Location: [facility]    Name: Alfonso Toscano, : 1945, Age: 80 y.o., MRN: [id number], Sex: male    Diagnosis  Pre-op Diagnosis      * Gastrointestinal hemorrhage, unspecified gastrointestinal hemorrhage type [K92.2] Post-op Diagnosis     * Gastrointestinal hemorrhage, unspecified gastrointestinal hemorrhage type [K92.2]     Procedures  DOPPLER HEMORRHOIDECTOMY  10798 - [place] HEMORRHOIDECTOMY INTERNAL RUBBER BAND LIGATIONS  86087    Surgeons      * Sumanth Womack - Primary    Resident/Fellow/Other Assistant:  Surgeons and Role:     * Ruma Larson MD - Assisting    Staff:   Circulator: Gisele Dunne Scrub: Radha Prado Person: Analisa  Surgical Assistant: Abdiel    Anesthesia Staff: Anesthesiologist: Xochilt Roque MD  CRNA: Kehinde Valentin, APRN-CRNA; You Mcmahon APRN-CRNA    Procedure Summary  Anesthesia: Monitor Anesthesia Care  ASA: III  Estimated Blood Loss: 25mL  Intra-op Medications:   Administrations occurring from 1235 to 1335 on 04/10/25:   Medication Name Total Dose   glycopyrrolate (Robinul) injection 0.2 mg/mL 0.4 mg   lidocaine PF (Xylocaine-MPF)  2 % 60 mg   midazolam (Versed) 1 mg/1 mL 1 mg   ondansetron (Zofran) 2 mg/mL injection 4 mg   propofol (Diprivan) injection 10 mg/mL 88.15 mg   ceFAZolin (Ancef) 2 g in dextrose (iso)  mL 2 g              Anesthesia Record               Intraprocedure I/O Totals          Intake    ceFAZolin (Ancef) 2 g in dextrose (iso)  mL 100.00 mL    Total Intake 100 mL          Specimen: No specimens collected              Drains and/or Catheters:   External Urinary Catheter Male (Active)   External Catheter Status In place 25 1200   Collection Container Other (Comment) 25 0629   Securement Method Other (Comment) 25 06   Output (mL) 100 mL 25 1200       Tourniquet Times:         Implants:     Findings: Very vascular hemorrhoids, arterial  ligation of hemorrhoids     Indications: Alfonso Toscano is an 80 y.o. male who is having surgery for Gastrointestinal hemorrhage, unspecified gastrointestinal hemorrhage type [K92.2].  This is a 80-year-old male with multiple medical comorbidities including cirrhosis who initially presented to [facility] with lower GI bleed.  Workup only demonstrated bleeding hemorrhoids.  He had the rubber band ligated however he still had persistent bleeding.  Because of this, I had a very long discussion with his family about what options were available.  Because of his very high risk status, I recommended a potential vessel ligation to help prevent bleeding with the understanding that he is high risk and this may fail however any higher risk procedure such as an actual hemorrhoidectomy could be very high risk and potentially fatal.  Risk comments, benefits were fully explained to the patient and family including bleeding, infection, injury to other organ.  The family and the patient consented the procedure above.    The patient was seen in the preoperative area. The risks, benefits, complications, treatment options, non-operative alternatives, expected recovery and outcomes were discussed with the patient. The possibilities of reaction to medication, pulmonary aspiration, injury to surrounding structures, bleeding, recurrent infection, the need for additional procedures, failure to diagnose a condition, and creating a complication requiring transfusion or operation were discussed with the patient. The patient concurred with the proposed plan, giving informed consent.  The site of surgery was properly noted/marked if necessary per policy. The patient has been actively warmed in preoperative area. Preoperative antibiotics have been ordered and given within 1 hours of incision. Venous thrombosis prophylaxis have been ordered including bilateral sequential compression devices    Procedure Details:     The patient was  brought back to the operating room.  This placed supine position.  Huddle was performed indicating the correct patient compositional, laterality.  The patient underwent MAC anesthesia.  He was then placed in lithotomy.  2 g cefazolin were ministered the patient.  Because of his coagulopathy, he was not given any DVT prophylaxis in fact got 1 unit of FFP prior to the OR.  SCDs were placed and patient were condition.  A timeout was performed indicate the correct patient compositional, laterality.    A proctoscope was inserted into the anus.  Multiple very vascular hemorrhoids were initially seen.  Using a Doppler, circumferential identification of vessels was done in the anus with suture ligation with 2-0 Vicryl at multiple positions.  This was done until the patient had at least 10 different ligations done.  Once this was done, the anus was inspected.  Hemostasis was obtained using additional 2-0 Vicryl's in a figure-of-eight fashion.  Once this was done hemostasis was obtained, a anal tampon was inserted.  The patient was placed back in supine position woken up and brought back to operative condition.  Patient tolerated seizure well.  The count was correct.  As present for the entire to the procedure.  My system, Abdiel Ramires, was assisted for prepping, draping, providing exposure.    Complications:  None; patient tolerated the procedure well.    Disposition: PACU - hemodynamically stable.  Condition: stable         Task Performed by RNFA or Surgical Assistant:  Pain, draping, providing exposure          Additional Details: None    Attending Attestation: I was present and scrubbed for the entire procedure.    Sumanth Womack  Phone Number: [phone]

## 2025-04-10 NOTE — CARE PLAN
The patient's goals for the shift include passing stool not containing blood.    The clinical goals for the shift include maintaining comfort throughout the shift.    Over the shift, the patient progressed toward above-stated goals.

## 2025-04-10 NOTE — ANESTHESIA PREPROCEDURE EVALUATION
Patient: Alfonso Toscano    Procedure Information       Date/Time: 04/10/25 1233    Procedure: HEMORRHOIDECTOMY - THIS IS GOING TO BE A VESSEL LIGATION SIMILAR TO ROMELIA, SHE WILL BE THERE TO HELP, PLEASE CONTACT ME BEFORE PULLING CART  AVAILABLE AFTER 9 AM    Location: [facility] 11 / [facility]    Surgeons: Sumanth Womack MD            Relevant Problems   Cardiac  EKG: Sinus bradycardia  Otherwise normal ECG  When compared with ECG of 28-MAR-2025 04:41, (unconfirmed)  OH interval has decreased  Criteria for Septal infarct are no longer Present  ST no longer depressed in Inferior leads  T wave inversion no longer evident in Inferior leads      Echo (2022): 1. The left ventricular systolic function is normal.   2. Spectral Doppler shows an impaired relaxation pattern of left ventricular diastolic filling.   3. Mild LVH and normal systolic function EF 65%.   4. Diastolic dysfunction with atria at upper limits of normal.   5. Aortic sclerosis, no significant stenosis was appreciated on the views obtained.   6. Mitral annular calcification.   7. Pericardial fat pad.   8. Images have technical limitations.     (+) Essential hypertension   (+) Heart murmur   (+) Mixed hyperlipidemia      Neuro   (+) Anxiety      GI   (+) Esophageal varices   (+) Gastric ulcer   (+) Gastrointestinal hemorrhage associated with anorectal source   (+) Gastrointestinal hemorrhage, unspecified gastrointestinal hemorrhage type      /Renal   (+) Malignant neoplasm of prostate (Multi)      Liver   (+) Hepatic cirrhosis (Multi)   (+) Hepatocellular carcinoma   (+) Malignant neoplasm of liver, not specified as primary or secondary (Multi)      Hematology   (+) Anemia   (+) Iron deficiency anemia secondary to inadequate dietary iron intake   (+) Thrombocytopenia (CMS-HCC)      HEENT   (+) Sinus pressure      Cardiac and Vasculature   (+) Nontraumatic hemorrhagic shock (Multi)      Genitourinary and Reproductive   (+) Hypocalcemia      Hematology  and Neoplasia   (+) Cancer (Multi)       Clinical information reviewed:    Allergies  Meds   Med Hx    Fam Hx          NPO Detail:  No data recorded     Physical Exam    Airway  Mallampati: II  TM distance: >3 FB  Neck ROM: full     Cardiovascular   Rhythm: regular  Rate: normal     Dental    Pulmonary - normal exam     Abdominal - normal exam             Anesthesia Plan    History of general anesthesia?: yes  History of complications of general anesthesia?: no    ASA 3 - emergent     MAC     The patient is not a current smoker.    intravenous induction   Anesthetic plan and risks discussed with patient.  Use of blood products discussed with patient who consented to blood products.    Plan discussed with CRNA.

## 2025-04-10 NOTE — CARE PLAN
The patient's goals for the shift include Labs WNL    The clinical goals for the shift include Remain HDS      Problem: Safety - Adult  Goal: Free from fall injury  Outcome: Progressing     Problem: Chronic Conditions and Co-morbidities  Goal: Patient's chronic conditions and co-morbidity symptoms are monitored and maintained or improved  Outcome: Progressing     Problem: Nutrition  Goal: Nutrient intake appropriate for maintaining nutritional needs  Outcome: Progressing     Problem: Skin  Goal: Participates in plan/prevention/treatment measures  Outcome: Progressing  Flowsheets (Taken 4/10/2025 1043)  Participates in plan/prevention/treatment measures: Increase activity/out of bed for meals  Goal: Prevent/manage excess moisture  Outcome: Progressing  Flowsheets (Taken 4/10/2025 1043)  Prevent/manage excess moisture: Cleanse incontinence/protect with barrier cream  Goal: Prevent/minimize sheer/friction injuries  Outcome: Progressing  Flowsheets (Taken 4/10/2025 1043)  Prevent/minimize sheer/friction injuries:   Increase activity/out of bed for meals   Turn/reposition every 2 hours/use positioning/transfer devices  Goal: Promote/optimize nutrition  Outcome: Progressing

## 2025-04-10 NOTE — SIGNIFICANT EVENT
I rechecked the patient this morning.  He denies any pain.  He states he had more nonbloody bowel movements this morning.  On exam, he is in no acute distress, resting comfortably, abdomen is soft and nontender, distended, no leg swelling.    Plan 4 OR today with Dr. Womack and Dr. Larson for arterial ligation of hemorrhoids.

## 2025-04-10 NOTE — PROGRESS NOTES
Physical Therapy                 Therapy Communication Note    Patient Name: Alfonso Toscano  MRN: [id number]  Department: [facility]  Room: [facility]1024-  Today's Date: 4/10/2025     Discipline: Physical Therapy    PT Missed Visit: Yes     Missed Visit Reason: Missed Visit Reason:  (surgery)    Missed Time: Attempt 9:10    Comment: Patient is currently NPO for surgery. He will be undergoing arterial ligation of hemorrhoids with possible pexy.  Will also require FFP transfusion prior to surgery. Hold PT at this time.

## 2025-04-10 NOTE — BRIEF OP NOTE
Date: 2025 - 4/10/2025  OR Location: [facility]    Name: Alfonso Toscano, : 1945, Age: 80 y.o., MRN: [id number], Sex: male    Diagnosis  Pre-op Diagnosis      * Gastrointestinal hemorrhage, unspecified gastrointestinal hemorrhage type [K92.2] Post-op Diagnosis     * Gastrointestinal hemorrhage, unspecified gastrointestinal hemorrhage type [K92.2]     Procedures  DOPPLER HEMORRHOIDECTOMY  23162 - CT HEMORRHOIDECTOMY INTERNAL RUBBER BAND LIGATIONS      Surgeons      * Sumanth Womack - Primary    Resident/Fellow/Other Assistant:  Surgeons and Role:     * Ruma Larson MD - Assisting    Staff:   Circulator: Gisele Dunne Scrub: Radha Prado Person: Analisa  Surgical Assistant: Abdiel    Anesthesia Staff: Anesthesiologist: Xochilt Roque MD  CRNA: Kehinde Valentin, APRN-CRNA; You Mcmahon APRN-CRNA    Procedure Summary  Anesthesia: Monitor Anesthesia Care  ASA: III  Estimated Blood Loss: 25mL  Intra-op Medications:   Administrations occurring from 1235 to 1335 on 04/10/25:   Medication Name Total Dose   glycopyrrolate (Robinul) injection 0.2 mg/mL 0.4 mg   lidocaine PF (Xylocaine-MPF)  2 % 60 mg   midazolam (Versed) 1 mg/1 mL 1 mg   ondansetron (Zofran) 2 mg/mL injection 4 mg   propofol (Diprivan) injection 10 mg/mL 88.15 mg   ceFAZolin (Ancef) 2 g in dextrose (iso)  mL 2 g              Anesthesia Record               Intraprocedure I/O Totals          Intake    ceFAZolin (Ancef) 2 g in dextrose (iso)  mL 100.00 mL    Total Intake 100 mL          Specimen: No specimens collected               Findings: Very vascular hemorrhoids, arterial ligation of hemorrhoids    Complications:  None; patient tolerated the procedure well.     Disposition: PACU - hemodynamically stable.  Condition: stable  Specimens Collected: No specimens collected  Attending Attestation: I was present and scrubbed for the entire procedure.    Sumanth Womack  Phone Number: [phone]

## 2025-04-11 LAB
ANION GAP SERPL CALC-SCNC: 11 MMOL/L (ref 10–20)
BASOPHILS # BLD AUTO: 0.01 X10*3/UL (ref 0–0.1)
BASOPHILS NFR BLD AUTO: 0.2 %
BLOOD EXPIRATION DATE: NORMAL
BUN SERPL-MCNC: 18 MG/DL (ref 6–23)
CALCIUM SERPL-MCNC: 7.5 MG/DL (ref 8.6–10.3)
CHLORIDE SERPL-SCNC: 100 MMOL/L (ref 98–107)
CO2 SERPL-SCNC: 21 MMOL/L (ref 21–32)
CREAT SERPL-MCNC: 1.09 MG/DL (ref 0.5–1.3)
DISPENSE STATUS: NORMAL
EGFRCR SERPLBLD CKD-EPI 2021: 69 ML/MIN/1.73M*2
EOSINOPHIL # BLD AUTO: 0 X10*3/UL (ref 0–0.4)
EOSINOPHIL NFR BLD AUTO: 0 %
ERYTHROCYTE [DISTWIDTH] IN BLOOD BY AUTOMATED COUNT: 17.6 % (ref 11.5–14.5)
GLUCOSE SERPL-MCNC: 142 MG/DL (ref 74–99)
HCT VFR BLD AUTO: 24.5 % (ref 41–52)
HGB BLD-MCNC: 7.9 G/DL (ref 13.5–17.5)
HOLD SPECIMEN: NORMAL
IMM GRANULOCYTES # BLD AUTO: 0.02 X10*3/UL (ref 0–0.5)
IMM GRANULOCYTES NFR BLD AUTO: 0.3 % (ref 0–0.9)
INR PPP: 1.8 (ref 0.9–1.1)
LYMPHOCYTES # BLD AUTO: 0.48 X10*3/UL (ref 0.8–3)
LYMPHOCYTES NFR BLD AUTO: 7.7 %
MCH RBC QN AUTO: 29.9 PG (ref 26–34)
MCHC RBC AUTO-ENTMCNC: 32.2 G/DL (ref 32–36)
MCV RBC AUTO: 93 FL (ref 80–100)
MONOCYTES # BLD AUTO: 0.29 X10*3/UL (ref 0.05–0.8)
MONOCYTES NFR BLD AUTO: 4.7 %
NEUTROPHILS # BLD AUTO: 5.42 X10*3/UL (ref 1.6–5.5)
NEUTROPHILS NFR BLD AUTO: 87.1 %
NRBC BLD-RTO: 0 /100 WBCS (ref 0–0)
PLATELET # BLD AUTO: 53 X10*3/UL (ref 150–450)
POTASSIUM SERPL-SCNC: 4.8 MMOL/L (ref 3.5–5.3)
PRODUCT BLOOD TYPE: 5100
PRODUCT CODE: NORMAL
PROTHROMBIN TIME: 20 SECONDS (ref 9.8–12.4)
RBC # BLD AUTO: 2.64 X10*6/UL (ref 4.5–5.9)
SODIUM SERPL-SCNC: 127 MMOL/L (ref 136–145)
UNIT ABO: NORMAL
UNIT NUMBER: NORMAL
UNIT RH: NORMAL
UNIT VOLUME: 294
WBC # BLD AUTO: 6.2 X10*3/UL (ref 4.4–11.3)

## 2025-04-11 PROCEDURE — 99232 SBSQ HOSP IP/OBS MODERATE 35: CPT

## 2025-04-11 PROCEDURE — 2500000001 HC RX 250 WO HCPCS SELF ADMINISTERED DRUGS (ALT 637 FOR MEDICARE OP): Performed by: FAMILY MEDICINE

## 2025-04-11 PROCEDURE — 2500000002 HC RX 250 W HCPCS SELF ADMINISTERED DRUGS (ALT 637 FOR MEDICARE OP, ALT 636 FOR OP/ED): Performed by: SURGERY

## 2025-04-11 PROCEDURE — 99233 SBSQ HOSP IP/OBS HIGH 50: CPT | Performed by: FAMILY MEDICINE

## 2025-04-11 PROCEDURE — 1200000002 HC GENERAL ROOM WITH TELEMETRY DAILY

## 2025-04-11 PROCEDURE — 85025 COMPLETE CBC W/AUTO DIFF WBC: CPT | Performed by: SURGERY

## 2025-04-11 PROCEDURE — 85610 PROTHROMBIN TIME: CPT | Performed by: SURGERY

## 2025-04-11 PROCEDURE — 2500000001 HC RX 250 WO HCPCS SELF ADMINISTERED DRUGS (ALT 637 FOR MEDICARE OP): Performed by: SURGERY

## 2025-04-11 PROCEDURE — 36415 COLL VENOUS BLD VENIPUNCTURE: CPT | Performed by: SURGERY

## 2025-04-11 PROCEDURE — 2500000004 HC RX 250 GENERAL PHARMACY W/ HCPCS (ALT 636 FOR OP/ED): Performed by: SURGERY

## 2025-04-11 PROCEDURE — 2500000005 HC RX 250 GENERAL PHARMACY W/O HCPCS: Performed by: SURGERY

## 2025-04-11 PROCEDURE — 82435 ASSAY OF BLOOD CHLORIDE: CPT | Performed by: SURGERY

## 2025-04-11 RX ORDER — PANTOPRAZOLE SODIUM 40 MG/1
40 TABLET, DELAYED RELEASE ORAL 2 TIMES DAILY
Status: DISCONTINUED | OUTPATIENT
Start: 2025-04-11 | End: 2025-04-14 | Stop reason: HOSPADM

## 2025-04-11 RX ORDER — CALCIUM CARBONATE 200(500)MG
500 TABLET,CHEWABLE ORAL DAILY
Status: DISCONTINUED | OUTPATIENT
Start: 2025-04-11 | End: 2025-04-14 | Stop reason: HOSPADM

## 2025-04-11 RX ORDER — ALUMINUM HYDROXIDE, MAGNESIUM HYDROXIDE, AND SIMETHICONE 1200; 120; 1200 MG/30ML; MG/30ML; MG/30ML
10 SUSPENSION ORAL ONCE
Status: COMPLETED | OUTPATIENT
Start: 2025-04-11 | End: 2025-04-11

## 2025-04-11 RX ORDER — DICYCLOMINE HYDROCHLORIDE 10 MG/1
10 CAPSULE ORAL ONCE
Status: COMPLETED | OUTPATIENT
Start: 2025-04-11 | End: 2025-04-11

## 2025-04-11 RX ADMIN — BICALUTAMIDE 50 MG: 50 TABLET ORAL at 08:27

## 2025-04-11 RX ADMIN — Medication 3 MG: at 21:42

## 2025-04-11 RX ADMIN — LACTULOSE 20 G: 10 SOLUTION ORAL at 08:23

## 2025-04-11 RX ADMIN — PANTOPRAZOLE SODIUM 40 MG: 40 TABLET, DELAYED RELEASE ORAL at 17:07

## 2025-04-11 RX ADMIN — ALUMINUM HYDROXIDE, MAGNESIUM HYDROXIDE, AND SIMETHICONE 10 ML: 200; 200; 20 SUSPENSION ORAL at 14:20

## 2025-04-11 RX ADMIN — NADOLOL 10 MG: 20 TABLET ORAL at 08:23

## 2025-04-11 RX ADMIN — LACTULOSE 20 G: 10 SOLUTION ORAL at 21:42

## 2025-04-11 RX ADMIN — ANTACID TABLETS 500 MG: 500 TABLET, CHEWABLE ORAL at 14:20

## 2025-04-11 RX ADMIN — SPIRONOLACTONE 50 MG: 25 TABLET ORAL at 08:27

## 2025-04-11 RX ADMIN — FINASTERIDE 5 MG: 5 TABLET, FILM COATED ORAL at 08:23

## 2025-04-11 RX ADMIN — PANTOPRAZOLE SODIUM 40 MG: 40 INJECTION, POWDER, FOR SOLUTION INTRAVENOUS at 08:23

## 2025-04-11 RX ADMIN — DICYCLOMINE HYDROCHLORIDE 10 MG: 10 CAPSULE ORAL at 14:20

## 2025-04-11 RX ADMIN — FUROSEMIDE 20 MG: 40 TABLET ORAL at 08:23

## 2025-04-11 RX ADMIN — LACTULOSE 20 G: 10 SOLUTION ORAL at 14:20

## 2025-04-11 ASSESSMENT — COGNITIVE AND FUNCTIONAL STATUS - GENERAL
MOVING FROM LYING ON BACK TO SITTING ON SIDE OF FLAT BED WITH BEDRAILS: A LITTLE
PERSONAL GROOMING: A LITTLE
DAILY ACTIVITIY SCORE: 11
MOVING TO AND FROM BED TO CHAIR: A LITTLE
STANDING UP FROM CHAIR USING ARMS: A LITTLE
DAILY ACTIVITIY SCORE: 11
MOVING FROM LYING ON BACK TO SITTING ON SIDE OF FLAT BED WITH BEDRAILS: A LITTLE
EATING MEALS: A LITTLE
TURNING FROM BACK TO SIDE WHILE IN FLAT BAD: A LITTLE
MOVING TO AND FROM BED TO CHAIR: A LITTLE
HELP NEEDED FOR BATHING: TOTAL
MOBILITY SCORE: 16
CLIMB 3 TO 5 STEPS WITH RAILING: A LOT
CLIMB 3 TO 5 STEPS WITH RAILING: TOTAL
CLIMB 3 TO 5 STEPS WITH RAILING: A LOT
WALKING IN HOSPITAL ROOM: A LOT
DRESSING REGULAR LOWER BODY CLOTHING: TOTAL
MOBILITY SCORE: 16
TURNING FROM BACK TO SIDE WHILE IN FLAT BAD: A LITTLE
MOVING TO AND FROM BED TO CHAIR: A LITTLE
HELP NEEDED FOR BATHING: TOTAL
STANDING UP FROM CHAIR USING ARMS: A LITTLE
DRESSING REGULAR UPPER BODY CLOTHING: A LOT
DRESSING REGULAR LOWER BODY CLOTHING: TOTAL
WALKING IN HOSPITAL ROOM: A LOT
MOBILITY SCORE: 16
TOILETING: TOTAL
EATING MEALS: A LITTLE
DRESSING REGULAR UPPER BODY CLOTHING: A LOT
MOVING FROM LYING ON BACK TO SITTING ON SIDE OF FLAT BED WITH BEDRAILS: A LITTLE
STANDING UP FROM CHAIR USING ARMS: A LITTLE
TOILETING: TOTAL
TURNING FROM BACK TO SIDE WHILE IN FLAT BAD: A LITTLE
WALKING IN HOSPITAL ROOM: A LITTLE
PERSONAL GROOMING: A LITTLE

## 2025-04-11 ASSESSMENT — PAIN SCALES - GENERAL
PAINLEVEL_OUTOF10: 0 - NO PAIN
PAINLEVEL_OUTOF10: 0 - NO PAIN

## 2025-04-11 ASSESSMENT — PAIN SCALES - WONG BAKER: WONGBAKER_NUMERICALRESPONSE: NO HURT

## 2025-04-11 ASSESSMENT — PAIN - FUNCTIONAL ASSESSMENT: PAIN_FUNCTIONAL_ASSESSMENT: 0-10

## 2025-04-11 NOTE — PROGRESS NOTES
General Surgery Progress Note    Patient: Alfonso Toscano  Unit/Bed: 1024/1024-B  YOB: 1945  MRN: [id number]  Acct: [id number]   Admitting Diagnosis: Gastrointestinal hemorrhage, unspecified gastrointestinal hemorrhage type [K92.2]  Date:  4/2/2025  Hospital Day: 8  Attending: Faith Mendiola MD    Complaint:  Chief Complaint   Patient presents with    Rectal Bleeding     Dx from hospital yesterday.        Subjective  Patient seen and examined this morning. No acute events overnight. Patient oob and sitting in chair.  Patient states he had a   PHYSICAL EXAM:  Physical Exam  Vitals and nursing note reviewed.   Constitutional:       General: He is awake.      Appearance: Normal appearance. He is overweight.   HENT:      Head: Normocephalic.      Nose: Nose normal.      Mouth/Throat:      Mouth: Mucous membranes are moist.   Eyes:      General: Scleral icterus present.   Cardiovascular:      Rate and Rhythm: Normal rate and regular rhythm.   Pulmonary:      Effort: Pulmonary effort is normal.      Breath sounds: Normal breath sounds.   Abdominal:      General: Abdomen is flat. Bowel sounds are normal.      Palpations: Abdomen is soft.   Genitourinary:     Comments: Dr. Womack at bedside. Visual inspection of anus reveals scant bloody drainage  Skin:     General: Skin is warm.      Capillary Refill: Capillary refill takes less than 2 seconds.   Neurological:      General: No focal deficit present.      Mental Status: He is alert. Mental status is at baseline.   Psychiatric:         Mood and Affect: Mood normal.         Speech: Speech normal.         Behavior: Behavior is cooperative.       Vital signs in last 24 hours:  Vitals:    04/11/25 0609   BP: 119/56   Pulse: (!) 48   Resp: 15   Temp: 36.6 °C (97.9 °F)   SpO2: 93%     Intake/Output this shift:    Intake/Output Summary (Last 24 hours) at 4/11/2025 1317  Last data filed at 4/11/2025 0231  Gross per 24 hour   Intake 1070 ml   Output 1100 ml   Net -30  ml      Allergies:  No Known Allergies   Medications:  Scheduled medications  alum-mag hydroxide-simeth, 10 mL, oral, Once  bicalutamide, 50 mg, oral, Daily  calcium carbonate, 500 mg, oral, Daily  dicyclomine, 10 mg, oral, Once  finasteride, 5 mg, oral, Daily  furosemide, 20 mg, oral, Daily  lactulose, 30 mL, oral, TID  nadolol, 10 mg, oral, Daily  pantoprazole, 40 mg, intravenous, Daily  phytonadione, 5 mg, intravenous, Once  spironolactone, 50 mg, oral, Daily      Continuous medications     PRN medications  PRN medications: acetaminophen **OR** acetaminophen **OR** acetaminophen, benzocaine-menthol, melatonin, menthol-zinc oxide, ondansetron ODT **OR** ondansetron, oxyCODONE  Labs:  Results for orders placed or performed during the hospital encounter of 04/02/25 (from the past 24 hours)   Protime-INR   Result Value Ref Range    Protime 20.0 (H) 9.8 - 12.4 seconds    INR 1.8 (H) 0.9 - 1.1   CBC and Auto Differential   Result Value Ref Range    WBC 6.2 4.4 - 11.3 x10*3/uL    nRBC 0.0 0.0 - 0.0 /100 WBCs    RBC 2.64 (L) 4.50 - 5.90 x10*6/uL    Hemoglobin 7.9 (L) 13.5 - 17.5 g/dL    Hematocrit 24.5 (L) 41.0 - 52.0 %    MCV 93 80 - 100 fL    MCH 29.9 26.0 - 34.0 pg    MCHC 32.2 32.0 - 36.0 g/dL    RDW 17.6 (H) 11.5 - 14.5 %    Platelets 53 (L) 150 - 450 x10*3/uL    Neutrophils % 87.1 40.0 - 80.0 %    Immature Granulocytes %, Automated 0.3 0.0 - 0.9 %    Lymphocytes % 7.7 13.0 - 44.0 %    Monocytes % 4.7 2.0 - 10.0 %    Eosinophils % 0.0 0.0 - 6.0 %    Basophils % 0.2 0.0 - 2.0 %    Neutrophils Absolute 5.42 1.60 - 5.50 x10*3/uL    Immature Granulocytes Absolute, Automated 0.02 0.00 - 0.50 x10*3/uL    Lymphocytes Absolute 0.48 (L) 0.80 - 3.00 x10*3/uL    Monocytes Absolute 0.29 0.05 - 0.80 x10*3/uL    Eosinophils Absolute 0.00 0.00 - 0.40 x10*3/uL    Basophils Absolute 0.01 0.00 - 0.10 x10*3/uL   Basic Metabolic Panel   Result Value Ref Range    Glucose 142 (H) 74 - 99 mg/dL    Sodium 127 (L) 136 - 145 mmol/L     Potassium 4.8 3.5 - 5.3 mmol/L    Chloride 100 98 - 107 mmol/L    Bicarbonate 21 21 - 32 mmol/L    Anion Gap 11 10 - 20 mmol/L    Urea Nitrogen 18 6 - 23 mg/dL    Creatinine 1.09 0.50 - 1.30 mg/dL    eGFR 69 >60 mL/min/1.73m*2    Calcium 7.5 (L) 8.6 - 10.3 mg/dL   SST TOP   Result Value Ref Range    Extra Tube Hold for add-ons.      *Note: Due to a large number of results and/or encounters for the requested time period, some results have not been displayed. A complete set of results can be found in Results Review.      Imaging:  Imaging  No results found.    Cardiology, Vascular, and Other Imaging  No other imaging results found for the past 2 days     Assessment  Hematochezia  4/10/2025 Doppler hemorrhoidectomy    On exam patient alert and at baseline.  Out of bed to the chair.  Respirations equal and unlabored.  Lung sounds clear.  Heart murmur noted.  Abdomen soft and nontender.  Bowel sounds present.  Visual inspection of anus with Dr. Quinn at the bedside reveals scant bloody drainage.  Morning lab work shows no leukocytosis, and hemoglobin 7.9 down from 8.3 yesterday.  Afebrile.    Plan  -Regular diet  -Pain control  -Nausea control  -DVT prophylaxis- SCD  -Continue PPI  -Pulmonary toileting - C&DB  -Encourage ambulation - OOB to chair  -Continue care per primary team       Further recommendations per Dr. Vu Echevarria, APRN-CNP    Time spent  37  minutes obtaining labs, imaging, recommendations, interview, assessment, examination, medication review/ordering, and EMR review.    Plan of care was discussed extensively with patient. Patient verbalized understanding through teach back method. All questions and concerns addressed upon examination.     Of note, this documentation is completed using the Dragon Dictation system (voice recognition software). There may be spelling and/or grammatical errors that were not corrected prior to final submission.

## 2025-04-11 NOTE — PROGRESS NOTES
Alfonso Toscano is a 80 y.o. male on day 8 of admission presenting with Gastrointestinal hemorrhage, unspecified gastrointestinal hemorrhage type.      Subjective   She complains of upset stomach today    Objective     Last Recorded Vitals  /56 (BP Location: Right arm, Patient Position: Lying)   Pulse (!) 48   Temp 36.6 °C (97.9 °F) (Temporal)   Resp 15   Wt 90.4 kg (199 lb 4.8 oz)   SpO2 93%   Intake/Output last 3 Shifts:    Intake/Output Summary (Last 24 hours) at 4/11/2025 1151  Last data filed at 4/11/2025 0231  Gross per 24 hour   Intake 1070 ml   Output 1100 ml   Net -30 ml       Admission Weight  Weight: 86.2 kg (190 lb) (04/02/25 0137)    Daily Weight  04/11/25 : 90.4 kg (199 lb 4.8 oz)    Image Results  ECG 12 lead  Sinus bradycardia  Otherwise normal ECG  When compared with ECG of 28-MAR-2025 04:41, (unconfirmed)  NV interval has decreased  Criteria for Septal infarct are no longer Present  ST no longer depressed in Inferior leads  T wave inversion no longer evident in Inferior leads  See ED provider note for full interpretation and clinical correlation  Confirmed by Vangie Morin ([id number]) on 4/3/2025 11:06:08 AM      Physical Exam  Alert oriented x 3  Lungs clear to auscultation bilaterally  Heart regular rhythm  Abdomen soft nontender  Extremities no leg edema  CNS no focal deficit    Relevant Results  Results for orders placed or performed during the hospital encounter of 04/02/25 (from the past 24 hours)   Protime-INR   Result Value Ref Range    Protime 20.0 (H) 9.8 - 12.4 seconds    INR 1.8 (H) 0.9 - 1.1   CBC and Auto Differential   Result Value Ref Range    WBC 6.2 4.4 - 11.3 x10*3/uL    nRBC 0.0 0.0 - 0.0 /100 WBCs    RBC 2.64 (L) 4.50 - 5.90 x10*6/uL    Hemoglobin 7.9 (L) 13.5 - 17.5 g/dL    Hematocrit 24.5 (L) 41.0 - 52.0 %    MCV 93 80 - 100 fL    MCH 29.9 26.0 - 34.0 pg    MCHC 32.2 32.0 - 36.0 g/dL    RDW 17.6 (H) 11.5 - 14.5 %    Platelets 53 (L) 150 - 450 x10*3/uL    Neutrophils %  87.1 40.0 - 80.0 %    Immature Granulocytes %, Automated 0.3 0.0 - 0.9 %    Lymphocytes % 7.7 13.0 - 44.0 %    Monocytes % 4.7 2.0 - 10.0 %    Eosinophils % 0.0 0.0 - 6.0 %    Basophils % 0.2 0.0 - 2.0 %    Neutrophils Absolute 5.42 1.60 - 5.50 x10*3/uL    Immature Granulocytes Absolute, Automated 0.02 0.00 - 0.50 x10*3/uL    Lymphocytes Absolute 0.48 (L) 0.80 - 3.00 x10*3/uL    Monocytes Absolute 0.29 0.05 - 0.80 x10*3/uL    Eosinophils Absolute 0.00 0.00 - 0.40 x10*3/uL    Basophils Absolute 0.01 0.00 - 0.10 x10*3/uL   Basic Metabolic Panel   Result Value Ref Range    Glucose 142 (H) 74 - 99 mg/dL    Sodium 127 (L) 136 - 145 mmol/L    Potassium 4.8 3.5 - 5.3 mmol/L    Chloride 100 98 - 107 mmol/L    Bicarbonate 21 21 - 32 mmol/L    Anion Gap 11 10 - 20 mmol/L    Urea Nitrogen 18 6 - 23 mg/dL    Creatinine 1.09 0.50 - 1.30 mg/dL    eGFR 69 >60 mL/min/1.73m*2    Calcium 7.5 (L) 8.6 - 10.3 mg/dL   SST TOP   Result Value Ref Range    Extra Tube Hold for add-ons.      *Note: Due to a large number of results and/or encounters for the requested time period, some results have not been displayed. A complete set of results can be found in Results Review.         Assessment/Plan     Anemia secondary to acute blood loss  -Hematochezia likely due to hemorrhoidal bleeding vs radiation proctitis vs varices  -Received 1 unit PRBCs on admission, Hgb is gradually trending down.  He has been banded before.  Surgery is recommending correcting coagulopathy and platelet count which will be difficult to achieve given underlying cirrhosis.  Will give of IV vitamin K although vitamin K is unlikely to fix the INR.  Platelets prior to surgery and 5 days of Avatrombopag with hematology recommendations here.  Pharmacy unable to order at this time.  Platelet count is above 50 at this time.  Will do as needed platelet infusion prior to surgery.  Will also do FFP's prior to surgery as well.  -Surgery is on board and due to persistent  intermittent bleeding we will likely need to do an intervention will likely do vessel ligation and possible pexy of hemorrhoids..  Continue to monitor H&H  -[facility] hematology is on board  - Dr. Alfredo recommends platelets and FFP prior to procedures. But bleeding is slowing down. Plans was to discharge home with improvement but still having moderate sized bloody bowel movements.  Cont to monitor H&H  -Platelets have improved in 60s now will need FFP's prior to procedure     Alcoholic cirrhosis of the liver  Thrombocytopenia  History of HCC s/p ablation in 2021 with recent chemotherapy  -Last EGD showed grade 1 varices  -s/p paracentesis on 3/31.  Continue with as needed paracentesis.  -Continue home diuretics and nadolol with hold parameters  -Continue home lactulose  -Platelets stable in the 40s, baseline ~50-60  -Daily INR, CMP     GERD  -Continue IV PPI for now     Bradycardia  Generalized weakness, hearing loss  -Continue nadolol with hold parameters  -Is asymptomatic, monitor on telemetry  -PT/OT eval and treat     DVTp    Per the wife the pt is DNR already and she will bring the paperwork.    Monitor H&H  I will give Tums, simethicone, and a dose of Bentyl  PT OT evaluation and treatment     Faith Mendiola MD

## 2025-04-11 NOTE — CONSULTS
"Nutrition Initial Assessment:   Nutrition Assessment         Patient is a 80 y.o. male presenting with gastrointestinal hemorrhage      Nutrition History:  Food and Nutrient History: RD consulted for LOS. Wife present at bedside. Per EMR, has a history of cirrhosis with esophageal varices, liver cancer, prostate cancer, anxiety, peptic ulcer disease, GERD, HLD. Seen by a RD on 3/6 where pt reported good appetite/intakes and no involuntary wt loss prior to starting chemo. Started chemo March 6, 2025. Noted was recently admitted for GIB. S/p EGD and colonoscopy that admission. Discharged 3/31. Per EMR, had a paracentesis on 3/31. here with GI hemorrhage. S/p arterial ligation of hemorrhoids on 4/10. Wife says over the past month appetite/intakes have declined and mainly is only eating soup and fruit. Was drinking Ensure once per day at home, though wife requesting Ensure be sent TID d/t declining intakes. Likes chocolate flavor. Pt and wife in agreement to stay on regular diet while intakes are poor. When intakes improve, recommend low sodium diet.  Vitamin/Herbal Supplement Use: vitamin D       Anthropometrics:  Height: 172.7 cm (5' 8\")   Weight: 90.4 kg (199 lb 4.8 oz)   BMI (Calculated): 30.31                            Weight History:       Wt Readings from Last 15 Encounters:   04/11/25 90.4 kg (199 lb 4.8 oz)   03/31/25 94.8 kg (208 lb 15.9 oz)   03/27/25 87.6 kg (193 lb 2 oz)   03/19/25 83.5 kg (184 lb)   03/13/25 81.6 kg (179 lb 14.3 oz)   03/13/25 81.6 kg (179 lb 14.3 oz)   03/06/25 80.8 kg (178 lb 2.1 oz)   03/06/25 80.8 kg (178 lb 2.1 oz)   02/25/25 80.5 kg (177 lb 7.5 oz)   02/22/25 87 kg (191 lb 12.8 oz)   01/10/25 82.7 kg (182 lb 5.1 oz)   12/31/24 81.6 kg (180 lb)   10/08/24 78.7 kg (173 lb 6.4 oz)   04/10/24 79.4 kg (175 lb)   11/28/23 81.6 kg (180 lb)         Weight Change %:  Significant Weight Loss: No    Nutrition Focused Physical Exam Findings:    Subcutaneous Fat Loss:   Orbital Fat Pads: " Mild-Moderate (slight dark circles and slight hollowing)  Buccal Fat Pads: Well nourished (full, rounded cheeks)  Triceps: Well nourished (ample fat tissue)  Muscle Wasting:  Temporalis: Mild-Moderate (slight depression)  Pectoralis (Clavicular Region): Mild-Moderate (some protrusion of clavicle)  Deltoid/Trapezius: Well nourished (rounded appearance at arm, shoulder, neck)  Interosseous: Mild-Moderate (slightly depressed area between thumb and forefinger)  Edema:  Edema: none  Physical Findings:  Skin: Negative (for PI)  Digestive System Findings: Abdominal pain    Nutrition Significant Labs:  BMP Trend:   Results from last 7 days   Lab Units 04/11/25  0410 04/10/25  0419 04/09/25  0420 04/08/25  0408   GLUCOSE mg/dL 142* 81 100* 88   CALCIUM mg/dL 7.5* 7.5* 7.3* 7.3*   SODIUM mmol/L 127* 129* 128* 130*   POTASSIUM mmol/L 4.8 4.8 4.7 4.6   CO2 mmol/L 21 23 24 25   CHLORIDE mmol/L 100 102 101 100   BUN mg/dL 18 12 13 15   CREATININE mg/dL 1.09 0.96 0.99 1.13    , A1C:  Lab Results   Component Value Date    HGBA1C 4.1 10/08/2024       Nutrition Specific Medications:  Reviewed    I/O:   Last BM Date: 04/11/25; Stool Appearance: Liquid (04/11/25 0900)    Dietary Orders (From admission, onward)       Start     Ordered    04/11/25 1221  Oral nutritional supplements  Until discontinued        Comments: chocolate   Question Answer Comment   Deliver with All meals    Select supplement: Ensure Plus High Protein        04/11/25 1220    04/10/25 1602  Adult diet Regular  Diet effective now        Question:  Diet type  Answer:  Regular    04/10/25 1601    04/02/25 1344  May Participate in Room Service  ( ROOM SERVICE MAY PARTICIPATE)  Once        Question:  .  Answer:  Yes    04/02/25 1343                     Estimated Needs:   Total Energy Estimated Needs in 24 hours (kCal): 2260 kCal  Method for Estimating Needs: 25 kcal/kg  Total Protein Estimated Needs in 24 Hours (g): 108 g  Method for Estimating 24 Hour Protein Needs:  1.2 g/kg  Total Fluid Estimated Needs in 24 Hours (mL): 2260 mL  Method for Estimating 24 Hour Fluid Needs: 1 ml/kcal or per MD        Nutrition Diagnosis   Malnutrition Diagnosis  Patient has Malnutrition Diagnosis: Yes  Diagnosis Status: New  Malnutrition Diagnosis: Moderate malnutrition related to chronic disease or condition  Related to: likely meeting <75% of estimated energy needs for at least 1 month, mild-moderate muscle losses, mild-moderate fat loss    Nutrition Diagnosis  Patient has Nutrition Diagnosis: Yes  Diagnosis Status (1): New  Nutrition Diagnosis 1: Increased nutrient needs  Related to (1): physiological causes increasing nutrient needs  As Evidenced by (1): cirrhosis, cancer  Additional Nutrition Diagnosis: Diagnosis 2  Diagnosis Status (2): New  Nutrition Diagnosis 2: Inadequate oral intake  Related to (2): altered GI function  As Evidenced by (2): reports of poor appetite/intakes, request for regular diet and increase in oral nutritional supplements       Nutrition Interventions/Recommendations   Nutrition prescription for oral nutrition    Nutrition Recommendations:  Individualized Nutrition Prescription Provided for : Regular diet with ONS. When intakes are consistently >50%, recommend low sodium diet.    Nutrition Interventions/Goals:   Goal: Consumes 3 meals per day  Medical Food Supplement: Commercial beverage medical food supplement therapy  Goal: Ensure Plus High Protein TID (provides 350 kcal, 20 g protein per serving)  Goal: wife at bedside      Education Documentation  No documentation found.            Nutrition Monitoring and Evaluation   Food/Nutrient Related History Monitoring  Monitoring and Evaluation Plan: Intake / amount of food, Estimated Energy Intake  Estimated Energy Intake: Energy intake 50 -75% of estimated energy needs  Intake / Amount of food: Consumes at least 50% or more of meals/snacks/supplements    Anthropometric Measurements  Monitoring and Evaluation Plan: Body  weight  Body Weight: Body weight - Maintain stable weight    Biochemical Data, Medical Tests and Procedures  Monitoring and Evaluation Plan: Electrolyte/renal panel, Glucose/endocrine profile  Electrolyte and Renal Panel: Electrolytes within normal limits  Glucose/Endocrine Profile: Glucose within normal limits ( mg/dL)              Time Spent (min): 45 minutes

## 2025-04-11 NOTE — CARE PLAN
The patient's goals for the shift include Labs WNL    The clinical goals for the shift include remain HDS

## 2025-04-11 NOTE — CARE PLAN
The patient's goals for the shift include Labs WNL    The clinical goals for the shift include HDS

## 2025-04-11 NOTE — PROGRESS NOTES
"Physical Therapy    Physical Therapy Treatment    Patient Name: Alfonso Toscano  MRN: [id number]  Today's Date: 4/11/2025  Time Calculation  Start Time: 0818  Stop Time: 0846  Time Calculation (min): 28 min     1024/1024-B    Assessment/Plan   Barriers to Discharge Home: Caregiver assistance, Physical needs    End of Session Communication:  (RN notified of large amount blood in toilet)    Assessment Comment:  (Patient is deconditioned and has limited activity tolerance. He continues to require assist for transfers and mobility.  He may benefit from skilled therapy in rehab setting.     End of Session Patient Position:  (Patient sitting up in chair, call light/tray in reach. Chair alarm on. Breakfast in reach, warm blankets provided)    PT Plan  Inpatient/Swing Bed or Outpatient: Inpatient  Treatment/Interventions: Bed mobility, Transfer training, Gait training, Balance training, Strengthening, Endurance training, Therapeutic exercise, Therapeutic activity, Stair training  PT Plan: Ongoing PT  PT Frequency: 3 times per week  PT Discharge Recommendations: mod intensity level of continued care  Equipment Recommended upon Discharge: Wheeled walker (pt has walker) PT Recommended Transfer Status: Assist x1, Assistive device    General Visit Information:   PT  Visit  PT Received On: 04/11/25    General  Prior to Session Communication:  (Cleared by RN for PT)  Patient Position Received:  (Patient presents in bed, on bed pan. Reiterated previous encouragement for him to get up and use either the BSC or amb to the bathroom as his plan is home at discharge. Agreeable to PT)    General Comment:  (Dx: GIB)    General Observations:   General Observation:  (2L 02; external male cather; tele; alarm **Patient had a hemorrhoidectomy internal rubber band ligations 4/10/25)    Subjective \"I don't know what to do.  I feel cramping.  I'm worried about the blood\"    Precautions:  Precautions  Medical Precautions: Fall " precautions      Pain:  Pain Assessment  Pain Assessment: 0-10  0-10 (Numeric) Pain Score:  (No numerical rating)  Pain Location:  (rectum from surgery yesterday)  Pain Interventions:  (RN notified)  Response to Interventions: Resting quietly    Cognition:  Cognition  Overall Cognitive Status:  (Anxious; instructions need repeated seveal times for follow through)  Safety/Judgement: Exceptions to WFL  Insight: Mild  Processing Speed: Delayed      Balance:   Static Standing Balance  Static Standing-Level of Assistance:  (Fair-  static stand)  Static Standing-Comment/Number of Minutes:  (4 minutes total of staic stand while patient performed monica care and hand hygiene.)    Dynamic Standing Balance  Dynamic Standing-Balance:  (Fair- dynamic stand with ww)      Activity Tolerance:  Activity Tolerance  Endurance:  (Poor tolerance to activity)     Bed Mobility  Bed Mobility:  (rolling towards right side: CGAx1 with use of bedrail   side lying->sit: CGAx1  HOB raised 15 degrees, use of bed rail. Slow to transition into sit. Effortful for patient. Assist given to guide trunk upright. Denies dizziness with change in position.)    Ambulation/Gait Training  Ambulation/Gait Training Performed:  (Patient amb 20'x 2 with ww and min x1. He adopts fwd flexed posture, head down watching floor. Knees remain slightly flexed. Patient shaky. Decreased step length bilat. Fatigues quickly. He amb into bathroom, where he had a large amount of blood in toilet while trying to have a BM. RN notified.     Transfers  Transfer:  (sit->stand: CGAx1  Two stands performed (EOB; elevated toilet).  Instructions needed for hand placement with both transfers.   stand->sit: min x1  Poor follow through of instructions for hand placement. Holds onto ww when sitting, poor descent into chair)             Outcome Measures:   [facility] Basic Mobility  Turning from your back to your side while in a flat bed without using bedrails: A little  Moving from lying on  your back to sitting on the side of a flat bed without using bedrails: A little  Moving to and from bed to chair (including a wheelchair): A little  Standing up from a chair using your arms (e.g. wheelchair or bedside chair): A little  To walk in hospital room: A little  Climbing 3-5 steps with railing: Total  Basic Mobility - Total Score: 16           Education Documentation  Mobility Training, taught by Makayla Woo PTA at 4/11/2025  8:56 AM.  Learner: Patient  Readiness: Acceptance  Method: Explanation, Demonstration  Response: Needs Reinforcement      Education Comments  Individual(s) Educated: Patient  Education Provided:  (Educated patient on benefits of mobility, safe tansfers and gait.)  Patient Response to Education:  (verbalizes understanding)    Encounter Problems       Encounter Problems (Active)       PT Problem       Pt will demonstrate mod I  with bed mobility to edge of bed.   (Progressing)       Start:  04/03/25    Expected End:  04/17/25            Pt will demonstrate mod I  with sit to stand/chair transfers with FWW.   (Progressing)       Start:  04/03/25    Expected End:  04/17/25            Pt will ambulate 30 feet with FWW SBA .   (Progressing)       Start:  04/03/25    Expected End:  04/17/25            Pt to demo improved BLE strength by being able to complete supine/seated thera ex 2x20 BLEs with 4 or less rest breaks .   (Not Progressing)       Start:  04/03/25    Expected End:  04/17/25

## 2025-04-12 LAB
ANION GAP SERPL CALC-SCNC: 11 MMOL/L (ref 10–20)
BASOPHILS # BLD AUTO: 0.02 X10*3/UL (ref 0–0.1)
BASOPHILS NFR BLD AUTO: 0.1 %
BUN SERPL-MCNC: 20 MG/DL (ref 6–23)
CALCIUM SERPL-MCNC: 7.9 MG/DL (ref 8.6–10.3)
CHLORIDE SERPL-SCNC: 102 MMOL/L (ref 98–107)
CO2 SERPL-SCNC: 22 MMOL/L (ref 21–32)
CREAT SERPL-MCNC: 1.07 MG/DL (ref 0.5–1.3)
EGFRCR SERPLBLD CKD-EPI 2021: 71 ML/MIN/1.73M*2
EOSINOPHIL # BLD AUTO: 0 X10*3/UL (ref 0–0.4)
EOSINOPHIL NFR BLD AUTO: 0 %
ERYTHROCYTE [DISTWIDTH] IN BLOOD BY AUTOMATED COUNT: 17.9 % (ref 11.5–14.5)
GLUCOSE SERPL-MCNC: 165 MG/DL (ref 74–99)
HCT VFR BLD AUTO: 23.6 % (ref 41–52)
HGB BLD-MCNC: 7.8 G/DL (ref 13.5–17.5)
IMM GRANULOCYTES # BLD AUTO: 0.12 X10*3/UL (ref 0–0.5)
IMM GRANULOCYTES NFR BLD AUTO: 0.7 % (ref 0–0.9)
INR PPP: 1.7 (ref 0.9–1.1)
LYMPHOCYTES # BLD AUTO: 0.79 X10*3/UL (ref 0.8–3)
LYMPHOCYTES NFR BLD AUTO: 4.6 %
MCH RBC QN AUTO: 30.6 PG (ref 26–34)
MCHC RBC AUTO-ENTMCNC: 33.1 G/DL (ref 32–36)
MCV RBC AUTO: 93 FL (ref 80–100)
MONOCYTES # BLD AUTO: 1.48 X10*3/UL (ref 0.05–0.8)
MONOCYTES NFR BLD AUTO: 8.7 %
NEUTROPHILS # BLD AUTO: 14.69 X10*3/UL (ref 1.6–5.5)
NEUTROPHILS NFR BLD AUTO: 85.9 %
NRBC BLD-RTO: 0 /100 WBCS (ref 0–0)
PLATELET # BLD AUTO: 67 X10*3/UL (ref 150–450)
POTASSIUM SERPL-SCNC: 4.8 MMOL/L (ref 3.5–5.3)
PROTHROMBIN TIME: 19.1 SECONDS (ref 9.8–12.4)
RBC # BLD AUTO: 2.55 X10*6/UL (ref 4.5–5.9)
SODIUM SERPL-SCNC: 130 MMOL/L (ref 136–145)
WBC # BLD AUTO: 17.1 X10*3/UL (ref 4.4–11.3)

## 2025-04-12 PROCEDURE — 80048 BASIC METABOLIC PNL TOTAL CA: CPT | Performed by: SURGERY

## 2025-04-12 PROCEDURE — 2500000004 HC RX 250 GENERAL PHARMACY W/ HCPCS (ALT 636 FOR OP/ED): Performed by: PHYSICIAN ASSISTANT

## 2025-04-12 PROCEDURE — 2500000001 HC RX 250 WO HCPCS SELF ADMINISTERED DRUGS (ALT 637 FOR MEDICARE OP): Performed by: FAMILY MEDICINE

## 2025-04-12 PROCEDURE — 97535 SELF CARE MNGMENT TRAINING: CPT | Mod: GO,CO

## 2025-04-12 PROCEDURE — 97530 THERAPEUTIC ACTIVITIES: CPT | Mod: GP,CQ | Performed by: PHYSICAL THERAPY ASSISTANT

## 2025-04-12 PROCEDURE — 85025 COMPLETE CBC W/AUTO DIFF WBC: CPT | Performed by: SURGERY

## 2025-04-12 PROCEDURE — 97530 THERAPEUTIC ACTIVITIES: CPT | Mod: GO,CO

## 2025-04-12 PROCEDURE — 2500000004 HC RX 250 GENERAL PHARMACY W/ HCPCS (ALT 636 FOR OP/ED): Performed by: SURGERY

## 2025-04-12 PROCEDURE — 2500000005 HC RX 250 GENERAL PHARMACY W/O HCPCS: Performed by: SURGERY

## 2025-04-12 PROCEDURE — 1210000001 HC SEMI-PRIVATE ROOM DAILY

## 2025-04-12 PROCEDURE — 2500000002 HC RX 250 W HCPCS SELF ADMINISTERED DRUGS (ALT 637 FOR MEDICARE OP, ALT 636 FOR OP/ED): Performed by: SURGERY

## 2025-04-12 PROCEDURE — 36415 COLL VENOUS BLD VENIPUNCTURE: CPT | Performed by: SURGERY

## 2025-04-12 PROCEDURE — 85610 PROTHROMBIN TIME: CPT | Performed by: SURGERY

## 2025-04-12 PROCEDURE — 99231 SBSQ HOSP IP/OBS SF/LOW 25: CPT | Performed by: PHYSICIAN ASSISTANT

## 2025-04-12 PROCEDURE — 99233 SBSQ HOSP IP/OBS HIGH 50: CPT | Performed by: INTERNAL MEDICINE

## 2025-04-12 PROCEDURE — 2500000001 HC RX 250 WO HCPCS SELF ADMINISTERED DRUGS (ALT 637 FOR MEDICARE OP): Performed by: SURGERY

## 2025-04-12 RX ORDER — CEFTRIAXONE 1 G/50ML
1 INJECTION, SOLUTION INTRAVENOUS EVERY 24 HOURS
Status: DISCONTINUED | OUTPATIENT
Start: 2025-04-12 | End: 2025-04-13

## 2025-04-12 RX ADMIN — SPIRONOLACTONE 50 MG: 25 TABLET ORAL at 08:25

## 2025-04-12 RX ADMIN — FUROSEMIDE 20 MG: 40 TABLET ORAL at 08:25

## 2025-04-12 RX ADMIN — PANTOPRAZOLE SODIUM 40 MG: 40 TABLET, DELAYED RELEASE ORAL at 05:33

## 2025-04-12 RX ADMIN — ANTACID TABLETS 500 MG: 500 TABLET, CHEWABLE ORAL at 08:24

## 2025-04-12 RX ADMIN — Medication 3 MG: at 20:17

## 2025-04-12 RX ADMIN — LACTULOSE 20 G: 10 SOLUTION ORAL at 20:17

## 2025-04-12 RX ADMIN — LACTULOSE 20 G: 10 SOLUTION ORAL at 08:25

## 2025-04-12 RX ADMIN — BICALUTAMIDE 50 MG: 50 TABLET ORAL at 08:37

## 2025-04-12 RX ADMIN — ACETAMINOPHEN 650 MG: 325 TABLET ORAL at 20:17

## 2025-04-12 RX ADMIN — NADOLOL 10 MG: 20 TABLET ORAL at 08:25

## 2025-04-12 RX ADMIN — FINASTERIDE 5 MG: 5 TABLET, FILM COATED ORAL at 08:25

## 2025-04-12 RX ADMIN — LACTULOSE 20 G: 10 SOLUTION ORAL at 14:27

## 2025-04-12 RX ADMIN — CEFTRIAXONE SODIUM 1 G: 1 INJECTION, SOLUTION INTRAVENOUS at 14:31

## 2025-04-12 RX ADMIN — PANTOPRAZOLE SODIUM 40 MG: 40 TABLET, DELAYED RELEASE ORAL at 17:30

## 2025-04-12 ASSESSMENT — COGNITIVE AND FUNCTIONAL STATUS - GENERAL
HELP NEEDED FOR BATHING: A LITTLE
MOVING FROM LYING ON BACK TO SITTING ON SIDE OF FLAT BED WITH BEDRAILS: A LITTLE
DAILY ACTIVITIY SCORE: 12
PERSONAL GROOMING: A LITTLE
TOILETING: A LOT
WALKING IN HOSPITAL ROOM: A LOT
TOILETING: A LOT
MOBILITY SCORE: 16
MOBILITY SCORE: 15
STANDING UP FROM CHAIR USING ARMS: A LITTLE
MOVING TO AND FROM BED TO CHAIR: A LITTLE
WALKING IN HOSPITAL ROOM: A LOT
DRESSING REGULAR LOWER BODY CLOTHING: A LOT
HELP NEEDED FOR BATHING: A LOT
DRESSING REGULAR UPPER BODY CLOTHING: A LOT
STANDING UP FROM CHAIR USING ARMS: A LITTLE
EATING MEALS: A LITTLE
TURNING FROM BACK TO SIDE WHILE IN FLAT BAD: A LITTLE
PERSONAL GROOMING: A LOT
MOVING TO AND FROM BED TO CHAIR: A LITTLE
MOVING FROM LYING ON BACK TO SITTING ON SIDE OF FLAT BED WITH BEDRAILS: A LITTLE
EATING MEALS: A LOT
CLIMB 3 TO 5 STEPS WITH RAILING: A LOT
TURNING FROM BACK TO SIDE WHILE IN FLAT BAD: A LITTLE
DRESSING REGULAR LOWER BODY CLOTHING: A LOT
DRESSING REGULAR UPPER BODY CLOTHING: A LITTLE
DAILY ACTIVITIY SCORE: 16
CLIMB 3 TO 5 STEPS WITH RAILING: TOTAL

## 2025-04-12 ASSESSMENT — PAIN - FUNCTIONAL ASSESSMENT
PAIN_FUNCTIONAL_ASSESSMENT: 0-10

## 2025-04-12 ASSESSMENT — PAIN SCALES - GENERAL
PAINLEVEL_OUTOF10: 0 - NO PAIN
PAINLEVEL_OUTOF10: 3

## 2025-04-12 ASSESSMENT — ACTIVITIES OF DAILY LIVING (ADL): HOME_MANAGEMENT_TIME_ENTRY: 30

## 2025-04-12 NOTE — PROGRESS NOTES
Occupational Therapy    OT Treatment    Patient Name: Alfonso Toscano  MRN: [id number]  Department: [facility]  Room: [address] [place] [address]  Today's Date: 4/12/2025  Time Calculation  Start Time: 0935  Stop Time: 1029  Time Calculation (min): 54 min        Assessment:  End of Session Communication:  (RN/MD notified of small amount of blood in bsc.)  End of Session Patient Position:  (Patient sitting up in chair, call light/tray in reach. Chair alarm on. spouse at b/s.  All questions/concerns  addressed)     Plan:  Treatment Interventions: ADL retraining, Functional transfer training, Endurance training  OT Frequency: 2 times per week  OT Discharge Recommendations: Moderate intensity level of continued care, Low intensity level of continued care  OT - OK to Discharge: Yes (when medically stable/cleared)  Treatment Interventions: ADL retraining, Functional transfer training, Endurance training    Subjective   Previous Visit Info:  OT Last Visit  OT Received On: 04/12/25  General:  General  Reason for Referral: GIB  Past Medical History Relevant to Rehab: HTN, HLD, Anxiety, Anemia, hepatic cirrhosis, colon polyps, hepatocellular carcinoma (active treatment).  Prior to Session Communication:  (Cleared by RN for OT)  Patient Position Received: Bed, 3 rail up, Alarm on  General Comment: Pt agreeable to OT, pleasant and cooperative  Precautions:  Medical Precautions: Fall precautions            Pain:  Pain Assessment  Pain Assessment: 0-10  0-10 (Numeric) Pain Score: 0 - No pain (pt states he has not had abd pain today)    Objective    Cognition:  Cognition  Overall Cognitive Status: Within Functional Limits (however does present with delayed processing this date)  Problem Solving: Exceptions to WFL  Safety/Judgement: Exceptions to WFL  Insight: Mild  Task Initiation: Delayed initiation  Planning: Reduced planning skills  Processing Speed: Delayed    Activities of Daily Living: UE Dressing  UE Dressing Comments: doff/don gown with min  assist and min vc's for sequencing task    LE Dressing  LE Dressing Comments: don socks with min assist and mod effort.  Pt uses figure four LE positioning to don socks.  Increased effort to don right sock vs left    Toileting  Toileting Comments: Mod assist for hygiene and clothing management.  Pt demo's fair- balance with 1 UE support. LE's in flexed posture with LE instability       Bed Mobility/Transfers: Bed Mobility  Bed Mobility:  (rolling towards right side: CGAx1 with use of bedrail **side lying->sit: CGAx1  HOB raised 15 degrees, use of bed rail. Slow to transition into sit. Effortful for patient. Assist given to guide trunk upright. Denies dizziness with change in position.)    Transfer 1  Trials/Comments 1: Min 3 STS transfers performed (EOB; elevated toilet, chair). Instructions needed for hand placement with  transfers. **stand->sit: min x1 Poor follow through of instructions for hand placement. poor positioning of walker, holds onto ww when sitting, poor descent into chair  Transfers 2  Trials/Comments 2: bed->BSC->Chair with min assist and fww.  Cues and assist for ww management and positioning.  Assist to boost and control descent      Functional Mobility:  Functional Mobility  Functional Mobility Performed:  (Functional mobility HHD with fww and gait belt with Min assist.  B/L LE instability however no LOB.  Gait belt training provided to pt and spouse.  Gait belt left in room for nsg, spouse to take home and utilize.)  Sitting Balance: Fair/Fair-     Standing Balance: Fair-       Therapy/Activity: Balance/Neuromuscular Re-Education  Balance/Neuromuscular Re-Education Activity Performed:  (Pt performing multiple standing trials with 1-2 UE support during completion of static/dyn standing tasks. Stand tolerance x 1-2 min with each trial with fair- balance.)      Outcome Measures:[facility] Daily Activity  Putting on and taking off regular lower body clothing: A lot  Bathing (including washing, rinsing,  drying): A little  Putting on and taking off regular upper body clothing: A little  Toileting, which includes using toilet, bedpan or urinal: A lot  Taking care of personal grooming such as brushing teeth: A little  Eating Meals: A little  Daily Activity - Total Score: 16        Education Documentation  Body Mechanics, taught by Jennifer L Felty, OTA at 4/12/2025 10:54 AM.  Learner: Patient  Readiness: Acceptance  Method: Explanation, Demonstration  Response: Needs Reinforcement    EDUCATION:  Education  Individual(s) Educated: Patient  Education Provided:  (Pt educated on fall prevention techniques; safe transfer techniques including hand placement and positioning; techniques/strategies for balance improvement; compensatory adl techniques; safe body mechanics; energy conservation techniques.)  Patient Response to Education: Patient/Caregiver Verbalized Understanding of Information    Goals:  Encounter Problems       Encounter Problems (Active)       OT Goals       SBA for all functional transfers  (Progressing)       Start:  04/03/25    Expected End:  04/17/25            SBA for Lb dressing  (Progressing)       Start:  04/03/25    Expected End:  04/17/25            SBA for toileting tasks and clothing mgmt  (Progressing)       Start:  04/03/25    Expected End:  04/17/25            Fair + dyn standing balance during ADLs and functional activities  (Progressing)       Start:  04/03/25    Expected End:  04/17/25

## 2025-04-12 NOTE — PROGRESS NOTES
General Surgery Progress Note    Patient: Alfonso Toscano  Unit/Bed: 1024/1024-B  YOB: 1945  MRN: [id number]  Acct: [id number]   Admitting Diagnosis: Gastrointestinal hemorrhage, unspecified gastrointestinal hemorrhage type [K92.2]  Date:  4/2/2025  Hospital Day: 9  Attending: Gary Johns MD    Complaint:  Chief Complaint   Patient presents with    Rectal Bleeding     Dx from hospital yesterday.        Subjective  POD #2 s/p hemorrhoidectomy with band ligation.  Pt had 2 BM overnight without bleeding though did have BM this AM with small amount of bleeding  Tolerating regular diet.  Ambulating.   Denies pain.  Denies N/V.  Hgb slowly trending down though relatively stable at 7.8.  Plts 67.  INR 1.7.  Remains afebrile though elevation in WBC today at 17.1 (from 6.2 yest) with left shift.      PHYSICAL EXAM:  Physical Exam  Constitutional:       General: He is not in acute distress.  HENT:      Mouth/Throat:      Mouth: Mucous membranes are moist.   Cardiovascular:      Rate and Rhythm: Normal rate and regular rhythm.   Pulmonary:      Effort: Pulmonary effort is normal.   Abdominal:      General: There is no distension.      Palpations: Abdomen is soft.      Tenderness: There is no abdominal tenderness.   Neurological:      General: No focal deficit present.      Mental Status: He is alert.   Psychiatric:         Mood and Affect: Mood normal.           Vital signs in last 24 hours:  Vitals:    04/12/25 0801   BP: 125/56   Pulse: 50   Resp: 16   Temp: 36.4 °C (97.5 °F)   SpO2: 97%     Intake/Output this shift:  No intake or output data in the 24 hours ending 04/12/25 0904   Allergies:  No Known Allergies   Medications:  Scheduled medications  bicalutamide, 50 mg, oral, Daily  calcium carbonate, 500 mg, oral, Daily  finasteride, 5 mg, oral, Daily  furosemide, 20 mg, oral, Daily  lactulose, 30 mL, oral, TID  nadolol, 10 mg, oral, Daily  pantoprazole, 40 mg, oral, BID  phytonadione, 5 mg,  intravenous, Once  spironolactone, 50 mg, oral, Daily      Continuous medications     PRN medications  PRN medications: acetaminophen **OR** acetaminophen **OR** acetaminophen, benzocaine-menthol, melatonin, menthol-zinc oxide, ondansetron ODT **OR** ondansetron, oxyCODONE  Labs:  Results for orders placed or performed during the hospital encounter of 04/02/25 (from the past 24 hours)   Protime-INR   Result Value Ref Range    Protime 19.1 (H) 9.8 - 12.4 seconds    INR 1.7 (H) 0.9 - 1.1   CBC and Auto Differential   Result Value Ref Range    WBC 17.1 (H) 4.4 - 11.3 x10*3/uL    nRBC 0.0 0.0 - 0.0 /100 WBCs    RBC 2.55 (L) 4.50 - 5.90 x10*6/uL    Hemoglobin 7.8 (L) 13.5 - 17.5 g/dL    Hematocrit 23.6 (L) 41.0 - 52.0 %    MCV 93 80 - 100 fL    MCH 30.6 26.0 - 34.0 pg    MCHC 33.1 32.0 - 36.0 g/dL    RDW 17.9 (H) 11.5 - 14.5 %    Platelets 67 (L) 150 - 450 x10*3/uL    Neutrophils % 85.9 40.0 - 80.0 %    Immature Granulocytes %, Automated 0.7 0.0 - 0.9 %    Lymphocytes % 4.6 13.0 - 44.0 %    Monocytes % 8.7 2.0 - 10.0 %    Eosinophils % 0.0 0.0 - 6.0 %    Basophils % 0.1 0.0 - 2.0 %    Neutrophils Absolute 14.69 (H) 1.60 - 5.50 x10*3/uL    Immature Granulocytes Absolute, Automated 0.12 0.00 - 0.50 x10*3/uL    Lymphocytes Absolute 0.79 (L) 0.80 - 3.00 x10*3/uL    Monocytes Absolute 1.48 (H) 0.05 - 0.80 x10*3/uL    Eosinophils Absolute 0.00 0.00 - 0.40 x10*3/uL    Basophils Absolute 0.02 0.00 - 0.10 x10*3/uL   Basic Metabolic Panel   Result Value Ref Range    Glucose 165 (H) 74 - 99 mg/dL    Sodium 130 (L) 136 - 145 mmol/L    Potassium 4.8 3.5 - 5.3 mmol/L    Chloride 102 98 - 107 mmol/L    Bicarbonate 22 21 - 32 mmol/L    Anion Gap 11 10 - 20 mmol/L    Urea Nitrogen 20 6 - 23 mg/dL    Creatinine 1.07 0.50 - 1.30 mg/dL    eGFR 71 >60 mL/min/1.73m*2    Calcium 7.9 (L) 8.6 - 10.3 mg/dL      Imaging:  Imaging  No results found.    Cardiology, Vascular, and Other Imaging  No other imaging results found for the past 2 days      Assessment / Plan:  -POD #2 hemorrhoidectomy with band ligation   -New leukocytosis:  Remains afebrile without abdominal tenderness    -Recent paracentesis 3/31 with 1L removed    -Discussed with Dr Johns:  Empiric coverage for SBP with Rocephin     Teresita Gold PA-C    Time spent  30  minutes obtaining labs, imaging, recommendations, interview, assessment, examination, medication review/ordering, and EMR review.    Plan of care was discussed extensively with patient. Patient verbalized understanding through teach back method. All questions and concerns addressed upon examination.     Of note, this documentation is completed using the Dragon Dictation system (voice recognition software). There may be spelling and/or grammatical errors that were not corrected prior to final submission.

## 2025-04-12 NOTE — PROGRESS NOTES
Physical Therapy    Physical Therapy Treatment    Patient Name: Alfonso Toscano  MRN: [id number]  Today's Date: 4/12/2025  Time Calculation  Start Time: 0210  Stop Time: 0234  Time Calculation (min): 24 min     1024/1024-B    Assessment/Plan   PT Assessment  PT Assessment Results: Decreased strength, Decreased endurance, Impaired balance, Decreased mobility, Decreased coordination  Rehab Prognosis: Good  Barriers to Discharge Home: Caregiver assistance, Physical needs  Evaluation/Treatment Tolerance: Patient limited by fatigue  Strengths: Attitude of self, Coping skills, Premorbid level of function, Support and attitude of living partners  Barriers to Participation: Comorbidities  End of Session Communication: Bedside nurse, PCT/NA/CTA  Assessment Comment:  (Patient is deconditioned and has limited activity tolerance. He continues to require assist for transfers and mobility.  He would benefit from continued PT)  End of Session Patient Position: Bed, 3 rail up  PT Plan  Inpatient/Swing Bed or Outpatient: Inpatient  PT Plan  Treatment/Interventions: Bed mobility, Transfer training, Gait training, Balance training, Therapeutic activity, Postural re-education  PT Plan: Ongoing PT  PT Frequency: 3 times per week    General Visit Information:   PT  Visit  PT Received On: 04/12/25  General  Prior to Session Communication: Bedside nurse, PCT/NA/CTA  Patient Position Received: Up in chair  General Comment:  (Pt. requesting to return to bed declining need to ambulate to bathroom and on standing became incontinant of bowels.)  Precautions:  Precautions  Medical Precautions: Fall precautions  Precautions Comment:  (fall, purewick intact)    Pain:  Pain Assessment  0-10 (Numeric) Pain Score: 0 - No pain    Treatments:  Therapeutic Activity  Therapeutic Activity Performed: Yes (standing at bsc during cleansing with cna assist and min x 1 wbos fair static standing tolerating ~40 seconds stand then requesting to sit)     Bed  Mobility  Bed Mobility: Yes (segmental rolling with rail and sba/cg, sit to supine with cg, sit to stand with min x 1)     Transfers  Transfer: Yes (sit to stand from chair and from bsc with min x 1, chair to bsc to bed with fww and min x 1 slow step to pattern with forward posture taking 4-6 steps  and assist to place fww.)    Outcome Measures:     [facility] Basic Mobility  Turning from your back to your side while in a flat bed without using bedrails: A little  Moving from lying on your back to sitting on the side of a flat bed without using bedrails: A little  Moving to and from bed to chair (including a wheelchair): A little  Standing up from a chair using your arms (e.g. wheelchair or bedside chair): A little  To walk in hospital room: A lot  Climbing 3-5 steps with railing: Total  Basic Mobility - Total Score: 15     EDUCATION:  Outpatient Education  Individual(s) Educated: Patient  Education Provided: Body Mechanics, Fall Risk, Posture  Patient Response to Education: Patient/Caregiver Verbalized Understanding of Information  Encounter Problems       Encounter Problems (Active)       PT Problem       Pt will demonstrate mod I  with bed mobility to edge of bed.   (Progressing)       Start:  04/03/25    Expected End:  04/17/25            Pt will demonstrate mod I  with sit to stand/chair transfers with FWW.   (Progressing)       Start:  04/03/25    Expected End:  04/17/25            Pt will ambulate 30 feet with FWW SBA .   (Progressing)       Start:  04/03/25    Expected End:  04/17/25            Pt to demo improved BLE strength by being able to complete supine/seated thera ex 2x20 BLEs with 4 or less rest breaks .   (Progressing)       Start:  04/03/25    Expected End:  04/17/25               Pain - Adult          Safety       LTG - Patient will adhere to hip precautions during ADL's and transfers       Start:  04/05/25            LTG - Patient will demonstrate safety requirements appropriate to  situation/environment       Start:  04/05/25            LTG - Patient will utilize safety techniques       Start:  04/05/25            STG - Patient locks brakes on wheelchair       Start:  04/05/25            STG - Patient uses call light consistently to request assistance with transfers       Start:  04/05/25            STG - Patient uses gait belt during all transfers       Start:  04/05/25

## 2025-04-12 NOTE — CARE PLAN
Have Your Skin Lesions Been Treated?: not been treated The patient's goals for the shift include Labs WNL    The clinical goals for the shift include remain HDS     Is This A New Presentation, Or A Follow-Up?: Skin Lesions

## 2025-04-12 NOTE — CARE PLAN
The patient's goals for the shift include Labs WNL    The clinical goals for the shift include remain HDS    Problem: Pain - Adult  Goal: Verbalizes/displays adequate comfort level or baseline comfort level  Outcome: Progressing

## 2025-04-13 VITALS
WEIGHT: 198.41 LBS | SYSTOLIC BLOOD PRESSURE: 129 MMHG | BODY MASS INDEX: 30.07 KG/M2 | DIASTOLIC BLOOD PRESSURE: 63 MMHG | HEART RATE: 52 BPM | HEIGHT: 68 IN | TEMPERATURE: 96.8 F | RESPIRATION RATE: 18 BRPM | OXYGEN SATURATION: 98 %

## 2025-04-13 LAB
ALBUMIN SERPL BCP-MCNC: 2.5 G/DL (ref 3.4–5)
ALP SERPL-CCNC: 217 U/L (ref 33–136)
ALT SERPL W P-5'-P-CCNC: 35 U/L (ref 10–52)
ANION GAP SERPL CALC-SCNC: 9 MMOL/L (ref 10–20)
AST SERPL W P-5'-P-CCNC: 83 U/L (ref 9–39)
BASOPHILS # BLD AUTO: 0 X10*3/UL (ref 0–0.1)
BASOPHILS NFR BLD AUTO: 0 %
BILIRUB SERPL-MCNC: 1.9 MG/DL (ref 0–1.2)
BUN SERPL-MCNC: 22 MG/DL (ref 6–23)
CALCIUM SERPL-MCNC: 8.3 MG/DL (ref 8.6–10.3)
CHLORIDE SERPL-SCNC: 104 MMOL/L (ref 98–107)
CO2 SERPL-SCNC: 23 MMOL/L (ref 21–32)
CREAT SERPL-MCNC: 1.07 MG/DL (ref 0.5–1.3)
EGFRCR SERPLBLD CKD-EPI 2021: 71 ML/MIN/1.73M*2
EOSINOPHIL # BLD AUTO: 0.04 X10*3/UL (ref 0–0.4)
EOSINOPHIL NFR BLD AUTO: 0.4 %
ERYTHROCYTE [DISTWIDTH] IN BLOOD BY AUTOMATED COUNT: 18 % (ref 11.5–14.5)
GLUCOSE SERPL-MCNC: 117 MG/DL (ref 74–99)
HCT VFR BLD AUTO: 23 % (ref 41–52)
HGB BLD-MCNC: 7.4 G/DL (ref 13.5–17.5)
IMM GRANULOCYTES # BLD AUTO: 0.04 X10*3/UL (ref 0–0.5)
IMM GRANULOCYTES NFR BLD AUTO: 0.4 % (ref 0–0.9)
INR PPP: 1.7 (ref 0.9–1.1)
LYMPHOCYTES # BLD AUTO: 0.67 X10*3/UL (ref 0.8–3)
LYMPHOCYTES NFR BLD AUTO: 7.5 %
MAGNESIUM SERPL-MCNC: 1.87 MG/DL (ref 1.6–2.4)
MCH RBC QN AUTO: 30.1 PG (ref 26–34)
MCHC RBC AUTO-ENTMCNC: 32.2 G/DL (ref 32–36)
MCV RBC AUTO: 94 FL (ref 80–100)
MONOCYTES # BLD AUTO: 1.31 X10*3/UL (ref 0.05–0.8)
MONOCYTES NFR BLD AUTO: 14.7 %
NEUTROPHILS # BLD AUTO: 6.88 X10*3/UL (ref 1.6–5.5)
NEUTROPHILS NFR BLD AUTO: 77 %
NRBC BLD-RTO: 0 /100 WBCS (ref 0–0)
PLATELET # BLD AUTO: 41 X10*3/UL (ref 150–450)
POTASSIUM SERPL-SCNC: 5 MMOL/L (ref 3.5–5.3)
PROT SERPL-MCNC: 4.5 G/DL (ref 6.4–8.2)
PROTHROMBIN TIME: 18.8 SECONDS (ref 9.8–12.4)
RBC # BLD AUTO: 2.46 X10*6/UL (ref 4.5–5.9)
SODIUM SERPL-SCNC: 131 MMOL/L (ref 136–145)
WBC # BLD AUTO: 8.9 X10*3/UL (ref 4.4–11.3)

## 2025-04-13 PROCEDURE — 2500000005 HC RX 250 GENERAL PHARMACY W/O HCPCS: Performed by: SURGERY

## 2025-04-13 PROCEDURE — 2500000002 HC RX 250 W HCPCS SELF ADMINISTERED DRUGS (ALT 637 FOR MEDICARE OP, ALT 636 FOR OP/ED): Performed by: SURGERY

## 2025-04-13 PROCEDURE — 2500000001 HC RX 250 WO HCPCS SELF ADMINISTERED DRUGS (ALT 637 FOR MEDICARE OP): Performed by: SURGERY

## 2025-04-13 PROCEDURE — 85610 PROTHROMBIN TIME: CPT | Performed by: SURGERY

## 2025-04-13 PROCEDURE — 2500000004 HC RX 250 GENERAL PHARMACY W/ HCPCS (ALT 636 FOR OP/ED): Performed by: SURGERY

## 2025-04-13 PROCEDURE — 99232 SBSQ HOSP IP/OBS MODERATE 35: CPT | Performed by: HOSPITALIST

## 2025-04-13 PROCEDURE — 83735 ASSAY OF MAGNESIUM: CPT | Performed by: INTERNAL MEDICINE

## 2025-04-13 PROCEDURE — 99232 SBSQ HOSP IP/OBS MODERATE 35: CPT

## 2025-04-13 PROCEDURE — 80053 COMPREHEN METABOLIC PANEL: CPT | Performed by: INTERNAL MEDICINE

## 2025-04-13 PROCEDURE — 2500000001 HC RX 250 WO HCPCS SELF ADMINISTERED DRUGS (ALT 637 FOR MEDICARE OP): Performed by: FAMILY MEDICINE

## 2025-04-13 PROCEDURE — 85025 COMPLETE CBC W/AUTO DIFF WBC: CPT | Performed by: INTERNAL MEDICINE

## 2025-04-13 PROCEDURE — 1210000001 HC SEMI-PRIVATE ROOM DAILY

## 2025-04-13 PROCEDURE — 36415 COLL VENOUS BLD VENIPUNCTURE: CPT | Performed by: INTERNAL MEDICINE

## 2025-04-13 RX ADMIN — Medication 1 APPLICATION: at 18:20

## 2025-04-13 RX ADMIN — FUROSEMIDE 20 MG: 40 TABLET ORAL at 08:45

## 2025-04-13 RX ADMIN — ANTACID TABLETS 500 MG: 500 TABLET, CHEWABLE ORAL at 08:45

## 2025-04-13 RX ADMIN — NADOLOL 10 MG: 20 TABLET ORAL at 08:44

## 2025-04-13 RX ADMIN — ACETAMINOPHEN 650 MG: 325 TABLET ORAL at 20:36

## 2025-04-13 RX ADMIN — BICALUTAMIDE 50 MG: 50 TABLET ORAL at 08:45

## 2025-04-13 RX ADMIN — FINASTERIDE 5 MG: 5 TABLET, FILM COATED ORAL at 08:44

## 2025-04-13 RX ADMIN — LACTULOSE 20 G: 10 SOLUTION ORAL at 08:45

## 2025-04-13 RX ADMIN — Medication 3 MG: at 20:36

## 2025-04-13 RX ADMIN — LACTULOSE 20 G: 10 SOLUTION ORAL at 14:34

## 2025-04-13 RX ADMIN — PANTOPRAZOLE SODIUM 40 MG: 40 TABLET, DELAYED RELEASE ORAL at 17:13

## 2025-04-13 RX ADMIN — SPIRONOLACTONE 50 MG: 25 TABLET ORAL at 08:45

## 2025-04-13 RX ADMIN — PANTOPRAZOLE SODIUM 40 MG: 40 TABLET, DELAYED RELEASE ORAL at 05:05

## 2025-04-13 ASSESSMENT — COGNITIVE AND FUNCTIONAL STATUS - GENERAL
TURNING FROM BACK TO SIDE WHILE IN FLAT BAD: A LITTLE
PERSONAL GROOMING: A LOT
WALKING IN HOSPITAL ROOM: A LOT
EATING MEALS: A LOT
MOVING TO AND FROM BED TO CHAIR: A LITTLE
MOBILITY SCORE: 16
TOILETING: A LOT
DRESSING REGULAR LOWER BODY CLOTHING: A LOT
DAILY ACTIVITIY SCORE: 12
DRESSING REGULAR UPPER BODY CLOTHING: A LOT
STANDING UP FROM CHAIR USING ARMS: A LITTLE
HELP NEEDED FOR BATHING: A LOT
MOVING FROM LYING ON BACK TO SITTING ON SIDE OF FLAT BED WITH BEDRAILS: A LITTLE
CLIMB 3 TO 5 STEPS WITH RAILING: A LOT

## 2025-04-13 ASSESSMENT — PAIN - FUNCTIONAL ASSESSMENT
PAIN_FUNCTIONAL_ASSESSMENT: 0-10
PAIN_FUNCTIONAL_ASSESSMENT: 0-10

## 2025-04-13 ASSESSMENT — PAIN SCALES - GENERAL
PAINLEVEL_OUTOF10: 3
PAINLEVEL_OUTOF10: 0 - NO PAIN
PAINLEVEL_OUTOF10: 0 - NO PAIN

## 2025-04-13 ASSESSMENT — PAIN SCALES - WONG BAKER: WONGBAKER_NUMERICALRESPONSE: NO HURT

## 2025-04-13 NOTE — PROGRESS NOTES
General Surgery Progress Note    Patient: Alfonso Toscano  Unit/Bed: 1024/1024-B  YOB: 1945  MRN: [id number]  Acct: [id number]   Admitting Diagnosis: Gastrointestinal hemorrhage, unspecified gastrointestinal hemorrhage type [K92.2]  Date:  4/2/2025  Hospital Day: 10  Attending: Shelton Dodd MD    Complaint:  Chief Complaint   Patient presents with    Rectal Bleeding     Dx from hospital yesterday.        Subjective  Patient seen and examined this morning. No acute events overnight. Patient oob and sitting in chair.  Patient states he slept well last night.  Patient reports passing flatus and having bowel movements.  Per nursing patient currently on bedpan.    PHYSICAL EXAM:  Physical Exam  Vitals and nursing note reviewed.   Constitutional:       General: He is awake.      Appearance: Normal appearance. He is overweight.   HENT:      Head: Normocephalic.      Nose: Nose normal.      Mouth/Throat:      Mouth: Mucous membranes are moist.   Eyes:      General: Scleral icterus present.   Cardiovascular:      Rate and Rhythm: Normal rate and regular rhythm.      Heart sounds: Murmur heard.   Pulmonary:      Effort: Pulmonary effort is normal.      Breath sounds: Normal breath sounds.   Abdominal:      General: Abdomen is flat. Bowel sounds are normal.      Palpations: Abdomen is soft.   Genitourinary:     Comments: Patient currently on bedpan.  No bright red blood noted in stool, however haze of red noted on willie.    Skin:     General: Skin is warm and dry.      Capillary Refill: Capillary refill takes less than 2 seconds.   Neurological:      General: No focal deficit present.      Mental Status: He is alert. Mental status is at baseline.   Psychiatric:         Mood and Affect: Mood normal.         Speech: Speech normal.         Behavior: Behavior is cooperative.       Vital signs in last 24 hours:  Vitals:    04/13/25 0745   BP: 120/58   Pulse: 53   Resp: 16   Temp: 36.2 °C (97.2 °F)   SpO2: 94%      Intake/Output this shift:    Intake/Output Summary (Last 24 hours) at 4/13/2025 0928  Last data filed at 4/13/2025 0014  Gross per 24 hour   Intake 240 ml   Output --   Net 240 ml      Allergies:  No Known Allergies   Medications:  Scheduled medications  bicalutamide, 50 mg, oral, Daily  calcium carbonate, 500 mg, oral, Daily  cefTRIAXone, 1 g, intravenous, q24h  finasteride, 5 mg, oral, Daily  furosemide, 20 mg, oral, Daily  lactulose, 30 mL, oral, TID  nadolol, 10 mg, oral, Daily  pantoprazole, 40 mg, oral, BID  phytonadione, 5 mg, intravenous, Once  spironolactone, 50 mg, oral, Daily      Continuous medications     PRN medications  PRN medications: acetaminophen **OR** acetaminophen **OR** acetaminophen, benzocaine-menthol, melatonin, menthol-zinc oxide, ondansetron ODT **OR** ondansetron, oxyCODONE  Labs:  Results for orders placed or performed during the hospital encounter of 04/02/25 (from the past 24 hours)   Protime-INR   Result Value Ref Range    Protime 18.8 (H) 9.8 - 12.4 seconds    INR 1.7 (H) 0.9 - 1.1   CBC and Auto Differential   Result Value Ref Range    WBC 8.9 4.4 - 11.3 x10*3/uL    nRBC 0.0 0.0 - 0.0 /100 WBCs    RBC 2.46 (L) 4.50 - 5.90 x10*6/uL    Hemoglobin 7.4 (L) 13.5 - 17.5 g/dL    Hematocrit 23.0 (L) 41.0 - 52.0 %    MCV 94 80 - 100 fL    MCH 30.1 26.0 - 34.0 pg    MCHC 32.2 32.0 - 36.0 g/dL    RDW 18.0 (H) 11.5 - 14.5 %    Platelets 41 (L) 150 - 450 x10*3/uL    Neutrophils % 77.0 40.0 - 80.0 %    Immature Granulocytes %, Automated 0.4 0.0 - 0.9 %    Lymphocytes % 7.5 13.0 - 44.0 %    Monocytes % 14.7 2.0 - 10.0 %    Eosinophils % 0.4 0.0 - 6.0 %    Basophils % 0.0 0.0 - 2.0 %    Neutrophils Absolute 6.88 (H) 1.60 - 5.50 x10*3/uL    Immature Granulocytes Absolute, Automated 0.04 0.00 - 0.50 x10*3/uL    Lymphocytes Absolute 0.67 (L) 0.80 - 3.00 x10*3/uL    Monocytes Absolute 1.31 (H) 0.05 - 0.80 x10*3/uL    Eosinophils Absolute 0.04 0.00 - 0.40 x10*3/uL    Basophils Absolute 0.00 0.00 -  0.10 x10*3/uL   Comprehensive Metabolic Panel   Result Value Ref Range    Glucose 117 (H) 74 - 99 mg/dL    Sodium 131 (L) 136 - 145 mmol/L    Potassium 5.0 3.5 - 5.3 mmol/L    Chloride 104 98 - 107 mmol/L    Bicarbonate 23 21 - 32 mmol/L    Anion Gap 9 (L) 10 - 20 mmol/L    Urea Nitrogen 22 6 - 23 mg/dL    Creatinine 1.07 0.50 - 1.30 mg/dL    eGFR 71 >60 mL/min/1.73m*2    Calcium 8.3 (L) 8.6 - 10.3 mg/dL    Albumin 2.5 (L) 3.4 - 5.0 g/dL    Alkaline Phosphatase 217 (H) 33 - 136 U/L    Total Protein 4.5 (L) 6.4 - 8.2 g/dL    AST 83 (H) 9 - 39 U/L    Bilirubin, Total 1.9 (H) 0.0 - 1.2 mg/dL    ALT 35 10 - 52 U/L   Magnesium   Result Value Ref Range    Magnesium 1.87 1.60 - 2.40 mg/dL      Imaging:  Imaging  No results found.    Cardiology, Vascular, and Other Imaging  No other imaging results found for the past 2 days     Assessment  Hematochezia  4/10/2025 Doppler hemorrhoidectomy    On exam patient alert and at baseline.  Out of bed to the chair.  Respirations equal and unlabored.  Lung sounds clear.  Heart murmur noted.  Abdomen soft and nontender.  Bowel sounds present.  Patient on bedpan and visual inspection reveals stool, no obvious blood in stool, however on willie haze outline red ring noted.   Morning lab work shows no leukocytosis, and hemoglobin 7.4 down from 7.8 yesterday.  Afebrile.    Plan  -Regular diet  -Pain control  -Nausea control  -DVT prophylaxis- SCD  -Continue PPI  -Continue to monitor for sign of rectal bleeding and report to general surgery team  -Pulmonary toileting - C&DB  -Encourage ambulation - OOB to chair  -Continue care per primary team       Further recommendations per Dr. Neo Echevarria, APRN-CNP    Time spent  37  minutes obtaining labs, imaging, recommendations, interview, assessment, examination, medication review/ordering, and EMR review.    Plan of care was discussed extensively with patient. Patient verbalized understanding through teach back method. All questions and  concerns addressed upon examination.     Of note, this documentation is completed using the Dragon Dictation system (voice recognition software). There may be spelling and/or grammatical errors that were not corrected prior to final submission.

## 2025-04-13 NOTE — PROGRESS NOTES
[facility]  homecare unable to accept case d/t staffing.  Spoke with spouse to inform, she is requesting  hc.  Pcp is dr. Gisele lee who is a  physician.

## 2025-04-13 NOTE — PROGRESS NOTES
ADMISSION DATE: 4/2/2025  HOSPITAL DAY: 9    SUBJECTIVE:  Patient was seen at bedside.  Remains somewhat confused.  Had a bowel movement with blood.  H&H has dropped and white count gone up.  Patient wants to go home.      OBJECTIVE:  Vitals:    04/12/25 0801 04/12/25 1512 04/12/25 2014 04/13/25 0014   BP: 125/56 118/56 124/59 124/56   BP Location: Right arm   Right arm   Patient Position: Lying   Lying   Pulse: 50  53 55   Resp: 16  20 17   Temp: 36.4 °C (97.5 °F) 36.6 °C (97.9 °F) 36.5 °C (97.7 °F) 36.9 °C (98.4 °F)   TempSrc: Temporal   Temporal   SpO2: 97% 98% 93% 95%   Weight:       Height:            Intake/Output Summary (Last 24 hours) at 4/13/2025 0207  Last data filed at 4/13/2025 0014  Gross per 24 hour   Intake 240 ml   Output --   Net 240 ml      Wt Readings from Last 10 Encounters:   04/12/25 90.4 kg (199 lb 4.7 oz)   03/31/25 94.8 kg (208 lb 15.9 oz)   03/27/25 87.6 kg (193 lb 2 oz)   03/19/25 83.5 kg (184 lb)   03/13/25 81.6 kg (179 lb 14.3 oz)   03/13/25 81.6 kg (179 lb 14.3 oz)   03/06/25 80.8 kg (178 lb 2.1 oz)   03/06/25 80.8 kg (178 lb 2.1 oz)   02/25/25 80.5 kg (177 lb 7.5 oz)   02/22/25 87 kg (191 lb 12.8 oz)       PHYSICAL EXAM:  Gen: Alert, awake,   HEENT:  Atraumatic, PERRL.  Conjunctivae clear.   Moist nasal mucous membranes. oropharynx non erythematous,   Neck:  Supple without thyromegaly or lymphadenopathy.  Lungs:  Clear to auscultation without rales, rhonchi, or rub.  Heart:  RRR, S1, S2, without M.  Abdomen:  Soft, non tender, no organ enlargement, bruit. Bowel sounds present . No CVA tenderness.  Extremities:  No edema. No calf swelling or tenderness.    Skin:  No rash, ecchymosis or erythema.    CURRENT ACTIVE MEDS:  bicalutamide, 50 mg, oral, Daily  calcium carbonate, 500 mg, oral, Daily  cefTRIAXone, 1 g, intravenous, q24h  finasteride, 5 mg, oral, Daily  furosemide, 20 mg, oral, Daily  lactulose, 30 mL, oral, TID  nadolol, 10 mg, oral, Daily  pantoprazole, 40 mg, oral,  BID  phytonadione, 5 mg, intravenous, Once  spironolactone, 50 mg, oral, Daily      LAB RESULTS:   CBC:   Results from last 7 days   Lab Units 04/12/25  0616 04/11/25  0410 04/10/25  0419   WBC AUTO x10*3/uL 17.1* 6.2 8.6   RBC AUTO x10*6/uL 2.55* 2.64* 2.76*   HEMOGLOBIN g/dL 7.8* 7.9* 8.3*   HEMATOCRIT % 23.6* 24.5* 25.9*   MCV fL 93 93 94   MCH pg 30.6 29.9 30.1   MCHC g/dL 33.1 32.2 32.0   RDW % 17.9* 17.6* 18.7*   PLATELETS AUTO x10*3/uL 67* 53* 57*     CMP:    Results from last 7 days   Lab Units 04/12/25  0616 04/11/25  0410 04/10/25  0419 04/09/25  0420 04/08/25  0408 04/07/25  0555   SODIUM mmol/L 130* 127* 129* 128* 130* 130*   POTASSIUM mmol/L 4.8 4.8 4.8 4.7 4.6 4.5   CHLORIDE mmol/L 102 100 102 101 100 101   CO2 mmol/L 22 21 23 24 25 24   BUN mg/dL 20 18 12 13 15 16   CREATININE mg/dL 1.07 1.09 0.96 0.99 1.13 1.18   GLUCOSE mg/dL 165* 142* 81 100* 88 83   PROTEIN TOTAL g/dL  --   --   --  4.2* 4.3* 4.3*   CALCIUM mg/dL 7.9* 7.5* 7.5* 7.3* 7.3* 7.3*   BILIRUBIN TOTAL mg/dL  --   --   --  2.9* 3.1* 2.9*   ALK PHOS U/L  --   --   --  149* 142* 134   AST U/L  --   --   --  46* 44* 42*   ALT U/L  --   --   --  23 22 21     BMP:    Results from last 7 days   Lab Units 04/12/25  0616 04/11/25  0410 04/10/25  0419   SODIUM mmol/L 130* 127* 129*   POTASSIUM mmol/L 4.8 4.8 4.8   CHLORIDE mmol/L 102 100 102   CO2 mmol/L 22 21 23   BUN mg/dL 20 18 12   CREATININE mg/dL 1.07 1.09 0.96   CALCIUM mg/dL 7.9* 7.5* 7.5*   GLUCOSE mg/dL 165* 142* 81     Magnesium:  Results from last 7 days   Lab Units 04/09/25  0420 04/08/25  0408 04/07/25  0555   MAGNESIUM mg/dL 1.70 1.68 1.71     Lab Results   Component Value Date    LDLCALC 124 (H) 10/08/2024     Lab Results   Component Value Date    HGBA1C 4.1 10/08/2024    HGBA1C 3.9 04/10/2024    HGBA1C 4.2 11/28/2023     Lab Results   Component Value Date    LDLCALC 124 (H) 10/08/2024    CREATININE 1.07 04/12/2025       Lab Results   Component Value Date    TSH 1.86 02/25/2025       Lab Results   Component Value Date    INR 1.7 (H) 04/12/2025    INR 1.8 (H) 04/11/2025    INR 1.9 (H) 04/10/2025    PROTIME 19.1 (H) 04/12/2025    PROTIME 20.0 (H) 04/11/2025    PROTIME 20.7 (H) 04/10/2025     CULTURES & SUSCEPTIBILITIES:   No results found for the last 90 days.    IMAGING STUDIES:  I have reviewed following imaging studies.  I agree with the impression and incorporated those results into my MDM     === 03/13/25 ===  XR CHEST 1 VIEW  No evidence of acute intrathoracic abnormality.    === 03/28/25 ===  CT ABDOMEN PELVIS ANGIOGRAM W AND/OR WO IV CONTRAST    1.  No evidence of active GI bleed. There is new mild colitis of the  ascending and proximal transverse colon which may be infectious,  inflammatory, or chemotherapy related.  2. Cirrhotic appearing liver with bilateral masses again noted and  stigmata of portal hypertension. There is stable appearance of  subocclusive thrombus within the portal vein. There is also interval  development of moderate amount of ascites within the abdomen and  pelvis.  3. Diffuse partially calcified atherosclerotic disease without  significant stenosis. 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  4. Colonic diverticulosis without diverticulitis.  5. Coronary artery calcifications.  6. Stable superior endplate deformity of the L4 vertebral body.      LAST EKG  Encounter Date: 04/02/25   ECG 12 lead   Result Value    Ventricular Rate 57    Atrial Rate 57    IL Interval 184    QRS Duration 70    QT Interval 426    QTC Calculation(Bazett) 414    P Axis 12    R Axis 53    T Axis 35    QRS Count 9    Q Onset 217    P Onset 125    P Offset 168    T Offset 430    QTC Fredericia 418     Echocardiogram : 6/29/22   1. The left ventricular systolic function is normal.  2. Spectral Doppler shows an impaired relaxation pattern of left ventricular diastolic filling.  3. Mild LVH and normal systolic function EF 65%.  4.  Diastolic dysfunction with atria at upper limits of normal.  5. Aortic sclerosis, no significant stenosis was appreciated on the views obtained.  6. Mitral annular calcification.  7. Pericardial fat pad.  8. Images have technical limitations.    PROBLEMS ON ADMISSION:  Gastrointestinal hemorrhage, unspecified gastrointestinal hemorrhage type [K92.2]    CHRONIC MEDICAL CONDITIONS:  Principal Problem:    Gastrointestinal hemorrhage, unspecified gastrointestinal hemorrhage type  Active Problems:    Hepatic cirrhosis (Multi)    Thrombocytopenia (CMS-HCC)    Lactic acidosis    ASSESSMENT AND PLAN FOR 4/12/2025  POD #2 for hemorrhoidectomy with band ligation.  Patient continues to have confusion and somewhat restlessness.  He does not have any flapping tremor.  Patient had paracentesis done in end of March.  He does have mild ascites.  His white count has gone up.  I like to start him on empiric IV Rocephin assuming this is SBP.  He still has mild rectal bleed with defecation.  His H&H has dropped.  Will repeat lab tomorrow.  PT OT is working with him.  He is AMPAC score is very low although he is refusing to go to skilled nursing facility.  I had a long discussion with his wife who is interested for home health care.  Family has discussed their choice with [facility].  Will continue with current medical therapy and plan.            P.S: This note was completed using Dragon voice recognition technology and may include unintended errors with respect to translation of words, typographical errors or grammar errors which may not have been identified while finalizing the chart.

## 2025-04-14 ENCOUNTER — DOCUMENTATION (OUTPATIENT)
Dept: HOME HEALTH SERVICES | Facility: HOME HEALTH | Age: 80
End: 2025-04-14
Payer: MEDICARE

## 2025-04-14 ENCOUNTER — HOME HEALTH ADMISSION (OUTPATIENT)
Dept: HOME HEALTH SERVICES | Facility: HOME HEALTH | Age: 80
End: 2025-04-14
Payer: MEDICARE

## 2025-04-14 ENCOUNTER — APPOINTMENT (OUTPATIENT)
Dept: HEMATOLOGY/ONCOLOGY | Facility: CLINIC | Age: 80
End: 2025-04-14
Payer: MEDICARE

## 2025-04-14 ENCOUNTER — TELEPHONE (OUTPATIENT)
Dept: PRIMARY CARE | Facility: CLINIC | Age: 80
End: 2025-04-14
Payer: MEDICARE

## 2025-04-14 VITALS
OXYGEN SATURATION: 94 % | HEART RATE: 51 BPM | DIASTOLIC BLOOD PRESSURE: 54 MMHG | SYSTOLIC BLOOD PRESSURE: 115 MMHG | WEIGHT: 201.28 LBS | HEIGHT: 68 IN | BODY MASS INDEX: 30.51 KG/M2 | TEMPERATURE: 96.8 F | RESPIRATION RATE: 16 BRPM

## 2025-04-14 LAB
ALBUMIN SERPL BCP-MCNC: 2.4 G/DL (ref 3.4–5)
ALP SERPL-CCNC: 253 U/L (ref 33–136)
ALT SERPL W P-5'-P-CCNC: 37 U/L (ref 10–52)
ANION GAP SERPL CALC-SCNC: 8 MMOL/L (ref 10–20)
AST SERPL W P-5'-P-CCNC: 78 U/L (ref 9–39)
BASOPHILS # BLD AUTO: 0.01 X10*3/UL (ref 0–0.1)
BASOPHILS NFR BLD AUTO: 0.1 %
BILIRUB SERPL-MCNC: 1.8 MG/DL (ref 0–1.2)
BUN SERPL-MCNC: 25 MG/DL (ref 6–23)
CALCIUM SERPL-MCNC: 8.5 MG/DL (ref 8.6–10.3)
CHLORIDE SERPL-SCNC: 105 MMOL/L (ref 98–107)
CO2 SERPL-SCNC: 24 MMOL/L (ref 21–32)
CREAT SERPL-MCNC: 1.03 MG/DL (ref 0.5–1.3)
EGFRCR SERPLBLD CKD-EPI 2021: 74 ML/MIN/1.73M*2
EOSINOPHIL # BLD AUTO: 0.18 X10*3/UL (ref 0–0.4)
EOSINOPHIL NFR BLD AUTO: 2.2 %
ERYTHROCYTE [DISTWIDTH] IN BLOOD BY AUTOMATED COUNT: 17.9 % (ref 11.5–14.5)
GLUCOSE SERPL-MCNC: 120 MG/DL (ref 74–99)
HCT VFR BLD AUTO: 24.5 % (ref 41–52)
HGB BLD-MCNC: 7.8 G/DL (ref 13.5–17.5)
HOLD SPECIMEN: NORMAL
IMM GRANULOCYTES # BLD AUTO: 0.03 X10*3/UL (ref 0–0.5)
IMM GRANULOCYTES NFR BLD AUTO: 0.4 % (ref 0–0.9)
INR PPP: 1.6 (ref 0.9–1.1)
LYMPHOCYTES # BLD AUTO: 0.99 X10*3/UL (ref 0.8–3)
LYMPHOCYTES NFR BLD AUTO: 12.2 %
MAGNESIUM SERPL-MCNC: 1.8 MG/DL (ref 1.6–2.4)
MCH RBC QN AUTO: 30.2 PG (ref 26–34)
MCHC RBC AUTO-ENTMCNC: 31.8 G/DL (ref 32–36)
MCV RBC AUTO: 95 FL (ref 80–100)
MONOCYTES # BLD AUTO: 1.28 X10*3/UL (ref 0.05–0.8)
MONOCYTES NFR BLD AUTO: 15.7 %
NEUTROPHILS # BLD AUTO: 5.64 X10*3/UL (ref 1.6–5.5)
NEUTROPHILS NFR BLD AUTO: 69.4 %
NRBC BLD-RTO: 0 /100 WBCS (ref 0–0)
PLATELET # BLD AUTO: 44 X10*3/UL (ref 150–450)
POTASSIUM SERPL-SCNC: 5.1 MMOL/L (ref 3.5–5.3)
PROT SERPL-MCNC: 4.6 G/DL (ref 6.4–8.2)
PROTHROMBIN TIME: 17.6 SECONDS (ref 9.8–12.4)
RBC # BLD AUTO: 2.58 X10*6/UL (ref 4.5–5.9)
SODIUM SERPL-SCNC: 132 MMOL/L (ref 136–145)
WBC # BLD AUTO: 8.1 X10*3/UL (ref 4.4–11.3)

## 2025-04-14 PROCEDURE — 99232 SBSQ HOSP IP/OBS MODERATE 35: CPT

## 2025-04-14 PROCEDURE — 99239 HOSP IP/OBS DSCHRG MGMT >30: CPT | Performed by: FAMILY MEDICINE

## 2025-04-14 PROCEDURE — 2500000001 HC RX 250 WO HCPCS SELF ADMINISTERED DRUGS (ALT 637 FOR MEDICARE OP): Performed by: FAMILY MEDICINE

## 2025-04-14 PROCEDURE — 2500000004 HC RX 250 GENERAL PHARMACY W/ HCPCS (ALT 636 FOR OP/ED): Performed by: SURGERY

## 2025-04-14 PROCEDURE — 83735 ASSAY OF MAGNESIUM: CPT | Performed by: INTERNAL MEDICINE

## 2025-04-14 PROCEDURE — 36415 COLL VENOUS BLD VENIPUNCTURE: CPT | Performed by: SURGERY

## 2025-04-14 PROCEDURE — 85610 PROTHROMBIN TIME: CPT | Performed by: SURGERY

## 2025-04-14 PROCEDURE — 2500000002 HC RX 250 W HCPCS SELF ADMINISTERED DRUGS (ALT 637 FOR MEDICARE OP, ALT 636 FOR OP/ED): Performed by: SURGERY

## 2025-04-14 PROCEDURE — 2500000001 HC RX 250 WO HCPCS SELF ADMINISTERED DRUGS (ALT 637 FOR MEDICARE OP): Performed by: SURGERY

## 2025-04-14 PROCEDURE — 84075 ASSAY ALKALINE PHOSPHATASE: CPT | Performed by: INTERNAL MEDICINE

## 2025-04-14 PROCEDURE — 97535 SELF CARE MNGMENT TRAINING: CPT | Mod: GO

## 2025-04-14 PROCEDURE — 97530 THERAPEUTIC ACTIVITIES: CPT | Mod: GO

## 2025-04-14 PROCEDURE — 85025 COMPLETE CBC W/AUTO DIFF WBC: CPT | Performed by: INTERNAL MEDICINE

## 2025-04-14 RX ORDER — OXYCODONE HYDROCHLORIDE 5 MG/1
5 TABLET ORAL EVERY 6 HOURS PRN
Qty: 15 TABLET | Refills: 0 | Status: SHIPPED | OUTPATIENT
Start: 2025-04-14 | End: 2025-04-19 | Stop reason: WASHOUT

## 2025-04-14 RX ADMIN — BICALUTAMIDE 50 MG: 50 TABLET ORAL at 08:12

## 2025-04-14 RX ADMIN — FUROSEMIDE 20 MG: 40 TABLET ORAL at 08:12

## 2025-04-14 RX ADMIN — FINASTERIDE 5 MG: 5 TABLET, FILM COATED ORAL at 08:12

## 2025-04-14 RX ADMIN — PANTOPRAZOLE SODIUM 40 MG: 40 TABLET, DELAYED RELEASE ORAL at 05:25

## 2025-04-14 RX ADMIN — LACTULOSE 20 G: 10 SOLUTION ORAL at 08:12

## 2025-04-14 RX ADMIN — SPIRONOLACTONE 50 MG: 25 TABLET ORAL at 08:12

## 2025-04-14 RX ADMIN — ANTACID TABLETS 500 MG: 500 TABLET, CHEWABLE ORAL at 08:12

## 2025-04-14 ASSESSMENT — COGNITIVE AND FUNCTIONAL STATUS - GENERAL
MOVING TO AND FROM BED TO CHAIR: A LITTLE
HELP NEEDED FOR BATHING: A LOT
TOILETING: A LITTLE
MOBILITY SCORE: 16
DRESSING REGULAR LOWER BODY CLOTHING: A LOT
HELP NEEDED FOR BATHING: A LOT
MOVING FROM LYING ON BACK TO SITTING ON SIDE OF FLAT BED WITH BEDRAILS: A LITTLE
PERSONAL GROOMING: A LITTLE
STANDING UP FROM CHAIR USING ARMS: A LITTLE
EATING MEALS: A LOT
TURNING FROM BACK TO SIDE WHILE IN FLAT BAD: A LITTLE
PERSONAL GROOMING: A LOT
DRESSING REGULAR UPPER BODY CLOTHING: A LOT
DRESSING REGULAR UPPER BODY CLOTHING: A LITTLE
DRESSING REGULAR LOWER BODY CLOTHING: A LOT
WALKING IN HOSPITAL ROOM: A LOT
CLIMB 3 TO 5 STEPS WITH RAILING: A LOT
TOILETING: A LOT
DAILY ACTIVITIY SCORE: 12
DAILY ACTIVITIY SCORE: 17

## 2025-04-14 ASSESSMENT — PAIN SCALES - GENERAL
PAINLEVEL_OUTOF10: 0 - NO PAIN
PAINLEVEL_OUTOF10: 0 - NO PAIN

## 2025-04-14 ASSESSMENT — ACTIVITIES OF DAILY LIVING (ADL): HOME_MANAGEMENT_TIME_ENTRY: 30

## 2025-04-14 ASSESSMENT — PAIN - FUNCTIONAL ASSESSMENT: PAIN_FUNCTIONAL_ASSESSMENT: 0-10

## 2025-04-14 NOTE — PROGRESS NOTES
Occupational Therapy    Occupational Therapy Treatment    Name: Alfonso Toscano  MRN: [id number]  Department:   Room: 1024/1024-B  Date: 04/14/25  Time Calculation  Start Time: 1046  Stop Time: 1126  Time Calculation (min): 40 min    Assessment:  End of Session Communication: Bedside nurse  End of Session Patient Position: Up in chair, Alarm on (wife present, all needs in reach)  Plan:  Treatment Interventions: ADL retraining, Functional transfer training, Endurance training  OT Frequency: 2 times per week  OT Discharge Recommendations: Moderate intensity level of continued care, Low intensity level of continued care  OT - OK to Discharge: Yes (when medically stable/cleared)    Subjective   Previous Visit Info:  OT Last Visit  OT Received On: 04/14/25  General:  General  Prior to Session Communication: Bedside nurse  Patient Position Received: Bed, 3 rail up, Alarm on  General Comment: Agreeable to therapy.  Wife present. Underwent hemorrhoidectomy on 4/10/25.  Precautions:  Medical Precautions: Fall precautions    Pain Assessment:  Pain Assessment  Pain Assessment: 0-10  0-10 (Numeric) Pain Score: 0 - No pain    Objective   Cognition:  Overall Cognitive Status: Within Functional Limits  Activities of Daily Living:      LE Dressing  LE Dressing Comments: Seated EOB with max assist to don socks.    Toileting  Toileting Level of Assistance: Minimum assistance  Where Assessed: Bedside commode  Toileting Comments: Standing at FWW, min assist for dynamic standing balance while pt. completed posterior monica hygiene with 1 UE on walker.       Bed Mobility/Transfers: Bed Mobility  Bed Mobility:  (CGA for balance and verbal cues for technique during sup to sit and scooting to EOB.)    Transfers  Transfer:  (CGA for balance sit<>stand at EOB x 3, sit<>stand at BSC x 2 and stand to sit at recliner with FWW and gait belt.)     Functional Mobility:  Functional Mobility  Functional Mobility Performed:  (Min assist for balance  during ~ 10' of ambulation between tasks with FWW and gait belt.)  Sitting Balance:     Standing Balance:  Dynamic Standing Balance  Dynamic Standing-Comments: fair (-)    Outcome Measures:  [facility] Daily Activity  Putting on and taking off regular lower body clothing: A lot  Bathing (including washing, rinsing, drying): A lot  Putting on and taking off regular upper body clothing: A little  Toileting, which includes using toilet, bedpan or urinal: A little  Taking care of personal grooming such as brushing teeth: A little  Eating Meals: None  Daily Activity - Total Score: 17    Education Documentation  ADL Training, taught by Marija Arango OT at 4/14/2025  2:14 PM.  Learner: Patient  Readiness: Acceptance  Method: Explanation  Response: Verbalizes Understanding    Education Comments  No comments found.    Goals:  Encounter Problems       Encounter Problems (Active)       OT Goals       SBA for all functional transfers  (Progressing)       Start:  04/03/25    Expected End:  04/17/25            SBA for Lb dressing  (Progressing)       Start:  04/03/25    Expected End:  04/17/25            SBA for toileting tasks and clothing mgmt  (Progressing)       Start:  04/03/25    Expected End:  04/17/25            Fair + dyn standing balance during ADLs and functional activities  (Progressing)       Start:  04/03/25    Expected End:  04/17/25

## 2025-04-14 NOTE — PROGRESS NOTES
General Surgery Progress Note    Patient: Alfonso Toscano  Unit/Bed: 1024/1024-B  YOB: 1945  MRN: [id number]  Acct: [id number]   Admitting Diagnosis: Gastrointestinal hemorrhage, unspecified gastrointestinal hemorrhage type [K92.2]  Date:  4/2/2025  Hospital Day: 11  Attending: Faith Mendiola MD    Complaint:  Chief Complaint   Patient presents with    Rectal Bleeding     Dx from hospital yesterday.        Subjective  Patient seen and examined this morning. No acute events overnight.  He states his bowel movements have been slowing down.  Per nursing staff, he has had just a small amount of bright red or pink blood when wiping, no gross blood in the stool.  He had a bowel movement this morning with only a small amount of pink when wiping.  The patient denies any pain or other complaints this morning.  PHYSICAL EXAM:  Physical Exam  Constitutional:       General: He is not in acute distress.  HENT:      Head: Normocephalic and atraumatic.      Mouth/Throat:      Mouth: Mucous membranes are moist.   Eyes:      Conjunctiva/sclera: Conjunctivae normal.   Pulmonary:      Effort: Pulmonary effort is normal. No respiratory distress.   Abdominal:      Palpations: Abdomen is soft.      Tenderness: There is no abdominal tenderness. There is no guarding.      Comments: Protuberant abdomen   Musculoskeletal:         General: No swelling.   Skin:     General: Skin is warm and dry.   Neurological:      Mental Status: He is alert.   Psychiatric:         Mood and Affect: Mood normal.         Behavior: Behavior normal.       Vital signs in last 24 hours:  Vitals:    04/14/25 0817   BP: 115/54   Pulse: 51   Resp: 16   Temp: 36 °C (96.8 °F)   SpO2: 94%     Intake/Output this shift:  No intake or output data in the 24 hours ending 04/14/25 0919   Allergies:  No Known Allergies   Medications:  Scheduled medications  bicalutamide, 50 mg, oral, Daily  calcium carbonate, 500 mg, oral, Daily  finasteride, 5 mg, oral,  Daily  furosemide, 20 mg, oral, Daily  lactulose, 30 mL, oral, TID  nadolol, 10 mg, oral, Daily  pantoprazole, 40 mg, oral, BID  spironolactone, 50 mg, oral, Daily      Continuous medications     PRN medications  PRN medications: acetaminophen **OR** acetaminophen **OR** acetaminophen, benzocaine-menthol, melatonin, menthol-zinc oxide, ondansetron ODT **OR** ondansetron, oxyCODONE  Labs:  Results for orders placed or performed during the hospital encounter of 04/02/25 (from the past 24 hours)   SST TOP   Result Value Ref Range    Extra Tube Hold for add-ons.    Protime-INR   Result Value Ref Range    Protime 17.6 (H) 9.8 - 12.4 seconds    INR 1.6 (H) 0.9 - 1.1   CBC and Auto Differential   Result Value Ref Range    WBC 8.1 4.4 - 11.3 x10*3/uL    nRBC 0.0 0.0 - 0.0 /100 WBCs    RBC 2.58 (L) 4.50 - 5.90 x10*6/uL    Hemoglobin 7.8 (L) 13.5 - 17.5 g/dL    Hematocrit 24.5 (L) 41.0 - 52.0 %    MCV 95 80 - 100 fL    MCH 30.2 26.0 - 34.0 pg    MCHC 31.8 (L) 32.0 - 36.0 g/dL    RDW 17.9 (H) 11.5 - 14.5 %    Platelets 44 (L) 150 - 450 x10*3/uL    Neutrophils % 69.4 40.0 - 80.0 %    Immature Granulocytes %, Automated 0.4 0.0 - 0.9 %    Lymphocytes % 12.2 13.0 - 44.0 %    Monocytes % 15.7 2.0 - 10.0 %    Eosinophils % 2.2 0.0 - 6.0 %    Basophils % 0.1 0.0 - 2.0 %    Neutrophils Absolute 5.64 (H) 1.60 - 5.50 x10*3/uL    Immature Granulocytes Absolute, Automated 0.03 0.00 - 0.50 x10*3/uL    Lymphocytes Absolute 0.99 0.80 - 3.00 x10*3/uL    Monocytes Absolute 1.28 (H) 0.05 - 0.80 x10*3/uL    Eosinophils Absolute 0.18 0.00 - 0.40 x10*3/uL    Basophils Absolute 0.01 0.00 - 0.10 x10*3/uL   Comprehensive Metabolic Panel   Result Value Ref Range    Glucose 120 (H) 74 - 99 mg/dL    Sodium 132 (L) 136 - 145 mmol/L    Potassium 5.1 3.5 - 5.3 mmol/L    Chloride 105 98 - 107 mmol/L    Bicarbonate 24 21 - 32 mmol/L    Anion Gap 8 (L) 10 - 20 mmol/L    Urea Nitrogen 25 (H) 6 - 23 mg/dL    Creatinine 1.03 0.50 - 1.30 mg/dL    eGFR 74 >60  mL/min/1.73m*2    Calcium 8.5 (L) 8.6 - 10.3 mg/dL    Albumin 2.4 (L) 3.4 - 5.0 g/dL    Alkaline Phosphatase 253 (H) 33 - 136 U/L    Total Protein 4.6 (L) 6.4 - 8.2 g/dL    AST 78 (H) 9 - 39 U/L    Bilirubin, Total 1.8 (H) 0.0 - 1.2 mg/dL    ALT 37 10 - 52 U/L   Magnesium   Result Value Ref Range    Magnesium 1.80 1.60 - 2.40 mg/dL     *Note: Due to a large number of results and/or encounters for the requested time period, some results have not been displayed. A complete set of results can be found in Results Review.      Imaging:  Imaging  No results found.    Cardiology, Vascular, and Other Imaging  No other imaging results found for the past 2 days     Assessment  Hematochezia  4/10/2025 Doppler hemorrhoidectomy    No acute events overnight.  Only having a small amount of blood per rectum with bowel movements.  No complaints of pain.  He is afebrile.  INR 1.6 today.  WBC 8.1, no leukocytosis.  Hemoglobin was 7.4 yesterday and is 7.8 today, appears stable.  Rectal exam by Dr. Womack at bedside.    Plan  -Regular diet  -Pain control  -Nausea control  -DVT prophylaxis- SCD  -Continue PPI  -Continue to monitor rectal bleeding  -Pulmonary toileting - C&DB  -Encourage ambulation - OOB to chair  -Continue care per primary team   - Follow-up with Dr. Womack in 2 weeks.  General surgery will sign off at this time.  Please do not hesitate to reach out with any questions or concerns.    Further recommendations per Dr. Vu Sky PA-C    Time spent  35  minutes obtaining labs, imaging, recommendations, interview, assessment, examination, medication review/ordering, and EMR review.    Plan of care was discussed extensively with patient. Patient verbalized understanding through teach back method. All questions and concerns addressed upon examination.     Of note, this documentation is completed using the Dragon Dictation system (voice recognition software). There may be spelling and/or grammatical errors that  were not corrected prior to final submission.

## 2025-04-14 NOTE — HH CARE COORDINATION
Home Care received a Referral for Nursing, Physical Therapy, Occupational Therapy, and Home Health Aide. We have processed the referral for a Start of Care on 4/15-4/16/25.     If you have any questions or concerns, please feel free to contact us at [phone]. Follow the prompts, enter your five digit zip code, and you will be directed to your care team on WE[facility] 2.      +/- BREANAT.

## 2025-04-14 NOTE — DISCHARGE INSTRUCTIONS
Call the office or go to the ER if:  You have a fever above 100.4  Cannot eat or drink  Have trouble breathing  Have chest pain or shortness of breath  Pain is uncontrolled  Large amount of blood in bowel movements

## 2025-04-14 NOTE — PROGRESS NOTES
Pt has a dc order. Was agreeable to [facility] when he last spoke with [facility]. [address] [facility] intake nurse notified via secure chat. [facility] ordered.     1245 Per [facility]: soc 24-48hrs post discharge.

## 2025-04-14 NOTE — CARE PLAN
The patient's goals for the shift include Labs WNL    The clinical goals for the shift include maintain safety    Problem: Pain - Adult  Goal: Verbalizes/displays adequate comfort level or baseline comfort level  Outcome: Adequate for Discharge     Problem: Safety - Adult  Goal: Free from fall injury  Outcome: Adequate for Discharge     Problem: Discharge Planning  Goal: Discharge to home or other facility with appropriate resources  Outcome: Adequate for Discharge     Problem: Chronic Conditions and Co-morbidities  Goal: Patient's chronic conditions and co-morbidity symptoms are monitored and maintained or improved  Outcome: Adequate for Discharge     Problem: Nutrition  Goal: Nutrient intake appropriate for maintaining nutritional needs  Outcome: Adequate for Discharge     Problem: Skin  Goal: Participates in plan/prevention/treatment measures  Outcome: Adequate for Discharge  Goal: Prevent/manage excess moisture  Outcome: Adequate for Discharge  Goal: Prevent/minimize sheer/friction injuries  Outcome: Adequate for Discharge  Goal: Promote/optimize nutrition  Outcome: Adequate for Discharge  Goal: Decreased wound size/increased tissue granulation at next dressing change  Outcome: Adequate for Discharge  Goal: Promote skin healing  Outcome: Adequate for Discharge     Problem: Fall/Injury  Goal: Not fall by end of shift  Outcome: Adequate for Discharge  Goal: Be free from injury by end of the shift  Outcome: Adequate for Discharge  Goal: Verbalize understanding of personal risk factors for fall in the hospital  Outcome: Adequate for Discharge  Goal: Verbalize understanding of risk factor reduction measures to prevent injury from fall in the home  Outcome: Adequate for Discharge  Goal: Use assistive devices by end of the shift  Outcome: Adequate for Discharge  Goal: Pace activities to prevent fatigue by end of the shift  Outcome: Adequate for Discharge     Problem: Pain  Goal: Takes deep breaths with improved pain  control throughout the shift  Outcome: Adequate for Discharge  Goal: Turns in bed with improved pain control throughout the shift  Outcome: Adequate for Discharge  Goal: Walks with improved pain control throughout the shift  Outcome: Adequate for Discharge  Goal: Performs ADL's with improved pain control throughout shift  Outcome: Adequate for Discharge  Goal: Participates in PT with improved pain control throughout the shift  Outcome: Adequate for Discharge  Goal: Free from opioid side effects throughout the shift  Outcome: Adequate for Discharge  Goal: Free from acute confusion related to pain meds throughout the shift  Outcome: Adequate for Discharge

## 2025-04-14 NOTE — DISCHARGE SUMMARY
Discharge Diagnosis  Gastrointestinal hemorrhage, unspecified gastrointestinal hemorrhage type    Discharge Meds     Medication List      START taking these medications     oxyCODONE 5 mg immediate release tablet; Commonly known as: Roxicodone;   Take 1 tablet (5 mg) by mouth every 6 hours if needed for severe pain (7 -   10).     CHANGE how you take these medications     furosemide 20 mg tablet; Commonly known as: Lasix; TAKE 1 TABLET BY   MOUTH ONCE DAILY; What changed: when to take this   lactulose 20 gram/30 mL oral solution; 30ML three times daily; What   changed: how much to take, how to take this, when to take this, additional   instructions   nadolol 20 mg tablet; Commonly known as: Corgard; Take 0.5 tablets (10   mg) by mouth once daily.; What changed: when to take this   spironolactone 50 mg tablet; Commonly known as: Aldactone; TAKE 1 TABLET   BY MOUTH ONCE DAILY; What changed: when to take this     CONTINUE taking these medications     bicalutamide 50 mg tablet; Commonly known as: Casodex   cholecalciferol 25 mcg (1,000 units) tablet; Commonly known as: Vitamin   D-3   dutasteride 0.5 mg capsule; Commonly known as: Avodart   fexofenadine 180 mg tablet; Commonly known as: Allegra   loperamide 2 mg capsule; Commonly known as: Imodium   pantoprazole 40 mg EC tablet; Commonly known as: ProtoNix; TAKE 1 TABLET   ONCE DAILY   prochlorperazine 10 mg tablet; Commonly known as: Compazine; Take 1   tablet (10 mg) by mouth every 6 hours if needed for nausea or vomiting.       Test Results Pending At Discharge  Pending Labs       No current pending labs.            Hospital Course  Alfonso Toscano is a 80 y.o. male with a history of liver cancer, prostate cancer, esophageal varices, anxiety disorder, colon polyps, hypertension, peptic ulcer disease, GERD, cirrhosis, and hyperlipidemia.  He was just discharged from the hospital on 3/31.  He presented at that time with a traumatic hemorrhagic shock and was initially  admitted to the ICU.  He received 2 units packed red blood cells and FFP.  He underwent EGD and colonoscopy at that time and had suspected hemorrhoidal bleeding treated with banding.  EGD showed small grade 1 varices.  He noted after discharge doing well until the day of presentation when he had a large bloody bowel movement at home.  He denied any abdominal pain, nausea, vomiting.  No black tarry stools.  He denied any dizziness or lightheadedness.  He denied chest pain, shortness of breath, or palpitations.  Vital signs on arrival to the emergency department were unremarkable.  Patient did have a asim heart rate of 49 in the ED as well as a asim systolic blood pressure of 104.     Patient required blood transfusion.  General surgery was consulted.  The patient underwent hemorrhoidectomy.  He is doing well postoperatively, H&H is stable.  He is cleared for discharge by general surgery with close outpatient follow-up.  He was discharged home with home health care.    Pertinent Physical Exam At Time of Discharge  Physical Exam  Alert oriented x 3, looks frail  Lungs clear to auscultation bilaterally  Heart regular rhythm  Abdomen soft nontender  Extremities no leg edema  CNS no focal deficit    Outpatient Follow-Up  Future Appointments   Date Time Provider Department Center   6/16/2025  3:00 PM Gisele Patel MD [id number] [place]         Faith Mendiola MD

## 2025-04-14 NOTE — NURSING NOTE
This nurse introduced self and role to patient and wife, prepared and provided AVS, sat with both, started and completed discharge education, spouse verbalized understanding, without further questions or concerns, agreeable and comfortable with discharge at this time, piv removed with catheter intact, no tele, spouse states she has all of their belongings, transport already arranged with staff, discharge complete.

## 2025-04-15 ENCOUNTER — PATIENT OUTREACH (OUTPATIENT)
Dept: PRIMARY CARE | Facility: CLINIC | Age: 80
End: 2025-04-15
Payer: MEDICARE

## 2025-04-15 NOTE — PROGRESS NOTES
Discharge facility: [facility]  Discharge diagnosis: Gastrointestinal hemorrhage, unspecified gastrointestinal hemorrhage type     Admission date: 4/2/25  Discharge date: 4/14/25    PCP Appointment Date: 4/23/25  Specialist Appointment Date:  4/29/25 Gen surgery  Hospital Encounter and Summary: Linked      ED to Hosp-Admission (Discharged) with Faith Mendiola MD; Almas Goldberg MD (04/02/2025)     See Discharge assessment below for further details    Wrap Up  Wrap Up Additional Comments: Spoke with patient's wife. She states he had a good night last night. He has had 2 bowel movements since returning home. She did not see any blood in them. She states he is walking to and from [place]. He is still weak and shaky but he is using walker. He is eating better, not his usual amount but a little better each day. She is giving him ensure drinks as well. He is not having any pain. They did not get prescription for oxycodone because they do not feel he needs it at this time. She states he had one round of chemotherapy and they were going back for the second round and that is when he had epsiode of bleeding and he was hospitalized again. They are planning to reschedule follow up with hem/onc to determine further plan of care . Hem/onc  had met with the board to determine next steps. Home care planning to come out tomorrow. She denies any further concerns at this time. She is aware of  signs/symptoms that warrant returning to ER. (4/15/2025 10:30 AM)    Medications  Medications reviewed with patient/caregiver?: Yes (Reviewed with patient's wife) (4/15/2025 10:30 AM)  Is the patient having any side effects they believe may be caused by any medication additions or changes?: No (4/15/2025 10:30 AM)  Does the patient have all medications ordered at discharge?: Yes (4/15/2025 10:30 AM)  Care Management Interventions: Provided patient education (4/15/2025 10:30 AM)  Prescription Comments: Declined prescription  for oxycodone (4/15/2025 10:30 AM)  Is the patient taking all medications as directed (includes completed medication regime)?: Yes (4/15/2025 10:30 AM)  Care Management Interventions: Provided patient education (4/15/2025 10:30 AM)  Medication Comments: No new or changed medications- declined need for oxycodone (4/15/2025 10:30 AM)  Follow Up Tasks: -- (n/a) (4/15/2025 10:30 AM)    Appointments  Does the patient have a primary care provider?: Yes (4/15/2025 10:30 AM)  Care Management Interventions: Verified appointment date/time/provider (4/23/25) (4/15/2025 10:30 AM)  Has the patient kept scheduled appointments due by today?: Yes (4/15/2025 10:30 AM)  Care Management Interventions: Advised patient to keep appointment; Educated on importance of keeping appointment; Advised to schedule with specialist (4/15/2025 10:30 AM)  Follow Up Tasks: -- (n/a) (4/15/2025 10:30 AM)    Self Management  What is the home health agency?:  Home Care (4/15/2025 10:30 AM)  Has home health visited the patient within 72 hours of discharge?: Not applicable (4/15/2025 10:30 AM)  Home Health Interventions: -- (n/a) (4/15/2025 10:30 AM)  What Durable Medical Equipment (DME) was ordered?: n/a (4/15/2025 10:30 AM)  Has all Durable Medical Equipment (DME) been delivered?: -- (n/a) (4/15/2025 10:30 AM)  DME Interventions: -- (n/a) (4/15/2025 10:30 AM)  Care Management Interventions: Notified PCP/provider (4/15/2025 10:30 AM)  Follow Up Tasks: -- (n/a) (4/15/2025 10:30 AM)    Patient Teaching  Does the patient have access to their discharge instructions?: Yes (4/15/2025 10:30 AM)  Care Management Interventions: Reviewed instructions with patient (4/15/2025 10:30 AM)  What is the patient's perception of their health status since discharge?: Improving (4/15/2025 10:30 AM)  Is the patient/caregiver able to teach back the hierarchy of who to call/visit for symptoms/problems? PCP, Specialist, Home Health nurse, Urgent Care, ED, 911: Yes (4/15/2025  10:30 AM)  Patient/Caregiver Education Comments: Instructed on hospital discharge instructions. Instructed on new and changed medications. Instructed if increased shortness of breath or chest pains to call 911. Provided my contact information and encouraged to call with any questions (4/15/2025 10:30 AM)

## 2025-04-16 ENCOUNTER — HOME CARE VISIT (OUTPATIENT)
Dept: HOME HEALTH SERVICES | Facility: HOME HEALTH | Age: 80
End: 2025-04-16
Payer: MEDICARE

## 2025-04-16 VITALS
OXYGEN SATURATION: 100 % | SYSTOLIC BLOOD PRESSURE: 132 MMHG | TEMPERATURE: 98 F | HEART RATE: 60 BPM | RESPIRATION RATE: 18 BRPM | DIASTOLIC BLOOD PRESSURE: 60 MMHG

## 2025-04-16 PROCEDURE — G0299 HHS/HOSPICE OF RN EA 15 MIN: HCPCS | Mod: HHH

## 2025-04-16 PROCEDURE — 169592 NO-PAY CLAIM PROCEDURE

## 2025-04-16 PROCEDURE — G0152 HHCP-SERV OF OT,EA 15 MIN: HCPCS | Mod: HHH

## 2025-04-16 SDOH — ECONOMIC STABILITY: HOUSING INSECURITY
HOME SAFETY: INCREASED WEAKNESS AND FATIGUE, REQUIRING USE OF WHEELED WALKER WITH SUPERVISION FOR ALL TRANSFERS AND MOBILITY. PATIENT REQUIRING MIN A WITH TOILETING, BATHING AND DRESSING. PATIENT EXHIBITS DECREASED UE STRENGTH REQUIRING INCREASED EFFORT TO PERFOR

## 2025-04-16 SDOH — ECONOMIC STABILITY: HOUSING INSECURITY
HOME SAFETY: BENCH FOR MAX SAFETY WITH BATHING AND TUB/SHOWER TRANSFERS. PATIENT EDUCATED IN UE HEP WITH 2# HAND WEIGHT, SPOUSE PRESENT DURING INSTRUCTION. OT TO TX 1W1, 2W3.

## 2025-04-16 SDOH — ECONOMIC STABILITY: HOUSING INSECURITY
HOME SAFETY: M TRANSFERS. PATIENT HAS BEEN SLEEPING IN RECLINER CHAIR SINCE RETURNING HOME. PATIENT WOULD LIKE TO IMPROVE OVERALL STRENGTH, GET BACK TO USING HIS CANE WITH TRANSFERS AND MOBILITY AND REGAIN INDEP WITH ADLS.   PATIENT MAY BENEFIT FROM TUB TRANSFER

## 2025-04-16 SDOH — ECONOMIC STABILITY: HOUSING INSECURITY
HOME SAFETY: 78YO MALE WITH H/O LIVER CANCER, S/P HOSPITALIZATION SECONDARY TO GIB.   PLOF: INDEP ADLS AND TRANSFERS, USE OF SPC, DOES NOT DRIVE, LIVES WITH SPOUSE IN 1SH, STE WITH HAND RAIL, SPOUSE COMPLETES ALL IADLS  SINCE DISCHARGE TO HOME PATIENT EXHIBITING

## 2025-04-16 ASSESSMENT — ACTIVITIES OF DAILY LIVING (ADL)
AMBULATION ASSISTANCE: STAND BY ASSIST
DRESSING_UB_CURRENT_FUNCTION: MINIMUM ASSIST
OASIS_M1830: 03
ENTERING_EXITING_HOME: MINIMUM ASSIST
CURRENT_FUNCTION: MINIMUM ASSIST
PHYSICAL TRANSFERS ASSESSED: 1
TOILETING: 1
GROOMING_CURRENT_FUNCTION: MINIMUM ASSIST
TOILETING: MINIMUM ASSIST
GROOMING ASSESSED: 1
DRESSING_LB_CURRENT_FUNCTION: MAXIMUM ASSIST
CURRENT_FUNCTION: STAND BY ASSIST
AMBULATION ASSISTANCE: 1
AMBULATION ASSISTANCE: MINIMUM ASSIST

## 2025-04-16 ASSESSMENT — ENCOUNTER SYMPTOMS
CHANGE IN APPETITE: DECREASED
LIMITED RANGE OF MOTION: 1
MUSCLE WEAKNESS: 1
PERSON REPORTING PAIN: PATIENT
SHORTNESS OF BREATH: 1
DIARRHEA: 1
DENIES PAIN: 1
PERSON REPORTING PAIN: PATIENT
RESPIRATORY SYMPTOMS COMMENTS: WITH ACTIVITY
APPETITE LEVEL: FAIR
LOWER EXTREMITY EDEMA: 1
DENIES PAIN: 1
LAST BOWEL MOVEMENT: 67311

## 2025-04-16 ASSESSMENT — LIFESTYLE VARIABLES: SMOKING_STATUS: 0

## 2025-04-16 NOTE — PROGRESS NOTES
Virtual or Telephone Consent  An interactive audio and video telecommunication system which permits real time communications between the patient (at the originating site) and provider (at the distant site) was utilized to provide this telehealth service.   Verbal consent was requested and obtained from Alfonso Toscano on this date, 04/18/25 for a telehealth visit and the patient's location was confirmed at the time of the visit.      Patient ID: Alfonso Toscano is a 80 y.o. male.    Oncology History   Hepatocellular carcinoma   2/25/2025 Initial Diagnosis    Hepatocellular carcinoma (Multi)     3/6/2025 -  Chemotherapy    Atezolizumab + Bevacizumab, 21 Day Cycles           Subjective    HPI  A virtual visit (audio & video) between the patient at home and the provider at [facility] [place] was utilized to provide this telehealth service    Mr. Alfonso Toscano is a 80 y.o. male presents for follow up of hepatocellular cancer. Since last visit he was hospitalized for GI bleed, discharged on 4/14/25.      He has had no bleeding since leaving hospital. He has been having home care, OT and PT since being home. His wife notes he has been weak since long hospitalization.    Patient's past medical history, surgical history, family history and social history reviewed.    Review of Systems:   Review of Systems:    Positive per HPI, otherwise negative.     Objective    BSA: There is no height or weight on file to calculate BSA.  There were no vitals taken for this visit.      Physical Exam      Performance Status:  Symptomatic; fully ambulatory    Labs/Imaging/Pathology: Personally reviewed reports and images in Epic electronic medical record system. Pertinent results as it related to the plan represented in below in assessment and plan.     Assessment/Plan   History of hepatocellular adenocarcinoma  - Initially diagnosed in May 2021.   - Ultrasound showed 3 liver lesions.  - MRI liver done.   - He underwent  ablation at the [facility] 10/11/21 with complications and a prolonged hospitalization for GI bleed. He was admitted 10/24/21-11/9/21 with persistent GI blood loss.   - Since then he has seen gastroenterology and undergoes banding on a regular basis for esophageal varicosities.   - Last colonoscopy 11/3/21 that showed diverticulitis in the sigmoid colon.  - Last EGD 10/26/21.  - We discussed there has been no imaging on the liver lesions since 2021.  - It is hard to understand if lesions are new or worsening and that he has a new sub centimeter lesion that is concerning for hepatocellular adenocarcinoma and we will plan for repeat MRI in 6 weeks.   - Regarding his anemia we will plan for SPEP and serum free light chains to assess for plasma cell dyscrasia.  - Will plan to check haptoglobin to rule out hemolysis.  - We will repeat blood counts today.  - If all are normal we will plan for follow-up with imaging in 6 weeks.   - We discussed given he is not interested in liver directed therapy that we could consider systemic therapy with avastin +atezolizumab.  - Reviewed side effects and consent signed today.  - RTC in 6 weeks.     2/25/25:   - New lesions in segment VI is concerning for disease progression.    - His AFP on 1/23/25, 48 ng/mL, was also elevated.    - We discussed localized treatment options. However, he is reluctant to undergo radiation or ablation.    - We discussed alternatively, we could consider systemic treatment.   - Will plan to check labs today   - We discussed bevacizumab vs atezolizumab   - Will plan to start cycle 1 next week   - RTC for toxicity check with Eric, then RTC with Dr. Alfredo for cycle 2   - We reviewed side effects and consent signed    3/27/25:   - Patient has been having some increased weight gain and increased ammonia leading to AMS after initial therapy; concern for toxicity from immunotherapy versus obstructive process   - Will plan for stat CT of liver to determine  etiology   - Hold treatment today   - Will call patient with CT results   - We did discuss we would consider localized SBRT if there is no other site of disease and will reach out to Dr. Alexandra if needed.   - RTC TBD    4/18/25: Virtual Visit   - Given history of recent GI bleed and profound weakness, will allow him time to recover   - Will get him in with Dr. Storm to consider localized therapy   - previously discussed with Dr Moulton and y90 may be an option  -  no plan for further systemic therapy fo rnow   - Will arrange for MRI, then follow up after    Reviewed ongoing medical problems and how they relate to his malignancy, will continue long term monitoring.    RTC in 4  weeks with me  This note has been transcribed using a medical scribe and there is a possibility of unintentional typing misprints    Diagnoses and all orders for this visit:  Hepatocellular carcinoma  -     Clinic Appointment Request; Future  -     CBC and Auto Differential; Future  -     Comprehensive metabolic panel; Future  -     AFP tumor marker; Future  -     MR liver w and wo IV contrast; Future      Laura Alfredo MD  Hematology/Oncology  [facility]      Scribe Attestation  By signing my name below, I, Moise Ocampo, attest that this documentation has been prepared under the direction and in the presence of Laura Alfredo MD.

## 2025-04-18 ENCOUNTER — TELEMEDICINE (OUTPATIENT)
Dept: HEMATOLOGY/ONCOLOGY | Facility: CLINIC | Age: 80
End: 2025-04-18
Payer: MEDICARE

## 2025-04-18 ENCOUNTER — HOME CARE VISIT (OUTPATIENT)
Dept: HOME HEALTH SERVICES | Facility: HOME HEALTH | Age: 80
End: 2025-04-18
Payer: MEDICARE

## 2025-04-18 VITALS
SYSTOLIC BLOOD PRESSURE: 110 MMHG | HEART RATE: 52 BPM | DIASTOLIC BLOOD PRESSURE: 55 MMHG | TEMPERATURE: 97.2 F | OXYGEN SATURATION: 98 %

## 2025-04-18 DIAGNOSIS — C22.0 HEPATOCELLULAR CARCINOMA: Primary | ICD-10-CM

## 2025-04-18 PROCEDURE — 1157F ADVNC CARE PLAN IN RCRD: CPT | Performed by: INTERNAL MEDICINE

## 2025-04-18 PROCEDURE — 99214 OFFICE O/P EST MOD 30 MIN: CPT | Performed by: INTERNAL MEDICINE

## 2025-04-18 PROCEDURE — 1123F ACP DISCUSS/DSCN MKR DOCD: CPT | Performed by: INTERNAL MEDICINE

## 2025-04-18 PROCEDURE — 1111F DSCHRG MED/CURRENT MED MERGE: CPT | Performed by: INTERNAL MEDICINE

## 2025-04-18 PROCEDURE — G0151 HHCP-SERV OF PT,EA 15 MIN: HCPCS | Mod: HHH

## 2025-04-18 ASSESSMENT — BALANCE ASSESSMENTS
STANDING BALANCE: 1 - STEADY BUT WIDE STANCE AND USES CANE OR OTHER SUPPORT
BALANCE SCORE: 6
TURNING 360 DEGREES STEPS: 0 - DISCONTINUOUS STEPS
ARISING SCORE: 1
SITTING BALANCE: 1 - STEADY, SAFE
EYES CLOSED AT MAXIMUM POSITION NUDGED: 0 - UNSTEADY
NUDGED: 0 - BEGINS TO FALL
ARISES: 1 - ABLE, USES ARMS TO HELP
IMMEDIATE STANDING BALANCE FIRST 5 SECONDS: 1 - STEADY BUT USES WALKER OR OTHER SUPPORT
SITTING DOWN: 1 - USES ARMS OR NOT SMOOTH MOTION
NUDGED SCORE: 0
ATTEMPTS TO ARISE: 1 - ABLE, REQUIRES MORE THAN ONE ATTEMPT

## 2025-04-18 ASSESSMENT — ENCOUNTER SYMPTOMS
PERSON REPORTING PAIN: PATIENT
DENIES PAIN: 1
MUSCLE WEAKNESS: 1

## 2025-04-18 ASSESSMENT — GAIT ASSESSMENTS
TRUNK SCORE: 0
TRUNK: 0 - MARKED SWAY OR USES WALKING AID
PATH SCORE: 1
BALANCE AND GAIT SCORE: 15
STEP CONTINUITY: 1 - STEPS APPEAR CONTINUOUS
PATH: 1 - MILD/MODERATE DEVIATION OR USES WALKING AID
STEP SYMMETRY: 1 - RIGHT AND LEFT STEP LENGTH APPEAR EQUAL
GAIT SCORE: 9
INITIATION OF GAIT IMMEDIATELY AFTER GO: 1 - NO HESITANCY
WALKING STANCE: 1 - HEELS ALMOST TOUCHING WHILE WALKING

## 2025-04-18 ASSESSMENT — ACTIVITIES OF DAILY LIVING (ADL)
AMBULATION_DISTANCE/DURATION_TOLERATED: 90 FT
CURRENT_FUNCTION: ONE PERSON
AMBULATION ASSISTANCE: ONE PERSON
AMBULATION ASSISTANCE ON FLAT SURFACES: 1

## 2025-04-23 ENCOUNTER — APPOINTMENT (OUTPATIENT)
Dept: PRIMARY CARE | Facility: CLINIC | Age: 80
End: 2025-04-23
Payer: MEDICARE

## 2025-04-23 ENCOUNTER — HOME CARE VISIT (OUTPATIENT)
Dept: HOME HEALTH SERVICES | Facility: HOME HEALTH | Age: 80
End: 2025-04-23
Payer: MEDICARE

## 2025-04-23 VITALS
HEART RATE: 60 BPM | TEMPERATURE: 97.9 F | DIASTOLIC BLOOD PRESSURE: 52 MMHG | SYSTOLIC BLOOD PRESSURE: 118 MMHG | OXYGEN SATURATION: 100 % | RESPIRATION RATE: 18 BRPM

## 2025-04-23 VITALS — WEIGHT: 201 LBS | BODY MASS INDEX: 30.56 KG/M2 | SYSTOLIC BLOOD PRESSURE: 118 MMHG | DIASTOLIC BLOOD PRESSURE: 56 MMHG

## 2025-04-23 DIAGNOSIS — C61 MALIGNANT NEOPLASM OF PROSTATE (MULTI): ICD-10-CM

## 2025-04-23 DIAGNOSIS — K64.9 BLEEDING HEMORRHOIDS: ICD-10-CM

## 2025-04-23 DIAGNOSIS — D69.6 THROMBOCYTOPENIA (CMS-HCC): ICD-10-CM

## 2025-04-23 DIAGNOSIS — D64.9 ANEMIA, UNSPECIFIED TYPE: ICD-10-CM

## 2025-04-23 DIAGNOSIS — R18.8 OTHER ASCITES: Primary | ICD-10-CM

## 2025-04-23 DIAGNOSIS — E61.1 IRON DEFICIENCY: ICD-10-CM

## 2025-04-23 DIAGNOSIS — C22.9 MALIGNANT NEOPLASM OF LIVER, UNSPECIFIED LIVER MALIGNANCY TYPE (MULTI): ICD-10-CM

## 2025-04-23 DIAGNOSIS — K70.31 ALCOHOLIC CIRRHOSIS OF LIVER WITH ASCITES (MULTI): ICD-10-CM

## 2025-04-23 DIAGNOSIS — C22.0 HEPATOCELLULAR CARCINOMA: ICD-10-CM

## 2025-04-23 PROBLEM — R57.8: Status: RESOLVED | Noted: 2025-03-28 | Resolved: 2025-04-23

## 2025-04-23 PROBLEM — D50.8 IRON DEFICIENCY ANEMIA SECONDARY TO INADEQUATE DIETARY IRON INTAKE: Status: RESOLVED | Noted: 2023-11-28 | Resolved: 2025-04-23

## 2025-04-23 PROCEDURE — 1159F MED LIST DOCD IN RCRD: CPT | Performed by: INTERNAL MEDICINE

## 2025-04-23 PROCEDURE — G0152 HHCP-SERV OF OT,EA 15 MIN: HCPCS | Mod: HHH

## 2025-04-23 PROCEDURE — 3074F SYST BP LT 130 MM HG: CPT | Performed by: INTERNAL MEDICINE

## 2025-04-23 PROCEDURE — 1036F TOBACCO NON-USER: CPT | Performed by: INTERNAL MEDICINE

## 2025-04-23 PROCEDURE — G0157 HHC PT ASSISTANT EA 15: HCPCS | Mod: CQ,HHH

## 2025-04-23 PROCEDURE — 1157F ADVNC CARE PLAN IN RCRD: CPT | Performed by: INTERNAL MEDICINE

## 2025-04-23 PROCEDURE — 99495 TRANSJ CARE MGMT MOD F2F 14D: CPT | Performed by: INTERNAL MEDICINE

## 2025-04-23 PROCEDURE — 1111F DSCHRG MED/CURRENT MED MERGE: CPT | Performed by: INTERNAL MEDICINE

## 2025-04-23 PROCEDURE — 3078F DIAST BP <80 MM HG: CPT | Performed by: INTERNAL MEDICINE

## 2025-04-23 PROCEDURE — 1123F ACP DISCUSS/DSCN MKR DOCD: CPT | Performed by: INTERNAL MEDICINE

## 2025-04-23 PROCEDURE — G8433 SCR FOR DEP NOT CPT DOC RSN: HCPCS | Performed by: INTERNAL MEDICINE

## 2025-04-23 ASSESSMENT — ENCOUNTER SYMPTOMS
SUBJECTIVE PAIN PROGRESSION: UNCHANGED
PAIN SEVERITY GOAL: 0/10
PERSON REPORTING PAIN: PATIENT
LOWEST PAIN SEVERITY IN PAST 24 HOURS: 3/10
PAIN LOCATION - PAIN QUALITY: SORE
PAIN LOCATION - PAIN SEVERITY: 4/10
PAIN LOCATION - PAIN FREQUENCY: CONSTANT
PAIN: 1
PAIN LOCATION: ABDOMEN
HIGHEST PAIN SEVERITY IN PAST 24 HOURS: 4/10

## 2025-04-23 ASSESSMENT — PAIN DESCRIPTION - PAIN TYPE: TYPE: ACUTE PAIN

## 2025-04-23 NOTE — PROGRESS NOTES
Subjective   Patient ID: Alfonso Toscano is a 80 y.o. male who presents for Virtual Visit.  HPI  Had to go back to hospital   Discharge facility: [facility]  Discharge diagnosis: Gastrointestinal hemorrhage, unspecified gastrointestinal hemorrhage type      Admission date: 4/2/25  Discharge date: 4/14/25           Large rectal bleed  Hemorrhoid ligation  10 spots   No more bleeding  May 15th mri liver  No black stools  Pain in left side of belly  Felt weakness in left in hospital  feels better  Soft stool  No diarrhea  No blood in stool  No further infusion  Abd pain on left  Colitis , but colonoscopy not revealing  Admit   Doing ot and pt at home  Findings/Impression   Fair prep.   The terminal ileum appeared normal.   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 After this was completed,   patient had spurting from a hemorrhoid. Colonoscope was switched to an EGD   scope with  (bleeding stopped) and 3 bands were placed in the region   of the bleeding (particularly over one site that looked like it had recent   stigmata of bleeding   No active or recent stigmata of bleeding.  Minimal Grade I varices in the mid esophagus + scarring from prior ?banding.   Small hiatal hernia.  Gastric inflammatory polyp (~2 mm) that started to ooze with suction irritation s/p 2 clips.   The stomach was otherwise normal.  The duodenum appeared normal  Alfonso Toscano is a 80 y.o. 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  Patient was just discharged from the hospital 2 days ago for GIB 2/2 hemorrhoid bleeding treated with banding and radiation proctitis that is treated as well.  Presents with 1 episode of hematochezia at home.  Hgb 8.3, was 7.9 on discharge days ago   Review of Systems  Gen:   no fever  HEENT:  no trouble swallowing  CV:  no dyspnea, cyanosis  Lungs:  no shortness of breath  GI:  no constipation, no blood in stool  Vascular:  no edema  Neuro:   no weakness  Skin:  no rash  MS:no joint swelling  Gu:  no urinary complaints  All other systems have been reviewed and are negative for complaint    /56   Wt 91.2 kg (201 lb)   BMI 30.56 kg/m²   Objective   Physical Exam  Lab Results   Component Value Date    WBC 8.1 04/14/2025    HGB 7.8 (L) 04/14/2025    HCT 24.5 (L) 04/14/2025    MCV 95 04/14/2025    PLT 44 (L) 04/14/2025     Lab Results   Component Value Date    GLUCOSE 120 (H) 04/14/2025    CALCIUM 8.5 (L) 04/14/2025     (L) 04/14/2025    K 5.1 04/14/2025    CO2 24 04/14/2025     04/14/2025    BUN 25 (H) 04/14/2025    CREATININE 1.03 04/14/2025     Social History[1]  Family History[2]    General:  Alert and in  NAD   Neurologic:  Nonfocal  Abd ascites noted   Psych: alert, normal mood      Problem List Items Addressed This Visit       Anemia    Hepatic cirrhosis (Multi)    Overview   Following w oncology    Acute exacerbation  Needed paracentesis             Malignant neoplasm of liver, not specified as primary or secondary (Multi)    Overview        Following w oncology dr nichole for infusions-mri scheduled  May not do infusion p results           Malignant neoplasm of prostate (Multi)    Overview   Last Assessment & Plan: Formatting of this note might be different from the original. Assessment: had  In 2008,had radiation Comment on above: Stable condition. Follow up at least yearly.dr coronado;  stable         Thrombocytopenia (CMS-HCC)    Overview       No new symptoms  stable condition.   Follow up at least yearly.           Hepatocellular carcinoma    Overview     DEFECTS OF TREATED HEPATIC NEOPLASM; 1.5 CM HYPODENSE RIGHT ANTERIOR   HEPATIC LESION,      Following w oncology dr nichole            Other Visit Diagnoses         Other ascites    -  Primary    Relevant Orders     Comprehensive Metabolic Panel    CBC and Auto Differential    Ammonia      Iron deficiency        Relevant Orders    Ferritin    Iron and TIBC      Bleeding hemorrhoids              Chronic conditions reviewed in the assessment and plan.    Continue medications unless specified otherwise.  Previous labs reviewed.   Other specialty provider notes reviewed.   An interactive audio/visual telecommunication system which permits real time communications between the patient (at the originating site) and provider (at the distant site) was utilized to provide this telehealth service.    Verbal consent was requested and obtained from the patient for this telehealth visit.  We have confirmed the patient wishes to see me, Dr. Gisele Patel a board certified Internal Medicine/Pediatrician with an active [place] Medical license as well as verified the patient's identity and physical location in [place].  Follow up in 3 months or prn.  Ok to do home health blood work  Pt is comfortable at home  And not interested in hospice or pall care now   Wife is also historian        [1]   Social History  Socioeconomic History    Marital status:    Tobacco Use    Smoking status: Never    Smokeless tobacco: Never   Substance and Sexual Activity    Alcohol use: Not Currently     Social Drivers of Health     Financial Resource Strain: Low Risk  (3/28/2025)    Overall Financial Resource Strain (CARDIA)     Difficulty of Paying Living Expenses: Not hard at all   Food Insecurity: No Food Insecurity (3/28/2025)    Hunger Vital Sign     Worried About Running Out of Food in the Last Year: Never true     Ran Out of Food in the Last Year: Never true   Transportation Needs: No Transportation Needs (4/16/2025)    OASIS : Transportation     Lack of Transportation (Medical): No     Lack of Transportation (Non-Medical): No     Patient Unable or Declines to Respond: No   Social Connections: Feeling Socially Integrated (4/16/2025)    OASIS :  Social Isolation     Frequency of experiencing loneliness or isolation: Never   Intimate Partner Violence: Not At Risk (3/28/2025)    Humiliation, Afraid, Rape, and Kick questionnaire     Fear of Current or Ex-Partner: No     Emotionally Abused: No     Physically Abused: No     Sexually Abused: No   Housing Stability: Low Risk  (3/28/2025)    Housing Stability Vital Sign     Unable to Pay for Housing in the Last Year: No     Number of Times Moved in the Last Year: 0     Homeless in the Last Year: No   [2]   Family History  Problem Relation Name Age of Onset    Colon cancer Mother      Liver cancer Mother

## 2025-04-24 ENCOUNTER — HOME CARE VISIT (OUTPATIENT)
Dept: HOME HEALTH SERVICES | Facility: HOME HEALTH | Age: 80
End: 2025-04-24
Payer: MEDICARE

## 2025-04-24 ENCOUNTER — LAB REQUISITION (OUTPATIENT)
Dept: LAB | Facility: HOSPITAL | Age: 80
End: 2025-04-24
Payer: MEDICARE

## 2025-04-24 DIAGNOSIS — K92.2 GASTROINTESTINAL HEMORRHAGE, UNSPECIFIED: ICD-10-CM

## 2025-04-24 LAB
ALBUMIN SERPL BCP-MCNC: 2.3 G/DL (ref 3.4–5)
ALP SERPL-CCNC: 180 U/L (ref 33–136)
ALT SERPL W P-5'-P-CCNC: 20 U/L (ref 10–52)
ANION GAP SERPL CALC-SCNC: 11 MMOL/L (ref 10–20)
AST SERPL W P-5'-P-CCNC: 33 U/L (ref 9–39)
BASOPHILS # BLD AUTO: 0.03 X10*3/UL (ref 0–0.1)
BASOPHILS NFR BLD AUTO: 0.4 %
BILIRUB SERPL-MCNC: 2.7 MG/DL (ref 0–1.2)
BUN SERPL-MCNC: 12 MG/DL (ref 6–23)
CALCIUM SERPL-MCNC: 7.2 MG/DL (ref 8.6–10.3)
CHLORIDE SERPL-SCNC: 101 MMOL/L (ref 98–107)
CO2 SERPL-SCNC: 25 MMOL/L (ref 21–32)
CREAT SERPL-MCNC: 0.91 MG/DL (ref 0.5–1.3)
EGFRCR SERPLBLD CKD-EPI 2021: 86 ML/MIN/1.73M*2
EOSINOPHIL # BLD AUTO: 0.28 X10*3/UL (ref 0–0.4)
EOSINOPHIL NFR BLD AUTO: 3.7 %
ERYTHROCYTE [DISTWIDTH] IN BLOOD BY AUTOMATED COUNT: 16.8 % (ref 11.5–14.5)
FERRITIN SERPL-MCNC: 57 NG/ML (ref 20–300)
GLUCOSE SERPL-MCNC: 145 MG/DL (ref 74–99)
HCT VFR BLD AUTO: 24.8 % (ref 41–52)
HGB BLD-MCNC: 7.6 G/DL (ref 13.5–17.5)
HOLD SPECIMEN: NORMAL
HOLD SPECIMEN: NORMAL
IMM GRANULOCYTES # BLD AUTO: 0.01 X10*3/UL (ref 0–0.5)
IMM GRANULOCYTES NFR BLD AUTO: 0.1 % (ref 0–0.9)
IRON SATN MFR SERPL: 9 % (ref 25–45)
IRON SERPL-MCNC: 20 UG/DL (ref 35–150)
LYMPHOCYTES # BLD AUTO: 1.11 X10*3/UL (ref 0.8–3)
LYMPHOCYTES NFR BLD AUTO: 14.7 %
MCH RBC QN AUTO: 28.9 PG (ref 26–34)
MCHC RBC AUTO-ENTMCNC: 30.6 G/DL (ref 32–36)
MCV RBC AUTO: 94 FL (ref 80–100)
MONOCYTES # BLD AUTO: 1.39 X10*3/UL (ref 0.05–0.8)
MONOCYTES NFR BLD AUTO: 18.4 %
NEUTROPHILS # BLD AUTO: 4.73 X10*3/UL (ref 1.6–5.5)
NEUTROPHILS NFR BLD AUTO: 62.7 %
NRBC BLD-RTO: 0 /100 WBCS (ref 0–0)
PLATELET # BLD AUTO: 50 X10*3/UL (ref 150–450)
POTASSIUM SERPL-SCNC: 3.8 MMOL/L (ref 3.5–5.3)
PROT SERPL-MCNC: 4.5 G/DL (ref 6.4–8.2)
RBC # BLD AUTO: 2.63 X10*6/UL (ref 4.5–5.9)
SODIUM SERPL-SCNC: 133 MMOL/L (ref 136–145)
TIBC SERPL-MCNC: 216 UG/DL (ref 240–445)
UIBC SERPL-MCNC: 196 UG/DL (ref 110–370)
WBC # BLD AUTO: 7.6 X10*3/UL (ref 4.4–11.3)

## 2025-04-24 PROCEDURE — 83550 IRON BINDING TEST: CPT

## 2025-04-24 PROCEDURE — 83540 ASSAY OF IRON: CPT

## 2025-04-24 PROCEDURE — 85025 COMPLETE CBC W/AUTO DIFF WBC: CPT

## 2025-04-24 PROCEDURE — 82728 ASSAY OF FERRITIN: CPT

## 2025-04-24 PROCEDURE — G0300 HHS/HOSPICE OF LPN EA 15 MIN: HCPCS | Mod: HHH

## 2025-04-24 PROCEDURE — 80053 COMPREHEN METABOLIC PANEL: CPT

## 2025-04-25 ENCOUNTER — HOME CARE VISIT (OUTPATIENT)
Dept: HOME HEALTH SERVICES | Facility: HOME HEALTH | Age: 80
End: 2025-04-25
Payer: MEDICARE

## 2025-04-25 ENCOUNTER — LAB REQUISITION (OUTPATIENT)
Dept: LAB | Facility: HOSPITAL | Age: 80
End: 2025-04-25
Payer: MEDICARE

## 2025-04-25 VITALS
OXYGEN SATURATION: 97 % | RESPIRATION RATE: 18 BRPM | SYSTOLIC BLOOD PRESSURE: 108 MMHG | TEMPERATURE: 98.2 F | DIASTOLIC BLOOD PRESSURE: 52 MMHG | HEART RATE: 72 BPM

## 2025-04-25 VITALS
DIASTOLIC BLOOD PRESSURE: 73 MMHG | HEART RATE: 61 BPM | OXYGEN SATURATION: 95 % | TEMPERATURE: 97.3 F | RESPIRATION RATE: 19 BRPM | SYSTOLIC BLOOD PRESSURE: 141 MMHG

## 2025-04-25 DIAGNOSIS — K92.2 GASTROINTESTINAL HEMORRHAGE, UNSPECIFIED: ICD-10-CM

## 2025-04-25 LAB
AMMONIA PLAS-SCNC: 59 UMOL/L (ref 16–53)
HOLD SPECIMEN: NORMAL

## 2025-04-25 PROCEDURE — G0152 HHCP-SERV OF OT,EA 15 MIN: HCPCS | Mod: HHH

## 2025-04-25 PROCEDURE — 82140 ASSAY OF AMMONIA: CPT

## 2025-04-25 PROCEDURE — G0157 HHC PT ASSISTANT EA 15: HCPCS | Mod: CQ,HHH

## 2025-04-25 PROCEDURE — G0300 HHS/HOSPICE OF LPN EA 15 MIN: HCPCS | Mod: HHH

## 2025-04-25 ASSESSMENT — ENCOUNTER SYMPTOMS
PAIN: 1
STOOL FREQUENCY: LESS THAN DAILY
LOSS OF SENSATION IN FEET: 0
PAIN SEVERITY GOAL: 3/10
PERSON REPORTING PAIN: PATIENT
LAST BOWEL MOVEMENT: 67318
PAIN: 1
PAIN LOCATION - PAIN SEVERITY: 5/10
LIMITED RANGE OF MOTION: 1
PERSON REPORTING PAIN: PATIENT
LOWEST PAIN SEVERITY IN PAST 24 HOURS: 3/10
APPETITE LEVEL: GOOD
SUBJECTIVE PAIN PROGRESSION: UNCHANGED
HIGHEST PAIN SEVERITY IN PAST 24 HOURS: 5/10
OCCASIONAL FEELINGS OF UNSTEADINESS: 0
PAIN LOCATION - PAIN QUALITY: PRESSURE
BOWEL PATTERN NORMAL: 1
PAIN LOCATION: ABDOMEN
CHANGE IN APPETITE: UNCHANGED
MUSCLE WEAKNESS: 1
PAIN LOCATION: ABDOMEN
DEPRESSION: 0
PAIN LOCATION - PAIN FREQUENCY: FREQUENT

## 2025-04-25 ASSESSMENT — ACTIVITIES OF DAILY LIVING (ADL)
FEEDING ASSESSED: 1
DRESSING_LB_CURRENT_FUNCTION: STAND BY ASSIST
TOILETING: 1
BATHING ASSESSED: 1
BATHING_CURRENT_FUNCTION: CONTACT GUARD ASSIST
TOILETING: STAND BY ASSIST
DRESSING_UB_CURRENT_FUNCTION: STAND BY ASSIST
FEEDING: INDEPENDENT
AMBULATION ASSISTANCE: 1
AMBULATION ASSISTANCE: STAND BY ASSIST

## 2025-04-25 ASSESSMENT — PAIN DESCRIPTION - PAIN TYPE: TYPE: ACUTE PAIN

## 2025-04-28 ENCOUNTER — TELEPHONE (OUTPATIENT)
Dept: PRIMARY CARE | Facility: CLINIC | Age: 80
End: 2025-04-28
Payer: MEDICARE

## 2025-04-28 ENCOUNTER — HOME CARE VISIT (OUTPATIENT)
Dept: HOME HEALTH SERVICES | Facility: HOME HEALTH | Age: 80
End: 2025-04-28
Payer: MEDICARE

## 2025-04-28 VITALS
DIASTOLIC BLOOD PRESSURE: 64 MMHG | SYSTOLIC BLOOD PRESSURE: 104 MMHG | RESPIRATION RATE: 18 BRPM | HEART RATE: 64 BPM | OXYGEN SATURATION: 92 % | TEMPERATURE: 97.3 F

## 2025-04-28 DIAGNOSIS — C22.0 HEPATOCELLULAR CARCINOMA: Primary | ICD-10-CM

## 2025-04-28 DIAGNOSIS — C22.0 HEPATOCELLULAR CARCINOMA: ICD-10-CM

## 2025-04-28 PROCEDURE — G0152 HHCP-SERV OF OT,EA 15 MIN: HCPCS | Mod: HHH

## 2025-04-28 ASSESSMENT — ENCOUNTER SYMPTOMS
PAIN: 1
PAIN LOCATION: ABDOMEN
PERSON REPORTING PAIN: PATIENT

## 2025-04-28 ASSESSMENT — ACTIVITIES OF DAILY LIVING (ADL)
TOILETING: MODERATE ASSIST
GROOMING_CURRENT_FUNCTION: CONTACT GUARD ASSIST
TOILETING: 1
GROOMING ASSESSED: 1

## 2025-04-28 NOTE — TELEPHONE ENCOUNTER
Please contact Dr Alfredo (hem/onc) to see if Dr Alfredo got our message (below) and what her thoughts are. Dr Patel will be back in office tomorrow if Dr Alfredo would like to discuss     Me to Laura Alfredo MD     4/25/25  8:03 AM  Hi Dr Sayda Patel is out of office, but asked I forward these results to you see if you think outpatient transfusion is a good idea. Per Katelyn, patient not actively bleeding.   If so, can you help me out with how I would order that please.  Thanks  Galilea

## 2025-04-29 ENCOUNTER — APPOINTMENT (OUTPATIENT)
Dept: SURGERY | Facility: CLINIC | Age: 80
End: 2025-04-29
Payer: MEDICARE

## 2025-04-29 NOTE — TELEPHONE ENCOUNTER
Called Dr Alfredo's office they did not see your message but Dr Patel reached out today and she is looking at and will respond to her   Dr Alfredo was out of the office last week

## 2025-04-30 ENCOUNTER — PATIENT OUTREACH (OUTPATIENT)
Dept: PRIMARY CARE | Facility: CLINIC | Age: 80
End: 2025-04-30
Payer: MEDICARE

## 2025-04-30 ENCOUNTER — HOME CARE VISIT (OUTPATIENT)
Dept: HOME HEALTH SERVICES | Facility: HOME HEALTH | Age: 80
End: 2025-04-30
Payer: MEDICARE

## 2025-04-30 VITALS
DIASTOLIC BLOOD PRESSURE: 64 MMHG | OXYGEN SATURATION: 99 % | TEMPERATURE: 97.9 F | HEART RATE: 58 BPM | SYSTOLIC BLOOD PRESSURE: 118 MMHG

## 2025-04-30 PROCEDURE — G0157 HHC PT ASSISTANT EA 15: HCPCS | Mod: CQ,HHH

## 2025-04-30 PROCEDURE — G0299 HHS/HOSPICE OF RN EA 15 MIN: HCPCS | Mod: HHH

## 2025-04-30 RX ORDER — TRAMADOL HYDROCHLORIDE 50 MG/1
50 TABLET ORAL 2 TIMES DAILY PRN
Qty: 14 TABLET | Refills: 0 | Status: SHIPPED | OUTPATIENT
Start: 2025-04-30 | End: 2025-05-07

## 2025-04-30 ASSESSMENT — ENCOUNTER SYMPTOMS
LOWER EXTREMITY EDEMA: 1
SHORTNESS OF BREATH: 1
PAIN LOCATION - PAIN SEVERITY: 6/10
MUSCLE WEAKNESS: 1
DYSPNEA ACTIVITY LEVEL: AT REST
APPETITE LEVEL: FAIR
LIMITED RANGE OF MOTION: 1
BOWEL PATTERN NORMAL: 1
SKIN LESIONS: 1
LAST BOWEL MOVEMENT: 67325
CHANGE IN APPETITE: UNCHANGED
FORGETFULNESS: 1
FATIGUE: 1
PAIN LOCATION: ABDOMEN
ABDOMINAL PAIN: 1
PAIN: 1
DESCRIPTION OF MEMORY LOSS: SHORT TERM
PERSON REPORTING PAIN: PATIENT

## 2025-04-30 ASSESSMENT — PAIN DESCRIPTION - PAIN TYPE: TYPE: ACUTE PAIN

## 2025-04-30 ASSESSMENT — ACTIVITIES OF DAILY LIVING (ADL)
AMBULATION ASSISTANCE: ONE PERSON
AMBULATION ASSISTANCE: STAND BY ASSIST
CURRENT_FUNCTION: ONE PERSON

## 2025-04-30 NOTE — PROGRESS NOTES
Confirmation of at least 2 patient identifiers.    Completed telephonic follow-up with patient after recent visit with PCP    Spoke to Spouse/significant other during outreach call.    Patient reports feeling: Improved    Patient has questions or concerns about medications: Yes - They are unsure if  abdomen is getting larger . Encouraged daily weights and measuring abdomen with sewing tape measure and call right away if they notice increase in size or weight.     Have all prescribed medications been filled? Yes    Patient has necessary resources to manage their care? Yes    Patient has questions or concerns? Yes - Monitoring abdomen for fluid retention      Spoke with patient's wife. She states he has not had any further bleeding. He continues to be receiving home care including skilled nursing and therapy. Wife states he does not seem to be bouncing back as he previously had been. She continues to monitor him and will call PCP if any concerns.  Next care management follow-up approximately within one month.  Care  information provided to patient.

## 2025-05-01 ENCOUNTER — HOME CARE VISIT (OUTPATIENT)
Dept: HOME HEALTH SERVICES | Facility: HOME HEALTH | Age: 80
End: 2025-05-01
Payer: MEDICARE

## 2025-05-01 VITALS
TEMPERATURE: 97.5 F | HEART RATE: 55 BPM | SYSTOLIC BLOOD PRESSURE: 104 MMHG | OXYGEN SATURATION: 92 % | DIASTOLIC BLOOD PRESSURE: 50 MMHG | RESPIRATION RATE: 18 BRPM

## 2025-05-01 PROCEDURE — G0152 HHCP-SERV OF OT,EA 15 MIN: HCPCS | Mod: HHH

## 2025-05-01 ASSESSMENT — ENCOUNTER SYMPTOMS
PAIN LOCATION: ABDOMEN
PAIN: 1
PERSON REPORTING PAIN: PATIENT
PAIN LOCATION - PAIN QUALITY: SORE

## 2025-05-02 ENCOUNTER — HOME CARE VISIT (OUTPATIENT)
Dept: HOME HEALTH SERVICES | Facility: HOME HEALTH | Age: 80
End: 2025-05-02
Payer: MEDICARE

## 2025-05-02 ENCOUNTER — TELEPHONE (OUTPATIENT)
Dept: PRIMARY CARE | Facility: CLINIC | Age: 80
End: 2025-05-02
Payer: MEDICARE

## 2025-05-02 PROCEDURE — G0157 HHC PT ASSISTANT EA 15: HCPCS | Mod: CQ,HHH

## 2025-05-02 ASSESSMENT — PAIN DESCRIPTION - PAIN TYPE: TYPE: ACUTE PAIN

## 2025-05-02 NOTE — TELEPHONE ENCOUNTER
----- Message from Gisele Patel sent at 5/2/2025  4:02 PM EDT -----  Regarding: FW: Pain medication  It sounds like he does, but the only way I would know to do that is back through the hospital.  ----- Message -----  From: Jo Cano RN  Sent: 5/2/2025   3:16 PM EDT  To: Gisele Patel MD  Subject: RE: Pain medication                              They want to avoid the hospital if possible but they're wondering if he needs another paracentesis scheduled.    ----- Message -----  From: Gisele Patel MD  Sent: 4/30/2025   2:26 PM EDT  To: Jo Cano RN  Subject: RE: Pain medication                                Yes I can, does he need to go back to the hospital?  ----- Message -----  From: Jo Cano RN  Sent: 4/30/2025   2:15 PM EDT  To: Gisele Patel MD  Subject: Pain medication                                  Dr. Patel, I'm a home health nurse caring for this patient. He's having increased work of breathing, increased abdominal girth and SOB. He's also having 6/10 abdominal pain and his wife Julieta was wondering if you could prescribe a refill of the Tramadol that you prescribed for him before. She said it works well for him.   -Jo Cano

## 2025-05-05 ENCOUNTER — HOME CARE VISIT (OUTPATIENT)
Dept: HOME HEALTH SERVICES | Facility: HOME HEALTH | Age: 80
End: 2025-05-05
Payer: MEDICARE

## 2025-05-05 VITALS — SYSTOLIC BLOOD PRESSURE: 112 MMHG | RESPIRATION RATE: 18 BRPM | DIASTOLIC BLOOD PRESSURE: 54 MMHG | TEMPERATURE: 97.3 F

## 2025-05-05 PROCEDURE — G0152 HHCP-SERV OF OT,EA 15 MIN: HCPCS | Mod: HHH

## 2025-05-05 ASSESSMENT — ENCOUNTER SYMPTOMS
PAIN LOCATION - PAIN SEVERITY: 1/10
PAIN LOCATION: ABDOMEN
SUBJECTIVE PAIN PROGRESSION: GRADUALLY IMPROVING
PAIN: 1
PAIN LOCATION - PAIN FREQUENCY: CONSTANT
PERSON REPORTING PAIN: PATIENT
PAIN LOCATION - PAIN QUALITY: SORE

## 2025-05-05 NOTE — CASE COMMUNICATION
During OT visit, spouse noticed the pad on the patients chair was wet and the bottom left side of his shirt was also wet. Patient also having increased edema in B ankles and feet. Upon skin inspection there was a noticeable droplet forming on the left side of his abdomen and draining down his side. Spoke with Dr. Patel who advised patient have the area dressed with gauze and paper tape and to change dressing frequently. [facility].

## 2025-05-06 ENCOUNTER — OFFICE VISIT (OUTPATIENT)
Dept: GASTROENTEROLOGY | Facility: CLINIC | Age: 80
End: 2025-05-06
Payer: MEDICARE

## 2025-05-06 VITALS
SYSTOLIC BLOOD PRESSURE: 122 MMHG | OXYGEN SATURATION: 92 % | HEIGHT: 68 IN | WEIGHT: 193 LBS | DIASTOLIC BLOOD PRESSURE: 64 MMHG | HEART RATE: 58 BPM | BODY MASS INDEX: 29.25 KG/M2

## 2025-05-06 DIAGNOSIS — K70.31 ALCOHOLIC CIRRHOSIS OF LIVER WITH ASCITES (MULTI): Primary | ICD-10-CM

## 2025-05-06 DIAGNOSIS — K92.2 GASTROINTESTINAL HEMORRHAGE, UNSPECIFIED GASTROINTESTINAL HEMORRHAGE TYPE: ICD-10-CM

## 2025-05-06 DIAGNOSIS — C22.0 HEPATOCELLULAR CARCINOMA: ICD-10-CM

## 2025-05-06 PROCEDURE — 3078F DIAST BP <80 MM HG: CPT | Performed by: INTERNAL MEDICINE

## 2025-05-06 PROCEDURE — 1036F TOBACCO NON-USER: CPT | Performed by: INTERNAL MEDICINE

## 2025-05-06 PROCEDURE — 1111F DSCHRG MED/CURRENT MED MERGE: CPT | Performed by: INTERNAL MEDICINE

## 2025-05-06 PROCEDURE — 3074F SYST BP LT 130 MM HG: CPT | Performed by: INTERNAL MEDICINE

## 2025-05-06 PROCEDURE — 99215 OFFICE O/P EST HI 40 MIN: CPT | Performed by: INTERNAL MEDICINE

## 2025-05-06 PROCEDURE — 1159F MED LIST DOCD IN RCRD: CPT | Performed by: INTERNAL MEDICINE

## 2025-05-06 NOTE — PROGRESS NOTES
Gastroenterology Office Visit     History of Present Illness:   Alfonso Toscano is a 80 y.o. male who presents to GI clinic for follow up of cirrhosis secondary to EOH; c/b EV, ascites (s/p LVP 2021) and HCC s/p ablation in 2021-this was c/b GI bleeding per wife.        Admitted in 3/25 for LGIB, readmitted in 4/25.  Had EGD and colon on first admit, see below.  Had doppler hemorrhoidectomy with Reznick on 4/10 (on second admission).  No further bleeding since discharge.     Has h/o HCC as above.  There had been no liver imaging b/w 2021 and 12/24.  MRI in 12/24 showed:    Three or four subcentimeter and one additional more substantial but  no larger than 18 mm, arterial enhancing liver lesions without portal  venous washout, capsule or pseudo capsule are all best categorized as  LR-3, intermediate probability for hepatocellular carcinoma.  Surveillance recommended    Rec'd atezolizumab and bevaciziumab infusion for HCC on 3/6.  No further infusions due to presumed toxicity from tx.     Saw heme-onc in mid-April.  MRI liver scheduled for 5/15 to follow up on liver lesions.    Oncology visit in 4/25:    4/18/25: Virtual Visit   - Given history of recent GI bleed and profound weakness, will allow him time to recover   - Will get him in with Dr. Storm to consider localized therapy   - previously discussed with Dr Moulton and y90 may be an option  -  no plan for further systemic therapy fo rnow   - Will arrange for MRI, then follow up after      He will see Dr. Moulton in IR on 5/20.     He has increasing LE edema bilaterally over past 3-4 days.  It extends to his abd.  He uses a lot of salt at home.  Eating canned soup.  Has increased abdominal girth.     LVP on 3/31, 1L taken off.  SAAG = 2.6 - 0.5 = 2.1.  TP < 0.5 (c/w liver).  Negative SBP (148 WBC, 9% PMNs).  Taking spironolactone 50 every day, furosemide 20 daily.  Has been on this dose for yrs per wife.      Review of Systems  Constitutional: denies fever/  "chills, night sweats, wt loss and fatigue  Respiratory: denies SOB, PUENTE  CV: denies chest pain; +LE edema  Neuro: denies weakness; +difficulty walking      Past Medical History   has a past medical history of Aortic ectasia, unspecified site (CMS-HCC) (03/16/2021), Cancer (Multi), Esophageal varices, Hepatocellular carcinoma, Personal history of diseases of the blood and blood-forming organs and certain disorders involving the immune mechanism (07/01/2020), and Personal history of other medical treatment.     Problem List  Problem List[1]    Past Surgical History  Surgical History[2]    Social History   reports that he has never smoked. He has never used smokeless tobacco. He reports that he does not currently use alcohol.     Family History  family history includes Colon cancer in his mother; Liver cancer in his mother.       Allergies  RX Allergies[3]    Medications  Current Outpatient Medications   Medication Instructions    bicalutamide (Casodex) 50 mg tablet 1 tablet, Daily with lunch    cholecalciferol (Vitamin D-3) 25 MCG (1000 UT) tablet 1 tablet, Daily with lunch    dutasteride (AVODART) 0.5 mg, Daily with lunch    ferrous sulfate 325 mg, Daily with breakfast    fexofenadine (ALLEGRA) 180 mg, Daily PRN    furosemide (LASIX) 20 mg, oral, Daily    lactulose 20 gram/30 mL oral solution 30ML three times daily    loperamide (Imodium) 2 mg capsule 1 capsule, 4 times daily PRN    nadolol (CORGARD) 10 mg, oral, Daily    pantoprazole (PROTONIX) 40 mg, oral, Daily    prochlorperazine (COMPAZINE) 10 mg, oral, Every 6 hours PRN    spironolactone (ALDACTONE) 50 mg, oral, Daily    traMADol (ULTRAM) 50 mg, oral, 2 times daily PRN        Objective   Blood pressure 122/64, pulse 58, height 1.727 m (5' 8\"), weight 87.5 kg (193 lb), SpO2 92%.      General: no acute distress, well-nourished  Skin: +ecchymoses all over UEs    Respiratory: normal breath sounds bilaterally, no wheezes or rales  CV: RRR, no murmurs; 3+ pitting LE " edema extending to abdominal wall    LABS  AFP 48    Component      Latest Ref Rng 4/24/2025   WBC      4.4 - 11.3 x10*3/uL 7.6    nRBC      0.0 - 0.0 /100 WBCs 0.0    RBC      4.50 - 5.90 x10*6/uL 2.63 (L)    HEMOGLOBIN      13.5 - 17.5 g/dL 7.6 (L)    HEMATOCRIT      41.0 - 52.0 % 24.8 (L)    MCV      80 - 100 fL 94    MCH      26.0 - 34.0 pg 28.9    MCHC      32.0 - 36.0 g/dL 30.6 (L)    RED CELL DISTRIBUTION WIDTH      11.5 - 14.5 % 16.8 (H)    Platelets      150 - 450 x10*3/uL 50 (L)       Component      Latest Ref Rng 4/24/2025   Creatinine      0.50 - 1.30 mg/dL 0.91    EGFR      >60 mL/min/1.73m*2 86    Calcium      8.6 - 10.3 mg/dL 7.2 (L)    Albumin      3.4 - 5.0 g/dL 2.3 (L)    Alkaline Phosphatase      33 - 136 U/L 180 (H)    Total Protein      6.4 - 8.2 g/dL 4.5 (L)    AST      9 - 39 U/L 33    Bilirubin Total      0.0 - 1.2 mg/dL 2.7 (H)    ALT      10 - 52 U/L 20         Radiology  As above    Endoscopy    Colonoscopy 3/25: XRT proctitis, s/p APC; spurting hemorrhoid, banded x3  EGD 3/25: small HH, grade I EV, not banded; prior scarring    Assessment/Plan   Alfonso Toscano is a 80 y.o. male who presents to GI clinic for cirrhosis, progression of HCC and LGIB.      Hepatic cirrhosis (Multi)- now with LE edema and increasing abdominal girth.   Labs today as ordered.  Will adjust diuretics according to labs.  Paracentesis ordered.  Low sodium diet advised.  Return to clinic in 2 weeks.       Gastrointestinal hemorrhage, unspecified gastrointestinal hemorrhage type  Stable.  No recurrence since hemorrhoid procedure last month.     Hepatocellular carcinoma- new lesions since 12/24 after ablation in 2021 at [facility].  Did not tolerate systemic tx in 3/25.    Has MRI scheduled, and follow with IR and oncology for possible local tx.          Abdiel S Putka, MD            [1]   Patient Active Problem List  Diagnosis    Allergic rhinitis due to pollen    Anatomical narrow angle, bilateral    Anemia    Anxiety     Bilateral impacted cerumen    Cobalamin deficiency    Colon polyps    Esophageal varices    Essential hypertension    Excessive cerumen in ear canal, right    Gastric ulcer    Gastrointestinal hemorrhage associated with anorectal source    Hepatic cirrhosis (Multi)    History of laser iridotomy    Hyperglycemia    Leukocytosis    Hypocalcemia    Malignant neoplasm of liver, not specified as primary or secondary (Multi)    Malignant neoplasm of prostate (Multi)    Heart murmur    PND (post-nasal drip)    Pseudophakia    Right foot pain    Sinus pressure    Hordeolum externum    Thrombocytopenia (CMS-HCC)    Vitamin D deficiency    Mixed hyperlipidemia    Hepatocellular carcinoma    Gastrointestinal hemorrhage, unspecified gastrointestinal hemorrhage type    Lactic acidosis   [2]   Past Surgical History:  Procedure Laterality Date    COLONOSCOPY  10/22/2018    Colonoscopy    OTHER SURGICAL HISTORY  07/01/2020    Cataract surgery   [3] No Known Allergies

## 2025-05-06 NOTE — PATIENT INSTRUCTIONS
Labs today as ordered.  Will adjust diuretics according to labs.  Paracentesis ordered- call [phone] to schedule.  Low sodium diet advised.  Return to clinic in 2 weeks.

## 2025-05-06 NOTE — ASSESSMENT & PLAN NOTE
Labs today as ordered.  Will adjust diuretics according to labs.  Paracentesis ordered.  Low sodium diet advised.  Return to clinic in 2 weeks.

## 2025-05-06 NOTE — Clinical Note
He is using a lot of salt at home.  Avoiding this may be all he needs to help edema and ascites.  I may increase his diuretics temporarily.

## 2025-05-07 ENCOUNTER — HOME CARE VISIT (OUTPATIENT)
Dept: HOME HEALTH SERVICES | Facility: HOME HEALTH | Age: 80
End: 2025-05-07
Payer: MEDICARE

## 2025-05-07 VITALS
HEART RATE: 54 BPM | OXYGEN SATURATION: 98 % | SYSTOLIC BLOOD PRESSURE: 120 MMHG | DIASTOLIC BLOOD PRESSURE: 54 MMHG | TEMPERATURE: 97.3 F

## 2025-05-07 LAB
ALBUMIN SERPL-MCNC: 2.5 G/DL (ref 3.6–5.1)
ALP SERPL-CCNC: 207 U/L (ref 35–144)
ALT SERPL-CCNC: 20 U/L (ref 9–46)
ANION GAP SERPL CALCULATED.4IONS-SCNC: 5 MMOL/L (CALC) (ref 7–17)
AST SERPL-CCNC: 45 U/L (ref 10–35)
BILIRUB SERPL-MCNC: 3.9 MG/DL (ref 0.2–1.2)
BUN SERPL-MCNC: 12 MG/DL (ref 7–25)
CALCIUM SERPL-MCNC: 8.2 MG/DL (ref 8.6–10.3)
CHLORIDE SERPL-SCNC: 101 MMOL/L (ref 98–110)
CO2 SERPL-SCNC: 27 MMOL/L (ref 20–32)
CREAT SERPL-MCNC: 0.96 MG/DL (ref 0.7–1.28)
EGFRCR SERPLBLD CKD-EPI 2021: 81 ML/MIN/1.73M2
ERYTHROCYTE [DISTWIDTH] IN BLOOD BY AUTOMATED COUNT: 16 % (ref 11–15)
GLUCOSE SERPL-MCNC: 90 MG/DL (ref 65–139)
HCT VFR BLD AUTO: 27.7 % (ref 38.5–50)
HGB BLD-MCNC: 9 G/DL (ref 13.2–17.1)
INR PPP: 1.5
MCH RBC QN AUTO: 28.8 PG (ref 27–33)
MCHC RBC AUTO-ENTMCNC: 32.5 G/DL (ref 32–36)
MCV RBC AUTO: 88.8 FL (ref 80–100)
PLATELET # BLD AUTO: 68 THOUSAND/UL (ref 140–400)
PMV BLD REES-ECKER: 11 FL (ref 7.5–12.5)
POTASSIUM SERPL-SCNC: 4.9 MMOL/L (ref 3.5–5.3)
PROT SERPL-MCNC: 5 G/DL (ref 6.1–8.1)
PROTHROMBIN TIME: 15.7 SEC (ref 9–11.5)
RBC # BLD AUTO: 3.12 MILLION/UL (ref 4.2–5.8)
SODIUM SERPL-SCNC: 133 MMOL/L (ref 135–146)
WBC # BLD AUTO: 8.2 THOUSAND/UL (ref 3.8–10.8)

## 2025-05-07 PROCEDURE — G0157 HHC PT ASSISTANT EA 15: HCPCS | Mod: CQ,HHH

## 2025-05-07 PROCEDURE — G0299 HHS/HOSPICE OF RN EA 15 MIN: HCPCS | Mod: HHH

## 2025-05-07 ASSESSMENT — ENCOUNTER SYMPTOMS
SKIN LESIONS: 1
CONTUSION: 1
PAIN: 1
APPETITE LEVEL: GOOD
LOWER EXTREMITY EDEMA: 1
PERSON REPORTING PAIN: PATIENT
DESCRIPTION OF MEMORY LOSS: SHORT TERM
SHORTNESS OF BREATH: 1
FATIGUES EASILY: 1
LIMITED RANGE OF MOTION: 1
MUSCLE WEAKNESS: 1
LAST BOWEL MOVEMENT: 67332
PAIN LOCATION: ABDOMEN
PAIN LOCATION - PAIN FREQUENCY: INFREQUENT
CHANGE IN APPETITE: UNCHANGED
DYSPNEA ON EXERTION: 1
DYSPNEA ACTIVITY LEVEL: AFTER AMBULATING 10 - 20 FT

## 2025-05-07 ASSESSMENT — ACTIVITIES OF DAILY LIVING (ADL)
AMBULATION ASSISTANCE: STAND BY ASSIST
CURRENT_FUNCTION: STAND BY ASSIST

## 2025-05-07 ASSESSMENT — PAIN DESCRIPTION - PAIN TYPE: TYPE: ACUTE PAIN

## 2025-05-09 ENCOUNTER — HOME CARE VISIT (OUTPATIENT)
Dept: HOME HEALTH SERVICES | Facility: HOME HEALTH | Age: 80
End: 2025-05-09
Payer: MEDICARE

## 2025-05-09 VITALS — DIASTOLIC BLOOD PRESSURE: 54 MMHG | SYSTOLIC BLOOD PRESSURE: 112 MMHG | TEMPERATURE: 98.1 F

## 2025-05-09 DIAGNOSIS — C22.0 HEPATOCELLULAR CARCINOMA: ICD-10-CM

## 2025-05-09 PROCEDURE — G0152 HHCP-SERV OF OT,EA 15 MIN: HCPCS | Mod: HHH

## 2025-05-09 PROCEDURE — G0157 HHC PT ASSISTANT EA 15: HCPCS | Mod: CQ,HHH

## 2025-05-09 SDOH — ECONOMIC STABILITY: HOUSING INSECURITY
HOME SAFETY: PATIENT EXHIBITING INCREASED LE EDEMA AND ABDOMINAL EDEMA, RECENT EPISODE OF WEAKNESS REQUIRING EMS TO ASSIST WITH TRANSFER FROM SHOWER CHAIR. PATIENT EXHIBITING SLOW PROGRESS TOWARD FUNCTIONAL TREATMENT GOALS, IMPEDED BY CURRENT MEDICAL ISSUES. PATI

## 2025-05-09 SDOH — ECONOMIC STABILITY: HOUSING INSECURITY
HOME SAFETY: ENT WILL BENEFIT FROM CONTINUED OT SERVICES TO CONTINUE TO ADDRESS STRENGTHENING AND ENDURANCE, FUNCTIONAL TRANSFER TRAINING, BALANCE FOR CARRYOVER WITH ALL FUNCTIONAL TASKS TO DECREASE CAREGIVER BURDEN AND DECREASE RISK OF FALLS .

## 2025-05-09 ASSESSMENT — PAIN DESCRIPTION - PAIN TYPE: TYPE: ACUTE PAIN

## 2025-05-09 ASSESSMENT — ENCOUNTER SYMPTOMS: DENIES PAIN: 1

## 2025-05-11 DIAGNOSIS — K74.69 OTHER CIRRHOSIS OF LIVER: ICD-10-CM

## 2025-05-11 LAB
CREAT UR-MCNC: 90 MG/DL (ref 20–320)
CREAT UR-MCNC: 90 MG/DL (ref 20–320)
POTASSIUM UR-SCNC: 21 MMOL/L (ref 12–129)
POTASSIUM/CREAT UR-SRTO: 23 MMOL/G CREAT (ref 13–101)
SODIUM UR-SCNC: 126 MMOL/L (ref 28–272)
SODIUM/CREAT UR-SRTO: 138 MMOL/G CREAT (ref 28–280)

## 2025-05-12 ENCOUNTER — HOME CARE VISIT (OUTPATIENT)
Dept: HOME HEALTH SERVICES | Facility: HOME HEALTH | Age: 80
End: 2025-05-12
Payer: MEDICARE

## 2025-05-12 VITALS
SYSTOLIC BLOOD PRESSURE: 108 MMHG | DIASTOLIC BLOOD PRESSURE: 64 MMHG | TEMPERATURE: 97.7 F | OXYGEN SATURATION: 94 % | HEART RATE: 54 BPM

## 2025-05-12 DIAGNOSIS — K70.31 ALCOHOLIC CIRRHOSIS OF LIVER WITH ASCITES (MULTI): Primary | ICD-10-CM

## 2025-05-12 PROCEDURE — G0299 HHS/HOSPICE OF RN EA 15 MIN: HCPCS | Mod: HHH

## 2025-05-12 PROCEDURE — G0151 HHCP-SERV OF PT,EA 15 MIN: HCPCS | Mod: HHH

## 2025-05-12 RX ORDER — SPIRONOLACTONE 50 MG/1
100 TABLET, FILM COATED ORAL DAILY
Qty: 180 TABLET | Refills: 3 | Status: ON HOLD | OUTPATIENT
Start: 2025-05-12

## 2025-05-12 RX ORDER — FUROSEMIDE 20 MG/1
40 TABLET ORAL DAILY
Qty: 30 TABLET | Refills: 11 | Status: ON HOLD | OUTPATIENT
Start: 2025-05-12

## 2025-05-12 ASSESSMENT — ACTIVITIES OF DAILY LIVING (ADL)
CURRENT_FUNCTION: STAND BY ASSIST
AMBULATION ASSISTANCE: ONE PERSON

## 2025-05-12 ASSESSMENT — ENCOUNTER SYMPTOMS
DYSPNEA ON EXERTION: 1
DENIES PAIN: 1
SKIN LESIONS: 1
LAST BOWEL MOVEMENT: 67337
MUSCLE WEAKNESS: 1
BOWEL PATTERN NORMAL: 1
SHORTNESS OF BREATH: 1
FATIGUES EASILY: 1
FORGETFULNESS: 1
LIMITED RANGE OF MOTION: 1
APPETITE LEVEL: FAIR
DYSPNEA ACTIVITY LEVEL: AFTER AMBULATING LESS THAN 10 FT
LOWER EXTREMITY EDEMA: 1
CHANGE IN APPETITE: UNCHANGED
PERSON REPORTING PAIN: PATIENT
DESCRIPTION OF MEMORY LOSS: SHORT TERM

## 2025-05-12 NOTE — TELEPHONE ENCOUNTER
Refill Request      Last OV with PCP 3/19/2025     Future Appointments       Date / Time Provider Department Dept Phone    5/12/2025 1:00 PM Jo Cano, RN  Home Health Services Scheduling [phone]    5/12/2025 2:00 PM Laney Chairez PT  Home Health Services Scheduling [phone]    5/13/2025 11:00 AM (Arrive by 9:30 AM) UMESH CAICEDO C-ARM [facility] [phone]    5/14/2025 11:15 AM Lilia Donohue, OT  Home Health Services Scheduling [phone]    5/14/2025 NETTA Cano, RN  Home Health Services Scheduling [phone]    5/15/2025 4:30 PM (Arrive by 4:15 PM) ELY MRI 1 [facility] [phone]    5/16/2025 NETTA Donohue, OT  Home Health Services Scheduling [phone]    5/19/2025 NETTA Donohue, OT  Home Health Services Scheduling [phone]    5/20/2025 3:00 PM (Arrive by 2:45 AM) UMESH IR CONSULT RM [facility] [phone]    5/21/2025 NETTA Cano, RN  Home Health Services Scheduling [phone]    5/22/2025 NETTA Donohue, OT  Home Health Services Scheduling [phone]    5/23/2025 9:30 AM Abdiel Meneses MD [facility] [phone]    5/27/2025 NETTA Donohue, OT  Home Health Services Scheduling [phone]    5/28/2025 NETTA Cano, RN  Home Health Services Scheduling [phone]    5/29/2025 NETTA Donohue, OT  Home Health Services Scheduling [phone]    6/2/2025 NETTA Donohue, OT  Home Health Services Scheduling [phone]    6/4/2025 TBD Lilia Donohue, OT  Home Health Services Scheduling [phone]    6/12/2025 1:40 PM (Arrive by 1:25 PM) Laura Alfredo MD [facility] [address] [phone]    6/16/2025 3:00 PM (Arrive by 2:45 PM) Gisele Patel MD  [facility] [phone]          Lab Results   Component Value Date    HGBA1C 4.1 10/08/2024     Lab Results   Component Value Date    CHOL 210 (H) 10/08/2024    CHOL  "168 04/10/2024    CHOL 231 (H) 11/28/2023     Lab Results   Component Value Date    HDL 76.1 10/08/2024    HDL 66.8 04/10/2024    HDL 77.6 11/28/2023     Lab Results   Component Value Date    LDLCALC 124 (H) 10/08/2024    LDLCALC 89 04/10/2024    LDLCALC 143 (H) 11/28/2023     Lab Results   Component Value Date    TRIG 50 10/08/2024    TRIG 61 04/10/2024    TRIG 53 11/28/2023     No components found for: \"CHOLHDL\"  Lab Results   Component Value Date    TSH 1.86 02/25/2025     Lab Results   Component Value Date    GLUCOSE 90 05/06/2025    CALCIUM 8.2 (L) 05/06/2025     (L) 05/06/2025    K 4.9 05/06/2025    CO2 27 05/06/2025     05/06/2025    BUN 12 05/06/2025    CREATININE 0.96 05/06/2025       "

## 2025-05-13 ENCOUNTER — HOSPITAL ENCOUNTER (OUTPATIENT)
Dept: RADIOLOGY | Facility: HOSPITAL | Age: 80
Discharge: HOME | End: 2025-05-13
Payer: MEDICARE

## 2025-05-13 VITALS
TEMPERATURE: 98.2 F | OXYGEN SATURATION: 98 % | SYSTOLIC BLOOD PRESSURE: 104 MMHG | DIASTOLIC BLOOD PRESSURE: 40 MMHG | HEART RATE: 55 BPM | RESPIRATION RATE: 18 BRPM

## 2025-05-13 VITALS
SYSTOLIC BLOOD PRESSURE: 95 MMHG | TEMPERATURE: 97.3 F | HEART RATE: 54 BPM | DIASTOLIC BLOOD PRESSURE: 50 MMHG | OXYGEN SATURATION: 97 %

## 2025-05-13 DIAGNOSIS — K70.31 ALCOHOLIC CIRRHOSIS OF LIVER WITH ASCITES (MULTI): ICD-10-CM

## 2025-05-13 PROCEDURE — 7100000009 HC PHASE TWO TIME - INITIAL BASE CHARGE

## 2025-05-13 PROCEDURE — 7100000010 HC PHASE TWO TIME - EACH INCREMENTAL 1 MINUTE

## 2025-05-13 PROCEDURE — 2720000007 HC OR 272 NO HCPCS

## 2025-05-13 PROCEDURE — 2500000004 HC RX 250 GENERAL PHARMACY W/ HCPCS (ALT 636 FOR OP/ED): Mod: JZ | Performed by: NURSE PRACTITIONER

## 2025-05-13 PROCEDURE — 49083 ABD PARACENTESIS W/IMAGING: CPT | Mod: RT | Performed by: NURSE PRACTITIONER

## 2025-05-13 RX ORDER — LIDOCAINE HYDROCHLORIDE 10 MG/ML
INJECTION, SOLUTION EPIDURAL; INFILTRATION; INTRACAUDAL; PERINEURAL
Status: COMPLETED | OUTPATIENT
Start: 2025-05-13 | End: 2025-05-13

## 2025-05-13 RX ADMIN — LIDOCAINE HYDROCHLORIDE 10 ML: 10 INJECTION, SOLUTION EPIDURAL; INFILTRATION; INTRACAUDAL; PERINEURAL at 11:13

## 2025-05-13 ASSESSMENT — PAIN SCALES - GENERAL
PAINLEVEL_OUTOF10: 0 - NO PAIN

## 2025-05-13 ASSESSMENT — PAIN - FUNCTIONAL ASSESSMENT
PAIN_FUNCTIONAL_ASSESSMENT: 0-10
PAIN_FUNCTIONAL_ASSESSMENT: 0-10

## 2025-05-13 ASSESSMENT — ACTIVITIES OF DAILY LIVING (ADL)
AMBULATION ASSISTANCE ON FLAT SURFACES: 1
CURRENT_FUNCTION: ONE PERSON
AMBULATION ASSISTANCE: ONE PERSON
AMBULATION_DISTANCE/DURATION_TOLERATED: 30 FT

## 2025-05-13 ASSESSMENT — ENCOUNTER SYMPTOMS
PERSON REPORTING PAIN: PATIENT
MUSCLE WEAKNESS: 1
DENIES PAIN: 1

## 2025-05-13 NOTE — Clinical Note
Paracentesis complete 2.5L of yellow fluid drained. Patient tolerated well, denies pain or nausea. Site dressed with opsite, patient to return to [facility]

## 2025-05-14 ENCOUNTER — HOME CARE VISIT (OUTPATIENT)
Dept: HOME HEALTH SERVICES | Facility: HOME HEALTH | Age: 80
End: 2025-05-14
Payer: MEDICARE

## 2025-05-14 VITALS
DIASTOLIC BLOOD PRESSURE: 72 MMHG | OXYGEN SATURATION: 97 % | RESPIRATION RATE: 18 BRPM | TEMPERATURE: 97.2 F | HEART RATE: 51 BPM | SYSTOLIC BLOOD PRESSURE: 110 MMHG

## 2025-05-14 VITALS
HEART RATE: 52 BPM | OXYGEN SATURATION: 96 % | SYSTOLIC BLOOD PRESSURE: 98 MMHG | DIASTOLIC BLOOD PRESSURE: 50 MMHG | TEMPERATURE: 97.3 F | RESPIRATION RATE: 18 BRPM

## 2025-05-14 PROCEDURE — G0157 HHC PT ASSISTANT EA 15: HCPCS | Mod: CQ,HHH

## 2025-05-14 PROCEDURE — G0300 HHS/HOSPICE OF LPN EA 15 MIN: HCPCS | Mod: HHH

## 2025-05-14 PROCEDURE — G0152 HHCP-SERV OF OT,EA 15 MIN: HCPCS | Mod: HHH

## 2025-05-14 ASSESSMENT — ACTIVITIES OF DAILY LIVING (ADL)
AMBULATION ASSISTANCE: 1
FEEDING ASSESSED: 1
TOILETING: MAXIMUM ASSIST
BATHING ASSESSED: 1
FEEDING: INDEPENDENT
DRESSING_LB_CURRENT_FUNCTION: MAXIMUM ASSIST
DRESSING_UB_CURRENT_FUNCTION: MAXIMUM ASSIST
TOILETING: 1
AMBULATION ASSISTANCE: NON-AMBULATORY
BATHING_CURRENT_FUNCTION: MAXIMUM ASSIST

## 2025-05-14 ASSESSMENT — ENCOUNTER SYMPTOMS
CHANGE IN APPETITE: INCREASED
DESCRIPTION OF MEMORY LOSS: SHORT TERM
MUSCLE WEAKNESS: 1
FATIGUES EASILY: 1
HYPOTENSION: 1
DYSPNEA ON EXERTION: 1
APPETITE LEVEL: GOOD
PERSON REPORTING PAIN: PATIENT
DENIES PAIN: 1
SKIN LESIONS: 1
BOWEL PATTERN NORMAL: 1
LIMITED RANGE OF MOTION: 1

## 2025-05-14 ASSESSMENT — PAIN DESCRIPTION - PAIN TYPE: TYPE: ACUTE PAIN;SURGICAL PAIN

## 2025-05-15 ENCOUNTER — APPOINTMENT (OUTPATIENT)
Dept: RADIOLOGY | Facility: HOSPITAL | Age: 80
End: 2025-05-15
Payer: MEDICARE

## 2025-05-16 ENCOUNTER — HOME CARE VISIT (OUTPATIENT)
Dept: HOME HEALTH SERVICES | Facility: HOME HEALTH | Age: 80
End: 2025-05-16
Payer: MEDICARE

## 2025-05-16 ENCOUNTER — APPOINTMENT (OUTPATIENT)
Dept: RADIOLOGY | Facility: HOSPITAL | Age: 80
DRG: 435 | End: 2025-05-16
Payer: MEDICARE

## 2025-05-16 ENCOUNTER — APPOINTMENT (OUTPATIENT)
Dept: CARDIOLOGY | Facility: HOSPITAL | Age: 80
DRG: 435 | End: 2025-05-16
Payer: MEDICARE

## 2025-05-16 ENCOUNTER — HOSPITAL ENCOUNTER (INPATIENT)
Facility: HOSPITAL | Age: 80
DRG: 435 | End: 2025-05-16
Attending: EMERGENCY MEDICINE | Admitting: HOSPITALIST
Payer: MEDICARE

## 2025-05-16 DIAGNOSIS — E87.1 HYPONATREMIA: ICD-10-CM

## 2025-05-16 DIAGNOSIS — E80.6 HYPERBILIRUBINEMIA: ICD-10-CM

## 2025-05-16 DIAGNOSIS — D69.6 THROMBOCYTOPENIA: ICD-10-CM

## 2025-05-16 DIAGNOSIS — E16.2 HYPOGLYCEMIA: ICD-10-CM

## 2025-05-16 DIAGNOSIS — N28.9 RENAL INSUFFICIENCY: ICD-10-CM

## 2025-05-16 DIAGNOSIS — K74.60 CIRRHOSIS OF LIVER WITH ASCITES, UNSPECIFIED HEPATIC CIRRHOSIS TYPE (MULTI): ICD-10-CM

## 2025-05-16 DIAGNOSIS — D68.9 COAGULOPATHY (MULTI): ICD-10-CM

## 2025-05-16 DIAGNOSIS — R53.1 GENERAL WEAKNESS: Primary | ICD-10-CM

## 2025-05-16 DIAGNOSIS — D64.9 ANEMIA, UNSPECIFIED TYPE: ICD-10-CM

## 2025-05-16 DIAGNOSIS — E72.20 HYPERAMMONEMIA (MULTI): ICD-10-CM

## 2025-05-16 DIAGNOSIS — R18.8 CIRRHOSIS OF LIVER WITH ASCITES, UNSPECIFIED HEPATIC CIRRHOSIS TYPE (MULTI): ICD-10-CM

## 2025-05-16 LAB
ABO GROUP (TYPE) IN BLOOD: NORMAL
ALBUMIN SERPL BCP-MCNC: 2.4 G/DL (ref 3.4–5)
ALP SERPL-CCNC: 179 U/L (ref 33–136)
ALT SERPL W P-5'-P-CCNC: 20 U/L (ref 10–52)
AMMONIA PLAS-SCNC: 69 UMOL/L (ref 16–53)
ANION GAP SERPL CALC-SCNC: 13 MMOL/L (ref 10–20)
ANTIBODY SCREEN: NORMAL
APPEARANCE UR: CLEAR
APTT PPP: 47 SECONDS (ref 26–36)
AST SERPL W P-5'-P-CCNC: 49 U/L (ref 9–39)
ATRIAL RATE: 56 BPM
BASOPHILS # BLD AUTO: 0.04 X10*3/UL (ref 0–0.1)
BASOPHILS NFR BLD AUTO: 0.4 %
BILIRUB DIRECT SERPL-MCNC: 1.7 MG/DL (ref 0–0.3)
BILIRUB SERPL-MCNC: 5 MG/DL (ref 0–1.2)
BILIRUB UR STRIP.AUTO-MCNC: NEGATIVE MG/DL
BNP SERPL-MCNC: 108 PG/ML (ref 0–99)
BUN SERPL-MCNC: 16 MG/DL (ref 6–23)
CALCIUM SERPL-MCNC: 8.1 MG/DL (ref 8.6–10.3)
CARDIAC TROPONIN I PNL SERPL HS: 13 NG/L (ref 0–20)
CARDIAC TROPONIN I PNL SERPL HS: 16 NG/L (ref 0–20)
CHLORIDE SERPL-SCNC: 96 MMOL/L (ref 98–107)
CO2 SERPL-SCNC: 26 MMOL/L (ref 21–32)
COLOR UR: YELLOW
CREAT SERPL-MCNC: 1.41 MG/DL (ref 0.5–1.3)
EGFRCR SERPLBLD CKD-EPI 2021: 51 ML/MIN/1.73M*2
EOSINOPHIL # BLD AUTO: 0.14 X10*3/UL (ref 0–0.4)
EOSINOPHIL NFR BLD AUTO: 1.4 %
ERYTHROCYTE [DISTWIDTH] IN BLOOD BY AUTOMATED COUNT: 21 % (ref 11.5–14.5)
GLUCOSE BLD MANUAL STRIP-MCNC: 150 MG/DL (ref 74–99)
GLUCOSE SERPL-MCNC: 64 MG/DL (ref 74–99)
GLUCOSE UR STRIP.AUTO-MCNC: NORMAL MG/DL
HCT VFR BLD AUTO: 29 % (ref 41–52)
HGB BLD-MCNC: 9.3 G/DL (ref 13.5–17.5)
HOLD SPECIMEN: NORMAL
IMM GRANULOCYTES # BLD AUTO: 0.03 X10*3/UL (ref 0–0.5)
IMM GRANULOCYTES NFR BLD AUTO: 0.3 % (ref 0–0.9)
INR PPP: 1.9 (ref 0.9–1.1)
KETONES UR STRIP.AUTO-MCNC: NEGATIVE MG/DL
LACTATE SERPL-SCNC: 2.4 MMOL/L (ref 0.4–2)
LACTATE SERPL-SCNC: 2.4 MMOL/L (ref 0.4–2)
LEUKOCYTE ESTERASE UR QL STRIP.AUTO: NEGATIVE
LYMPHOCYTES # BLD AUTO: 1.82 X10*3/UL (ref 0.8–3)
LYMPHOCYTES NFR BLD AUTO: 17.9 %
MCH RBC QN AUTO: 29.5 PG (ref 26–34)
MCHC RBC AUTO-ENTMCNC: 32.1 G/DL (ref 32–36)
MCV RBC AUTO: 92 FL (ref 80–100)
MONOCYTES # BLD AUTO: 2.07 X10*3/UL (ref 0.05–0.8)
MONOCYTES NFR BLD AUTO: 20.4 %
NEUTROPHILS # BLD AUTO: 6.06 X10*3/UL (ref 1.6–5.5)
NEUTROPHILS NFR BLD AUTO: 59.6 %
NITRITE UR QL STRIP.AUTO: NEGATIVE
NRBC BLD-RTO: 0 /100 WBCS (ref 0–0)
OVALOCYTES BLD QL SMEAR: NORMAL
P AXIS: 70 DEGREES
P OFFSET: 149 MS
P ONSET: 103 MS
PH UR STRIP.AUTO: 6.5 [PH]
PLATELET # BLD AUTO: 69 X10*3/UL (ref 150–450)
POTASSIUM SERPL-SCNC: 4.4 MMOL/L (ref 3.5–5.3)
PR INTERVAL: 234 MS
PROT SERPL-MCNC: 5.3 G/DL (ref 6.4–8.2)
PROT UR STRIP.AUTO-MCNC: NEGATIVE MG/DL
PROTHROMBIN TIME: 20.9 SECONDS (ref 9.8–12.4)
Q ONSET: 220 MS
QRS COUNT: 9 BEATS
QRS DURATION: 76 MS
QT INTERVAL: 464 MS
QTC CALCULATION(BAZETT): 447 MS
QTC FREDERICIA: 453 MS
R AXIS: 46 DEGREES
RBC # BLD AUTO: 3.15 X10*6/UL (ref 4.5–5.9)
RBC # UR STRIP.AUTO: NEGATIVE MG/DL
RBC MORPH BLD: NORMAL
RH FACTOR (ANTIGEN D): NORMAL
SODIUM SERPL-SCNC: 131 MMOL/L (ref 136–145)
SP GR UR STRIP.AUTO: 1.01
T AXIS: 39 DEGREES
T OFFSET: 452 MS
UROBILINOGEN UR STRIP.AUTO-MCNC: ABNORMAL MG/DL
VENTRICULAR RATE: 56 BPM
WBC # BLD AUTO: 10.2 X10*3/UL (ref 4.4–11.3)

## 2025-05-16 PROCEDURE — G0378 HOSPITAL OBSERVATION PER HR: HCPCS

## 2025-05-16 PROCEDURE — 83605 ASSAY OF LACTIC ACID: CPT | Performed by: EMERGENCY MEDICINE

## 2025-05-16 PROCEDURE — 2500000001 HC RX 250 WO HCPCS SELF ADMINISTERED DRUGS (ALT 637 FOR MEDICARE OP): Performed by: HOSPITALIST

## 2025-05-16 PROCEDURE — 71045 X-RAY EXAM CHEST 1 VIEW: CPT | Mod: FOREIGN READ | Performed by: RADIOLOGY

## 2025-05-16 PROCEDURE — 84484 ASSAY OF TROPONIN QUANT: CPT | Performed by: EMERGENCY MEDICINE

## 2025-05-16 PROCEDURE — 86901 BLOOD TYPING SEROLOGIC RH(D): CPT | Performed by: EMERGENCY MEDICINE

## 2025-05-16 PROCEDURE — 99285 EMERGENCY DEPT VISIT HI MDM: CPT | Mod: 25 | Performed by: EMERGENCY MEDICINE

## 2025-05-16 PROCEDURE — 85610 PROTHROMBIN TIME: CPT | Performed by: EMERGENCY MEDICINE

## 2025-05-16 PROCEDURE — 86900 BLOOD TYPING SEROLOGIC ABO: CPT | Performed by: EMERGENCY MEDICINE

## 2025-05-16 PROCEDURE — 72131 CT LUMBAR SPINE W/O DYE: CPT | Mod: FOREIGN READ | Performed by: RADIOLOGY

## 2025-05-16 PROCEDURE — 2500000004 HC RX 250 GENERAL PHARMACY W/ HCPCS (ALT 636 FOR OP/ED): Mod: JZ | Performed by: EMERGENCY MEDICINE

## 2025-05-16 PROCEDURE — 2500000001 HC RX 250 WO HCPCS SELF ADMINISTERED DRUGS (ALT 637 FOR MEDICARE OP): Performed by: INTERNAL MEDICINE

## 2025-05-16 PROCEDURE — 2500000002 HC RX 250 W HCPCS SELF ADMINISTERED DRUGS (ALT 637 FOR MEDICARE OP, ALT 636 FOR OP/ED): Performed by: INTERNAL MEDICINE

## 2025-05-16 PROCEDURE — 85025 COMPLETE CBC W/AUTO DIFF WBC: CPT | Performed by: EMERGENCY MEDICINE

## 2025-05-16 PROCEDURE — 87040 BLOOD CULTURE FOR BACTERIA: CPT | Mod: ELYLAB | Performed by: EMERGENCY MEDICINE

## 2025-05-16 PROCEDURE — 81003 URINALYSIS AUTO W/O SCOPE: CPT | Performed by: EMERGENCY MEDICINE

## 2025-05-16 PROCEDURE — 99223 1ST HOSP IP/OBS HIGH 75: CPT | Performed by: HOSPITALIST

## 2025-05-16 PROCEDURE — 82248 BILIRUBIN DIRECT: CPT | Performed by: EMERGENCY MEDICINE

## 2025-05-16 PROCEDURE — 96361 HYDRATE IV INFUSION ADD-ON: CPT

## 2025-05-16 PROCEDURE — 71045 X-RAY EXAM CHEST 1 VIEW: CPT

## 2025-05-16 PROCEDURE — 99221 1ST HOSP IP/OBS SF/LOW 40: CPT | Performed by: NURSE PRACTITIONER

## 2025-05-16 PROCEDURE — 96374 THER/PROPH/DIAG INJ IV PUSH: CPT

## 2025-05-16 PROCEDURE — 36415 COLL VENOUS BLD VENIPUNCTURE: CPT | Performed by: EMERGENCY MEDICINE

## 2025-05-16 PROCEDURE — 74176 CT ABD & PELVIS W/O CONTRAST: CPT | Mod: FOREIGN READ | Performed by: RADIOLOGY

## 2025-05-16 PROCEDURE — 93005 ELECTROCARDIOGRAM TRACING: CPT

## 2025-05-16 PROCEDURE — 2500000005 HC RX 250 GENERAL PHARMACY W/O HCPCS: Performed by: EMERGENCY MEDICINE

## 2025-05-16 PROCEDURE — 80053 COMPREHEN METABOLIC PANEL: CPT | Performed by: EMERGENCY MEDICINE

## 2025-05-16 PROCEDURE — 87075 CULTR BACTERIA EXCEPT BLOOD: CPT | Mod: ELYLAB | Performed by: EMERGENCY MEDICINE

## 2025-05-16 PROCEDURE — 83880 ASSAY OF NATRIURETIC PEPTIDE: CPT | Performed by: EMERGENCY MEDICINE

## 2025-05-16 PROCEDURE — 74176 CT ABD & PELVIS W/O CONTRAST: CPT

## 2025-05-16 PROCEDURE — 82140 ASSAY OF AMMONIA: CPT | Performed by: EMERGENCY MEDICINE

## 2025-05-16 PROCEDURE — 82947 ASSAY GLUCOSE BLOOD QUANT: CPT

## 2025-05-16 PROCEDURE — 85730 THROMBOPLASTIN TIME PARTIAL: CPT | Performed by: EMERGENCY MEDICINE

## 2025-05-16 RX ORDER — ACETAMINOPHEN 650 MG/1
650 SUPPOSITORY RECTAL EVERY 4 HOURS PRN
Status: DISCONTINUED | OUTPATIENT
Start: 2025-05-16 | End: 2025-05-21 | Stop reason: HOSPADM

## 2025-05-16 RX ORDER — ACETAMINOPHEN 325 MG/1
650 TABLET ORAL EVERY 4 HOURS PRN
Status: DISCONTINUED | OUTPATIENT
Start: 2025-05-16 | End: 2025-05-21 | Stop reason: HOSPADM

## 2025-05-16 RX ORDER — TRAMADOL HYDROCHLORIDE 50 MG/1
50 TABLET, FILM COATED ORAL EVERY 6 HOURS PRN
COMMUNITY

## 2025-05-16 RX ORDER — TRAMADOL HYDROCHLORIDE 50 MG/1
50 TABLET, FILM COATED ORAL EVERY 6 HOURS PRN
Status: DISCONTINUED | OUTPATIENT
Start: 2025-05-16 | End: 2025-05-18

## 2025-05-16 RX ORDER — FINASTERIDE 5 MG/1
5 TABLET, FILM COATED ORAL DAILY
Status: DISCONTINUED | OUTPATIENT
Start: 2025-05-16 | End: 2025-05-21 | Stop reason: HOSPADM

## 2025-05-16 RX ORDER — POLYETHYLENE GLYCOL 3350 17 G/17G
17 POWDER, FOR SOLUTION ORAL DAILY PRN
Status: DISCONTINUED | OUTPATIENT
Start: 2025-05-16 | End: 2025-05-21 | Stop reason: HOSPADM

## 2025-05-16 RX ORDER — PANTOPRAZOLE SODIUM 40 MG/1
40 TABLET, DELAYED RELEASE ORAL DAILY
Status: DISCONTINUED | OUTPATIENT
Start: 2025-05-16 | End: 2025-05-21 | Stop reason: HOSPADM

## 2025-05-16 RX ORDER — ACETAMINOPHEN 160 MG/5ML
650 SOLUTION ORAL EVERY 4 HOURS PRN
Status: DISCONTINUED | OUTPATIENT
Start: 2025-05-16 | End: 2025-05-21 | Stop reason: HOSPADM

## 2025-05-16 RX ORDER — ONDANSETRON HYDROCHLORIDE 2 MG/ML
4 INJECTION, SOLUTION INTRAVENOUS EVERY 8 HOURS PRN
Status: DISCONTINUED | OUTPATIENT
Start: 2025-05-16 | End: 2025-05-21 | Stop reason: HOSPADM

## 2025-05-16 RX ORDER — ONDANSETRON 4 MG/1
4 TABLET, FILM COATED ORAL EVERY 8 HOURS PRN
Status: DISCONTINUED | OUTPATIENT
Start: 2025-05-16 | End: 2025-05-21 | Stop reason: HOSPADM

## 2025-05-16 RX ORDER — OXYCODONE HYDROCHLORIDE 5 MG/1
5 TABLET ORAL EVERY 6 HOURS PRN
Status: DISCONTINUED | OUTPATIENT
Start: 2025-05-16 | End: 2025-05-21 | Stop reason: HOSPADM

## 2025-05-16 RX ORDER — NADOLOL 20 MG/1
10 TABLET ORAL DAILY
Status: DISCONTINUED | OUTPATIENT
Start: 2025-05-16 | End: 2025-05-21 | Stop reason: HOSPADM

## 2025-05-16 RX ORDER — SPIRONOLACTONE 25 MG/1
100 TABLET ORAL DAILY
Status: DISCONTINUED | OUTPATIENT
Start: 2025-05-16 | End: 2025-05-21 | Stop reason: HOSPADM

## 2025-05-16 RX ORDER — DEXTROSE 50 % IN WATER (D50W) INTRAVENOUS SYRINGE
25 ONCE
Status: COMPLETED | OUTPATIENT
Start: 2025-05-16 | End: 2025-05-16

## 2025-05-16 RX ADMIN — ACETAMINOPHEN 650 MG: 325 TABLET ORAL at 21:54

## 2025-05-16 RX ADMIN — SPIRONOLACTONE 100 MG: 25 TABLET ORAL at 21:33

## 2025-05-16 RX ADMIN — SODIUM CHLORIDE 500 ML: 0.9 INJECTION, SOLUTION INTRAVENOUS at 11:32

## 2025-05-16 RX ADMIN — PANTOPRAZOLE SODIUM 40 MG: 40 TABLET, DELAYED RELEASE ORAL at 21:33

## 2025-05-16 RX ADMIN — FINASTERIDE 5 MG: 5 TABLET, FILM COATED ORAL at 21:33

## 2025-05-16 RX ADMIN — DEXTROSE MONOHYDRATE 25 G: 25 INJECTION, SOLUTION INTRAVENOUS at 13:21

## 2025-05-16 RX ADMIN — NADOLOL 10 MG: 20 TABLET ORAL at 23:54

## 2025-05-16 SDOH — SOCIAL STABILITY: SOCIAL NETWORK
IN A TYPICAL WEEK, HOW MANY TIMES DO YOU TALK ON THE PHONE WITH FAMILY, FRIENDS, OR NEIGHBORS?: MORE THAN THREE TIMES A WEEK

## 2025-05-16 SDOH — SOCIAL STABILITY: SOCIAL INSECURITY: HAVE YOU HAD ANY THOUGHTS OF HARMING ANYONE ELSE?: NO

## 2025-05-16 SDOH — ECONOMIC STABILITY: INCOME INSECURITY: IN THE PAST 12 MONTHS HAS THE ELECTRIC, GAS, OIL, OR WATER COMPANY THREATENED TO SHUT OFF SERVICES IN YOUR HOME?: NO

## 2025-05-16 SDOH — ECONOMIC STABILITY: HOUSING INSECURITY: IN THE PAST 12 MONTHS, HOW MANY TIMES HAVE YOU MOVED WHERE YOU WERE LIVING?: 0

## 2025-05-16 SDOH — SOCIAL STABILITY: SOCIAL NETWORK: HOW OFTEN DO YOU ATTEND MEETINGS OF THE CLUBS OR ORGANIZATIONS YOU BELONG TO?: NEVER

## 2025-05-16 SDOH — HEALTH STABILITY: MENTAL HEALTH: HOW OFTEN DO YOU HAVE SIX OR MORE DRINKS ON ONE OCCASION?: NEVER

## 2025-05-16 SDOH — HEALTH STABILITY: PHYSICAL HEALTH
HOW OFTEN DO YOU NEED TO HAVE SOMEONE HELP YOU WHEN YOU READ INSTRUCTIONS, PAMPHLETS, OR OTHER WRITTEN MATERIAL FROM YOUR DOCTOR OR PHARMACY?: NEVER

## 2025-05-16 SDOH — SOCIAL STABILITY: SOCIAL INSECURITY: WITHIN THE LAST YEAR, HAVE YOU BEEN HUMILIATED OR EMOTIONALLY ABUSED IN OTHER WAYS BY YOUR PARTNER OR EX-PARTNER?: NO

## 2025-05-16 SDOH — ECONOMIC STABILITY: HOUSING INSECURITY: AT ANY TIME IN THE PAST 12 MONTHS, WERE YOU HOMELESS OR LIVING IN A SHELTER (INCLUDING NOW)?: NO

## 2025-05-16 SDOH — SOCIAL STABILITY: SOCIAL INSECURITY: ARE YOU MARRIED, WIDOWED, DIVORCED, SEPARATED, NEVER MARRIED, OR LIVING WITH A PARTNER?: MARRIED

## 2025-05-16 SDOH — HEALTH STABILITY: MENTAL HEALTH
DO YOU FEEL STRESS - TENSE, RESTLESS, NERVOUS, OR ANXIOUS, OR UNABLE TO SLEEP AT NIGHT BECAUSE YOUR MIND IS TROUBLED ALL THE TIME - THESE DAYS?: ONLY A LITTLE

## 2025-05-16 SDOH — SOCIAL STABILITY: SOCIAL INSECURITY: WERE YOU ABLE TO COMPLETE ALL THE BEHAVIORAL HEALTH SCREENINGS?: YES

## 2025-05-16 SDOH — SOCIAL STABILITY: SOCIAL INSECURITY: WITHIN THE LAST YEAR, HAVE YOU BEEN AFRAID OF YOUR PARTNER OR EX-PARTNER?: NO

## 2025-05-16 SDOH — SOCIAL STABILITY: SOCIAL INSECURITY: DO YOU FEEL ANYONE HAS EXPLOITED OR TAKEN ADVANTAGE OF YOU FINANCIALLY OR OF YOUR PERSONAL PROPERTY?: NO

## 2025-05-16 SDOH — HEALTH STABILITY: MENTAL HEALTH: HOW MANY DRINKS CONTAINING ALCOHOL DO YOU HAVE ON A TYPICAL DAY WHEN YOU ARE DRINKING?: PATIENT DOES NOT DRINK

## 2025-05-16 SDOH — ECONOMIC STABILITY: HOUSING INSECURITY: IN THE LAST 12 MONTHS, WAS THERE A TIME WHEN YOU WERE NOT ABLE TO PAY THE MORTGAGE OR RENT ON TIME?: NO

## 2025-05-16 SDOH — HEALTH STABILITY: MENTAL HEALTH: HOW OFTEN DO YOU HAVE A DRINK CONTAINING ALCOHOL?: NEVER

## 2025-05-16 SDOH — ECONOMIC STABILITY: FOOD INSECURITY: WITHIN THE PAST 12 MONTHS, THE FOOD YOU BOUGHT JUST DIDN'T LAST AND YOU DIDN'T HAVE MONEY TO GET MORE.: NEVER TRUE

## 2025-05-16 SDOH — SOCIAL STABILITY: SOCIAL INSECURITY: HAS ANYONE EVER THREATENED TO HURT YOUR FAMILY OR YOUR PETS?: NO

## 2025-05-16 SDOH — SOCIAL STABILITY: SOCIAL NETWORK: HOW OFTEN DO YOU GET TOGETHER WITH FRIENDS OR RELATIVES?: MORE THAN THREE TIMES A WEEK

## 2025-05-16 SDOH — ECONOMIC STABILITY: FOOD INSECURITY: WITHIN THE PAST 12 MONTHS, YOU WORRIED THAT YOUR FOOD WOULD RUN OUT BEFORE YOU GOT THE MONEY TO BUY MORE.: NEVER TRUE

## 2025-05-16 SDOH — SOCIAL STABILITY: SOCIAL INSECURITY: HAVE YOU HAD THOUGHTS OF HARMING ANYONE ELSE?: NO

## 2025-05-16 SDOH — HEALTH STABILITY: PHYSICAL HEALTH: ON AVERAGE, HOW MANY DAYS PER WEEK DO YOU ENGAGE IN MODERATE TO STRENUOUS EXERCISE (LIKE A BRISK WALK)?: 0 DAYS

## 2025-05-16 SDOH — SOCIAL STABILITY: SOCIAL NETWORK: HOW OFTEN DO YOU ATTEND CHURCH OR RELIGIOUS SERVICES?: NEVER

## 2025-05-16 SDOH — SOCIAL STABILITY: SOCIAL NETWORK
DO YOU BELONG TO ANY CLUBS OR ORGANIZATIONS SUCH AS CHURCH GROUPS, UNIONS, FRATERNAL OR ATHLETIC GROUPS, OR SCHOOL GROUPS?: NO

## 2025-05-16 SDOH — SOCIAL STABILITY: SOCIAL INSECURITY: ARE THERE ANY APPARENT SIGNS OF INJURIES/BEHAVIORS THAT COULD BE RELATED TO ABUSE/NEGLECT?: NO

## 2025-05-16 SDOH — HEALTH STABILITY: PHYSICAL HEALTH: ON AVERAGE, HOW MANY MINUTES DO YOU ENGAGE IN EXERCISE AT THIS LEVEL?: 0 MIN

## 2025-05-16 SDOH — ECONOMIC STABILITY: TRANSPORTATION INSECURITY: IN THE PAST 12 MONTHS, HAS LACK OF TRANSPORTATION KEPT YOU FROM MEDICAL APPOINTMENTS OR FROM GETTING MEDICATIONS?: NO

## 2025-05-16 SDOH — SOCIAL STABILITY: SOCIAL INSECURITY: DO YOU FEEL UNSAFE GOING BACK TO THE PLACE WHERE YOU ARE LIVING?: NO

## 2025-05-16 SDOH — SOCIAL STABILITY: SOCIAL INSECURITY: ABUSE: ADULT

## 2025-05-16 SDOH — SOCIAL STABILITY: SOCIAL INSECURITY: ARE YOU OR HAVE YOU BEEN THREATENED OR ABUSED PHYSICALLY, EMOTIONALLY, OR SEXUALLY BY ANYONE?: NO

## 2025-05-16 SDOH — SOCIAL STABILITY: SOCIAL INSECURITY: DOES ANYONE TRY TO KEEP YOU FROM HAVING/CONTACTING OTHER FRIENDS OR DOING THINGS OUTSIDE YOUR HOME?: NO

## 2025-05-16 ASSESSMENT — PAIN SCALES - GENERAL
PAINLEVEL_OUTOF10: 3
PAINLEVEL_OUTOF10: 10 - WORST POSSIBLE PAIN

## 2025-05-16 ASSESSMENT — LIFESTYLE VARIABLES
PRESCIPTION_ABUSE_PAST_12_MONTHS: NO
AUDIT-C TOTAL SCORE: 0
SKIP TO QUESTIONS 9-10: 1
AUDIT-C TOTAL SCORE: 0
HOW OFTEN DO YOU HAVE 6 OR MORE DRINKS ON ONE OCCASION: NEVER
HOW MANY STANDARD DRINKS CONTAINING ALCOHOL DO YOU HAVE ON A TYPICAL DAY: PATIENT DOES NOT DRINK
SKIP TO QUESTIONS 9-10: 1
AUDIT-C TOTAL SCORE: 0
EVER FELT BAD OR GUILTY ABOUT YOUR DRINKING: NO
HAVE PEOPLE ANNOYED YOU BY CRITICIZING YOUR DRINKING: NO
TOTAL SCORE: 0
EVER HAD A DRINK FIRST THING IN THE MORNING TO STEADY YOUR NERVES TO GET RID OF A HANGOVER: NO
HOW OFTEN DO YOU HAVE A DRINK CONTAINING ALCOHOL: NEVER
HAVE YOU EVER FELT YOU SHOULD CUT DOWN ON YOUR DRINKING: NO
SUBSTANCE_ABUSE_PAST_12_MONTHS: NO

## 2025-05-16 ASSESSMENT — COGNITIVE AND FUNCTIONAL STATUS - GENERAL
PATIENT BASELINE BEDBOUND: YES
MOBILITY SCORE: 12
TURNING FROM BACK TO SIDE WHILE IN FLAT BAD: A LITTLE
MOVING FROM LYING ON BACK TO SITTING ON SIDE OF FLAT BED WITH BEDRAILS: A LITTLE
TOILETING: A LOT
STANDING UP FROM CHAIR USING ARMS: A LOT
CLIMB 3 TO 5 STEPS WITH RAILING: TOTAL
PERSONAL GROOMING: A LOT
MOVING TO AND FROM BED TO CHAIR: A LOT
HELP NEEDED FOR BATHING: A LOT
DRESSING REGULAR LOWER BODY CLOTHING: A LOT
EATING MEALS: A LITTLE
DAILY ACTIVITIY SCORE: 13
WALKING IN HOSPITAL ROOM: TOTAL
DRESSING REGULAR UPPER BODY CLOTHING: A LOT

## 2025-05-16 ASSESSMENT — ACTIVITIES OF DAILY LIVING (ADL)
GROOMING: NEEDS ASSISTANCE
DRESSING YOURSELF: NEEDS ASSISTANCE
FEEDING YOURSELF: INDEPENDENT
HEARING - RIGHT EAR: HEARING AID
TOILETING: NEEDS ASSISTANCE
BATHING: NEEDS ASSISTANCE
ASSISTIVE_DEVICE: WALKER
LACK_OF_TRANSPORTATION: NO
LACK_OF_TRANSPORTATION: NO
WALKS IN HOME: NEEDS ASSISTANCE
JUDGMENT_ADEQUATE_SAFELY_COMPLETE_DAILY_ACTIVITIES: YES
ADEQUATE_TO_COMPLETE_ADL: YES
PATIENT'S MEMORY ADEQUATE TO SAFELY COMPLETE DAILY ACTIVITIES?: YES
HEARING - LEFT EAR: HEARING AID

## 2025-05-16 ASSESSMENT — PAIN - FUNCTIONAL ASSESSMENT: PAIN_FUNCTIONAL_ASSESSMENT: 0-10

## 2025-05-16 ASSESSMENT — PAIN DESCRIPTION - LOCATION: LOCATION: BACK

## 2025-05-16 NOTE — ED PROVIDER NOTES
HPI   Chief Complaint   Patient presents with    Back Pain     Patient is from home with lower back pain         History provided by:  Patient, spouse and EMS personnel    Chief Complaint   Patient presents with    Back Pain     Patient is from home with lower back pain       History of Present Illness:  Alfonso Toscano is a 80 y.o. male presents with increasing generalized weakness and low back pain.  The patient's wife states he was recently hospitalized with cirrhosis and had a paracentesis.  The patient currently lives at home and has been increasingly deconditioned.  He has unable to ambulate more than a couple of feet.  No fever, chills or cough.  No chest pain or shortness of breath.  No abdominal pain.  No nausea, vomiting or diarrhea.  No dysuria or hematuria.  No loss of motor or sensory function.  No loss of bladder or bowel function.  No headache.  No slurred speech or facial droop.  The patient has multiple bruises and abrasions from frequent falls.  The patient tells me he has some low back pain.  Worse with movement and palpation.      PMFSH:   As per HPI, otherwise nurses notes reviewed in EMR.    Past Medical History: Medical History[1]   Past Surgical History: Surgical History[2]   Family History: Family History[3]   Social History:  Social History[4]  Allergies: Allergies[5]  Current Outpatient Medications   Medication Instructions    bicalutamide (Casodex) 50 mg tablet 1 tablet, Daily with lunch    cholecalciferol (Vitamin D-3) 25 MCG (1000 UT) tablet 1 tablet, Daily with lunch    dutasteride (AVODART) 0.5 mg, Daily with lunch    ferrous sulfate 325 mg, Daily with breakfast    fexofenadine (ALLEGRA) 180 mg, Daily PRN    furosemide (LASIX) 40 mg, oral, Daily, Instructed by MD to take 2 20mg tablets daily beginning 5/7/25    lactulose 20 gram/30 mL oral solution 30ML three times daily    loperamide (Imodium) 2 mg capsule 1 capsule, 4 times daily PRN    nadolol (CORGARD) 10 mg, oral, Daily     "pantoprazole (PROTONIX) 40 mg, oral, Daily    prochlorperazine (COMPAZINE) 10 mg, oral, Every 6 hours PRN    spironolactone (ALDACTONE) 100 mg, oral, Daily, Instructed by MD to take 2 50mg tablets daily beginning 5/7/25              Patient History   Medical History[6]  Surgical History[7]  Family History[8]  Social History[9]    Physical Exam   ED Triage Vitals [05/16/25 0952]   Temperature Heart Rate Respirations BP   36 °C (96.8 °F) 54 18 (!) 97/45      Pulse Ox Temp Source Heart Rate Source Patient Position   97 % Temporal Monitor Sitting      BP Location FiO2 (%)     Left arm --       Physical Exam  Physical Exam:    ED Triage Vitals [05/16/25 0952]   Temperature Heart Rate Respirations BP   36 °C (96.8 °F) 54 18 (!) 97/45      Pulse Ox Temp Source Heart Rate Source Patient Position   97 % Temporal Monitor Sitting      BP Location FiO2 (%)     Left arm --         Patient Vitals for the past 24 hrs:   BP Temp Temp src Pulse Resp SpO2 Height Weight   05/16/25 1436 (!) 115/46 -- -- 57 18 95 % -- --   05/16/25 1331 (!) 95/37 -- -- 59 18 95 % -- --   05/16/25 0952 (!) 97/45 36 °C (96.8 °F) Temporal 54 18 97 % 1.702 m (5' 7\") 87.5 kg (192 lb 14.4 oz)       Constitutional: Vital signs per nursing notes.  Well developed, well nourished.  Moderate acute distress.    Psychiatric: alert and oriented to person, place, and time; no abnormalities of mood or affect; memory intact; tired appearing  Eyes: PERRL; conjunctivae and lids normal; EOMI  ENT: otoscopic exam of external canal and TM´s normal; nasal mucosa, turbinates, and septum normal; mouth, tongue, and pharynx normal; pharynx without edema, exudate, or injection  Respiratory: normal respiratory effort and excursion; no rales, rhonchi, or wheezes; equal air entry  Cardiovascular: regular rate and rhythm; no murmurs, rubs or gallops; symmetric pulses; no edema; normal capillary refill; distal pulses present  Neurological: normal speech; CN II-XII grossly intact; " normal motor and sensory function; no nystagmus; no pronator drift  GI: no masses, tenderness, rebound or guarding; no palpable, pulsatile mass; no organomegaly; no hernia; normal bowel sounds; (-) Grove´s sign; (-) McBurney´s sign; (-) CVA tenderness  Lymphatic: no adenopathy of neck  Musculoskeletal: normal digits and nails; no gross tendon or ligament injury; normal to palpation; normal strength/tone; neurovascular status intact; (-) Jerica´s sign; (-) straight leg raise; no palpable cords; calf circumference equal bilaterally  Skin: normal to inspection; normal to palpation; no rash; except multiple bruises and abrasions to the extremities      ED Course & MDM   Diagnoses as of 05/16/25 1603   General weakness   Cirrhosis of liver with ascites, unspecified hepatic cirrhosis type (Multi)   Anemia, unspecified type   Thrombocytopenia (CMS-HCC)   Hyperammonemia (Multi)   Hypoglycemia   Hyponatremia   Renal insufficiency   Hyperbilirubinemia   Coagulopathy (Multi)                 No data recorded                                 Medical Decision Making  Medical Decision Making:    EKG: My interpretation of EKG is sinus bradycardia 56 bpm with first-degree AV block and nonspecific ST-T changes    Labs:   Labs Reviewed   CBC WITH AUTO DIFFERENTIAL - Abnormal       Result Value    WBC 10.2      nRBC 0.0      RBC 3.15 (*)     Hemoglobin 9.3 (*)     Hematocrit 29.0 (*)     MCV 92      MCH 29.5      MCHC 32.1      RDW 21.0 (*)     Platelets 69 (*)     Neutrophils % 59.6      Immature Granulocytes %, Automated 0.3      Lymphocytes % 17.9      Monocytes % 20.4      Eosinophils % 1.4      Basophils % 0.4      Neutrophils Absolute 6.06 (*)     Immature Granulocytes Absolute, Automated 0.03      Lymphocytes Absolute 1.82      Monocytes Absolute 2.07 (*)     Eosinophils Absolute 0.14      Basophils Absolute 0.04     LACTATE - Abnormal    Lactate 2.4 (*)     Narrative:     Venipuncture immediately after or during the  administration of Metamizole may lead to falsely low results. Testing should be performed immediately prior to Metamizole dosing.   BASIC METABOLIC PANEL - Abnormal    Glucose 64 (*)     Sodium 131 (*)     Potassium 4.4      Chloride 96 (*)     Bicarbonate 26      Anion Gap 13      Urea Nitrogen 16      Creatinine 1.41 (*)     eGFR 51 (*)     Calcium 8.1 (*)    HEPATIC FUNCTION PANEL - Abnormal    Albumin 2.4 (*)     Bilirubin, Total 5.0 (*)     Bilirubin, Direct 1.7 (*)     Alkaline Phosphatase 179 (*)     ALT 20      AST 49 (*)     Total Protein 5.3 (*)    AMMONIA - Abnormal    Ammonia 69 (*)    PROTIME-INR - Abnormal    Protime 20.9 (*)     INR 1.9 (*)    APTT - Abnormal    aPTT 47 (*)     Narrative:     The APTT is no longer used for monitoring Unfractionated Heparin Therapy. For monitoring Heparin Therapy, use the Heparin Assay.   B-TYPE NATRIURETIC PEPTIDE - Abnormal     (*)     Narrative:        <100 pg/mL - Heart failure unlikely  100-299 pg/mL - Intermediate probability of acute heart                  failure exacerbation. Correlate with clinical                  context and patient history.    >=300 pg/mL - Heart Failure likely. Correlate with clinical                  context and patient history.    BNP testing is performed using different testing methodology at [facility] than at other [facility]. Direct result comparisons should only be made within the same method.      URINALYSIS WITH REFLEX CULTURE AND MICROSCOPIC - Abnormal    Color, Urine Yellow      Appearance, Urine Clear      Specific Gravity, Urine 1.013      pH, Urine 6.5      Protein, Urine NEGATIVE      Glucose, Urine Normal      Blood, Urine NEGATIVE      Ketones, Urine NEGATIVE      Bilirubin, Urine NEGATIVE      Urobilinogen, Urine 3 (1+) (*)     Nitrite, Urine NEGATIVE      Leukocyte Esterase, Urine NEGATIVE     LACTATE - Abnormal    Lactate 2.4 (*)     Narrative:     Venipuncture immediately after or during the  administration of Metamizole may lead to falsely low results. Testing should be performed immediately prior to Metamizole dosing.   POCT GLUCOSE - Abnormal    POCT Glucose 150 (*)    SERIAL TROPONIN-INITIAL - Normal    Troponin I, High Sensitivity 16      Narrative:     Less than 99th percentile of normal range cutoff-  Female and children under 18 years old <14 ng/L; Male <21 ng/L: Negative  Repeat testing should be performed if clinically indicated.     Female and children under 18 years old 14-50 ng/L; Male 21-50 ng/L:  Consistent with possible cardiac damage and possible increased clinical   risk. Serial measurements may help to assess extent of myocardial damage.     >50 ng/L: Consistent with cardiac damage, increased clinical risk and  myocardial infarction. Serial measurements may help assess extent of   myocardial damage.      NOTE: Children less than 1 year old may have higher baseline troponin   levels and results should be interpreted in conjunction with the overall   clinical context.     NOTE: Troponin I testing is performed using a different   testing methodology at [facility] than at other   [facility]. Direct result comparisons should only   be made within the same method.   SERIAL TROPONIN, 1 HOUR - Normal    Troponin I, High Sensitivity 13      Narrative:     Less than 99th percentile of normal range cutoff-  Female and children under 18 years old <14 ng/L; Male <21 ng/L: Negative  Repeat testing should be performed if clinically indicated.     Female and children under 18 years old 14-50 ng/L; Male 21-50 ng/L:  Consistent with possible cardiac damage and possible increased clinical   risk. Serial measurements may help to assess extent of myocardial damage.     >50 ng/L: Consistent with cardiac damage, increased clinical risk and  myocardial infarction. Serial measurements may help assess extent of   myocardial damage.      NOTE: Children less than 1 year old may have higher  baseline troponin   levels and results should be interpreted in conjunction with the overall   clinical context.     NOTE: Troponin I testing is performed using a different   testing methodology at [facility] than at other   [facility] hospitals. Direct result comparisons should only   be made within the same method.   BLOOD CULTURE   BLOOD CULTURE   TROPONIN SERIES- (INITIAL, 1 HR)    Narrative:     The following orders were created for panel order Troponin I Series, High Sensitivity (0, 1 HR).  Procedure                               Abnormality         Status                     ---------                               -----------         ------                     Troponin I, High Sensiti...[[id number]]  Normal              Final result               Troponin, High Sensitivi...[[id number]]  Normal              Final result                 Please view results for these tests on the individual orders.   TYPE AND SCREEN    ABO TYPE O      Rh TYPE POS      ANTIBODY SCREEN NEG     URINALYSIS WITH REFLEX CULTURE AND MICROSCOPIC    Narrative:     The following orders were created for panel order Urinalysis with Reflex Culture and Microscopic.  Procedure                               Abnormality         Status                     ---------                               -----------         ------                     Urinalysis with Reflex C...[[id number]]  Abnormal            Final result               Extra Urine Gray Tube[[id number]]                            In process                   Please view results for these tests on the individual orders.   EXTRA URINE GRAY TUBE   MORPHOLOGY    RBC Morphology See Below      Ovalocytes Few         Diagnostic Imaging:   CT abdomen pelvis wo IV contrast   Final Result   1.Mild wall thickening of the rectum. Correlate clinically for   proctitis.   2.Hepatic steatosis with cirrhotic morphology of the liver. Few   small low-attenuation lesions measuring up to 1.6 cm in the  left   hepatic lobe poorly evaluated on this noncontrast exam. Recommend   further evaluation with contrast-enhanced MRI.   3.Small amount of abdominal/pelvic ascites.   4.Colonic diverticulosis without findings of diverticulitis.   5.Severe coronary artery calcifications.   6.Stable superior endplate compression deformity of L4 with   approximately 40% loss of height.   7.Severe central canal narrowing at L3-4 and L4-5.Mild to   moderate bilateral neuroforaminal narrowing at L3-4, L4-5 and L5-S1.     Signed by Jaime Prado MD      CT lumbar spine retrospective reconstruction protocol   Final Result   1.Mild wall thickening of the rectum. Correlate clinically for   proctitis.   2.Hepatic steatosis with cirrhotic morphology of the liver. Few   small low-attenuation lesions measuring up to 1.6 cm in the left   hepatic lobe poorly evaluated on this noncontrast exam. Recommend   further evaluation with contrast-enhanced MRI.   3.Small amount of abdominal/pelvic ascites.   4.Colonic diverticulosis without findings of diverticulitis.   5.Severe coronary artery calcifications.   6.Stable superior endplate compression deformity of L4 with   approximately 40% loss of height.   7.Severe central canal narrowing at L3-4 and L4-5.Mild to   moderate bilateral neuroforaminal narrowing at L3-4, L4-5 and L5-S1.     Signed by Jaime Prado MD      XR chest 1 view   Final Result   No acute process.   Signed by Jaime Prado MD          ED Medication Administration:   Medications   acetaminophen (Tylenol) tablet 650 mg (has no administration in time range)     Or   acetaminophen (Tylenol) oral liquid 650 mg (has no administration in time range)     Or   acetaminophen (Tylenol) suppository 650 mg (has no administration in time range)   acetaminophen (Tylenol) tablet 650 mg (has no administration in time range)     Or   acetaminophen (Tylenol) oral liquid 650 mg (has no administration in time range)     Or   acetaminophen (Tylenol)  suppository 650 mg (has no administration in time range)   ondansetron (Zofran) tablet 4 mg (has no administration in time range)     Or   ondansetron (Zofran) injection 4 mg (has no administration in time range)   polyethylene glycol (Glycolax, Miralax) packet 17 g (has no administration in time range)   oxyCODONE (Roxicodone) immediate release tablet 5 mg (has no administration in time range)   sodium chloride 0.9 % bolus 500 mL (0 mL intravenous Stopped 5/16/25 1331)   dextrose 50 % injection 25 g (25 g intravenous Given 5/16/25 1321)       ED Course:     Alfonso Toscano is a 80 y.o. male presents with weakness.    Differential Diagnoses Considered:  ACS, arrhythmia, electrolyte disorder, infection    Patient presents with  generalized weakness .     Chest x-ray to evaluate for evidence of pneumonia.     EKG/troponin to evaluate for evidence of arrhythmia/ACS/AMI.    Lab work to evaluate for evidence of severe anemia, thrombocytopenia, leukocytosis/leukopenia, or electrolyte abnormality (including hypokalemia, hyperkalemia, hypernatremia, hyponatremia, hyperglycemia, or hypoglycemia).    PT/INR to evaluate for evidence of coagulopathy.    LFTs to evaluate for evidence of acute hepatitis.    UA to evaluate for evidence of UTI.    Lactic acid and Blood cultures to evaluate for sepsis and bacteremia.     Considered CT head but not indicated as I considered but do not suspect stroke, ICB or mass.     CT of the abdomen pelvis with lumbar spine reconstructions to evaluate for infectious cause.               Diagnoses as of 05/16/25 1603   General weakness   Cirrhosis of liver with ascites, unspecified hepatic cirrhosis type (Multi)   Anemia, unspecified type   Thrombocytopenia (CMS-HCC)   Hyperammonemia (Multi)   Hypoglycemia   Hyponatremia   Renal insufficiency   Hyperbilirubinemia   Coagulopathy (Multi)       Abnormal Labs Reviewed   CBC WITH AUTO DIFFERENTIAL - Abnormal; Notable for the following components:        Result Value    RBC 3.15 (*)     Hemoglobin 9.3 (*)     Hematocrit 29.0 (*)     RDW 21.0 (*)     Platelets 69 (*)     Neutrophils Absolute 6.06 (*)     Monocytes Absolute 2.07 (*)     All other components within normal limits   LACTATE - Abnormal; Notable for the following components:    Lactate 2.4 (*)     All other components within normal limits    Narrative:     Venipuncture immediately after or during the administration of Metamizole may lead to falsely low results. Testing should be performed immediately prior to Metamizole dosing.   BASIC METABOLIC PANEL - Abnormal; Notable for the following components:    Glucose 64 (*)     Sodium 131 (*)     Chloride 96 (*)     Creatinine 1.41 (*)     eGFR 51 (*)     Calcium 8.1 (*)     All other components within normal limits   HEPATIC FUNCTION PANEL - Abnormal; Notable for the following components:    Albumin 2.4 (*)     Bilirubin, Total 5.0 (*)     Bilirubin, Direct 1.7 (*)     Alkaline Phosphatase 179 (*)     AST 49 (*)     Total Protein 5.3 (*)     All other components within normal limits   AMMONIA - Abnormal; Notable for the following components:    Ammonia 69 (*)     All other components within normal limits   PROTIME-INR - Abnormal; Notable for the following components:    Protime 20.9 (*)     INR 1.9 (*)     All other components within normal limits   APTT - Abnormal; Notable for the following components:    aPTT 47 (*)     All other components within normal limits    Narrative:     The APTT is no longer used for monitoring Unfractionated Heparin Therapy. For monitoring Heparin Therapy, use the Heparin Assay.   B-TYPE NATRIURETIC PEPTIDE - Abnormal; Notable for the following components:     (*)     All other components within normal limits    Narrative:        <100 pg/mL - Heart failure unlikely  100-299 pg/mL - Intermediate probability of acute heart                  failure exacerbation. Correlate with clinical                  context and patient  history.    >=300 pg/mL - Heart Failure likely. Correlate with clinical                  context and patient history.    BNP testing is performed using different testing methodology at [facility] than at other [facility] hospitals. Direct result comparisons should only be made within the same method.      URINALYSIS WITH REFLEX CULTURE AND MICROSCOPIC - Abnormal; Notable for the following components:    Urobilinogen, Urine 3 (1+) (*)     All other components within normal limits   LACTATE - Abnormal; Notable for the following components:    Lactate 2.4 (*)     All other components within normal limits    Narrative:     Venipuncture immediately after or during the administration of Metamizole may lead to falsely low results. Testing should be performed immediately prior to Metamizole dosing.   POCT GLUCOSE - Abnormal; Notable for the following components:    POCT Glucose 150 (*)     All other components within normal limits       BP      Temp      Pulse     Resp      SpO2            Diagnostic evaluation was completed.  BNP is 108.  CBC shows normal white blood cell count with mild anemia with a hemoglobin of 9.3.  Platelets are low at 69.  Troponin is in the normal range.  Ammonia is elevated at 69.  Urinalysis shows 1+ urobilinogen.  Metabolic panel shows a glucose of 64.  Sodium is low at 131.  Potassium is in normal range.  BUN is normal with a slightly elevated creatinine of 1.41.  Total bilirubin is elevated at 5 with a direct bilirubin at 1.7.  AST is elevated at 49 with a normal ALT.  Lactate is elevated at 2.4.  INR is 1.9.  CT of the abdomen pelvis with reconstructions of the lumbar spine shows mild wall thickening of the rectum.  Hepatic steatosis with cirrhotic morphology of the liver.  Few small low-attenuation lesions measuring up to 1.6 cm in the left hepatic lobe poorly evaluated on this noncontrast exam.  Small amount of abdominal pelvic ascites.  Colonic texture reticulosis without  findings of diverticulitis.  Severe coronary artery calcifications.  Stable superior endplate compression deformity of L4 with approximately 40% loss of height.  Severe central canal narrowing at L3-4 and L4-5.  Mild to moderate bilateral neuroforaminal narrowing at L3-4, L4-5 and L5-S1.  Chest x-ray shows no acute process.    Patient was treated with IV normal saline.    Medications administered improved the patient's condition.    I suspect the patient's condition is likely secondary to his liver disease.  In addition, he is becoming progressively more weak.  He likely will benefit from placement.  The patient will be hospitalized for further care and evaluation.    The patient's condition requires ongoing treatment and evaluation necessitating hospital admission.  I have reviewed the patient's history, physical exam, and test information with the admitting physician,   Dr. Dodd                , who agrees to hospitalize the patient.     I discussed the results and plan for hospitalization with the patient and/or family/friend if present.  Questions were addressed.  Patient and/or family/friend expressed understanding.    Shared decision making made with patient, and/or family, who agrees with plan.          Diagnosis:   1. General weakness    2. Cirrhosis of liver with ascites, unspecified hepatic cirrhosis type (Multi)    3. Anemia, unspecified type    4. Thrombocytopenia (CMS-HCC)    5. Hyperammonemia (Multi)    6. Hypoglycemia    7. Hyponatremia    8. Renal insufficiency    9. Hyperbilirubinemia    10. 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    Aortic dilatation    Cancer (Multi)     Cirrhosis of liver (Multi)     Esophageal varices     Hepatocellular carcinoma     Personal history of diseases of the blood and blood-forming organs and  certain disorders involving the immune mechanism 07/01/2020    History of anemia    Personal history of other medical treatment     H/O echocardiogram   [2]   Past Surgical History:  Procedure Laterality Date    COLONOSCOPY  10/22/2018    Colonoscopy    OTHER SURGICAL HISTORY  07/01/2020    Cataract surgery   [3]   Family History  Problem Relation Name Age of Onset    Colon cancer Mother      Liver cancer Mother     [4]   Social History  Tobacco Use    Smoking status: Never    Smokeless tobacco: Never   Substance Use Topics    Alcohol use: Not Currently   [5] No Known Allergies  [6]   Past Medical History:  Diagnosis Date    Aortic ectasia, 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    Aortic dilatation    Cancer (Multi)     Cirrhosis of liver (Multi)     Esophageal varices     Hepatocellular carcinoma     Personal history of diseases of the blood and blood-forming organs and certain disorders involving the immune mechanism 07/01/2020    History of anemia    Personal history of other medical treatment     H/O echocardiogram   [7]   Past Surgical History:  Procedure Laterality Date    COLONOSCOPY  10/22/2018    Colonoscopy    OTHER SURGICAL HISTORY  07/01/2020    Cataract surgery   [8]   Family History  Problem Relation Name Age of Onset    Colon cancer Mother      Liver cancer Mother     [9]   Social History  Tobacco Use    Smoking status: Never    Smokeless tobacco: Never   Substance Use Topics    Alcohol use: Not Currently    Drug use: Not on file        Reinaldo MITCHELL MD  05/16/25 4146

## 2025-05-16 NOTE — CONSULTS
"Consults    Reason For Consult  Goals of care     History Of Present Illness  Alfonso Toscano is a 80 y.o. male presenting with with history of hepatocellular CA, cirrhosis, GIB, presented from home with increased weakness and inability of wife to care for him at home. He has become increasingly weak over the past few weeks. No fevers or chills. Recenly had 2L of ascitic fluid drained on 5/3. He was scheduled for an MRI today but unable to make it. Therapy on hold given recent illness.      ROS: c/o pain in low back.  Does not radiate.      Past Medical History  He has a past medical history of Aortic ectasia, unspecified site (CMS-HCC) (03/16/2021), Cancer (Multi), Cirrhosis of liver (Multi), Esophageal varices, Hepatocellular carcinoma, Personal history of diseases of the blood and blood-forming organs and certain disorders involving the immune mechanism (07/01/2020), and Personal history of other medical treatment.    Surgical History  He has a past surgical history that includes Colonoscopy (10/22/2018) and Other surgical history (07/01/2020).     Family History  Family History[1]  Allergies  Patient has no known allergies.    Review of Systems     Physical Exam  GEN: frail elderly male, ill appearing  HEENT: mm dry; bitemp wasting  RESP: even and regular    Last Recorded Vitals  Blood pressure 109/57, pulse 58, temperature 36 °C (96.8 °F), temperature source Temporal, resp. rate 18, height 1.702 m (5' 7\"), weight 87.5 kg (192 lb 14.4 oz), SpO2 95%.    Relevant Results  Scheduled medications  Scheduled Medications[2]  Continuous medications  Continuous Medications[3]  PRN medications  PRN Medications[4]    Results for orders placed or performed during the hospital encounter of 05/16/25 (from the past 24 hours)   ECG 12 lead   Result Value Ref Range    Ventricular Rate 56 BPM    Atrial Rate 56 BPM    NH Interval 234 ms    QRS Duration 76 ms    QT Interval 464 ms    QTC Calculation(Bazett) 447 ms    P Axis 70 " degrees    R Axis 46 degrees    T Axis 39 degrees    QRS Count 9 beats    Q Onset 220 ms    P Onset 103 ms    P Offset 149 ms    T Offset 452 ms    QTC Fredericia 453 ms   CBC and Auto Differential   Result Value Ref Range    WBC 10.2 4.4 - 11.3 x10*3/uL    nRBC 0.0 0.0 - 0.0 /100 WBCs    RBC 3.15 (L) 4.50 - 5.90 x10*6/uL    Hemoglobin 9.3 (L) 13.5 - 17.5 g/dL    Hematocrit 29.0 (L) 41.0 - 52.0 %    MCV 92 80 - 100 fL    MCH 29.5 26.0 - 34.0 pg    MCHC 32.1 32.0 - 36.0 g/dL    RDW 21.0 (H) 11.5 - 14.5 %    Platelets 69 (L) 150 - 450 x10*3/uL    Neutrophils % 59.6 40.0 - 80.0 %    Immature Granulocytes %, Automated 0.3 0.0 - 0.9 %    Lymphocytes % 17.9 13.0 - 44.0 %    Monocytes % 20.4 2.0 - 10.0 %    Eosinophils % 1.4 0.0 - 6.0 %    Basophils % 0.4 0.0 - 2.0 %    Neutrophils Absolute 6.06 (H) 1.60 - 5.50 x10*3/uL    Immature Granulocytes Absolute, Automated 0.03 0.00 - 0.50 x10*3/uL    Lymphocytes Absolute 1.82 0.80 - 3.00 x10*3/uL    Monocytes Absolute 2.07 (H) 0.05 - 0.80 x10*3/uL    Eosinophils Absolute 0.14 0.00 - 0.40 x10*3/uL    Basophils Absolute 0.04 0.00 - 0.10 x10*3/uL   Lactate   Result Value Ref Range    Lactate 2.4 (H) 0.4 - 2.0 mmol/L   Basic metabolic panel   Result Value Ref Range    Glucose 64 (L) 74 - 99 mg/dL    Sodium 131 (L) 136 - 145 mmol/L    Potassium 4.4 3.5 - 5.3 mmol/L    Chloride 96 (L) 98 - 107 mmol/L    Bicarbonate 26 21 - 32 mmol/L    Anion Gap 13 10 - 20 mmol/L    Urea Nitrogen 16 6 - 23 mg/dL    Creatinine 1.41 (H) 0.50 - 1.30 mg/dL    eGFR 51 (L) >60 mL/min/1.73m*2    Calcium 8.1 (L) 8.6 - 10.3 mg/dL   Hepatic function panel   Result Value Ref Range    Albumin 2.4 (L) 3.4 - 5.0 g/dL    Bilirubin, Total 5.0 (H) 0.0 - 1.2 mg/dL    Bilirubin, Direct 1.7 (H) 0.0 - 0.3 mg/dL    Alkaline Phosphatase 179 (H) 33 - 136 U/L    ALT 20 10 - 52 U/L    AST 49 (H) 9 - 39 U/L    Total Protein 5.3 (L) 6.4 - 8.2 g/dL   Ammonia   Result Value Ref Range    Ammonia 69 (H) 16 - 53 umol/L   Protime-INR    Result Value Ref Range    Protime 20.9 (H) 9.8 - 12.4 seconds    INR 1.9 (H) 0.9 - 1.1   aPTT   Result Value Ref Range    aPTT 47 (H) 26 - 36 seconds   B-Type Natriuretic Peptide   Result Value Ref Range     (H) 0 - 99 pg/mL   Type And Screen   Result Value Ref Range    ABO TYPE O     Rh TYPE POS     ANTIBODY SCREEN NEG    Troponin I, High Sensitivity, Initial   Result Value Ref Range    Troponin I, High Sensitivity 16 0 - 20 ng/L   Morphology   Result Value Ref Range    RBC Morphology See Below     Ovalocytes Few    Urinalysis with Reflex Culture and Microscopic   Result Value Ref Range    Color, Urine Yellow Light-Yellow, Yellow, Dark-Yellow    Appearance, Urine Clear Clear    Specific Gravity, Urine 1.013 1.005 - 1.035    pH, Urine 6.5 5.0, 5.5, 6.0, 6.5, 7.0, 7.5, 8.0    Protein, Urine NEGATIVE NEGATIVE, 10 (TRACE), 20 (TRACE) mg/dL    Glucose, Urine Normal Normal mg/dL    Blood, Urine NEGATIVE NEGATIVE mg/dL    Ketones, Urine NEGATIVE NEGATIVE mg/dL    Bilirubin, Urine NEGATIVE NEGATIVE mg/dL    Urobilinogen, Urine 3 (1+) (A) Normal mg/dL    Nitrite, Urine NEGATIVE NEGATIVE    Leukocyte Esterase, Urine NEGATIVE NEGATIVE   Troponin, High Sensitivity, 1 Hour   Result Value Ref Range    Troponin I, High Sensitivity 13 0 - 20 ng/L   Lactate   Result Value Ref Range    Lactate 2.4 (H) 0.4 - 2.0 mmol/L   POCT GLUCOSE   Result Value Ref Range    POCT Glucose 150 (H) 74 - 99 mg/dL          Assessment/Plan   -Met with patient in ED, only family at bedside is son in law.  He did maria e me permission to speak in front of him.  Patient appears to be a poor historian.  Feels he's not doing well overall and willing to go to rehab though unsure how well he'll do there.  Would like to talk to his wife and will attempt to reach out to her for more information.    I spent 45 minutes in the professional and overall care of this patient.      June Lara, APRN-CNP         [1]   Family History  Problem Relation Name  Age of Onset    Colon cancer Mother      Liver cancer Mother     [2]    [3]    [4] PRN medications: acetaminophen **OR** acetaminophen **OR** acetaminophen, acetaminophen **OR** acetaminophen **OR** acetaminophen, ondansetron **OR** ondansetron, oxyCODONE, polyethylene glycol

## 2025-05-16 NOTE — H&P
History Of Present Illness  Alfonso Toscano is a 80 y.o. male presenting with with history of hepatocellular CA, cirrhosis, GIB, presented from home with increased weakness and inability of wife to care for him at home. He has become increasingly weak over the past few weeks. No fevers or chills. Recenly had 2L of ascitic fluid drained on 5/3. He was scheduled for an MRI today but unable to make it. In the ED workup benign however patient very weak. He subsequently admitted for observation.      Past Medical History  He has a past medical history of Aortic ectasia, unspecified site (CMS-HCC) (03/16/2021), Cancer (Multi), Cirrhosis of liver (Multi), Esophageal varices, Hepatocellular carcinoma, Personal history of diseases of the blood and blood-forming organs and certain disorders involving the immune mechanism (07/01/2020), and Personal history of other medical treatment.    Surgical History  He has a past surgical history that includes Colonoscopy (10/22/2018) and Other surgical history (07/01/2020).     Social History  He reports that he has never smoked. He has never used smokeless tobacco. He reports that he does not currently use alcohol. No history on file for drug use.    Family History  Family History[1]     10 point review of systems negative except per HPI      Last Recorded Vitals  BP (!) 115/46 (BP Location: Right arm, Patient Position: Lying)   Pulse 57   Temp 36 °C (96.8 °F) (Temporal)   Resp 18   Wt 87.5 kg (192 lb 14.4 oz)   SpO2 95%     Physical Exam   Gen: fatigued, ill appearing   HEENT: EOM, MMM  CV: RRR, no murmurs rubs or gallops  Resp: coarse rhonchi b/l   Abdomen: soft, NT,+BS  LE: No edema        Relevant Results  Labs Reviewed   CBC WITH AUTO DIFFERENTIAL - Abnormal       Result Value    WBC 10.2      nRBC 0.0      RBC 3.15 (*)     Hemoglobin 9.3 (*)     Hematocrit 29.0 (*)     MCV 92      MCH 29.5      MCHC 32.1      RDW 21.0 (*)     Platelets 69 (*)     Neutrophils % 59.6       Immature Granulocytes %, Automated 0.3      Lymphocytes % 17.9      Monocytes % 20.4      Eosinophils % 1.4      Basophils % 0.4      Neutrophils Absolute 6.06 (*)     Immature Granulocytes Absolute, Automated 0.03      Lymphocytes Absolute 1.82      Monocytes Absolute 2.07 (*)     Eosinophils Absolute 0.14      Basophils Absolute 0.04     LACTATE - Abnormal    Lactate 2.4 (*)     Narrative:     Venipuncture immediately after or during the administration of Metamizole may lead to falsely low results. Testing should be performed immediately prior to Metamizole dosing.   BASIC METABOLIC PANEL - Abnormal    Glucose 64 (*)     Sodium 131 (*)     Potassium 4.4      Chloride 96 (*)     Bicarbonate 26      Anion Gap 13      Urea Nitrogen 16      Creatinine 1.41 (*)     eGFR 51 (*)     Calcium 8.1 (*)    HEPATIC FUNCTION PANEL - Abnormal    Albumin 2.4 (*)     Bilirubin, Total 5.0 (*)     Bilirubin, Direct 1.7 (*)     Alkaline Phosphatase 179 (*)     ALT 20      AST 49 (*)     Total Protein 5.3 (*)    AMMONIA - Abnormal    Ammonia 69 (*)    PROTIME-INR - Abnormal    Protime 20.9 (*)     INR 1.9 (*)    APTT - Abnormal    aPTT 47 (*)     Narrative:     The APTT is no longer used for monitoring Unfractionated Heparin Therapy. For monitoring Heparin Therapy, use the Heparin Assay.   B-TYPE NATRIURETIC PEPTIDE - Abnormal     (*)     Narrative:        <100 pg/mL - Heart failure unlikely  100-299 pg/mL - Intermediate probability of acute heart                  failure exacerbation. Correlate with clinical                  context and patient history.    >=300 pg/mL - Heart Failure likely. Correlate with clinical                  context and patient history.    BNP testing is performed using different testing methodology at [facility] than at other [facility] hospitals. Direct result comparisons should only be made within the same method.      URINALYSIS WITH REFLEX CULTURE AND MICROSCOPIC - Abnormal    Color,  Urine Yellow      Appearance, Urine Clear      Specific Gravity, Urine 1.013      pH, Urine 6.5      Protein, Urine NEGATIVE      Glucose, Urine Normal      Blood, Urine NEGATIVE      Ketones, Urine NEGATIVE      Bilirubin, Urine NEGATIVE      Urobilinogen, Urine 3 (1+) (*)     Nitrite, Urine NEGATIVE      Leukocyte Esterase, Urine NEGATIVE     LACTATE - Abnormal    Lactate 2.4 (*)     Narrative:     Venipuncture immediately after or during the administration of Metamizole may lead to falsely low results. Testing should be performed immediately prior to Metamizole dosing.   POCT GLUCOSE - Abnormal    POCT Glucose 150 (*)    SERIAL TROPONIN-INITIAL - Normal    Troponin I, High Sensitivity 16      Narrative:     Less than 99th percentile of normal range cutoff-  Female and children under 18 years old <14 ng/L; Male <21 ng/L: Negative  Repeat testing should be performed if clinically indicated.     Female and children under 18 years old 14-50 ng/L; Male 21-50 ng/L:  Consistent with possible cardiac damage and possible increased clinical   risk. Serial measurements may help to assess extent of myocardial damage.     >50 ng/L: Consistent with cardiac damage, increased clinical risk and  myocardial infarction. Serial measurements may help assess extent of   myocardial damage.      NOTE: Children less than 1 year old may have higher baseline troponin   levels and results should be interpreted in conjunction with the overall   clinical context.     NOTE: Troponin I testing is performed using a different   testing methodology at [facility] than at other   Health system hospitals. Direct result comparisons should only   be made within the same method.   SERIAL TROPONIN, 1 HOUR - Normal    Troponin I, High Sensitivity 13      Narrative:     Less than 99th percentile of normal range cutoff-  Female and children under 18 years old <14 ng/L; Male <21 ng/L: Negative  Repeat testing should be performed if clinically  indicated.     Female and children under 18 years old 14-50 ng/L; Male 21-50 ng/L:  Consistent with possible cardiac damage and possible increased clinical   risk. Serial measurements may help to assess extent of myocardial damage.     >50 ng/L: Consistent with cardiac damage, increased clinical risk and  myocardial infarction. Serial measurements may help assess extent of   myocardial damage.      NOTE: Children less than 1 year old may have higher baseline troponin   levels and results should be interpreted in conjunction with the overall   clinical context.     NOTE: Troponin I testing is performed using a different   testing methodology at [facility] than at other   [facility]. Direct result comparisons should only   be made within the same method.   BLOOD CULTURE   BLOOD CULTURE   TROPONIN SERIES- (INITIAL, 1 HR)    Narrative:     The following orders were created for panel order Troponin I Series, High Sensitivity (0, 1 HR).  Procedure                               Abnormality         Status                     ---------                               -----------         ------                     Troponin I, High Sensiti...[[id number]]  Normal              Final result               Troponin, High Sensitivi...[[id number]]  Normal              Final result                 Please view results for these tests on the individual orders.   TYPE AND SCREEN    ABO TYPE O      Rh TYPE POS      ANTIBODY SCREEN NEG     URINALYSIS WITH REFLEX CULTURE AND MICROSCOPIC    Narrative:     The following orders were created for panel order Urinalysis with Reflex Culture and Microscopic.  Procedure                               Abnormality         Status                     ---------                               -----------         ------                     Urinalysis with Reflex C...[[id number]]  Abnormal            Final result               Extra Urine Gray Tube[[id number]]                            In  process                   Please view results for these tests on the individual orders.   EXTRA URINE GRAY TUBE   MORPHOLOGY    RBC Morphology See Below      Ovalocytes Few       CT abdomen pelvis wo IV contrast   Final Result   1.Mild wall thickening of the rectum. Correlate clinically for   proctitis.   2.Hepatic steatosis with cirrhotic morphology of the liver. Few   small low-attenuation lesions measuring up to 1.6 cm in the left   hepatic lobe poorly evaluated on this noncontrast exam. Recommend   further evaluation with contrast-enhanced MRI.   3.Small amount of abdominal/pelvic ascites.   4.Colonic diverticulosis without findings of diverticulitis.   5.Severe coronary artery calcifications.   6.Stable superior endplate compression deformity of L4 with   approximately 40% loss of height.   7.Severe central canal narrowing at L3-4 and L4-5.Mild to   moderate bilateral neuroforaminal narrowing at L3-4, L4-5 and L5-S1.     Signed by Jaime Praod MD      CT lumbar spine retrospective reconstruction protocol   Final Result   1.Mild wall thickening of the rectum. Correlate clinically for   proctitis.   2.Hepatic steatosis with cirrhotic morphology of the liver. Few   small low-attenuation lesions measuring up to 1.6 cm in the left   hepatic lobe poorly evaluated on this noncontrast exam. Recommend   further evaluation with contrast-enhanced MRI.   3.Small amount of abdominal/pelvic ascites.   4.Colonic diverticulosis without findings of diverticulitis.   5.Severe coronary artery calcifications.   6.Stable superior endplate compression deformity of L4 with   approximately 40% loss of height.   7.Severe central canal narrowing at L3-4 and L4-5.Mild to   moderate bilateral neuroforaminal narrowing at L3-4, L4-5 and L5-S1.     Signed by Jaime Prado MD      XR chest 1 view   Final Result   No acute process.   Signed by Jaime Prado MD        Assessment/Plan  80 year old male with history of metastatic liver CA,  admitted with weakness and inability for family to care for patient at home    -therapy has been on hold given recently illness, recently had GI bleed  -he was suppose to have MRI but unable to make it do to weakness  -recently had thoracentesis on 5/13 and had 2.5L removed  -continue diuretics and lactulose   -PT/OT and palliative care consulted    Chronic blood loss anemia: recent GI bleed, H/H stable  -had EGD and colonoscopy in may and found to have hemorrhoidal bleeding     Thrombocytopenia: setting of liver disease     HTN/HLD: continue home meds      DVT ppx: SCD       Shelton Dodd MD         [1]   Family History  Problem Relation Name Age of Onset    Colon cancer Mother      Liver cancer Mother

## 2025-05-17 ENCOUNTER — HOME CARE VISIT (OUTPATIENT)
Dept: HOME HEALTH SERVICES | Facility: HOME HEALTH | Age: 80
End: 2025-05-17
Payer: MEDICARE

## 2025-05-17 ENCOUNTER — APPOINTMENT (OUTPATIENT)
Dept: CARDIOLOGY | Facility: HOSPITAL | Age: 80
DRG: 435 | End: 2025-05-17
Payer: MEDICARE

## 2025-05-17 ENCOUNTER — HOSPITAL ENCOUNTER (OUTPATIENT)
Dept: CARDIOLOGY | Facility: HOSPITAL | Age: 80
Setting detail: OBSERVATION
Discharge: HOME | DRG: 435 | End: 2025-05-17
Payer: MEDICARE

## 2025-05-17 VITALS
SYSTOLIC BLOOD PRESSURE: 138 MMHG | TEMPERATURE: 97 F | HEART RATE: 53 BPM | DIASTOLIC BLOOD PRESSURE: 58 MMHG | OXYGEN SATURATION: 96 %

## 2025-05-17 LAB
ATRIAL RATE: 51 BPM
GLUCOSE BLD MANUAL STRIP-MCNC: 99 MG/DL (ref 74–99)
P AXIS: 17 DEGREES
P OFFSET: 170 MS
P ONSET: 113 MS
PR INTERVAL: 210 MS
Q ONSET: 218 MS
QRS COUNT: 9 BEATS
QRS DURATION: 80 MS
QT INTERVAL: 462 MS
QTC CALCULATION(BAZETT): 425 MS
QTC FREDERICIA: 438 MS
R AXIS: 26 DEGREES
T AXIS: 58 DEGREES
T OFFSET: 449 MS
VENTRICULAR RATE: 51 BPM

## 2025-05-17 PROCEDURE — 2500000004 HC RX 250 GENERAL PHARMACY W/ HCPCS (ALT 636 FOR OP/ED): Performed by: HOSPITALIST

## 2025-05-17 PROCEDURE — 93005 ELECTROCARDIOGRAM TRACING: CPT

## 2025-05-17 PROCEDURE — 2500000002 HC RX 250 W HCPCS SELF ADMINISTERED DRUGS (ALT 637 FOR MEDICARE OP, ALT 636 FOR OP/ED): Performed by: INTERNAL MEDICINE

## 2025-05-17 PROCEDURE — 93010 ELECTROCARDIOGRAM REPORT: CPT | Performed by: INTERNAL MEDICINE

## 2025-05-17 PROCEDURE — 97165 OT EVAL LOW COMPLEX 30 MIN: CPT | Mod: GO

## 2025-05-17 PROCEDURE — G0378 HOSPITAL OBSERVATION PER HR: HCPCS

## 2025-05-17 PROCEDURE — 97161 PT EVAL LOW COMPLEX 20 MIN: CPT | Mod: GP | Performed by: PHYSICAL THERAPIST

## 2025-05-17 PROCEDURE — 99232 SBSQ HOSP IP/OBS MODERATE 35: CPT | Performed by: HOSPITALIST

## 2025-05-17 PROCEDURE — 2500000001 HC RX 250 WO HCPCS SELF ADMINISTERED DRUGS (ALT 637 FOR MEDICARE OP): Performed by: HOSPITALIST

## 2025-05-17 PROCEDURE — 2500000001 HC RX 250 WO HCPCS SELF ADMINISTERED DRUGS (ALT 637 FOR MEDICARE OP): Performed by: INTERNAL MEDICINE

## 2025-05-17 PROCEDURE — 82947 ASSAY GLUCOSE BLOOD QUANT: CPT

## 2025-05-17 RX ORDER — FERROUS SULFATE 325(65) MG
1 TABLET ORAL
Status: DISCONTINUED | OUTPATIENT
Start: 2025-05-18 | End: 2025-05-21 | Stop reason: HOSPADM

## 2025-05-17 RX ORDER — BICALUTAMIDE 50 MG/1
50 TABLET, FILM COATED ORAL
Status: DISCONTINUED | OUTPATIENT
Start: 2025-05-17 | End: 2025-05-21 | Stop reason: HOSPADM

## 2025-05-17 RX ORDER — CHOLECALCIFEROL (VITAMIN D3) 25 MCG
25 TABLET ORAL
Status: DISCONTINUED | OUTPATIENT
Start: 2025-05-17 | End: 2025-05-21 | Stop reason: HOSPADM

## 2025-05-17 RX ORDER — LACTULOSE 10 G/15ML
20 SOLUTION ORAL 3 TIMES DAILY
Status: DISCONTINUED | OUTPATIENT
Start: 2025-05-17 | End: 2025-05-21 | Stop reason: HOSPADM

## 2025-05-17 RX ORDER — FUROSEMIDE 40 MG/1
40 TABLET ORAL DAILY
Status: DISCONTINUED | OUTPATIENT
Start: 2025-05-17 | End: 2025-05-21 | Stop reason: HOSPADM

## 2025-05-17 RX ADMIN — Medication 25 MCG: at 13:47

## 2025-05-17 RX ADMIN — FUROSEMIDE 40 MG: 40 TABLET ORAL at 13:47

## 2025-05-17 RX ADMIN — LACTULOSE 20 G: 20 SOLUTION ORAL at 15:11

## 2025-05-17 RX ADMIN — BICALUTAMIDE 50 MG: 50 TABLET ORAL at 13:47

## 2025-05-17 RX ADMIN — LACTULOSE 20 G: 20 SOLUTION ORAL at 21:40

## 2025-05-17 RX ADMIN — PANTOPRAZOLE SODIUM 40 MG: 40 TABLET, DELAYED RELEASE ORAL at 09:15

## 2025-05-17 RX ADMIN — FINASTERIDE 5 MG: 5 TABLET, FILM COATED ORAL at 09:15

## 2025-05-17 RX ADMIN — SPIRONOLACTONE 100 MG: 25 TABLET ORAL at 09:15

## 2025-05-17 RX ADMIN — ACETAMINOPHEN 650 MG: 325 TABLET ORAL at 21:40

## 2025-05-17 ASSESSMENT — COGNITIVE AND FUNCTIONAL STATUS - GENERAL
PERSONAL GROOMING: A LITTLE
MOVING TO AND FROM BED TO CHAIR: A LOT
DRESSING REGULAR UPPER BODY CLOTHING: A LOT
CLIMB 3 TO 5 STEPS WITH RAILING: TOTAL
EATING MEALS: A LITTLE
MOVING TO AND FROM BED TO CHAIR: A LOT
DRESSING REGULAR LOWER BODY CLOTHING: A LOT
WALKING IN HOSPITAL ROOM: TOTAL
EATING MEALS: A LITTLE
TOILETING: A LOT
HELP NEEDED FOR BATHING: A LOT
CLIMB 3 TO 5 STEPS WITH RAILING: TOTAL
MOVING TO AND FROM BED TO CHAIR: A LOT
MOBILITY SCORE: 12
MOVING FROM LYING ON BACK TO SITTING ON SIDE OF FLAT BED WITH BEDRAILS: A LITTLE
WALKING IN HOSPITAL ROOM: A LOT
MOBILITY SCORE: 12
CLIMB 3 TO 5 STEPS WITH RAILING: TOTAL
STANDING UP FROM CHAIR USING ARMS: A LOT
PERSONAL GROOMING: A LOT
MOVING FROM LYING ON BACK TO SITTING ON SIDE OF FLAT BED WITH BEDRAILS: A LITTLE
EATING MEALS: A LITTLE
MOBILITY SCORE: 12
HELP NEEDED FOR BATHING: A LOT
DAILY ACTIVITIY SCORE: 14
DRESSING REGULAR LOWER BODY CLOTHING: A LOT
WALKING IN HOSPITAL ROOM: TOTAL
TURNING FROM BACK TO SIDE WHILE IN FLAT BAD: A LOT
HELP NEEDED FOR BATHING: A LOT
TURNING FROM BACK TO SIDE WHILE IN FLAT BAD: A LITTLE
MOVING FROM LYING ON BACK TO SITTING ON SIDE OF FLAT BED WITH BEDRAILS: A LITTLE
STANDING UP FROM CHAIR USING ARMS: A LOT
DRESSING REGULAR LOWER BODY CLOTHING: A LOT
DAILY ACTIVITIY SCORE: 13
PERSONAL GROOMING: A LOT
TURNING FROM BACK TO SIDE WHILE IN FLAT BAD: A LITTLE
DRESSING REGULAR UPPER BODY CLOTHING: A LOT
STANDING UP FROM CHAIR USING ARMS: A LOT
TOILETING: A LOT
DAILY ACTIVITIY SCORE: 13
TOILETING: A LOT
DRESSING REGULAR UPPER BODY CLOTHING: A LOT

## 2025-05-17 ASSESSMENT — PAIN SCALES - GENERAL
PAINLEVEL_OUTOF10: 5 - MODERATE PAIN
PAINLEVEL_OUTOF10: 5 - MODERATE PAIN
PAINLEVEL_OUTOF10: 3
PAINLEVEL_OUTOF10: 0 - NO PAIN

## 2025-05-17 ASSESSMENT — ACTIVITIES OF DAILY LIVING (ADL)
LACK_OF_TRANSPORTATION: NO
BATHING_ASSISTANCE: MAXIMAL

## 2025-05-17 ASSESSMENT — PAIN - FUNCTIONAL ASSESSMENT
PAIN_FUNCTIONAL_ASSESSMENT: 0-10
PAIN_FUNCTIONAL_ASSESSMENT: 0-10

## 2025-05-17 NOTE — PROGRESS NOTES
"Alfonso Toscano is a 80 y.o. male on day 0 of admission presenting with weakness      Subjective   Complaints still very weak.        Objective     Last Recorded Vitals  BP (!) 105/46   Pulse 50   Temp 36.6 °C (97.9 °F)   Resp 17   Ht 1.702 m (5' 7\")   Wt 87.5 kg (192 lb 14.4 oz)   SpO2 93%   BMI 30.21 kg/m²      Intake/Output last 3 Shifts:  No intake or output data in the 24 hours ending 05/17/25 1213    Scheduled medications  Scheduled Medications[1]  Continuous medications  Continuous Medications[2]  PRN medications  PRN Medications[3]    Physical Exam   Gen: NAD  HEENT: EOM, MMM  CV: RRR, no murmurs rubs or gallops  Resp: coarse rhonchi b/l   Abdomen: soft, NT,+BS  LE: No edema      Relevant Results  Lab Results   Component Value Date    WBC 10.2 05/16/2025    HGB 9.3 (L) 05/16/2025    HCT 29.0 (L) 05/16/2025    MCV 92 05/16/2025    PLT 69 (L) 05/16/2025     Lab Results   Component Value Date    GLUCOSE 64 (L) 05/16/2025    CALCIUM 8.1 (L) 05/16/2025     (L) 05/16/2025    K 4.4 05/16/2025    CO2 26 05/16/2025    CL 96 (L) 05/16/2025    BUN 16 05/16/2025    CREATININE 1.41 (H) 05/16/2025         Assessment/Plan 80 year old male with history of metastatic liver CA, admitted with weakness and inability for family to care for patient at home     -therapy has been on hold given recently illness, recently had GI bleed  -he was suppose to have MRI but unable to make it do to weakness  -recently had thoracentesis on 5/13 and had 2.5L removed  -continue diuretics and lactulose   -palliative consulted, discussed with wife today and would like to try snf and then palliative/hospice    Metabolic encephalopathy has been ruled out      Chronic blood loss anemia: recent GI bleed, H/H stable  -had EGD and colonoscopy in may and found to have hemorrhoidal bleeding      Thrombocytopenia: setting of liver disease      HTN/HLD: continue home meds     DVT ppx: SCD    Code status: DNRCC-SHIRLENE Dodd MD       "     [1] finasteride, 5 mg, oral, Daily  nadolol, 10 mg, oral, Daily  pantoprazole, 40 mg, oral, Daily  spironolactone, 100 mg, oral, Daily  [2]    [3] PRN medications: acetaminophen **OR** acetaminophen **OR** acetaminophen, acetaminophen **OR** acetaminophen **OR** acetaminophen, ondansetron **OR** ondansetron, oxyCODONE, polyethylene glycol, traMADol

## 2025-05-17 NOTE — PROGRESS NOTES
Occupational Therapy    Evaluation    Patient Name: Alfonso Toscano  MRN: [id number]  Department: [facility]  Room: [address] [place] [address]  Today's Date: 5/17/2025  Time Calculation  Start Time: 1131  Stop Time: 1156  Time Calculation (min): 25 min    Assessment  IP OT Assessment  OT Assessment: Continue OT to address balance, strength, endurance for ADLs and functional mobility.  Barriers to Discharge Home: Physical needs  Physical Needs: Stair navigation into home limited by function/safety, 24hr mobility assistance needed, 24hr ADL assistance needed, High falls risk due to function or environment, Ambulating household distances limited by function/safety  End of Session Communication: Bedside nurse  End of Session Patient Position: Bed, 3 rail up, Alarm on (all needs in reach, wife present)  Plan:  Treatment Interventions: ADL retraining, Functional transfer training, UE strengthening/ROM, Endurance training, Patient/family training, Equipment evaluation/education, Compensatory technique education  OT Frequency: 2 times per week  OT Discharge Recommendations: Moderate intensity level of continued care  OT - OK to Discharge: Yes (when medically appropriate)    Subjective   Current Problem:  1. General weakness        2. Cirrhosis of liver with ascites, unspecified hepatic cirrhosis type (Multi)        3. Anemia, unspecified type        4. Thrombocytopenia (CMS-HCC)        5. Hyperammonemia (Multi)        6. Hypoglycemia        7. Hyponatremia        8. Renal insufficiency        9. Hyperbilirubinemia        10. Coagulopathy (Multi)          OT Visit Info:  OT Received On: 05/17/25  General Visit Info:  General  Reason for Referral: ADL  Referred By: Yousif  Past Medical History Relevant to Rehab: Includes: Hepatocellular carcinoma, recent GIB, cirrhosis with recent paracentesis (5/3/25)  Family/Caregiver Present: Yes  Caregiver Feedback: spouse  Co-Treatment: PT  Co-Treatment Reason: safety  Prior to Session Communication: Bedside  nurse  Patient Position Received: Bed, 3 rail up, Alarm off, not on at start of session (external catheter, yellow/jaundice coloring, skin tears bilat. LEs and RUE due to recent fall in shower at home.)  General Comment: To ED 5/16 due to worsening weakness and low back pain; frequent falls, difficulty ambulating; spouse unable to care for patient. Pt. has hepatocellular carcinoma and Tx is on hold due to recent illness/GIB. Palliative Care Consult. CXR (-) acute. CT abd/pelvis and L-spine: Hepatic steatosis with cirrhotic morphology of the liver. Few small low-attenuation lesions measuring up to 1.6 cm in the left  hepatic lobe poorly evaluated on this noncontrast exam. Recommend  further evaluation with contrast-enhanced MRI.  Small amount of abdominal/pelvic ascites.  Colonic diverticulosis without findings of diverticulitis. Severe coronary artery calcifications. Stable superior endplate compression deformity of L4 with  approximately 40% loss of height.  Severe central canal narrowing at L3-4 and L4-5. Mild to  moderate bilateral neuroforaminal narrowing at L3-4, L4-5 and L5-S1.  Precautions:  Hearing/Visual Limitations: hard of hearing, uses amplifier at times  Medical Precautions: Fall precautions  Precautions Comment: Hypotension      Vital Signs Comment: Seated BP 99/48 HR 49 bpm; seated BP after ambulation 108/51 HR 51 bpm    Pain:  Pain Assessment  Pain Assessment: 0-10  0-10 (Numeric) Pain Score: 5 - Moderate pain  Pain Location: Leg  Pain Orientation: Left, Right  Pain Interventions: Repositioned    Objective   Cognition:  Orientation Level: Oriented X4  Processing Speed: Delayed           Home Living:  Home Living Comments: Lives with spouse (24/7 supervision available) in 1 story home with 3 ABNER with HR and 1 step into kitchen with GBs available. Pt. sleeps in recliner. Owns 2ww.  Amb. with 2ww and some assist for household distances. Multiple falls (2 very recent falls). Spouse drives. Pt. was  participating in HHPT and HHOT. Has needed assistance with LB bathing  and ambulation over past 2 weeks.      ADL:  Eating Deficit: Setup  Grooming Assistance: Minimal  Bathing Assistance: Maximal  UE Dressing Assistance: Moderate  LE Dressing Assistance: Maximal  Toileting Assistance with Device: Maximal  ADL Comments: head hanging, kyphotic, generally weak and frail  Activity Tolerance:  Endurance: Decreased tolerance for upright activites  Activity Tolerance Comments: fatigues quickly; hypotensive- see vitals above  Bed Mobility/Transfers: Bed Mobility  Bed Mobility:  (Mod assist sup to sit and sit to sup with increased time.  Assist with trunk.)    Transfers  Transfer:  (Mod assist sit<>stand at EOB with FWW.  Assist with lift, balance, descent.)      Functional Mobility:  Functional Mobility  Functional Mobility Performed:  (Mod assist for 6 side steps at EOB with FWW.  Assist for balance and walker management.)  Sitting Balance:  Static Sitting Balance  Static Sitting-Comment/Number of Minutes: good  Dynamic Sitting Balance  Dynamic Sitting-Comments: fair (+)  Standing Balance:  Static Standing Balance  Static Standing-Comment/Number of Minutes: fair (-) with FWW  Dynamic Standing Balance  Dynamic Standing-Comments: poor (+) with FWW     Strength:  Strength Comments: Generalized weakness, 3+/5 bilat. UEs.       Extremities: RUE   RUE :  (RUE AROM grossly WFL, weak movements) and LUE   LUE:  (LUE AROM grossly WFL, weak movements)    Outcome Measures: [facility] Daily Activity  Putting on and taking off regular lower body clothing: A lot  Bathing (including washing, rinsing, drying): A lot  Putting on and taking off regular upper body clothing: A lot  Toileting, which includes using toilet, bedpan or urinal: A lot  Taking care of personal grooming such as brushing teeth: A little  Eating Meals: A little  Daily Activity - Total Score: 14      Education Documentation  Precautions, taught by Marija Arango, OT at  5/17/2025  4:48 PM.  Learner: Patient  Readiness: Acceptance  Method: Explanation  Response: Verbalizes Understanding    Body Mechanics, taught by Marija Arango OT at 5/17/2025  4:48 PM.  Learner: Patient  Readiness: Acceptance  Method: Explanation  Response: Verbalizes Understanding    Education Comments  No comments found.      Goals:   Encounter Problems       Encounter Problems (Active)       OT Goals       CGA sit/stand, bed/chair/commode with FWW.        Start:  05/17/25    Expected End:  05/31/25            Set up for UB bathing/dressing.        Start:  05/17/25    Expected End:  05/31/25            Min assist LB bathing/dressing.        Start:  05/17/25    Expected End:  05/31/25            Min assist toileting.        Start:  05/17/25    Expected End:  05/31/25            Fair dynamic standing balance for ADLs with UE support.        Start:  05/17/25    Expected End:  05/31/25

## 2025-05-17 NOTE — CARE PLAN
"The patient's goals for the shift include \"feel better\"    The clinical goals for the shift include Pt will maintain safety during shift.      "

## 2025-05-17 NOTE — PROGRESS NOTES
Physical Therapy    Physical Therapy Evaluation    Patient Name: Alfonso Toscano  MRN: [id number]  Today's Date: 5/17/2025   Time Calculation  Start Time: 1133  Stop Time: 1156  Time Calculation (min): 23 min  925/925-A    Assessment/Plan   PT Assessment  PT Assessment Results: Decreased strength, Decreased endurance, Impaired balance, Decreased mobility, Impaired hearing, Pain  Rehab Prognosis: Fair  Barriers to Discharge Home: Caregiver assistance, Physical needs  Caregiver Assistance: Caregiver assistance needed per identified barriers - however, level of patient's required assistance exceeds assistance available at home  Physical Needs: 24hr mobility assistance needed, High falls risk due to function or environment, Ambulating household distances limited by function/safety  End of Session Communication: Bedside nurse  Assessment Comment: Pt. requires up to modAx1 and ww to complete minimal OOB mobility. Pt. may benefit from continued PT to increase mobility and indep.  End of Session Patient Position: Bed, 3 rail up, Alarm on  IP OR SWING BED PT PLAN  Inpatient or Swing Bed: Inpatient  PT Plan  Treatment/Interventions: Bed mobility, Transfer training, Gait training, Balance training, Strengthening, Endurance training, Therapeutic exercise, Therapeutic activity, Home exercise program  PT Plan: Ongoing PT  PT Frequency: 3 times per week  PT Discharge Recommendations: Moderate intensity level of continued care  Equipment Recommended upon Discharge: Wheeled walker  PT Recommended Transfer Status: Assist x1, Assistive device (ww)  PT - OK to Discharge: Yes (to next level of care, when medically stable)      General Visit Information:  General  Reason for Referral: Impaired mobility  Referred By: Yousif 5/16  Past Medical History Relevant to Rehab: Hepatocellular carcinoma, recent GIB, cirrhosis with recent paracentesis (5/3/25)  Family/Caregiver Present: Yes  Caregiver Feedback: spouse  Co-Treatment: OT  Prior to  Session Communication: Bedside nurse  Patient Position Received: Bed, 3 rail up, Alarm off, not on at start of session (external catheter, yellow/jaundice coloring)  General Comment: To ED 5/16 due to worsening weakness and low back pain; frequent falls, difficulty ambulating; spouse unable to care for patient. Pt. has hepatocellular carcinoma and Tx is on hold due to recent illness/GIB. Palliative Care Consult. CXR (-) acute. CT abd/pelvis and L-spine: Hepatic steatosis with cirrhotic morphology of the liver. Few small low-attenuation lesions measuring up to 1.6 cm in the left  hepatic lobe poorly evaluated on this noncontrast exam. Recommend  further evaluation with contrast-enhanced MRI.  Small amount of abdominal/pelvic ascites.  Colonic diverticulosis without findings of diverticulitis. Severe coronary artery calcifications. Stable superior endplate compression deformity of L4 with  approximately 40% loss of height.  Severe central canal narrowing at L3-4 and L4-5. Mild to  moderate bilateral neuroforaminal narrowing at L3-4, L4-5 and L5-S1.    Home Living:  Home Living  Home Living Comments: Lives with spouse (24/7 supervision available) in 1 story home with 3 ABNER with HR and 1 step into kitchen with GBs available. Pt. sleeps in recliner. Owns 2ww.    Prior Level of Function:  Prior Function Per Pt/Caregiver Report  Prior Function Comments: Amb. with 2ww and some assist for household distances. Multiple falls (2 very recent falls). Spouse drives. Pt. was participating in HHPT and HHOT.    Precautions:  Precautions  Hearing/Visual Limitations: [place]  Medical Precautions: Fall precautions  Precautions Comment: Hypotension    Vital Signs:  Vital Signs  Vital Signs Comment: Seated BP 99/48 HR 49 bpm; seated BP after ambulation 108/51 HR 51 bpm  Objective     Pain:  Pain Assessment  Pain Assessment: 0-10  0-10 (Numeric) Pain Score: 5 - Moderate pain  Pain Location: Leg  Pain Orientation: Left, Right (and R great toe  (bandage intact))  Pain Interventions: Repositioned (activity modification)    Cognition:  Cognition  Orientation Level: Oriented X4  Processing Speed: Delayed    General Assessments:  General Observation  General Observation: Multiple abrasions/skin tears with bandages on BLEs and RUE from recent falls, per spouse   Activity Tolerance  Endurance: Decreased tolerance for upright activites  Activity Tolerance Comments: fatigues quickly; hypotensive- see vitals above  Sensation  Light Touch: No apparent deficits  Strength  Strength Comments: General weakness throughout. BLE 4-/5 grossly        Postural Control  Posture Comment: kyphotic posture, rounded shoulders, forward head          Functional Assessments:     Bed Mobility  Bed Mobility:  (supine to/from sit with modAx1; increased time required)  Transfers  Transfer:  (sit to/from stand with ww and modAx1; cues for safe hand placement)  Ambulation/Gait Training  Ambulation/Gait Training Performed:  (~ 4-5 side steps with ww and min/modAx1; slow, shuffled steps, assist with ww management)          Extremity/Trunk Assessments:        RLE   RLE :  (RLE AROM WFL, however, shortened hamstrings- appears chronic)  LLE   LLE :  (LLE AROM WFL, however, shortened hamstrings- appears chronic)    Outcome Measures:     [facility] Basic Mobility  Turning from your back to your side while in a flat bed without using bedrails: A little  Moving from lying on your back to sitting on the side of a flat bed without using bedrails: A lot  Moving to and from bed to chair (including a wheelchair): A lot  Standing up from a chair using your arms (e.g. wheelchair or bedside chair): A lot  To walk in hospital room: A lot  Climbing 3-5 steps with railing: Total  Basic Mobility - Total Score: 12                                                             Goals:  Encounter Problems       Encounter Problems (Active)       Impaired mobility        Perform all bed mobility with CGAx1       Start:   05/17/25    Expected End:  05/31/25            Perform all transfers with ww and CGAx1       Start:  05/17/25    Expected End:  05/31/25            Patient will ambulate 25 ft with ww and CGAx1        Start:  05/17/25    Expected End:  05/31/25            Patient will perform BLE HEP with supervision x10-15 reps x 1-2 sets, as tolerated        Start:  05/17/25    Expected End:  05/31/25                 Education Documentation  Mobility Training, taught by Radha Hammer PT at 5/17/2025  3:33 PM.  Learner: Patient  Readiness: Acceptance  Method: Explanation, Demonstration  Response: Verbalizes Understanding, Demonstrated Understanding, Needs Reinforcement  Comment: cues for safe mobility    Education Comments  No comments found.

## 2025-05-17 NOTE — PROGRESS NOTES
05/17/25 1133   Discharge Planning   Living Arrangements Spouse/significant other   Support Systems Spouse/significant other   Assistance Needed no   Type of Residence Private residence   Number of Stairs to Enter Residence 3   Number of Stairs Within Residence 0   Do you have animals or pets at home? No   Home or Post Acute Services Post acute facilities (Rehab/SNF/etc)   Type of Post Acute Facility Services Skilled nursing   Expected Discharge Disposition SNF   Does the patient need discharge transport arranged? Yes   RoundTrip coordination needed? Yes   Financial Resource Strain   How hard is it for you to pay for the very basics like food, housing, medical care, and heating? Not hard   Housing Stability   In the last 12 months, was there a time when you were not able to pay the mortgage or rent on time? N   In the past 12 months, how many times have you moved where you were living? 0   At any time in the past 12 months, were you homeless or living in a shelter (including now)? N   Transportation Needs   In the past 12 months, has lack of transportation kept you from medical appointments or from getting medications? no   In the past 12 months, has lack of transportation kept you from meetings, work, or from getting things needed for daily living? No   Patient Choice   Provider Choice list and CMS website (https://medicare.gov/care-compare#search) for post-acute Quality and Resource Measure Data were provided and reviewed with: Patient;Family   Intensity of Service   Intensity of Service 0-30 min     Chart reviewed, writer spoke with patient and spouse introduced self and explained role. Patient sitting up in bed appears frail. Writer notified by attending anticipate need for SNF and possible informational meeting with hospice vs palliative care. Writer reviewed criteria for SNF and would need to meet criteria for insurance to approve SNF. She would like to look at SNF for rehab to see if spouse can improve  mobility as he is very weak now and will not be able to provide the current level of care he needs. She would like to get him stronger to come home and at that time may consider palliative. Reviewed palliative and hospice. At this time wants to pursue SNF if he qualified for under insurance. Preference is [facility]. Referral sent in [facility]. Care Transition team to follow and assist as needed. LYDIA Sepulveda    Update 05/18/2025 0817 [facility] can accept pending insurance verification on Monday. PT am-pac 12 will be a [employer] pre-cert for SNF.  LYDIA Sepulveda

## 2025-05-18 VITALS
OXYGEN SATURATION: 94 % | SYSTOLIC BLOOD PRESSURE: 111 MMHG | TEMPERATURE: 98.6 F | BODY MASS INDEX: 30.28 KG/M2 | HEIGHT: 67 IN | WEIGHT: 192.9 LBS | RESPIRATION RATE: 16 BRPM | DIASTOLIC BLOOD PRESSURE: 54 MMHG | HEART RATE: 60 BPM

## 2025-05-18 PROBLEM — R53.1 GENERAL WEAKNESS: Status: ACTIVE | Noted: 2025-05-18

## 2025-05-18 LAB
ALBUMIN SERPL BCP-MCNC: 2.2 G/DL (ref 3.4–5)
ALP SERPL-CCNC: 161 U/L (ref 33–136)
ALT SERPL W P-5'-P-CCNC: 18 U/L (ref 10–52)
ANION GAP SERPL CALC-SCNC: 16 MMOL/L (ref 10–20)
AST SERPL W P-5'-P-CCNC: 40 U/L (ref 9–39)
BACTERIA BLD CULT: NORMAL
BACTERIA BLD CULT: NORMAL
BILIRUB DIRECT SERPL-MCNC: 2 MG/DL (ref 0–0.3)
BILIRUB SERPL-MCNC: 5 MG/DL (ref 0–1.2)
BUN SERPL-MCNC: 18 MG/DL (ref 6–23)
CALCIUM SERPL-MCNC: 8.1 MG/DL (ref 8.6–10.3)
CHLORIDE SERPL-SCNC: 97 MMOL/L (ref 98–107)
CO2 SERPL-SCNC: 24 MMOL/L (ref 21–32)
CREAT SERPL-MCNC: 1.42 MG/DL (ref 0.5–1.3)
EGFRCR SERPLBLD CKD-EPI 2021: 51 ML/MIN/1.73M*2
ERYTHROCYTE [DISTWIDTH] IN BLOOD BY AUTOMATED COUNT: 20.6 % (ref 11.5–14.5)
GLUCOSE SERPL-MCNC: 71 MG/DL (ref 74–99)
HCT VFR BLD AUTO: 29.8 % (ref 41–52)
HGB BLD-MCNC: 9.7 G/DL (ref 13.5–17.5)
INR PPP: 2 (ref 0.9–1.1)
MAGNESIUM SERPL-MCNC: 1.81 MG/DL (ref 1.6–2.4)
MCH RBC QN AUTO: 30 PG (ref 26–34)
MCHC RBC AUTO-ENTMCNC: 32.6 G/DL (ref 32–36)
MCV RBC AUTO: 92 FL (ref 80–100)
NRBC BLD-RTO: 0 /100 WBCS (ref 0–0)
PLATELET # BLD AUTO: 66 X10*3/UL (ref 150–450)
POTASSIUM SERPL-SCNC: 4.7 MMOL/L (ref 3.5–5.3)
PROT SERPL-MCNC: 4.9 G/DL (ref 6.4–8.2)
PROTHROMBIN TIME: 22.3 SECONDS (ref 9.8–12.4)
RBC # BLD AUTO: 3.23 X10*6/UL (ref 4.5–5.9)
SODIUM SERPL-SCNC: 132 MMOL/L (ref 136–145)
WBC # BLD AUTO: 8.6 X10*3/UL (ref 4.4–11.3)

## 2025-05-18 PROCEDURE — 2500000002 HC RX 250 W HCPCS SELF ADMINISTERED DRUGS (ALT 637 FOR MEDICARE OP, ALT 636 FOR OP/ED): Performed by: INTERNAL MEDICINE

## 2025-05-18 PROCEDURE — 85610 PROTHROMBIN TIME: CPT | Performed by: HOSPITALIST

## 2025-05-18 PROCEDURE — 99232 SBSQ HOSP IP/OBS MODERATE 35: CPT | Performed by: HOSPITALIST

## 2025-05-18 PROCEDURE — 84075 ASSAY ALKALINE PHOSPHATASE: CPT | Performed by: HOSPITALIST

## 2025-05-18 PROCEDURE — 36415 COLL VENOUS BLD VENIPUNCTURE: CPT | Performed by: HOSPITALIST

## 2025-05-18 PROCEDURE — 2500000001 HC RX 250 WO HCPCS SELF ADMINISTERED DRUGS (ALT 637 FOR MEDICARE OP): Performed by: INTERNAL MEDICINE

## 2025-05-18 PROCEDURE — 2500000004 HC RX 250 GENERAL PHARMACY W/ HCPCS (ALT 636 FOR OP/ED): Performed by: HOSPITALIST

## 2025-05-18 PROCEDURE — 2500000001 HC RX 250 WO HCPCS SELF ADMINISTERED DRUGS (ALT 637 FOR MEDICARE OP): Performed by: HOSPITALIST

## 2025-05-18 PROCEDURE — 80053 COMPREHEN METABOLIC PANEL: CPT | Performed by: HOSPITALIST

## 2025-05-18 PROCEDURE — 83735 ASSAY OF MAGNESIUM: CPT | Performed by: HOSPITALIST

## 2025-05-18 PROCEDURE — 1210000001 HC SEMI-PRIVATE ROOM DAILY

## 2025-05-18 PROCEDURE — 85027 COMPLETE CBC AUTOMATED: CPT | Performed by: HOSPITALIST

## 2025-05-18 PROCEDURE — 82374 ASSAY BLOOD CARBON DIOXIDE: CPT | Performed by: HOSPITALIST

## 2025-05-18 RX ORDER — DICLOFENAC SODIUM 10 MG/G
4 GEL TOPICAL 4 TIMES DAILY PRN
Status: DISCONTINUED | OUTPATIENT
Start: 2025-05-18 | End: 2025-05-21 | Stop reason: HOSPADM

## 2025-05-18 RX ORDER — TRAMADOL HYDROCHLORIDE 50 MG/1
50 TABLET, FILM COATED ORAL EVERY 6 HOURS PRN
Status: DISCONTINUED | OUTPATIENT
Start: 2025-05-18 | End: 2025-05-21 | Stop reason: HOSPADM

## 2025-05-18 RX ADMIN — FINASTERIDE 5 MG: 5 TABLET, FILM COATED ORAL at 08:13

## 2025-05-18 RX ADMIN — Medication 25 MCG: at 12:52

## 2025-05-18 RX ADMIN — BICALUTAMIDE 50 MG: 50 TABLET ORAL at 12:52

## 2025-05-18 RX ADMIN — NADOLOL 10 MG: 20 TABLET ORAL at 08:13

## 2025-05-18 RX ADMIN — OXYCODONE 5 MG: 5 TABLET ORAL at 23:19

## 2025-05-18 RX ADMIN — FERROUS SULFATE TAB 325 MG (65 MG ELEMENTAL FE) 1 TABLET: 325 (65 FE) TAB at 08:13

## 2025-05-18 RX ADMIN — TRAMADOL HYDROCHLORIDE 50 MG: 50 TABLET, FILM COATED ORAL at 13:01

## 2025-05-18 RX ADMIN — SPIRONOLACTONE 100 MG: 25 TABLET ORAL at 08:13

## 2025-05-18 RX ADMIN — PANTOPRAZOLE SODIUM 40 MG: 40 TABLET, DELAYED RELEASE ORAL at 08:13

## 2025-05-18 RX ADMIN — LACTULOSE 20 G: 20 SOLUTION ORAL at 08:13

## 2025-05-18 RX ADMIN — LACTULOSE 20 G: 20 SOLUTION ORAL at 21:47

## 2025-05-18 RX ADMIN — LACTULOSE 20 G: 20 SOLUTION ORAL at 16:12

## 2025-05-18 RX ADMIN — FUROSEMIDE 40 MG: 40 TABLET ORAL at 08:13

## 2025-05-18 ASSESSMENT — COGNITIVE AND FUNCTIONAL STATUS - GENERAL
DAILY ACTIVITIY SCORE: 13
WALKING IN HOSPITAL ROOM: TOTAL
TOILETING: A LOT
DRESSING REGULAR LOWER BODY CLOTHING: A LOT
STANDING UP FROM CHAIR USING ARMS: A LOT
EATING MEALS: A LITTLE
MOBILITY SCORE: 12
DRESSING REGULAR LOWER BODY CLOTHING: A LOT
CLIMB 3 TO 5 STEPS WITH RAILING: TOTAL
PERSONAL GROOMING: A LOT
HELP NEEDED FOR BATHING: A LOT
DRESSING REGULAR UPPER BODY CLOTHING: A LOT
TURNING FROM BACK TO SIDE WHILE IN FLAT BAD: A LITTLE
MOVING FROM LYING ON BACK TO SITTING ON SIDE OF FLAT BED WITH BEDRAILS: A LITTLE
TURNING FROM BACK TO SIDE WHILE IN FLAT BAD: A LITTLE
TOILETING: A LOT
PERSONAL GROOMING: A LOT
CLIMB 3 TO 5 STEPS WITH RAILING: TOTAL
MOVING TO AND FROM BED TO CHAIR: A LOT
DRESSING REGULAR LOWER BODY CLOTHING: A LOT
DRESSING REGULAR UPPER BODY CLOTHING: A LOT
PERSONAL GROOMING: A LOT
STANDING UP FROM CHAIR USING ARMS: A LOT
MOBILITY SCORE: 12
TURNING FROM BACK TO SIDE WHILE IN FLAT BAD: A LITTLE
DAILY ACTIVITIY SCORE: 13
WALKING IN HOSPITAL ROOM: TOTAL
HELP NEEDED FOR BATHING: A LOT
TOILETING: A LOT
STANDING UP FROM CHAIR USING ARMS: A LOT
DRESSING REGULAR UPPER BODY CLOTHING: A LOT
MOBILITY SCORE: 12
MOVING TO AND FROM BED TO CHAIR: A LOT
MOVING FROM LYING ON BACK TO SITTING ON SIDE OF FLAT BED WITH BEDRAILS: A LITTLE
EATING MEALS: A LITTLE
MOVING FROM LYING ON BACK TO SITTING ON SIDE OF FLAT BED WITH BEDRAILS: A LITTLE
HELP NEEDED FOR BATHING: A LOT
MOVING TO AND FROM BED TO CHAIR: A LOT
DAILY ACTIVITIY SCORE: 13
WALKING IN HOSPITAL ROOM: TOTAL
CLIMB 3 TO 5 STEPS WITH RAILING: TOTAL
EATING MEALS: A LITTLE

## 2025-05-18 ASSESSMENT — PAIN SCALES - GENERAL
PAINLEVEL_OUTOF10: 3
PAINLEVEL_OUTOF10: 5 - MODERATE PAIN
PAINLEVEL_OUTOF10: 5 - MODERATE PAIN
PAINLEVEL_OUTOF10: 8
PAINLEVEL_OUTOF10: 0 - NO PAIN

## 2025-05-18 ASSESSMENT — PAIN - FUNCTIONAL ASSESSMENT
PAIN_FUNCTIONAL_ASSESSMENT: 0-10

## 2025-05-18 ASSESSMENT — PAIN DESCRIPTION - DESCRIPTORS: DESCRIPTORS: ACHING

## 2025-05-18 NOTE — CARE PLAN
The patient's goals for the shift include pain control    The clinical goals for the shift include Comfort and safety

## 2025-05-18 NOTE — PROGRESS NOTES
"Alfonso Toscano is a 80 y.o. male on day 0 of admission presenting with weakness      Subjective   Complaints still very weak.        Objective     Last Recorded Vitals  /55   Pulse 53   Temp 36.1 °C (97 °F)   Resp 16   Ht 1.702 m (5' 7\")   Wt 87.5 kg (192 lb 14.4 oz)   SpO2 97%   BMI 30.21 kg/m²      Intake/Output last 3 Shifts:    Intake/Output Summary (Last 24 hours) at 5/18/2025 1130  Last data filed at 5/18/2025 0818  Gross per 24 hour   Intake 60 ml   Output 1700 ml   Net -1640 ml       Scheduled medications  Scheduled Medications[1]  Continuous medications  Continuous Medications[2]  PRN medications  PRN Medications[3]    Physical Exam   Gen: NAD  HEENT: EOM, MMM  CV: RRR, no murmurs rubs or gallops  Resp: coarse rhonchi b/l   Abdomen: soft, NT,+BS  LE: No edema      Relevant Results  Lab Results   Component Value Date    WBC 8.6 05/18/2025    HGB 9.7 (L) 05/18/2025    HCT 29.8 (L) 05/18/2025    MCV 92 05/18/2025    PLT 66 (L) 05/18/2025     Lab Results   Component Value Date    GLUCOSE 71 (L) 05/18/2025    CALCIUM 8.1 (L) 05/18/2025     (L) 05/18/2025    K 4.7 05/18/2025    CO2 24 05/18/2025    CL 97 (L) 05/18/2025    BUN 18 05/18/2025    CREATININE 1.42 (H) 05/18/2025         Assessment/Plan 80 year old male with history of metastatic liver CA, admitted with weakness and inability for family to care for patient at home     -therapy has been on hold given recently illness, recently had GI bleed  -he was suppose to have MRI but unable to make it do to weakness  -recently had thoracentesis on 5/13 and had 2.5L removed  -continue diuretics and lactulose   -palliative consulted, discussed with wife today and would like to try snf and then palliative/hospice  -will need precert     Metabolic encephalopathy has been ruled out      Chronic blood loss anemia: recent GI bleed, H/H stable  -had EGD and colonoscopy in may and found to have hemorrhoidal bleeding      Thrombocytopenia: setting of " liver disease      HTN/HLD: continue home meds     DVT ppx: SCD    Code status: DNRCC-A    Labs are stable will hold of ordering tomorrow       Shelton Dodd MD           [1] bicalutamide, 50 mg, oral, Daily with lunch  cholecalciferol, 25 mcg, oral, Daily with lunch  ferrous sulfate, 1 tablet, oral, Daily with breakfast  finasteride, 5 mg, oral, Daily  furosemide, 40 mg, oral, Daily  lactulose, 20 g, oral, TID  nadolol, 10 mg, oral, Daily  pantoprazole, 40 mg, oral, Daily  spironolactone, 100 mg, oral, Daily     [2]    [3] PRN medications: acetaminophen **OR** acetaminophen **OR** acetaminophen, acetaminophen **OR** acetaminophen **OR** acetaminophen, ondansetron **OR** ondansetron, oxyCODONE, polyethylene glycol, traMADol

## 2025-05-19 LAB
ATRIAL RATE: 51 BPM
P AXIS: 17 DEGREES
P OFFSET: 170 MS
P ONSET: 113 MS
PR INTERVAL: 210 MS
Q ONSET: 218 MS
QRS COUNT: 9 BEATS
QRS DURATION: 80 MS
QT INTERVAL: 462 MS
QTC CALCULATION(BAZETT): 425 MS
QTC FREDERICIA: 438 MS
R AXIS: 26 DEGREES
T AXIS: 58 DEGREES
T OFFSET: 449 MS
VENTRICULAR RATE: 51 BPM

## 2025-05-19 PROCEDURE — 99232 SBSQ HOSP IP/OBS MODERATE 35: CPT | Performed by: STUDENT IN AN ORGANIZED HEALTH CARE EDUCATION/TRAINING PROGRAM

## 2025-05-19 PROCEDURE — 99233 SBSQ HOSP IP/OBS HIGH 50: CPT | Performed by: NURSE PRACTITIONER

## 2025-05-19 PROCEDURE — 2500000004 HC RX 250 GENERAL PHARMACY W/ HCPCS (ALT 636 FOR OP/ED): Performed by: HOSPITALIST

## 2025-05-19 PROCEDURE — 2500000001 HC RX 250 WO HCPCS SELF ADMINISTERED DRUGS (ALT 637 FOR MEDICARE OP): Performed by: HOSPITALIST

## 2025-05-19 PROCEDURE — 2500000002 HC RX 250 W HCPCS SELF ADMINISTERED DRUGS (ALT 637 FOR MEDICARE OP, ALT 636 FOR OP/ED): Performed by: INTERNAL MEDICINE

## 2025-05-19 PROCEDURE — 1210000001 HC SEMI-PRIVATE ROOM DAILY

## 2025-05-19 PROCEDURE — 97535 SELF CARE MNGMENT TRAINING: CPT | Mod: GO

## 2025-05-19 PROCEDURE — 97530 THERAPEUTIC ACTIVITIES: CPT | Mod: GO

## 2025-05-19 PROCEDURE — 2500000001 HC RX 250 WO HCPCS SELF ADMINISTERED DRUGS (ALT 637 FOR MEDICARE OP): Performed by: INTERNAL MEDICINE

## 2025-05-19 PROCEDURE — 97110 THERAPEUTIC EXERCISES: CPT | Mod: GP,CQ | Performed by: PHYSICAL THERAPY ASSISTANT

## 2025-05-19 RX ADMIN — NADOLOL 10 MG: 20 TABLET ORAL at 09:16

## 2025-05-19 RX ADMIN — LACTULOSE 20 G: 20 SOLUTION ORAL at 15:35

## 2025-05-19 RX ADMIN — LACTULOSE 20 G: 20 SOLUTION ORAL at 09:09

## 2025-05-19 RX ADMIN — FINASTERIDE 5 MG: 5 TABLET, FILM COATED ORAL at 09:09

## 2025-05-19 RX ADMIN — SPIRONOLACTONE 100 MG: 25 TABLET ORAL at 09:09

## 2025-05-19 RX ADMIN — FERROUS SULFATE TAB 325 MG (65 MG ELEMENTAL FE) 1 TABLET: 325 (65 FE) TAB at 09:09

## 2025-05-19 RX ADMIN — LACTULOSE 20 G: 20 SOLUTION ORAL at 21:38

## 2025-05-19 RX ADMIN — FUROSEMIDE 40 MG: 40 TABLET ORAL at 09:09

## 2025-05-19 RX ADMIN — ONDANSETRON 4 MG: 2 INJECTION INTRAMUSCULAR; INTRAVENOUS at 10:37

## 2025-05-19 RX ADMIN — Medication 25 MCG: at 13:16

## 2025-05-19 RX ADMIN — BICALUTAMIDE 50 MG: 50 TABLET ORAL at 13:16

## 2025-05-19 RX ADMIN — PANTOPRAZOLE SODIUM 40 MG: 40 TABLET, DELAYED RELEASE ORAL at 09:09

## 2025-05-19 ASSESSMENT — COGNITIVE AND FUNCTIONAL STATUS - GENERAL
WALKING IN HOSPITAL ROOM: TOTAL
MOBILITY SCORE: 13
MOBILITY SCORE: 13
STANDING UP FROM CHAIR USING ARMS: A LOT
WALKING IN HOSPITAL ROOM: A LOT
PERSONAL GROOMING: A LOT
TOILETING: A LOT
WALKING IN HOSPITAL ROOM: A LOT
STANDING UP FROM CHAIR USING ARMS: A LOT
MOVING TO AND FROM BED TO CHAIR: A LOT
MOVING TO AND FROM BED TO CHAIR: A LITTLE
TURNING FROM BACK TO SIDE WHILE IN FLAT BAD: A LITTLE
MOVING FROM LYING ON BACK TO SITTING ON SIDE OF FLAT BED WITH BEDRAILS: A LITTLE
CLIMB 3 TO 5 STEPS WITH RAILING: TOTAL
DRESSING REGULAR LOWER BODY CLOTHING: A LOT
CLIMB 3 TO 5 STEPS WITH RAILING: A LOT
TOILETING: A LOT
TURNING FROM BACK TO SIDE WHILE IN FLAT BAD: A LITTLE
MOVING FROM LYING ON BACK TO SITTING ON SIDE OF FLAT BED WITH BEDRAILS: A LITTLE
DAILY ACTIVITIY SCORE: 13
DRESSING REGULAR LOWER BODY CLOTHING: A LOT
DRESSING REGULAR UPPER BODY CLOTHING: A LOT
DRESSING REGULAR UPPER BODY CLOTHING: A LOT
HELP NEEDED FOR BATHING: A LOT
DAILY ACTIVITIY SCORE: 13
DAILY ACTIVITIY SCORE: 10
TURNING FROM BACK TO SIDE WHILE IN FLAT BAD: A LITTLE
EATING MEALS: A LOT
HELP NEEDED FOR BATHING: A LOT
DRESSING REGULAR LOWER BODY CLOTHING: TOTAL
STANDING UP FROM CHAIR USING ARMS: A LOT
MOVING TO AND FROM BED TO CHAIR: A LOT
PERSONAL GROOMING: A LOT
MOVING FROM LYING ON BACK TO SITTING ON SIDE OF FLAT BED WITH BEDRAILS: A LITTLE
EATING MEALS: A LITTLE
PERSONAL GROOMING: A LOT
DRESSING REGULAR UPPER BODY CLOTHING: A LOT
CLIMB 3 TO 5 STEPS WITH RAILING: TOTAL
TOILETING: TOTAL
HELP NEEDED FOR BATHING: A LOT
MOBILITY SCORE: 14
EATING MEALS: A LITTLE

## 2025-05-19 ASSESSMENT — PAIN SCALES - GENERAL
PAINLEVEL_OUTOF10: 8
PAINLEVEL_OUTOF10: 0 - NO PAIN

## 2025-05-19 ASSESSMENT — PAIN - FUNCTIONAL ASSESSMENT: PAIN_FUNCTIONAL_ASSESSMENT: 0-10

## 2025-05-19 ASSESSMENT — ACTIVITIES OF DAILY LIVING (ADL): HOME_MANAGEMENT_TIME_ENTRY: 8

## 2025-05-19 NOTE — PROGRESS NOTES
Occupational Therapy    Occupational Therapy Treatment    Name: Alfonso Toscano  MRN: [id number]  Department: [facility]  Room: [facility]92-  Date: 05/19/25  Time Calculation  Start Time: 1120  Stop Time: 1202  Time Calculation (min): 42 min    Assessment:  Evaluation/Treatment Tolerance: Patient limited by fatigue, Patient limited by pain  End of Session Communication: Bedside nurse  End of Session Patient Position: Bed, 3 rail up, Alarm on (all needs in reach, bilat. heel protectors on, wife present)  Plan:  Treatment Interventions: ADL retraining, Functional transfer training, UE strengthening/ROM, Endurance training, Patient/family training, Equipment evaluation/education, Compensatory technique education  OT Frequency: 2 times per week  OT Discharge Recommendations: Moderate intensity level of continued care  OT - OK to Discharge: Yes (when medically appropriate)    Subjective     OT Visit Info:  OT Received On: 05/19/25  General:  General  Prior to Session Communication: Bedside nurse  Patient Position Received: Bed, 3 rail up, Alarm on (wife present and supportive, bilat. heel protectors on)  General Comment: Agreeable to therapy.  Very fatigued, generally weak, significant pain at areas of bilat. LE skin tears. OT notified RN of pain.  Precautions:  Hearing/Visual Limitations: hard of hearing, uses amplifier at times  Medical Precautions: Fall precautions  Precautions Comment: Very painful skin tears on bilat. LEs, hypotension    Pain Assessment:  Pain Assessment  Pain Assessment: 0-10  0-10 (Numeric) Pain Score: 8  Pain Type: Acute pain  Pain Location: Leg (skin tears)  Pain Orientation: Right, Left    Objective   Cognition:  Overall Cognitive Status: Within Functional Limits  Orientation Level: Oriented X4  Processing Speed: Delayed  Activities of Daily Living: Feeding  Feeding Comments: Max assist for management of ensure to take sips, due to generalized fatigue.    LE Dressing  LE Dressing:  (dependent to  don/doff heel protectors and socks)    Functional Standing Tolerance:     Bed Mobility/Transfers: Bed Mobility  Bed Mobility: Yes  Bed Mobility 1  Bed Mobility 1: Supine to sitting  Level of Assistance 1: Moderate assistance, Moderate verbal cues  Bed Mobility Comments 1: Assist to reach to rail and roll to side, assist to stay on side and cues to move LEs over EOB, assist to elevate trunk, cues to scoot to EOB.  All with increased time and very effortful for patient.  Bed Mobility 2  Bed Mobility  2: Rolling right, Rolling left  Level of Assistance 2: Moderate assistance, Moderate verbal cues  Bed Mobility Comments 2: Rolling completed for hygiene  Bed Mobility 3  Bed Mobility 3: Sitting to supine  Level of Assistance 3: Moderate assistance  Bed Mobility Comments 3: Assist to lower and adjust trunk, increased time for pt to manage LEs.    Transfers  Transfer: Yes  Transfer 1  Transfer Device 1: Walker, Gait belt  Transfer Level of Assistance 1: Moderate assistance  Trials/Comments 1: Sit<>stand at EOB.  Assist for lift, balance, descent.    Functional Mobility:  Functional Mobility  Functional Mobility Performed: Yes  Functional Mobility 1  Device 1: Rolling walker  Functional Mobility Support Devices: Gait belt  Assistance 1: Moderate assistance (Retropulsive and weak, assist with balance and walker management)  Comments 1: Side steps at EOB x 2 trials of ~ 4 steps each with seated rest break between.  Sitting Balance:  Static Sitting Balance  Static Sitting-Comment/Number of Minutes: good  Dynamic Sitting Balance  Dynamic Sitting-Comments: fair (+)  Standing Balance:  Static Standing Balance  Static Standing-Comment/Number of Minutes: poor (+)/poor with FWW  Dynamic Standing Balance  Dynamic Standing-Comments: poor (+)/poor with FWW     Other Activity:  Other Activity  Other Activity Performed: Yes (pt. wife voicing that pt. skin fragile and legs sore to touch, pt. was given heel protectors which were applied for  pressure relief and protection from pressure sores.)    Outcome Measures:  [facility] Daily Activity  Putting on and taking off regular lower body clothing: Total  Bathing (including washing, rinsing, drying): A lot  Putting on and taking off regular upper body clothing: A lot  Toileting, which includes using toilet, bedpan or urinal: Total  Taking care of personal grooming such as brushing teeth: A lot  Eating Meals: A lot  Daily Activity - Total Score: 10    Education Documentation  Body Mechanics, taught by Marija Arango, OT at 5/19/2025  1:17 PM.  Learner: Patient  Readiness: Acceptance  Method: Explanation  Response: Verbalizes Understanding    Education Comments  No comments found.      Goals:  Encounter Problems       Encounter Problems (Active)       OT Goals       CGA sit/stand, bed/chair/commode with FWW.  (Progressing)       Start:  05/17/25    Expected End:  05/31/25            Set up for UB bathing/dressing.  (Progressing)       Start:  05/17/25    Expected End:  05/31/25            Min assist LB bathing/dressing.  (Progressing)       Start:  05/17/25    Expected End:  05/31/25            Min assist toileting.  (Progressing)       Start:  05/17/25    Expected End:  05/31/25            Fair dynamic standing balance for ADLs with UE support.  (Progressing)       Start:  05/17/25    Expected End:  05/31/25

## 2025-05-19 NOTE — CONSULTS
Nutrition Progress Note    RD consulted for MST = 3. Code status currently comfort measures only, hospice consulted. Full assessment deferred at this time d/t changes in code status. Recommend comfort feedings as tolerated. Has Ensure ordered TID. Please discontinue oral nutritional supplements if pt dislikes or no longer wishes to receive. Please reconsult for changes in plan of care/code status or need for RD.

## 2025-05-19 NOTE — PROGRESS NOTES
"Alfonso Toscano is a 80 y.o. male on day 1 of admission presenting with Generalized weakness.    Subjective   Doesn't feel well.  +nausea      Objective     Physical Exam  GEN: awake and alert, ill appearing elderly male in distress  RESP: even and regular    Last Recorded Vitals  Blood pressure 102/50, pulse 59, temperature 36.5 °C (97.7 °F), temperature source Temporal, resp. rate 17, height 1.702 m (5' 7\"), weight 87.5 kg (192 lb 14.4 oz), SpO2 94%.  Intake/Output last 3 Shifts:  I/O last 3 completed shifts:  In: 60 (0.7 mL/kg) [P.O.:60]  Out: 2750 (31.4 mL/kg) [Urine:2750 (0.9 mL/kg/hr)]  Weight: 87.5 kg     Relevant Results  Scheduled medications  Scheduled Medications[1]  Continuous medications  Continuous Medications[2]  PRN medications  PRN Medications[3]    No results found for this or any previous visit (from the past 24 hours).    ASSESSMENT:  -met with patient and his spouse and his wife Julieta.  Patient is not feeling well and currently is nauseous.  While he is alert and oriented he defers decision making to his spouse.  We talked about goals of care and she is hopeful insurance precert goes through for him to go to rehab.  Plan to transition to hospice in the future.  The family has decided they do not wish to pursue anymore cancer directed therapy because he does not tolerate it well.  Readdressed code status as patient is DNR but ok to intubate.  She agrees he would not want aggressive life saving measures and would not want to be in an ICU setting as she does not see the benefit.  She does not really want him returning to the  hospital if he declines further and would transition to hospice at the facility.  Hospitalist and RN updated.  Pt transitioned to DNRCC.        I spent 65minutes in the professional and overall care of this patient.      June Lara, APRN-CNP           [1] bicalutamide, 50 mg, oral, Daily with lunch  cholecalciferol, 25 mcg, oral, Daily with lunch  ferrous sulfate, 1 " tablet, oral, Daily with breakfast  finasteride, 5 mg, oral, Daily  furosemide, 40 mg, oral, Daily  lactulose, 20 g, oral, TID  nadolol, 10 mg, oral, Daily  pantoprazole, 40 mg, oral, Daily  spironolactone, 100 mg, oral, Daily  [2]    [3] PRN medications: acetaminophen **OR** acetaminophen **OR** acetaminophen, acetaminophen **OR** acetaminophen **OR** acetaminophen, diclofenac sodium, ondansetron **OR** ondansetron, oxyCODONE, polyethylene glycol, traMADol

## 2025-05-19 NOTE — CONSULTS
Wound Care Consult Multiple skin tears BLE and knees. All wounds are stable. Wound beds are beefy red, there is scant clear drainage seen on old dressings. Surrounding tissue has bruising and ecchymotic areas. Applied xeroform dressings and secured with mepilex. Skin tears BUE. Wound are stable, there is no drainage, wound beds are beefy red, surrounding tissue intact. Applied xerofrom dressings and secured with mepilex.     Visit Date: 5/19/2025      Patient Name: Alfonso Toscano         MRN: [id number]             Reason for Consult: Wound Care for skin tears        Wound History: 1 -2 weeks     Pertinent Labs:       Assessment:                         Wound Plan: Continue with current dressing changes applying xeroform dressings every other day.      Darnell Amador LPN  5/19/2025  2:02 PM

## 2025-05-19 NOTE — PROGRESS NOTES
Pt accepted to [facility] for PT/OT/wound care, ADOD Wednesday 5/21. Precert submitted. Pt to be seen by wound care nurse, has multiple skin tears to bilateral arms and legs as well as wounds on bilateral buttocks. Discussed plan with wife Julieta at bedside. Facility updated on dc plan.    UPDATE- Precert Status: Pending Reference Number: [id number] Dates: 05/19/2025    UPDATE- Precert Status: Approved Certification Number: [id number]  Dates: 05/19/2025 - 05/21/2025. Facility notified.

## 2025-05-19 NOTE — PROGRESS NOTES
"Alfonso Toscano is a 80 y.o. male on day 1 of admission presenting with weakness      Subjective   Complaints still very weak.        Objective     Last Recorded Vitals  /50   Pulse 59   Temp 36.5 °C (97.7 °F) (Temporal)   Resp 17   Ht 1.702 m (5' 7\")   Wt 87.5 kg (192 lb 14.4 oz)   SpO2 94%   BMI 30.21 kg/m²      Intake/Output last 3 Shifts:    Intake/Output Summary (Last 24 hours) at 5/19/2025 1450  Last data filed at 5/18/2025 1816  Gross per 24 hour   Intake --   Output 1050 ml   Net -1050 ml       Scheduled medications  Scheduled Medications[1]  Continuous medications  Continuous Medications[2]  PRN medications  PRN Medications[3]    Physical Exam   Gen: NAD  HEENT: EOM, MMM  CV: RRR, no murmurs rubs or gallops  Resp: coarse rhonchi b/l   Abdomen: soft, NT,+BS  LE: No edema      Relevant Results  Lab Results   Component Value Date    WBC 8.6 05/18/2025    HGB 9.7 (L) 05/18/2025    HCT 29.8 (L) 05/18/2025    MCV 92 05/18/2025    PLT 66 (L) 05/18/2025     Lab Results   Component Value Date    GLUCOSE 71 (L) 05/18/2025    CALCIUM 8.1 (L) 05/18/2025     (L) 05/18/2025    K 4.7 05/18/2025    CO2 24 05/18/2025    CL 97 (L) 05/18/2025    BUN 18 05/18/2025    CREATININE 1.42 (H) 05/18/2025         Assessment/Plan 80 year old male with history of metastatic liver CA, admitted with weakness and inability for family to care for patient at home     -therapy has been on hold given recently illness, recently had GI bleed  -he was suppose to have MRI but unable to make it do to weakness  -recently had thoracentesis on 5/13 and had 2.5L removed  -continue diuretics and lactulose   -palliative consulted, discussed with wife today and would like to try snf and then palliative/hospice  -will need precert     Metabolic encephalopathy has been ruled out      Chronic blood loss anemia: recent GI bleed, H/H stable  -had EGD and colonoscopy in may and found to have hemorrhoidal bleeding      Thrombocytopenia: " setting of liver disease      HTN/HLD: continue home meds     DVT ppx: SCD    Code status: DNRCC-A    Labs are stable will hold of ordering tomorrow     Disposition. Anticipate discharge to [facility] SNF this Wednesday.    Lee Cantu DO         [1] bicalutamide, 50 mg, oral, Daily with lunch  cholecalciferol, 25 mcg, oral, Daily with lunch  ferrous sulfate, 1 tablet, oral, Daily with breakfast  finasteride, 5 mg, oral, Daily  furosemide, 40 mg, oral, Daily  lactulose, 20 g, oral, TID  nadolol, 10 mg, oral, Daily  pantoprazole, 40 mg, oral, Daily  spironolactone, 100 mg, oral, Daily     [2]    [3] PRN medications: acetaminophen **OR** acetaminophen **OR** acetaminophen, acetaminophen **OR** acetaminophen **OR** acetaminophen, diclofenac sodium, ondansetron **OR** ondansetron, oxyCODONE, polyethylene glycol, traMADol

## 2025-05-20 ENCOUNTER — APPOINTMENT (OUTPATIENT)
Dept: RADIOLOGY | Facility: HOSPITAL | Age: 80
End: 2025-05-20
Payer: MEDICARE

## 2025-05-20 LAB
BACTERIA BLD CULT: NORMAL
BACTERIA BLD CULT: NORMAL

## 2025-05-20 PROCEDURE — 2500000001 HC RX 250 WO HCPCS SELF ADMINISTERED DRUGS (ALT 637 FOR MEDICARE OP): Performed by: HOSPITALIST

## 2025-05-20 PROCEDURE — 97530 THERAPEUTIC ACTIVITIES: CPT | Mod: GP,CQ

## 2025-05-20 PROCEDURE — 2500000002 HC RX 250 W HCPCS SELF ADMINISTERED DRUGS (ALT 637 FOR MEDICARE OP, ALT 636 FOR OP/ED): Performed by: INTERNAL MEDICINE

## 2025-05-20 PROCEDURE — 99239 HOSP IP/OBS DSCHRG MGMT >30: CPT | Performed by: STUDENT IN AN ORGANIZED HEALTH CARE EDUCATION/TRAINING PROGRAM

## 2025-05-20 PROCEDURE — 2500000004 HC RX 250 GENERAL PHARMACY W/ HCPCS (ALT 636 FOR OP/ED): Mod: JZ | Performed by: HOSPITALIST

## 2025-05-20 PROCEDURE — 2500000001 HC RX 250 WO HCPCS SELF ADMINISTERED DRUGS (ALT 637 FOR MEDICARE OP): Performed by: INTERNAL MEDICINE

## 2025-05-20 PROCEDURE — 1210000001 HC SEMI-PRIVATE ROOM DAILY

## 2025-05-20 RX ORDER — POLYETHYLENE GLYCOL 3350 17 G/17G
17 POWDER, FOR SOLUTION ORAL DAILY PRN
Start: 2025-05-20

## 2025-05-20 RX ORDER — OXYCODONE HYDROCHLORIDE 5 MG/1
5 TABLET ORAL EVERY 6 HOURS PRN
Qty: 15 TABLET | Refills: 0 | Status: SHIPPED | OUTPATIENT
Start: 2025-05-20

## 2025-05-20 RX ADMIN — FERROUS SULFATE TAB 325 MG (65 MG ELEMENTAL FE) 1 TABLET: 325 (65 FE) TAB at 09:13

## 2025-05-20 RX ADMIN — PANTOPRAZOLE SODIUM 40 MG: 40 TABLET, DELAYED RELEASE ORAL at 09:09

## 2025-05-20 RX ADMIN — SPIRONOLACTONE 100 MG: 25 TABLET ORAL at 09:10

## 2025-05-20 RX ADMIN — ONDANSETRON 4 MG: 4 TABLET, FILM COATED ORAL at 02:44

## 2025-05-20 RX ADMIN — LACTULOSE 20 G: 20 SOLUTION ORAL at 09:09

## 2025-05-20 RX ADMIN — NADOLOL 10 MG: 20 TABLET ORAL at 09:10

## 2025-05-20 RX ADMIN — FINASTERIDE 5 MG: 5 TABLET, FILM COATED ORAL at 09:09

## 2025-05-20 RX ADMIN — LACTULOSE 20 G: 20 SOLUTION ORAL at 22:11

## 2025-05-20 RX ADMIN — Medication 25 MCG: at 11:42

## 2025-05-20 RX ADMIN — LACTULOSE 20 G: 20 SOLUTION ORAL at 15:59

## 2025-05-20 RX ADMIN — ONDANSETRON 4 MG: 2 INJECTION INTRAMUSCULAR; INTRAVENOUS at 23:47

## 2025-05-20 RX ADMIN — BICALUTAMIDE 50 MG: 50 TABLET ORAL at 11:42

## 2025-05-20 RX ADMIN — FUROSEMIDE 40 MG: 40 TABLET ORAL at 09:09

## 2025-05-20 ASSESSMENT — COGNITIVE AND FUNCTIONAL STATUS - GENERAL
EATING MEALS: A LITTLE
EATING MEALS: A LITTLE
TURNING FROM BACK TO SIDE WHILE IN FLAT BAD: A LITTLE
TURNING FROM BACK TO SIDE WHILE IN FLAT BAD: A LITTLE
DRESSING REGULAR UPPER BODY CLOTHING: A LOT
WALKING IN HOSPITAL ROOM: A LOT
MOVING TO AND FROM BED TO CHAIR: A LOT
DAILY ACTIVITIY SCORE: 13
DAILY ACTIVITIY SCORE: 13
TURNING FROM BACK TO SIDE WHILE IN FLAT BAD: A LOT
MOVING TO AND FROM BED TO CHAIR: A LITTLE
PERSONAL GROOMING: A LOT
MOVING FROM LYING ON BACK TO SITTING ON SIDE OF FLAT BED WITH BEDRAILS: A LITTLE
WALKING IN HOSPITAL ROOM: TOTAL
CLIMB 3 TO 5 STEPS WITH RAILING: A LOT
CLIMB 3 TO 5 STEPS WITH RAILING: TOTAL
MOBILITY SCORE: 13
MOVING FROM LYING ON BACK TO SITTING ON SIDE OF FLAT BED WITH BEDRAILS: A LITTLE
MOBILITY SCORE: 14
HELP NEEDED FOR BATHING: A LOT
CLIMB 3 TO 5 STEPS WITH RAILING: TOTAL
STANDING UP FROM CHAIR USING ARMS: A LITTLE
MOVING TO AND FROM BED TO CHAIR: A LITTLE
TOILETING: A LOT
STANDING UP FROM CHAIR USING ARMS: A LITTLE
WALKING IN HOSPITAL ROOM: A LOT
STANDING UP FROM CHAIR USING ARMS: A LOT
TOILETING: A LOT
DRESSING REGULAR UPPER BODY CLOTHING: A LOT
DRESSING REGULAR LOWER BODY CLOTHING: A LOT
DRESSING REGULAR LOWER BODY CLOTHING: A LOT
PERSONAL GROOMING: A LOT
MOBILITY SCORE: 15
MOVING FROM LYING ON BACK TO SITTING ON SIDE OF FLAT BED WITH BEDRAILS: A LITTLE
HELP NEEDED FOR BATHING: A LOT

## 2025-05-20 ASSESSMENT — PAIN SCALES - GENERAL
PAINLEVEL_OUTOF10: 5 - MODERATE PAIN
PAINLEVEL_OUTOF10: 0 - NO PAIN
PAINLEVEL_OUTOF10: 0 - NO PAIN

## 2025-05-20 ASSESSMENT — PAIN - FUNCTIONAL ASSESSMENT
PAIN_FUNCTIONAL_ASSESSMENT: 0-10
PAIN_FUNCTIONAL_ASSESSMENT: 0-10

## 2025-05-20 NOTE — DISCHARGE SUMMARY
Discharge Diagnosis  Generalized weakness in the setting of metastatic liver cancer, GI bleed    Issues Requiring Follow-Up  Plan for discharge to SNF  Needs hospice referral due to poor prognosis status    Discharge Meds     Medication List      START taking these medications     oxyCODONE 5 mg immediate release tablet; Commonly known as: Roxicodone;   Take 1 tablet (5 mg) by mouth every 6 hours if needed for severe pain (7 -   10).   polyethylene glycol 17 gram packet; Commonly known as: Glycolax,   Miralax; Take 17 g by mouth once daily as needed (Constipation).     CONTINUE taking these medications     bicalutamide 50 mg tablet; Commonly known as: Casodex   cholecalciferol 25 mcg (1,000 units) tablet; Commonly known as: Vitamin   D-3   dutasteride 0.5 mg capsule; Commonly known as: Avodart   ferrous sulfate 325 mg (65 mg elemental) tablet   fexofenadine 180 mg tablet; Commonly known as: Allegra   furosemide 20 mg tablet; Commonly known as: Lasix; Take 2 tablets (40   mg) by mouth once daily. Instructed by MD to take 2 20mg tablets daily   beginning 5/7/25   lactulose 20 gram/30 mL oral solution; 30ML three times daily   nadolol 20 mg tablet; Commonly known as: Corgard; Take 0.5 tablets (10   mg) by mouth once daily.   pantoprazole 40 mg EC tablet; Commonly known as: ProtoNix; TAKE 1 TABLET   ONCE DAILY   spironolactone 50 mg tablet; Commonly known as: Aldactone; Take 2   tablets (100 mg) by mouth once daily. Instructed by MD to take 2 50mg   tablets daily beginning 5/7/25   traMADol 50 mg tablet; Commonly known as: Ultram       Test Results Pending At Discharge  Pending Labs       Order Current Status    Blood Culture Preliminary result    Blood Culture Preliminary result            Hospital Course  80 year old male with history of metastatic liver CA, admitted with weakness and inability for family to care for patient at home     Patient was placed on pain management, fall/aspiration precaution, antiemetic as  needed.   He was supposed to have MRI but unable to make it do to weakness.   Last week patient had thoracentesis on 5/13 and had 2.5L removed  Here he was continued on diuretics and lactulose.    Palliative consulted, discussed with wife and would like to try snf and then palliative/hospice as outpatient. Code status is DNR-comfort measure only.   Metabolic encephalopathy resolved.   Chronic blood loss anemia: recent GI bleed, H/H stable  Had EGD and colonoscopy in may and found to have hemorrhoidal bleeding      Thrombocytopenia: in the setting of liver disease   HTN/HLD: continued home meds  DVT ppx: SCDs     Patient is currently hemodynamically stable and ready for discharge to SNF and outpatient follow-up for hospice.  He will be continued on the current management.  Wife at bedside and all the questions were answered.  They are planning to continue with comfort care only.    Pertinent Physical Exam At Time of Discharge  Physical Exam    General: Well-developed frail elderly male, in no acute distress, ill-appearing  HEENT: AT, NC, no JVD, no lymphadenopathy, neck supple  Lungs: Clear, no wheezing, no crackles  Cardiac: Normal S1-S2, no murmur, no gallop  Abdomen: Soft, nontender, no distention, positive bowel sound  Extremities: No deformity, no edema, pulses intact, ROM limited due to frailty  Neurological: Alert awake oriented x3, sensation intact, clear speech      Outpatient Follow-Up  Future Appointments   Date Time Provider Department Center   6/16/2025  3:00 PM Gisele Patel MD [id number] [place]       Time spent 40 minutes  Beatrice Ramos MD

## 2025-05-20 NOTE — CARE PLAN
Problem: Skin  Goal: Prevent/minimize sheer/friction injuries  5/20/2025 1349 by Sarah Plummer RN  Outcome: Progressing  Flowsheets (Taken 5/20/2025 1349)  Prevent/minimize sheer/friction injuries: Turn/reposition every 2 hours/use positioning/transfer devices  5/20/2025 1347 by Sarah Plummer RN  Outcome: Progressing  Goal: Participates in plan/prevention/treatment measures  5/20/2025 1349 by Sarah Plummer RN  Outcome: Progressing  Flowsheets (Taken 5/20/2025 1349)  Participates in plan/prevention/treatment measures: Increase activity/out of bed for meals  5/20/2025 1347 by Sarah Plummer RN  Outcome: Progressing  Goal: Prevent/manage excess moisture  5/20/2025 1349 by Sarah Plummer RN  Outcome: Progressing  Flowsheets (Taken 5/20/2025 1349)  Prevent/manage excess moisture: Follow provider orders for dressing changes  5/20/2025 1347 by Sarah Plummer RN  Outcome: Progressing  Goal: Promote/optimize nutrition  5/20/2025 1349 by Sarah Plummer RN  Outcome: Progressing  Flowsheets (Taken 5/20/2025 0335 by Meka Chang RN)  Promote/optimize nutrition:   Consume > 50% meals/supplements   Assist with feeding  5/20/2025 1347 by Sarah Plummer RN  Outcome: Progressing  Goal: Promote skin healing  5/20/2025 1349 by Sarah Plummer RN  Outcome: Progressing  Flowsheets (Taken 5/20/2025 1349)  Promote skin healing: Assess skin/pad under line(s)/device(s)  5/20/2025 1347 by Sarah Plummer RN  Outcome: Progressing   The patient's goals for the shift include feel better    The clinical goals for the shift include ensure patient will have no pain or discomfort during stay in hospital

## 2025-05-20 NOTE — CARE PLAN
The patient's goals for the shift include feel better    The clinical goals for the shift include comfort and safety until the end of the shift    Over the shift, the patient did progress toward the following goals.    Problem: Skin  Goal: Prevent/minimize sheer/friction injuries  Outcome: Progressing     Problem: Skin  Goal: Participates in plan/prevention/treatment measures  Outcome: Progressing  Flowsheets (Taken 5/20/2025 0335)  Participates in plan/prevention/treatment measures: Discuss with provider PT/OT consult     Problem: Skin  Goal: Prevent/manage excess moisture  Outcome: Progressing  Flowsheets (Taken 5/20/2025 0335)  Prevent/manage excess moisture:   Cleanse incontinence/protect with barrier cream   Moisturize dry skin   Monitor for/manage infection if present     Problem: Skin  Goal: Promote/optimize nutrition  Outcome: Progressing  Flowsheets (Taken 5/20/2025 0335)  Promote/optimize nutrition:   Consume > 50% meals/supplements   Assist with feeding     Problem: Fall/Injury  Goal: Be free from injury by end of the shift  Outcome: Progressing     Problem: Fall/Injury  Goal: Pace activities to prevent fatigue by end of the shift  Outcome: Progressing     Problem: Pain - Adult  Goal: Verbalizes/displays adequate comfort level or baseline comfort level  Outcome: Progressing     Problem: Safety - Adult  Goal: Free from fall injury  Outcome: Progressing     Problem: Discharge Planning  Goal: Discharge to home or other facility with appropriate resources  Outcome: Progressing     Problem: Chronic Conditions and Co-morbidities  Goal: Patient's chronic conditions and co-morbidity symptoms are monitored and maintained or improved  Outcome: Progressing     Problem: Nutrition  Goal: Nutrient intake appropriate for maintaining nutritional needs  Outcome: Progressing

## 2025-05-20 NOTE — CARE PLAN
The patient's goals for the shift include feel better    The clinical goals for the shift include ensure patient will have no pain or discomfort during stay in hospital    Over the shift, the patient did not make progress toward the following goals. Barriers to progression include liver cancer.  Recommendations to address these barriers include encourage patient to eat and get out of bed.   Problem: Skin  Goal: Prevent/minimize sheer/friction injuries  Outcome: Progressing  Goal: Participates in plan/prevention/treatment measures  Outcome: Progressing  Goal: Prevent/manage excess moisture  Outcome: Progressing  Goal: Promote/optimize nutrition  Outcome: Progressing  Goal: Promote skin healing  Outcome: Progressing     Problem: Fall/Injury  Goal: Not fall by end of shift  Outcome: Progressing  Goal: Be free from injury by end of the shift  Outcome: Progressing  Goal: Verbalize understanding of personal risk factors for fall in the hospital  Outcome: Progressing  Goal: Verbalize understanding of risk factor reduction measures to prevent injury from fall in the home  Outcome: Progressing  Goal: Use assistive devices by end of the shift  Outcome: Progressing  Goal: Pace activities to prevent fatigue by end of the shift  Outcome: Progressing     Problem: Pain - Adult  Goal: Verbalizes/displays adequate comfort level or baseline comfort level  Outcome: Progressing     Problem: Safety - Adult  Goal: Free from fall injury  Outcome: Progressing     Problem: Discharge Planning  Goal: Discharge to home or other facility with appropriate resources  Outcome: Progressing     Problem: Chronic Conditions and Co-morbidities  Goal: Patient's chronic conditions and co-morbidity symptoms are monitored and maintained or improved  Outcome: Progressing     Problem: Nutrition  Goal: Nutrient intake appropriate for maintaining nutritional needs  Outcome: Progressing

## 2025-05-21 ENCOUNTER — APPOINTMENT (OUTPATIENT)
Dept: PRIMARY CARE | Facility: CLINIC | Age: 80
End: 2025-05-21
Payer: MEDICARE

## 2025-05-21 VITALS
DIASTOLIC BLOOD PRESSURE: 51 MMHG | HEART RATE: 57 BPM | TEMPERATURE: 97 F | SYSTOLIC BLOOD PRESSURE: 107 MMHG | HEIGHT: 67 IN | RESPIRATION RATE: 12 BRPM | OXYGEN SATURATION: 97 % | WEIGHT: 192.9 LBS | BODY MASS INDEX: 30.28 KG/M2

## 2025-05-21 PROCEDURE — 99231 SBSQ HOSP IP/OBS SF/LOW 25: CPT | Performed by: NURSE PRACTITIONER

## 2025-05-21 PROCEDURE — 2500000004 HC RX 250 GENERAL PHARMACY W/ HCPCS (ALT 636 FOR OP/ED): Mod: JZ | Performed by: HOSPITALIST

## 2025-05-21 PROCEDURE — 2500000001 HC RX 250 WO HCPCS SELF ADMINISTERED DRUGS (ALT 637 FOR MEDICARE OP): Performed by: HOSPITALIST

## 2025-05-21 PROCEDURE — 2500000002 HC RX 250 W HCPCS SELF ADMINISTERED DRUGS (ALT 637 FOR MEDICARE OP, ALT 636 FOR OP/ED): Performed by: INTERNAL MEDICINE

## 2025-05-21 PROCEDURE — 2500000001 HC RX 250 WO HCPCS SELF ADMINISTERED DRUGS (ALT 637 FOR MEDICARE OP): Performed by: INTERNAL MEDICINE

## 2025-05-21 PROCEDURE — 99232 SBSQ HOSP IP/OBS MODERATE 35: CPT | Performed by: STUDENT IN AN ORGANIZED HEALTH CARE EDUCATION/TRAINING PROGRAM

## 2025-05-21 RX ADMIN — FINASTERIDE 5 MG: 5 TABLET, FILM COATED ORAL at 08:12

## 2025-05-21 RX ADMIN — FUROSEMIDE 40 MG: 40 TABLET ORAL at 08:12

## 2025-05-21 RX ADMIN — SPIRONOLACTONE 100 MG: 25 TABLET ORAL at 08:12

## 2025-05-21 RX ADMIN — LACTULOSE 20 G: 20 SOLUTION ORAL at 08:12

## 2025-05-21 RX ADMIN — ONDANSETRON 4 MG: 2 INJECTION INTRAMUSCULAR; INTRAVENOUS at 08:10

## 2025-05-21 RX ADMIN — PANTOPRAZOLE SODIUM 40 MG: 40 TABLET, DELAYED RELEASE ORAL at 08:13

## 2025-05-21 RX ADMIN — FERROUS SULFATE TAB 325 MG (65 MG ELEMENTAL FE) 1 TABLET: 325 (65 FE) TAB at 08:12

## 2025-05-21 RX ADMIN — NADOLOL 10 MG: 20 TABLET ORAL at 08:13

## 2025-05-21 NOTE — PROGRESS NOTES
Alfonso Toscano is a 80 y.o. male on day 3 of admission presenting with Generalized weakness.      Subjective   Currently patient is mildly bradycardic and hypotensive, lying down on his bed, no complaint  Plan for discharge to SNF today at 11:30 AM    Objective     Last Recorded Vitals  /51 (BP Location: Left arm, Patient Position: Lying)   Pulse 57   Temp 36.1 °C (97 °F) (Temporal)   Resp 12   Wt 87.5 kg (192 lb 14.4 oz)   SpO2 97%   Intake/Output last 3 Shifts:    Intake/Output Summary (Last 24 hours) at 5/21/2025 1008  Last data filed at 5/20/2025 2345  Gross per 24 hour   Intake 200 ml   Output 2200 ml   Net -2000 ml       Admission Weight  Weight: 87.5 kg (192 lb 14.4 oz) (05/16/25 0952)    Daily Weight  05/16/25 : 87.5 kg (192 lb 14.4 oz)    Image Results  ECG 12 lead  Sinus bradycardia with 1st degree AV block  Otherwise normal ECG  When compared with ECG of 16-MAY-2025 10:26,  T wave inversion no longer evident in Anterior leads  Confirmed by Ovidio Rasheed ([id number]) on 5/19/2025 9:06:08 AM      Physical Exam    General: Well-developed frail elderly male, in no acute distress, ill-appearing  HEENT: AT, NC, no JVD, no lymphadenopathy, neck supple  Lungs: Clear, no wheezing, no crackles  Cardiac: Normal S1-S2, no murmur, no gallop  Abdomen: Soft, nontender, no distention, positive bowel sound  Extremities: No deformity, no edema, pulses intact, ROM limited due to frailty  Neurological: Alert awake oriented x3, sensation intact, clear speech          Assessment & Plan  Generalized weakness    General weakness      80 year old male with history of metastatic liver CA, admitted with weakness and inability for family to care for patient at home     Patient was placed on pain management, fall/aspiration precaution, antiemetic as needed.   He was supposed to have MRI but unable to make it do to weakness.   Last week patient had thoracentesis on 5/13 and had 2.5L removed  Here he was continued on  diuretics and lactulose.    Palliative consulted, discussed with wife and would like to try snf and then palliative/hospice as outpatient. Code status is DNR-comfort measure only.   Metabolic encephalopathy resolved.   Chronic blood loss anemia: recent GI bleed, H/H stable  Had EGD and colonoscopy in may and found to have hemorrhoidal bleeding      Thrombocytopenia: in the setting of liver disease   HTN/HLD: continued home meds  DVT ppx: SCDs     Patient is currently hemodynamically stable and ready for discharge to SNF and outpatient follow-up for hospice.  He will be continued on the current management.  Wife at bedside and all the questions were answered.  They are planning to continue with comfort care only.      5/21/2025  Patient is ready for discharge to SNF today at 11:30 AM  Meds were adjusted  Goldenrod lisette Ramos MD

## 2025-05-21 NOTE — PROGRESS NOTES
05/21/25 0910   Discharge Planning   Home or Post Acute Services Post acute facilities (Rehab/SNF/etc)   Type of Post Acute Facility Services Skilled nursing   Expected Discharge Disposition SNF  ([facility])   Does the patient need discharge transport arranged? Yes   RoundTrip coordination needed? Yes   Has discharge transport been arranged? Yes   What day is the transport expected? 05/21/25   What time is the transport expected? 1130   Patient Choice   Provider Choice list and CMS website (https://medicare.gov/care-compare#search) for post-acute Quality and Resource Measure Data were provided and reviewed with: Patient;Family   Patient / Family choosing to utilize agency / facility established prior to hospitalization No   Intensity of Service   Intensity of Service 0-30 min     Medically cleared for dc. Transport confirmed in Roundtrip for 11:30 am  by ritchie Villavicencio completed and signed, sent in Careport with AVS and MAR. Requested SW assistance with completion of HENS. Pt and family updated. Bedside RN given report number. Facility aware.

## 2025-05-21 NOTE — NURSING NOTE
Physician ambulance here and transferring patient onto cot for discharge and transport to [facility].

## 2025-05-21 NOTE — PROGRESS NOTES
"Physical Therapy    Physical Therapy Treatment    Patient Name: Alfonso Toscano  MRN: [id number]  Today's Date: 5/21/2025  Time Calculation  Start Time: 1011  Stop Time: 1028  Time Calculation (min): 17 min     925/925-A    Assessment/Plan   PT Assessment  PT Assessment Results: Decreased strength, Decreased endurance, Impaired balance, Decreased mobility, Impaired hearing, Pain  Rehab Prognosis: Fair  Barriers to Discharge Home: Caregiver assistance, Physical needs  Caregiver Assistance: Caregiver assistance needed per identified barriers - however, level of patient's required assistance exceeds assistance available at home  Physical Needs: 24hr mobility assistance needed, High falls risk due to function or environment, Ambulating household distances limited by function/safety  End of Session Communication: Bedside nurse  Assessment Comment: Pt. requires assist to complete minimal OOB mobility. Pt. may benefit from continued PT to increase mobility and indep.  End of Session Patient Position:  (Patient sitting up in chair after treatment. Call light/tray in reach. Chair alarm on. Pillow on seat for comfort. Spouse at bedside.)  PT Plan  Inpatient/Swing Bed or Outpatient: Inpatient  PT Plan  Treatment/Interventions: Therapeutic exercise, Range of motion, Positioning  PT Plan: Ongoing PT  PT Frequency: 3 times per week    General Visit Information:   PT  Visit  PT Received On: 05/19/25  General  Family/Caregiver Present: Yes (wife Iman present)  Prior to Session Communication: Bedside nurse  Patient Position Received: Bed, 3 rail up  General Comment:  (Pt. agreeable to therapy bedside appearing lethargic during tx. and falling asleep, wife inquiring if pt. had ammonia levels checked \"he's like this when ammonia is high\" RN was informed of pt. wife concerns.)    Precautions:  Precautions  Hearing/Visual Limitations: hard of hearing, uses amplifier at times  Medical Precautions: Fall precautions  Precautions Comment:  " (fall, hypotension, [place])    Treatments:  Therapeutic Exercise  Therapeutic Exercise Performed: Yes (supine BLE exercises to promote mobility with aarom encouraging arom to promote strength with education to hold contractions 2 x 10 reps: ap's, heelsides, hip abd/adductions, slr's, saq's.)  Therapeutic Activity  Therapeutic Activity Performed:  (educated on ble exercises to promote mobility with education on pressure relief with bed mobility encouraged.)    Transfers  Transfer: Yes (declined out of bed activity with c/o fatique appearing to want to sleep post tx.)     Other Activity  Other Activity Performed: Yes (pt. wife voicing that pt. skin fragile and legs sore to touch, pt. was given heel protectors which were applied for pressure relief and protection from pressure sores.)    Outcome Measures:   13    Education Documentation  Mobility Training, taught by Petey Hightower PTA at 5/21/2025  3:48 PM.  Learner: Patient  Readiness: Acceptance  Method: Demonstration, Teach-back  Response: Needs Reinforcement    Education Comments  No comments found.       EDUCATION:  Outpatient Education  Individual(s) Educated: Patient  Education Provided:  (Patient educated in bed mobility, transfers, gait, balance and LE ther-ex.)  Patient Response to Education:  (pt. participating as able with increments of appearing to fall asleep, although wakens to participate as educated taking physiological rest breaks as needed.)  Encounter Problems       Encounter Problems (Active)       Impaired mobility        Perform all bed mobility with CGAx1 (Progressing)       Start:  05/17/25    Expected End:  05/31/25            Perform all transfers with ww and CGAx1 (Progressing)       Start:  05/17/25    Expected End:  05/31/25            Patient will ambulate 25 ft with ww and CGAx1  (Progressing)       Start:  05/17/25    Expected End:  05/31/25            Patient will perform BLE HEP with supervision x10-15 reps x 1-2 sets, as tolerated   (Progressing)       Start:  05/17/25    Expected End:  05/31/25               Pain - Adult

## 2025-05-21 NOTE — PROGRESS NOTES
"Alfonso Toscano is a 80 y.o. male on day 3 of admission presenting with Generalized weakness.    Subjective   Does not feel great but feels ready to go to rehab.    Objective     Physical Exam  GEN: Frail elderly male, ill appearing  HEENT: MMM  CV: RRR, no murmur  RESP: even and regular  ABD: soft, nt  NEURO: awake, alert and oriented x3    Last Recorded Vitals  Blood pressure 107/51, pulse 57, temperature 36.1 °C (97 °F), temperature source Temporal, resp. rate 12, height 1.702 m (5' 7\"), weight 87.5 kg (192 lb 14.4 oz), SpO2 97%.  Intake/Output last 3 Shifts:  I/O last 3 completed shifts:  In: 600 (6.9 mL/kg) [P.O.:600]  Out: 4200 (48 mL/kg) [Urine:4200 (1.3 mL/kg/hr)]  Weight: 87.5 kg     Relevant Results                 Assessment/Plan   -Discharging to SNF today.  No new updates.  Plan for hospice in future.  He is appropriate now and not sure how much benefit will be achieved at rehab.  -DNRCC  I spent 25minutes in the professional and overall care of this patient.      June Lara, DARVIN-CNP      "

## 2025-05-21 NOTE — CARE PLAN
The patient's goals for the shift include feel better    The clinical goals for the shift include comfort and safety throughout the shift    Over the shift, the patient did progress toward the following goals.     Problem: Fall/Injury  Goal: Not fall by end of shift  Outcome: Progressing     Problem: Fall/Injury  Goal: Be free from injury by end of the shift  Outcome: Progressing     Problem: Pain - Adult  Goal: Verbalizes/displays adequate comfort level or baseline comfort level  Outcome: Progressing     Problem: Safety - Adult  Goal: Free from fall injury  Outcome: Progressing     Problem: Chronic Conditions and Co-morbidities  Goal: Patient's chronic conditions and co-morbidity symptoms are monitored and maintained or improved  Outcome: Progressing     Problem: Discharge Planning  Goal: Discharge to home or other facility with appropriate resources  Outcome: Progressing     Problem: Skin  Goal: Prevent/manage excess moisture  Outcome: Progressing     Problem: Skin  Goal: Prevent/minimize sheer/friction injuries  Outcome: Progressing     Problem: Skin  Goal: Participates in plan/prevention/treatment measures  Outcome: Progressing     Problem: Skin  Goal: Promote skin healing  Outcome: Progressing

## 2025-05-23 ENCOUNTER — APPOINTMENT (OUTPATIENT)
Dept: GASTROENTEROLOGY | Facility: CLINIC | Age: 80
End: 2025-05-23
Payer: MEDICARE

## 2025-05-23 ENCOUNTER — NURSING HOME VISIT (OUTPATIENT)
Dept: POST ACUTE CARE | Facility: EXTERNAL LOCATION | Age: 80
End: 2025-05-23

## 2025-05-23 DIAGNOSIS — J30.1 ALLERGIC RHINITIS DUE TO POLLEN, UNSPECIFIED SEASONALITY: ICD-10-CM

## 2025-05-23 DIAGNOSIS — C22.0 HEPATOCELLULAR CARCINOMA: Primary | ICD-10-CM

## 2025-05-23 DIAGNOSIS — I85.00 ESOPHAGEAL VARICES WITHOUT BLEEDING, UNSPECIFIED ESOPHAGEAL VARICES TYPE (MULTI): ICD-10-CM

## 2025-05-23 DIAGNOSIS — R53.1 GENERAL WEAKNESS: ICD-10-CM

## 2025-05-23 DIAGNOSIS — K92.2 GASTROINTESTINAL HEMORRHAGE, UNSPECIFIED GASTROINTESTINAL HEMORRHAGE TYPE: ICD-10-CM

## 2025-05-23 DIAGNOSIS — I10 ESSENTIAL HYPERTENSION: ICD-10-CM

## 2025-05-23 PROCEDURE — 99310 SBSQ NF CARE HIGH MDM 45: CPT | Performed by: NURSE PRACTITIONER

## 2025-05-23 NOTE — LETTER
Patient: Alfonso Toscano  : 1945    Encounter Date: 2025    Name: Alfonso Toscano  YOB: 1945    INITIAL NURSE PRACTITIONER FOLLOW UP VISIT: [facility],   [facility]  - 25: Generalized weakness in the setting of metastatic liver cancer, GIB    HPI:  Alfonso Toscano is a 80 y.o. male who is here at [facility] as a new admission for PT/OT.   PMH of hepatocellular carcinoma, cirrhosis with esophageal varices, prostate cancer , and aortic ectasia.   Patient presented from home with progressive generalized weakness and caregiver strain. Over the past weeks, the pt has lacked fevers or chills but required removal of 2.5?L of ascites on  due to increasing abdominal distension. In the ER Laboratory evaluation showed anemia (Hgb?9.3?g/dL), thrombocytopenia (69?×?10³/µL), hypoalbuminemia, elevated bilirubin and INR, mild lactatemia, and renal insufficiency (Cr?1.41?mg/dL, eGFR?51?mL/min). CT imaging demonstrated cirrhotic liver morphology with small low-attenuation lesions, mild ascites, diverticulosis, severe coronary calcifications, and an L4 compression deformity. Pt was admitted for observation, continued diuretics, lactulose, and with palliative and rehabilitation services consulted.  Palliative care consultation done for metastatic hepatocellular carcinoma, cirrhosis, recent GI bleed, and s/p paracentesis on 25. The patient expressed willingness to try rehabilitation but uncertain benefit, so the team will engage his wife to clarify goals and continue discussions about hospice and post-acute planning.   The patient is now here at [facility] facility for PT/OT.   On evaluation today, the patient appears markedly emaciated with obvious jaundice; he is unresponsive with eyes intermittently rolled back and maintains spontaneous respirations without meaningful interaction.   Nursing reports this level of unresponsiveness has persisted since admission three days ago.   Given his advanced  metastatic hepatic cancer with cirrhosis, formal PT/OT is unlikely to provide meaningful benefit at this stage. His profound frailty and metabolic encephalopathy significantly limit his capacity to participate in, or benefit from, standard rehabilitative therapies.  His PCP has signed a DNR-CCA order, yet the discharge paperwork lists “DNR?-?comfort measures only.” Attempts to clarify goals of care by calling his wife were unsuccessful; I will reconnect with her as soon as she is available to ensure that his treatment plan aligns with his and his family’s wishes.    REVIEW OF SYSTEMS:  Review of systems are negative except where noted in HPI.    /77   Pulse 62      Physical Exam  Constitutional:       Appearance: He is normal weight. He is ill-appearing.      Comments: emaciated   HENT:      Head: Normocephalic.      Comments: Temporal wasting     Right Ear: External ear normal.      Left Ear: External ear normal.      Nose: Nose normal.      Mouth/Throat:      Mouth: Mucous membranes are dry.   Eyes:      Pupils: Pupils are equal, round, and reactive to light.      Comments: Jaundice sclera   Cardiovascular:      Rate and Rhythm: Normal rate and regular rhythm.      Pulses: Normal pulses.      Heart sounds: Murmur heard.   Pulmonary:      Effort: Pulmonary effort is normal.      Breath sounds: Normal breath sounds.      Comments: Diminished lung sounds  Abdominal:      General: Abdomen is flat. Bowel sounds are normal. There is no distension.      Palpations: Abdomen is soft.      Tenderness: There is no abdominal tenderness.   Genitourinary:     Comments: Not examined  Musculoskeletal:         General: Tenderness present. Normal range of motion.      Cervical back: Normal range of motion and neck supple.      Right lower leg: No edema.      Left lower leg: No edema.      Comments: Muscle wasting, Generalized weakness and limited effort due to frailty   Skin:     General: Skin is warm and dry.      Capillary  Refill: Capillary refill takes less than 2 seconds.      Comments: jaundice   Neurological:      General: No focal deficit present.      Mental Status: He is alert. Mental status is at baseline.      Comments: Unresponsive to conversation, intermittently opens eyes to name; no focal deficits       ADVANCED CARE PLANNING: Discussion done with the patient and family if available regarding code status, diagnosis, and the prognosis of the patient.     CODE STATUS: DNRCCA    DISCHARGE PLANNING:  Patient will be discharge home when goals are met.   Discharge planner to communicate ongoing updates regarding discharge plan.     Follow up Appointments:  May?27,?2025 (as needed): follow up with Murray?GERMAINE Patel (Pediatrics)    June?16,?2025 at 3:00?PM: Murray?GERMAINE Patel, [id number], [facility]    RECENT HOSPITALIZATION RECORDS REVIEWED:   All laboratories, diagnostic tests, progress notes, consultation notes, and discharge notes available reviewed from recent hospitalization.     RX Allergies[1]    MEDICATION LIST FROM RECENT HOSPITALIZATION:  oxyCODONE 5?mg immediate release tablet; take 1 tablet by mouth every 6?hours PRN for severe pain    polyethylene glycol 17?g packet; take 17?g by mouth once daily PRN for constipation    bicalutamide 50?mg tablet; 1 tablet by mouth once daily    cholecalciferol 25?mcg (1,000?units) tablet; 1 tablet by mouth once daily    dutasteride 0.5?mg capsule; 1 capsule by mouth once daily    ferrous sulfate 325?mg (65?mg elemental) tablet; 1 tablet by mouth once daily    fexofenadine 180?mg tablet; 1 tablet by mouth once daily PRN    furosemide 20?mg tablet; 2 tablets (40?mg) by mouth once daily (as of 5/7/25)    lactulose 20?g/30?mL solution; 30?mL by mouth three times daily    nadolol 20?mg tablet; 0.5 tablet (10?mg) by mouth once daily    pantoprazole 40?mg EC tablet; 1 tablet by mouth once daily    spironolactone 50?mg tablet; 2 tablets (100?mg) by mouth once daily (as of  5/7/25)    traMADol 50?mg tablet; take 1 tablet by mouth every 6?hours PRN    MEDICATIONS UPDATED AND RECONCILED AT THE FACILITY:  Please see Nursing facility medication list.  Oxycodone d'cd    Assessment/Plan   Problem List Items Addressed This Visit       Allergic rhinitis due to pollen    Esophageal varices    Essential hypertension    Hepatocellular carcinoma - Primary    Gastrointestinal hemorrhage, unspecified gastrointestinal hemorrhage type    General weakness         PLAN:   Recent nursing evaluation and notes were reviewed.      Weakness and physical deconditioning:   The patient exhibits generalized weakness and physical deconditioning.   Physical therapy, occupational therapy, and speech therapy are actively evaluating and treating the patient to improve strength, functional mobility, endurance, and overall conditioning.   Therapy is unlikely to provide meaningful benefit at this stage. His profound frailty and metabolic encephalopathy significantly limit his capacity to participate in, or benefit from, standard rehabilitative therapies.  We will offer PT/OT to assess any potential for improvement without imposing undue burden.  Therapies are focused on achieving maximum functional potential while prioritizing safety during all interventions.  Please refer to the initial therapy evaluations for detailed information regarding the patient's initial evaluation.     Metastatic liver cancer, cirrhosis, s/p paracentesis 5/13/25:   Cont Bicalutamide 50 mg daily  Cont Lactulose 20g/30ml BID  Cont Furosemide 40 mg daily and Spironolactone 100 mg daily    Hypertension:  Continue to monitor and trend BP within the facility.  Current BP readings: 110/70s  Continue medications:  Nadolol 20 mg give 1/2 tab daily  Labs will be obtained periodically to guide ongoing management and adjust medications as needed.  The antihypertensive regimen will be reviewed regularly and adjusted based on effectiveness, side effects,  and lab trends.  Avoidance of nephrotoxic agents is recommended to preserve renal function.    Anemia, recent GIB:  Ferrous sulfate 325 mg daily    BPH:   Cont finasteride 5 mg daily    GERD:  Cont Protonix 40 mg daily    Supplementation:  Cholecalciferol 1000 iu daily    Allergies:  Cont fexofenadine 180 mg daily PRN    Pain Management:   Tramadol 50 mg Q6hr PRN    Constipation:  Miralax 17 g once daily PRN    Nausea:  Zofran 4 mg Q6hr PRN    Skin Integrity:  Nursing to monitor skin integrity as patient is at risk for pressure injuries.  Turn and reposition Q 2 hours or more.  Air mattress and when up in chair cushion reducing device.  Dietician to evaluate and recommend.  Nutritional supplement to be implemented if needed.  Please monitor skin integrity and other pressure areas.    WOUNDS:  Wound care per nursing  Sacral  Bilateral shins  Bilateral knees  See Facility notes for measurements/assessment of wound.  Referral to wound clinic or wound team here at the facility to follow if appropriate.    Any decline or change in condition needs to be communicated with the physician or myself.    Discussion with nursing staff regarding ongoing care and management.  Communication regarding patients status, overall condition, changes with plan (medications or treatments), and any questions from family completed if present.  If family not available, would communicate in person or via phone if needed.   We will continue with the plan and medications noted above.    We will continue to follow the patient here at the facility.    *Please note that nursing facility notes, facility EMR, outside laboratory agency, and  EMR do not interface.     Completion of the note was done through Dragon voice recognition technology and may include unintended or grammatical errors which may not have been recognized when finalizing the note.     Time:   A total of 45 minutes or more was spent in evaluation and management of the patient,  including time with the patient and family (if present).   Over 50% of this time was dedicated to counseling and coordination of care.  This includes time spent:  Reviewing vital signs, nursing/therapy reports, hospital records, labs, imaging, and consults.  Conducting a face-to-face assessment, including history, physical exam, and patient education.  Discussing the diagnosis, treatment options, and current medications prescribed.  Reviewing and updating the plan of care with the patient, family if available, nursing staff, and interdisciplinary team.  Documenting in the electronic medical record.     KEKE Tobar       Electronically Signed By: KEKE Tobar   5/30/25 12:46 PM       [1]  No Known Allergies

## 2025-05-24 NOTE — PROGRESS NOTES
"Physical Therapy    Physical Therapy Treatment    Patient Name: Alfonso Toscano  MRN: [id number]  Today's Date: 5/24/2025  Time Calculation  Start Time: 1011  Stop Time: 1028  Time Calculation (min): 17 min     925/925-A    Assessment/Plan   PT Assessment  PT Assessment Results: Decreased strength, Decreased endurance, Impaired balance, Decreased mobility, Impaired hearing, Pain  Rehab Prognosis: Fair  Barriers to Discharge Home: Caregiver assistance, Physical needs  Caregiver Assistance: Caregiver assistance needed per identified barriers - however, level of patient's required assistance exceeds assistance available at home  Physical Needs: 24hr mobility assistance needed, High falls risk due to function or environment, Ambulating household distances limited by function/safety  End of Session Communication: Bedside nurse  Assessment Comment: Pt. requires assist to complete minimal OOB mobility. Pt. may benefit from continued PT to increase mobility and indep.  End of Session Patient Position:  (Patient sitting up in chair after treatment. Call light/tray in reach. Chair alarm on. Pillow on seat for comfort. Spouse at bedside.)  PT Plan  Inpatient/Swing Bed or Outpatient: Inpatient  PT Plan  Treatment/Interventions: Therapeutic exercise, Range of motion, Positioning  PT Plan: Ongoing PT  PT Frequency: 3 times per week  General Visit Information:   PT  Visit  PT Received On: 05/19/25  General  Family/Caregiver Present: Yes (wife Iman present)  Prior to Session Communication: Bedside nurse  Patient Position Received: Bed, 3 rail up  General Comment:  (Pt. agreeable to therapy bedside appearing lethargic during tx. and falling asleep, wife inquiring if pt. had ammonia levels checked \"he's like this when ammonia is high\" RN was informed of pt. wife concerns.)  Subjective     Precautions:  Precautions  Hearing/Visual Limitations: hard of hearing, uses amplifier at times  Medical Precautions: Fall precautions  Precautions " Comment:  (fall, hypotension, [place])  Treatments:  Therapeutic Exercise  Therapeutic Exercise Performed: Yes (supine BLE exercises to promote mobility with aarom encouraging arom to promote strength with education to hold contractions 2 x 10 reps: ap's, heelsides, hip abd/adductions, slr's, saq's.)  Therapeutic Activity  Therapeutic Activity Performed:  (educated on ble exercises to promote mobility with education on pressure relief with bed mobility encouraged.)      Other Activity  Other Activity Performed: Yes (pt. wife voicing that pt. skin fragile and legs sore to touch, pt. was given heel protectors which were applied for pressure relief and protection from pressure sores.)    Outcome Measures:  13        EDUCATION:  Outpatient Education  Individual(s) Educated: Patient  Education Provided:  (Patient educated in bed mobility, transfers, gait, balance and LE ther-ex.)  Patient Response to Education:  (pt. participating as able with increments of appearing to fall asleep, although wakens to participate as educated taking physiological rest breaks as needed.)  Encounter Problems       Encounter Problems (Resolved)       Impaired mobility        Perform all bed mobility with CGAx1 (Adequate for Discharge)       Start:  05/17/25    Expected End:  05/31/25    Resolved:  05/21/25         Perform all transfers with ww and CGAx1 (Adequate for Discharge)       Start:  05/17/25    Expected End:  05/31/25    Resolved:  05/21/25         Patient will ambulate 25 ft with ww and CGAx1  (Adequate for Discharge)       Start:  05/17/25    Expected End:  05/31/25    Resolved:  05/21/25         Patient will perform BLE HEP with supervision x10-15 reps x 1-2 sets, as tolerated  (Adequate for Discharge)       Start:  05/17/25    Expected End:  05/31/25    Resolved:  05/21/25            Pain - Adult

## 2025-05-27 ENCOUNTER — NURSING HOME VISIT (OUTPATIENT)
Dept: POST ACUTE CARE | Facility: EXTERNAL LOCATION | Age: 80
End: 2025-05-27
Payer: MEDICARE

## 2025-05-27 DIAGNOSIS — I10 ESSENTIAL HYPERTENSION: ICD-10-CM

## 2025-05-27 DIAGNOSIS — K92.2 GASTROINTESTINAL HEMORRHAGE, UNSPECIFIED GASTROINTESTINAL HEMORRHAGE TYPE: ICD-10-CM

## 2025-05-27 DIAGNOSIS — R53.81 PHYSICAL DECONDITIONING: ICD-10-CM

## 2025-05-27 DIAGNOSIS — I85.00 ESOPHAGEAL VARICES WITHOUT BLEEDING, UNSPECIFIED ESOPHAGEAL VARICES TYPE (MULTI): ICD-10-CM

## 2025-05-27 DIAGNOSIS — R53.1 GENERAL WEAKNESS: ICD-10-CM

## 2025-05-27 DIAGNOSIS — C22.0 HEPATOCELLULAR CARCINOMA: Primary | ICD-10-CM

## 2025-05-27 DIAGNOSIS — J30.1 ALLERGIC RHINITIS DUE TO POLLEN, UNSPECIFIED SEASONALITY: ICD-10-CM

## 2025-05-27 PROCEDURE — 99309 SBSQ NF CARE MODERATE MDM 30: CPT | Performed by: NURSE PRACTITIONER

## 2025-05-27 NOTE — LETTER
Patient: Alfonso Toscano  : 1945    Encounter Date: 2025    Name: Alfonso Toscano  YOB: 1945    FOLLOW UP VISIT: [facility],   [facility]  - 25: Generalized weakness in the setting of metastatic liver cancer, GIB     SUBJECTIVE:  Patient is noted to be sitting up in a olga-chair. No complaints. Patient keeps eyes closed and does not respond to verbal cues. No purposeful movements noted.   Vital signs reviewed and stable.  No family or surrogates present at bedside during visit.  Labs reviewed.    REVIEW OF SYSTEMS:   All review of systems are negative unless otherwise stated above under subjective.    LABS REVIEWED AT FACILITY:  25  Severe hypoglycemia:  Glucose? 40?mg/dL (normal )   Nursing checked accucheck at the bedside and current reading is 121.   CO2?16?mEq/L (21-33) common in impaired hepatic clearance of lactate.   Azotemia: BUN?38?mg/dL (7-25), Creatinine?2.1?mg/dL (0.6-1.3),   Reduced GFR: 31?mL/min/1.73?m² (should be >?60) Indicates Stage?3-4 chronic kidney disease;   Hypoalbuminemia & low total protein: Albumin?2.2?g/dL (3.5-5.5), Total protein?5.2?g/dL (6.0-8.3) - Reflects malnutrition  Cholestatic pattern:  Alkaline phosphatase?325?IU/L ()  Total bilirubin?4.6?mg/dL (0.2-1.2)  AST?68?IU/L (4-40), ALT?32?IU/L (4-55)  Predominant elevation of ALP and bilirubin is typical of bile-duct obstruction or infiltrative liver tumors or hepatic liver carcinoma. AST:ALT ratio >?2 may also suggest alcoholic or advanced fibrotic liver changes.  Mild hyponatremia: Na?132?mEq/L (136-145)  Borderline hyperkalemia: K?5.0?mEq/L (3.5-5.3)  Chloride normal: Cl?99?mEq/L ()  RBC? 3.68?M/µL (4.00-6.60), Hgb?11.6?g/dL (14.0-18.0), Hct?36.5?% (42.0-54.0)  Platelets 80?K/µL (150-450) -- significant thrombocytopenia.  Likely due to hypersplenism, bone-marrow suppression, malnutrition, and marrow infiltration.    Living will related issues reviewed-Code status: DNCA    /78    Pulse 81   Physical Exam  Constitutional:       Appearance: He is normal weight. He is ill-appearing.      Comments: emaciated   HENT:      Head: Normocephalic.      Comments: Temporal wasting     Right Ear: External ear normal.      Left Ear: External ear normal.      Nose: Nose normal.      Mouth/Throat:      Mouth: Mucous membranes are dry.   Eyes:      Pupils: Pupils are equal, round, and reactive to light.      Comments: Jaundice sclera   Cardiovascular:      Rate and Rhythm: Normal rate and regular rhythm.      Pulses: Normal pulses.      Heart sounds: Murmur heard.   Pulmonary:      Effort: Pulmonary effort is normal.      Breath sounds: Normal breath sounds.      Comments: Diminished lung sounds  Abdominal:      General: Abdomen is flat. Bowel sounds are normal. There is no distension.      Palpations: Abdomen is soft.      Tenderness: There is no abdominal tenderness.   Genitourinary:     Comments: Not examined  Musculoskeletal:         General: Tenderness present. Normal range of motion.      Cervical back: Normal range of motion and neck supple.      Right lower leg: No edema.      Left lower leg: No edema.      Comments: Muscle wasting, Generalized weakness and limited effort due to frailty   Skin:     General: Skin is warm and dry.      Capillary Refill: Capillary refill takes less than 2 seconds.      Comments: jaundice   Neurological:      General: No focal deficit present.      Mental Status: He is alert. Mental status is at baseline.      Comments: Unresponsive to conversation, intermittently opens eyes to name; no focal deficits     Assessment/Plan  Problem List Items Addressed This Visit       Allergic rhinitis due to pollen    Esophageal varices    Essential hypertension    Hepatocellular carcinoma - Primary    Gastrointestinal hemorrhage, unspecified gastrointestinal hemorrhage type    General weakness    Physical deconditioning       PLAN:  Pt has been seen for follow up visit.  Recent  therapy notes have been reviewed.  Therapy is unlikely to provide meaningful benefit at this stage. His profound frailty and metabolic encephalopathy significantly limit his capacity to participate in, or benefit from, standard rehabilitative therapies.  We will cont to offer PT/OT to assess any potential for improvement without imposing undue burden.  Refer to PT/OT/ST documentation in the facility records for detailed updates on therapy progression and response.  Recent nursing evaluation and notes were reviewed.   Medication list reviewed here at the facility, please see facility list for complete list of medications and dosages.     Weakness and physical deconditioning:   The patient exhibits generalized weakness and physical deconditioning.   Physical therapy, occupational therapy, and speech therapy are ongoing     Metastatic liver cancer, cirrhosis, s/p paracentesis 5/13/25:   Cont Bicalutamide 50 mg daily  Cont Lactulose 20g/30ml BID  Cont Furosemide 40 mg daily and Spironolactone 100 mg daily  Ammonia level drawn and pending    Hypertension:  Continue to monitor and trend BP within the facility.  Current BP readings: 130/70s  Continue medications:  Nadolol 20 mg give 1/2 tab daily  Labs will be obtained periodically to guide ongoing management and adjust medications as needed.  The antihypertensive regimen will be reviewed regularly and adjusted based on effectiveness, side effects, and lab trends.  Avoidance of nephrotoxic agents is recommended to preserve renal function.     Anemia, recent GIB:  Ferrous sulfate 325 mg daily     BPH:   Cont finasteride 5 mg daily     GERD:  Cont Protonix 40 mg daily     Supplementation:  Cholecalciferol 1000 iu daily     Allergies:  Cont fexofenadine 180 mg daily PRN     Pain Management:   Tramadol 50 mg Q6hr PRN     Constipation:  Miralax 17 g once daily PRN     Nausea:  Zofran 4 mg Q6hr PRN     WOUNDS:  Wound care per nursing  Sacral  Bilateral shins  Bilateral  knees  See Facility notes for measurements/assessment of wound.  Referral to wound clinic or wound team here at the facility to follow if appropriate.    Skin integrity:  Nursing to monitor skin integrity as patient is at risk for pressure injuries.  Turn and reposition Q 2 hours or more.  Air mattress and when up in chair cushion reducing device.  Dietician to evaluate and recommend.  Nutritional supplement to be implemented if needed.  Please monitor skin integrity and other pressure areas.    Any decline or change in condition needs to be communicated with the physician or myself.    Discussion with nursing staff regarding ongoing care and management.  Communication regarding patients status, overall condition, changes with plan (medications or treatments), and any questions from family completed if present.  If family not available, would communicate in person or via phone if needed.   We will continue with the plan and medications noted above.    We will continue to follow the patient here at the facility.    Discharge planning:   Patient to be discharged home when goals are met.   Ongoing discussion with patient and family if available regarding discharge per discharge planner at facility.     *Please note that nursing facility notes, nursing EMR, outside laboratory agency, and  EMR do not interface.     Completion of the note was done through Dragon voice recognition technology and may include unintended or grammatical errors which may not have been recognized when finalizing the note.     Time:    KEKE Tobar      Electronically Signed By: KEKE Tobar   5/30/25  1:02 PM

## 2025-05-30 ENCOUNTER — NURSING HOME VISIT (OUTPATIENT)
Dept: POST ACUTE CARE | Facility: EXTERNAL LOCATION | Age: 80
End: 2025-05-30
Payer: MEDICARE

## 2025-05-30 VITALS — DIASTOLIC BLOOD PRESSURE: 80 MMHG | SYSTOLIC BLOOD PRESSURE: 110 MMHG | HEART RATE: 82 BPM

## 2025-05-30 VITALS — HEART RATE: 81 BPM | SYSTOLIC BLOOD PRESSURE: 122 MMHG | DIASTOLIC BLOOD PRESSURE: 78 MMHG

## 2025-05-30 VITALS — HEART RATE: 62 BPM | DIASTOLIC BLOOD PRESSURE: 77 MMHG | SYSTOLIC BLOOD PRESSURE: 115 MMHG

## 2025-05-30 DIAGNOSIS — R53.81 PHYSICAL DECONDITIONING: ICD-10-CM

## 2025-05-30 DIAGNOSIS — I85.00 ESOPHAGEAL VARICES WITHOUT BLEEDING, UNSPECIFIED ESOPHAGEAL VARICES TYPE (MULTI): ICD-10-CM

## 2025-05-30 DIAGNOSIS — K92.2 GASTROINTESTINAL HEMORRHAGE, UNSPECIFIED GASTROINTESTINAL HEMORRHAGE TYPE: ICD-10-CM

## 2025-05-30 DIAGNOSIS — J30.1 ALLERGIC RHINITIS DUE TO POLLEN, UNSPECIFIED SEASONALITY: ICD-10-CM

## 2025-05-30 DIAGNOSIS — I10 ESSENTIAL HYPERTENSION: ICD-10-CM

## 2025-05-30 DIAGNOSIS — R53.1 GENERAL WEAKNESS: ICD-10-CM

## 2025-05-30 DIAGNOSIS — C22.0 HEPATOCELLULAR CARCINOMA: Primary | ICD-10-CM

## 2025-05-30 PROCEDURE — 99309 SBSQ NF CARE MODERATE MDM 30: CPT | Performed by: NURSE PRACTITIONER

## 2025-05-30 NOTE — PROGRESS NOTES
Name: Alfonso Toscano  YOB: 1945    FOLLOW UP VISIT: [facility],   [facility] 5/16 - 5/20/25: Generalized weakness in the setting of metastatic liver cancer, GIB     SUBJECTIVE:  Today, the patient is noted to be resting in bed awake with his eyes open. Wife is at the bedside.   Connected with wife regarding the plan of care.   Wife understand the degree of illness but wishes to have  do as much as he can in PT/OT to build strength.   Wife states that DNR is here that was signed by Dr. Patel.   DNRCCA in chart.    REVIEW OF SYSTEMS:   All review of systems are negative unless otherwise stated above under subjective.    LABS REVIEWED AT FACILITY:  Ammonia 123    Living will related issues reviewed-Code status: DNRCCA    /80   Pulse 82   Physical Exam  Constitutional:       Appearance: He is normal weight. He is ill-appearing.      Comments: emaciated   HENT:      Head: Normocephalic.      Comments: Temporal wasting     Right Ear: External ear normal.      Left Ear: External ear normal.      Nose: Nose normal.      Mouth/Throat:      Mouth: Mucous membranes are dry.   Eyes:      Pupils: Pupils are equal, round, and reactive to light.      Comments: Jaundice sclera   Cardiovascular:      Rate and Rhythm: Normal rate and regular rhythm.      Pulses: Normal pulses.      Heart sounds: Murmur heard.   Pulmonary:      Effort: Pulmonary effort is normal.      Breath sounds: Normal breath sounds.      Comments: Diminished lung sounds  Abdominal:      General: Abdomen is flat. Bowel sounds are normal. There is no distension.      Palpations: Abdomen is soft.      Tenderness: There is no abdominal tenderness.   Genitourinary:     Comments: Not examined  Musculoskeletal:         General: Tenderness present. Normal range of motion.      Cervical back: Normal range of motion and neck supple.      Right lower leg: No edema.      Left lower leg: No edema.      Comments: Muscle wasting, Generalized weakness and  limited effort due to frailty   Skin:     General: Skin is warm and dry.      Capillary Refill: Capillary refill takes less than 2 seconds.      Comments: jaundice   Neurological:      General: No focal deficit present.      Mental Status: He is alert. Mental status is at baseline.      Comments: Unresponsive to conversation, intermittently opens eyes to name; no focal deficits      Assessment/Plan   Problem List Items Addressed This Visit       Allergic rhinitis due to pollen    Esophageal varices    Essential hypertension    Hepatocellular carcinoma - Primary    Gastrointestinal hemorrhage, unspecified gastrointestinal hemorrhage type    General weakness    Physical deconditioning       PLAN:  Pt has been seen for follow up visit.  Recent therapy notes have been reviewed.  The patient remains engaged in skilled therapy services aimed at improving strength, functional mobility, and endurance.   Alfonso Toscano is demonstrating progress consistent with their current abilities.   Refer to PT/OT/ST documentation in the facility records for detailed updates on therapy progression and response.  Recent nursing evaluation and notes were reviewed.   Medication list reviewed here at the facility, please see facility list for complete list of medications and dosages.     Weakness and physical deconditioning:   The patient exhibits generalized weakness and physical deconditioning.   Physical therapy, occupational therapy, and speech therapy are ongoing     Metastatic liver cancer, cirrhosis, s/p paracentesis 5/13/25:   Cont Bicalutamide 50 mg daily  Cont Lactulose 20g/30ml BID  Cont Furosemide 40 mg daily and Spironolactone 100 mg daily  Ammonia level 123, Lactulose to increase to TID (wife as stepped out of building since Ammonia resulted - will discuss when available), repeat Ammonia in AM     Hypertension:  Continue to monitor and trend BP within the facility.  Current BP readings: 130/70s  Continue medications:  Nadolol  20 mg give 1/2 tab daily  Labs will be obtained periodically to guide ongoing management and adjust medications as needed.  The antihypertensive regimen will be reviewed regularly and adjusted based on effectiveness, side effects, and lab trends.  Avoidance of nephrotoxic agents is recommended to preserve renal function.     Anemia, recent GIB:  Ferrous sulfate 325 mg daily     BPH:   Cont finasteride 5 mg daily     GERD:  Cont Protonix 40 mg daily     Supplementation:  Cholecalciferol 1000 iu daily     Allergies:  Cont fexofenadine 180 mg daily PRN     Pain Management:   Tramadol 50 mg Q6hr PRN     Constipation:  Miralax 17 g once daily PRN     Nausea:  Zofran 4 mg Q6hr PRN     WOUNDS:  Wound care per nursing  Sacral  Bilateral shins  Bilateral knees  See Facility notes for measurements/assessment of wound.  Referral to wound clinic or wound team here at the facility to follow if appropriate.    Skin integrity:  Nursing to monitor skin integrity as patient is at risk for pressure injuries.  Turn and reposition Q 2 hours or more.  Air mattress and when up in chair cushion reducing device.  Dietician to evaluate and recommend.  Nutritional supplement to be implemented if needed.  Please monitor skin integrity and other pressure areas.    Any decline or change in condition needs to be communicated with the physician or myself.    Discussion with nursing staff regarding ongoing care and management.  Communication regarding patients status, overall condition, changes with plan (medications or treatments), and any questions from family completed if present.  If family not available, would communicate in person or via phone if needed.   We will continue with the plan and medications noted above.    We will continue to follow the patient here at the facility.    Discharge planning:   Patient to be discharged home when goals are met.   Ongoing discussion with patient and family if available regarding discharge per discharge  planner at facility.     *Please note that nursing facility notes, nursing EMR, outside laboratory agency, and  EMR do not interface.     Completion of the note was done through Dragon voice recognition technology and may include unintended or grammatical errors which may not have been recognized when finalizing the note.     Time:    Dee Dee Vergara, APRN-CNP

## 2025-05-30 NOTE — PROGRESS NOTES
Name: Alfonso Toscano  YOB: 1945    INITIAL NURSE PRACTITIONER FOLLOW UP VISIT: [facility],   [facility] 5/16 - 5/20/25: Generalized weakness in the setting of metastatic liver cancer, GIB    HPI:  Alfonso Toscano is a 80 y.o. male who is here at [facility] as a new admission for PT/OT.   PMH of hepatocellular carcinoma, cirrhosis with esophageal varices, prostate cancer 2009, and aortic ectasia.   Patient presented from home with progressive generalized weakness and caregiver strain. Over the past weeks, the pt has lacked fevers or chills but required removal of 2.5?L of ascites on 5/13 due to increasing abdominal distension. In the ER Laboratory evaluation showed anemia (Hgb?9.3?g/dL), thrombocytopenia (69?×?10³/µL), hypoalbuminemia, elevated bilirubin and INR, mild lactatemia, and renal insufficiency (Cr?1.41?mg/dL, eGFR?51?mL/min). CT imaging demonstrated cirrhotic liver morphology with small low-attenuation lesions, mild ascites, diverticulosis, severe coronary calcifications, and an L4 compression deformity. Pt was admitted for observation, continued diuretics, lactulose, and with palliative and rehabilitation services consulted.  Palliative care consultation done for metastatic hepatocellular carcinoma, cirrhosis, recent GI bleed, and s/p paracentesis on 5/13/25. The patient expressed willingness to try rehabilitation but uncertain benefit, so the team will engage his wife to clarify goals and continue discussions about hospice and post-acute planning.   The patient is now here at a [facility] facility for PT/OT.   On evaluation today, the patient appears markedly emaciated with obvious jaundice; he is unresponsive with eyes intermittently rolled back and maintains spontaneous respirations without meaningful interaction.   Nursing reports this level of unresponsiveness has persisted since admission three days ago.   Given his advanced metastatic hepatic cancer with cirrhosis, formal PT/OT is unlikely to provide  meaningful benefit at this stage. His profound frailty and metabolic encephalopathy significantly limit his capacity to participate in, or benefit from, standard rehabilitative therapies.  His PCP has signed a DNR-CCA order, yet the discharge paperwork lists “DNR?-?comfort measures only.” Attempts to clarify goals of care by calling his wife were unsuccessful; I will reconnect with her as soon as she is available to ensure that his treatment plan aligns with his and his family’s wishes.    REVIEW OF SYSTEMS:  Review of systems are negative except where noted in HPI.    /77   Pulse 62      Physical Exam  Constitutional:       Appearance: He is normal weight. He is ill-appearing.      Comments: emaciated   HENT:      Head: Normocephalic.      Comments: Temporal wasting     Right Ear: External ear normal.      Left Ear: External ear normal.      Nose: Nose normal.      Mouth/Throat:      Mouth: Mucous membranes are dry.   Eyes:      Pupils: Pupils are equal, round, and reactive to light.      Comments: Jaundice sclera   Cardiovascular:      Rate and Rhythm: Normal rate and regular rhythm.      Pulses: Normal pulses.      Heart sounds: Murmur heard.   Pulmonary:      Effort: Pulmonary effort is normal.      Breath sounds: Normal breath sounds.      Comments: Diminished lung sounds  Abdominal:      General: Abdomen is flat. Bowel sounds are normal. There is no distension.      Palpations: Abdomen is soft.      Tenderness: There is no abdominal tenderness.   Genitourinary:     Comments: Not examined  Musculoskeletal:         General: Tenderness present. Normal range of motion.      Cervical back: Normal range of motion and neck supple.      Right lower leg: No edema.      Left lower leg: No edema.      Comments: Muscle wasting, Generalized weakness and limited effort due to frailty   Skin:     General: Skin is warm and dry.      Capillary Refill: Capillary refill takes less than 2 seconds.      Comments: jaundice    Neurological:      General: No focal deficit present.      Mental Status: He is alert. Mental status is at baseline.      Comments: Unresponsive to conversation, intermittently opens eyes to name; no focal deficits       ADVANCED CARE PLANNING: Discussion done with the patient and family if available regarding code status, diagnosis, and the prognosis of the patient.     CODE STATUS: DNRCCA    DISCHARGE PLANNING:  Patient will be discharge home when goals are met.   Discharge planner to communicate ongoing updates regarding discharge plan.     Follow up Appointments:  May?27,?2025 (as needed): follow up with Murray?GERMAINE Patel (Pediatrics)    June?16,?2025 at 3:00?PM: [facility]GERMAINE Patel, [id number]    RECENT HOSPITALIZATION RECORDS REVIEWED:   All laboratories, diagnostic tests, progress notes, consultation notes, and discharge notes available reviewed from recent hospitalization.     RX Allergies[1]    MEDICATION LIST FROM RECENT HOSPITALIZATION:  oxyCODONE 5?mg immediate release tablet; take 1 tablet by mouth every 6?hours PRN for severe pain    polyethylene glycol 17?g packet; take 17?g by mouth once daily PRN for constipation    bicalutamide 50?mg tablet; 1 tablet by mouth once daily    cholecalciferol 25?mcg (1,000?units) tablet; 1 tablet by mouth once daily    dutasteride 0.5?mg capsule; 1 capsule by mouth once daily    ferrous sulfate 325?mg (65?mg elemental) tablet; 1 tablet by mouth once daily    fexofenadine 180?mg tablet; 1 tablet by mouth once daily PRN    furosemide 20?mg tablet; 2 tablets (40?mg) by mouth once daily (as of 5/7/25)    lactulose 20?g/30?mL solution; 30?mL by mouth three times daily    nadolol 20?mg tablet; 0.5 tablet (10?mg) by mouth once daily    pantoprazole 40?mg EC tablet; 1 tablet by mouth once daily    spironolactone 50?mg tablet; 2 tablets (100?mg) by mouth once daily (as of 5/7/25)    traMADol 50?mg tablet; take 1 tablet by mouth every 6?hours  PRN    MEDICATIONS UPDATED AND RECONCILED AT THE FACILITY:  Please see Nursing facility medication list.  Oxycodone d'cd    Assessment/Plan    Problem List Items Addressed This Visit       Allergic rhinitis due to pollen    Esophageal varices    Essential hypertension    Hepatocellular carcinoma - Primary    Gastrointestinal hemorrhage, unspecified gastrointestinal hemorrhage type    General weakness         PLAN:   Recent nursing evaluation and notes were reviewed.      Weakness and physical deconditioning:   The patient exhibits generalized weakness and physical deconditioning.   Physical therapy, occupational therapy, and speech therapy are actively evaluating and treating the patient to improve strength, functional mobility, endurance, and overall conditioning.   Therapy is unlikely to provide meaningful benefit at this stage. His profound frailty and metabolic encephalopathy significantly limit his capacity to participate in, or benefit from, standard rehabilitative therapies.  We will offer PT/OT to assess any potential for improvement without imposing undue burden.  Therapies are focused on achieving maximum functional potential while prioritizing safety during all interventions.  Please refer to the initial therapy evaluations for detailed information regarding the patient's initial evaluation.     Metastatic liver cancer, cirrhosis, s/p paracentesis 5/13/25:   Cont Bicalutamide 50 mg daily  Cont Lactulose 20g/30ml BID  Cont Furosemide 40 mg daily and Spironolactone 100 mg daily    Hypertension:  Continue to monitor and trend BP within the facility.  Current BP readings: 110/70s  Continue medications:  Nadolol 20 mg give 1/2 tab daily  Labs will be obtained periodically to guide ongoing management and adjust medications as needed.  The antihypertensive regimen will be reviewed regularly and adjusted based on effectiveness, side effects, and lab trends.  Avoidance of nephrotoxic agents is recommended to  preserve renal function.    Anemia, recent GIB:  Ferrous sulfate 325 mg daily    BPH:   Cont finasteride 5 mg daily    GERD:  Cont Protonix 40 mg daily    Supplementation:  Cholecalciferol 1000 iu daily    Allergies:  Cont fexofenadine 180 mg daily PRN    Pain Management:   Tramadol 50 mg Q6hr PRN    Constipation:  Miralax 17 g once daily PRN    Nausea:  Zofran 4 mg Q6hr PRN    Skin Integrity:  Nursing to monitor skin integrity as patient is at risk for pressure injuries.  Turn and reposition Q 2 hours or more.  Air mattress and when up in chair cushion reducing device.  Dietician to evaluate and recommend.  Nutritional supplement to be implemented if needed.  Please monitor skin integrity and other pressure areas.    WOUNDS:  Wound care per nursing  Sacral  Bilateral shins  Bilateral knees  See Facility notes for measurements/assessment of wound.  Referral to wound clinic or wound team here at the facility to follow if appropriate.    Any decline or change in condition needs to be communicated with the physician or myself.    Discussion with nursing staff regarding ongoing care and management.  Communication regarding patients status, overall condition, changes with plan (medications or treatments), and any questions from family completed if present.  If family not available, would communicate in person or via phone if needed.   We will continue with the plan and medications noted above.    We will continue to follow the patient here at the facility.    *Please note that nursing facility notes, facility EMR, outside laboratory agency, and  EMR do not interface.     Completion of the note was done through Dragon voice recognition technology and may include unintended or grammatical errors which may not have been recognized when finalizing the note.     Time:   A total of 45 minutes or more was spent in evaluation and management of the patient, including time with the patient and family (if present).   Over 50% of  this time was dedicated to counseling and coordination of care.  This includes time spent:  Reviewing vital signs, nursing/therapy reports, hospital records, labs, imaging, and consults.  Conducting a face-to-face assessment, including history, physical exam, and patient education.  Discussing the diagnosis, treatment options, and current medications prescribed.  Reviewing and updating the plan of care with the patient, family if available, nursing staff, and interdisciplinary team.  Documenting in the electronic medical record.     DARVIN Tobar-CNP        [1] No Known Allergies

## 2025-05-30 NOTE — LETTER
Patient: Alfonso Toscano  : 1945    Encounter Date: 2025    Name: Alfonso Toscano  YOB: 1945    FOLLOW UP VISIT: [facility],   [facility]  - 25: Generalized weakness in the setting of metastatic liver cancer, GIB     SUBJECTIVE:  Today, the patient is noted to be resting in bed awake with his eyes open. Wife is at the bedside.   Connected with wife regarding the plan of care.   Wife understand the degree of illness but wishes to have  do as much as he can in PT/OT to build strength.   Wife states that DNR is here that was signed by Dr. Patel.   DNRCCA in chart.    REVIEW OF SYSTEMS:   All review of systems are negative unless otherwise stated above under subjective.    LABS REVIEWED AT FACILITY:  Ammonia 123    Living will related issues reviewed-Code status: DNRCCA    /80   Pulse 82   Physical Exam  Constitutional:       Appearance: He is normal weight. He is ill-appearing.      Comments: emaciated   HENT:      Head: Normocephalic.      Comments: Temporal wasting     Right Ear: External ear normal.      Left Ear: External ear normal.      Nose: Nose normal.      Mouth/Throat:      Mouth: Mucous membranes are dry.   Eyes:      Pupils: Pupils are equal, round, and reactive to light.      Comments: Jaundice sclera   Cardiovascular:      Rate and Rhythm: Normal rate and regular rhythm.      Pulses: Normal pulses.      Heart sounds: Murmur heard.   Pulmonary:      Effort: Pulmonary effort is normal.      Breath sounds: Normal breath sounds.      Comments: Diminished lung sounds  Abdominal:      General: Abdomen is flat. Bowel sounds are normal. There is no distension.      Palpations: Abdomen is soft.      Tenderness: There is no abdominal tenderness.   Genitourinary:     Comments: Not examined  Musculoskeletal:         General: Tenderness present. Normal range of motion.      Cervical back: Normal range of motion and neck supple.      Right lower leg: No edema.      Left  lower leg: No edema.      Comments: Muscle wasting, Generalized weakness and limited effort due to frailty   Skin:     General: Skin is warm and dry.      Capillary Refill: Capillary refill takes less than 2 seconds.      Comments: jaundice   Neurological:      General: No focal deficit present.      Mental Status: He is alert. Mental status is at baseline.      Comments: Unresponsive to conversation, intermittently opens eyes to name; no focal deficits      Assessment/Plan  Problem List Items Addressed This Visit       Allergic rhinitis due to pollen    Esophageal varices    Essential hypertension    Hepatocellular carcinoma - Primary    Gastrointestinal hemorrhage, unspecified gastrointestinal hemorrhage type    General weakness    Physical deconditioning       PLAN:  Pt has been seen for follow up visit.  Recent therapy notes have been reviewed.  The patient remains engaged in skilled therapy services aimed at improving strength, functional mobility, and endurance.   Alfonso JORGENSEN Toscano is demonstrating progress consistent with their current abilities.   Refer to PT/OT/ST documentation in the facility records for detailed updates on therapy progression and response.  Recent nursing evaluation and notes were reviewed.   Medication list reviewed here at the facility, please see facility list for complete list of medications and dosages.     Weakness and physical deconditioning:   The patient exhibits generalized weakness and physical deconditioning.   Physical therapy, occupational therapy, and speech therapy are ongoing     Metastatic liver cancer, cirrhosis, s/p paracentesis 5/13/25:   Cont Bicalutamide 50 mg daily  Cont Lactulose 20g/30ml BID  Cont Furosemide 40 mg daily and Spironolactone 100 mg daily  Ammonia level 123, Lactulose to increase to TID (wife as stepped out of building since Ammonia resulted - will discuss when available), repeat Ammonia in AM     Hypertension:  Continue to monitor and trend BP within  the facility.  Current BP readings: 130/70s  Continue medications:  Nadolol 20 mg give 1/2 tab daily  Labs will be obtained periodically to guide ongoing management and adjust medications as needed.  The antihypertensive regimen will be reviewed regularly and adjusted based on effectiveness, side effects, and lab trends.  Avoidance of nephrotoxic agents is recommended to preserve renal function.     Anemia, recent GIB:  Ferrous sulfate 325 mg daily     BPH:   Cont finasteride 5 mg daily     GERD:  Cont Protonix 40 mg daily     Supplementation:  Cholecalciferol 1000 iu daily     Allergies:  Cont fexofenadine 180 mg daily PRN     Pain Management:   Tramadol 50 mg Q6hr PRN     Constipation:  Miralax 17 g once daily PRN     Nausea:  Zofran 4 mg Q6hr PRN     WOUNDS:  Wound care per nursing  Sacral  Bilateral shins  Bilateral knees  See Facility notes for measurements/assessment of wound.  Referral to wound clinic or wound team here at the facility to follow if appropriate.    Skin integrity:  Nursing to monitor skin integrity as patient is at risk for pressure injuries.  Turn and reposition Q 2 hours or more.  Air mattress and when up in chair cushion reducing device.  Dietician to evaluate and recommend.  Nutritional supplement to be implemented if needed.  Please monitor skin integrity and other pressure areas.    Any decline or change in condition needs to be communicated with the physician or myself.    Discussion with nursing staff regarding ongoing care and management.  Communication regarding patients status, overall condition, changes with plan (medications or treatments), and any questions from family completed if present.  If family not available, would communicate in person or via phone if needed.   We will continue with the plan and medications noted above.    We will continue to follow the patient here at the facility.    Discharge planning:   Patient to be discharged home when goals are met.   Ongoing  discussion with patient and family if available regarding discharge per discharge planner at facility.     *Please note that nursing facility notes, nursing EMR, outside laboratory agency, and  EMR do not interface.     Completion of the note was done through Dragon voice recognition technology and may include unintended or grammatical errors which may not have been recognized when finalizing the note.     Time:    KEKE Tobar      Electronically Signed By: KEKE Tobar   5/30/25  1:58 PM

## 2025-05-30 NOTE — PROGRESS NOTES
Name: Alfonso Toscano  YOB: 1945    FOLLOW UP VISIT: [facility],   [facility] 5/16 - 5/20/25: Generalized weakness in the setting of metastatic liver cancer, GIB     SUBJECTIVE:  Patient is noted to be sitting up in a olga-chair. No complaints. Patient keeps eyes closed and does not respond to verbal cues. No purposeful movements noted.   Vital signs reviewed and stable.  No family or surrogates present at bedside during visit.  Labs reviewed.    REVIEW OF SYSTEMS:   All review of systems are negative unless otherwise stated above under subjective.    LABS REVIEWED AT FACILITY:  5/27/25  Severe hypoglycemia:  Glucose? 40?mg/dL (normal )   Nursing checked accucheck at the bedside and current reading is 121.   CO??16?mEq/L (21-33) common in impaired hepatic clearance of lactate.   Azotemia: BUN?38?mg/dL (7-25), Creatinine?2.1?mg/dL (0.6-1.3),   Reduced GFR: 31?mL/min/1.73?m² (should be >?60) Indicates Stage?3-4 chronic kidney disease;   Hypoalbuminemia & low total protein: Albumin?2.2?g/dL (3.5-5.5), Total protein?5.2?g/dL (6.0-8.3) - Reflects malnutrition  Cholestatic pattern:  Alkaline phosphatase?325?IU/L ()  Total bilirubin?4.6?mg/dL (0.2-1.2)  AST?68?IU/L (4-40), ALT?32?IU/L (4-55)  Predominant elevation of ALP and bilirubin is typical of bile?duct obstruction or infiltrative liver tumors or hepatic liver carcinoma. AST:ALT ratio >?2 may also suggest alcoholic or advanced fibrotic liver changes.  Mild hyponatremia: Na?132?mEq/L (136-145)  Borderline hyperkalemia: K?5.0?mEq/L (3.5-5.3)  Chloride normal: Cl?99?mEq/L ()  RBC? 3.68?M/µL (4.00-6.60), Hgb?11.6?g/dL (14.0-18.0), Hct?36.5?% (42.0-54.0)  Platelets 80?K/µL (150-450) -- significant thrombocytopenia.  Likely due to hypersplenism, bone?marrow suppression, malnutrition, and marrow infiltration.    Living will related issues reviewed-Code status: DNRCCA    /78   Pulse 81   Physical Exam  Constitutional:       Appearance: He is  normal weight. He is ill-appearing.      Comments: emaciated   HENT:      Head: Normocephalic.      Comments: Temporal wasting     Right Ear: External ear normal.      Left Ear: External ear normal.      Nose: Nose normal.      Mouth/Throat:      Mouth: Mucous membranes are dry.   Eyes:      Pupils: Pupils are equal, round, and reactive to light.      Comments: Jaundice sclera   Cardiovascular:      Rate and Rhythm: Normal rate and regular rhythm.      Pulses: Normal pulses.      Heart sounds: Murmur heard.   Pulmonary:      Effort: Pulmonary effort is normal.      Breath sounds: Normal breath sounds.      Comments: Diminished lung sounds  Abdominal:      General: Abdomen is flat. Bowel sounds are normal. There is no distension.      Palpations: Abdomen is soft.      Tenderness: There is no abdominal tenderness.   Genitourinary:     Comments: Not examined  Musculoskeletal:         General: Tenderness present. Normal range of motion.      Cervical back: Normal range of motion and neck supple.      Right lower leg: No edema.      Left lower leg: No edema.      Comments: Muscle wasting, Generalized weakness and limited effort due to frailty   Skin:     General: Skin is warm and dry.      Capillary Refill: Capillary refill takes less than 2 seconds.      Comments: jaundice   Neurological:      General: No focal deficit present.      Mental Status: He is alert. Mental status is at baseline.      Comments: Unresponsive to conversation, intermittently opens eyes to name; no focal deficits     Assessment/Plan   Problem List Items Addressed This Visit       Allergic rhinitis due to pollen    Esophageal varices    Essential hypertension    Hepatocellular carcinoma - Primary    Gastrointestinal hemorrhage, unspecified gastrointestinal hemorrhage type    General weakness    Physical deconditioning       PLAN:  Pt has been seen for follow up visit.  Recent therapy notes have been reviewed.  Therapy is unlikely to provide  meaningful benefit at this stage. His profound frailty and metabolic encephalopathy significantly limit his capacity to participate in, or benefit from, standard rehabilitative therapies.  We will cont to offer PT/OT to assess any potential for improvement without imposing undue burden.  Refer to PT/OT/ST documentation in the facility records for detailed updates on therapy progression and response.  Recent nursing evaluation and notes were reviewed.   Medication list reviewed here at the facility, please see facility list for complete list of medications and dosages.     Weakness and physical deconditioning:   The patient exhibits generalized weakness and physical deconditioning.   Physical therapy, occupational therapy, and speech therapy are ongoing     Metastatic liver cancer, cirrhosis, s/p paracentesis 5/13/25:   Cont Bicalutamide 50 mg daily  Cont Lactulose 20g/30ml BID  Cont Furosemide 40 mg daily and Spironolactone 100 mg daily  Ammonia level drawn and pending    Hypertension:  Continue to monitor and trend BP within the facility.  Current BP readings: 130/70s  Continue medications:  Nadolol 20 mg give 1/2 tab daily  Labs will be obtained periodically to guide ongoing management and adjust medications as needed.  The antihypertensive regimen will be reviewed regularly and adjusted based on effectiveness, side effects, and lab trends.  Avoidance of nephrotoxic agents is recommended to preserve renal function.     Anemia, recent GIB:  Ferrous sulfate 325 mg daily     BPH:   Cont finasteride 5 mg daily     GERD:  Cont Protonix 40 mg daily     Supplementation:  Cholecalciferol 1000 iu daily     Allergies:  Cont fexofenadine 180 mg daily PRN     Pain Management:   Tramadol 50 mg Q6hr PRN     Constipation:  Miralax 17 g once daily PRN     Nausea:  Zofran 4 mg Q6hr PRN     WOUNDS:  Wound care per nursing  Sacral  Bilateral shins  Bilateral knees  See Facility notes for measurements/assessment of wound.  Referral  to wound clinic or wound team here at the facility to follow if appropriate.    Skin integrity:  Nursing to monitor skin integrity as patient is at risk for pressure injuries.  Turn and reposition Q 2 hours or more.  Air mattress and when up in chair cushion reducing device.  Dietician to evaluate and recommend.  Nutritional supplement to be implemented if needed.  Please monitor skin integrity and other pressure areas.    Any decline or change in condition needs to be communicated with the physician or myself.    Discussion with nursing staff regarding ongoing care and management.  Communication regarding patients status, overall condition, changes with plan (medications or treatments), and any questions from family completed if present.  If family not available, would communicate in person or via phone if needed.   We will continue with the plan and medications noted above.    We will continue to follow the patient here at the facility.    Discharge planning:   Patient to be discharged home when goals are met.   Ongoing discussion with patient and family if available regarding discharge per discharge planner at facility.     *Please note that nursing facility notes, nursing EMR, outside laboratory agency, and  EMR do not interface.     Completion of the note was done through Dragon voice recognition technology and may include unintended or grammatical errors which may not have been recognized when finalizing the note.     Time:    Dee Dee Vergara, APRN-CNP

## 2025-06-02 ENCOUNTER — HOSPITAL ENCOUNTER (OUTPATIENT)
Age: 80
Setting detail: SPECIMEN
Discharge: HOME | End: 2025-06-02

## 2025-06-09 ENCOUNTER — APPOINTMENT (OUTPATIENT)
Dept: HEMATOLOGY/ONCOLOGY | Facility: CLINIC | Age: 80
End: 2025-06-09
Payer: MEDICARE

## 2025-06-09 NOTE — TELEPHONE ENCOUNTER
Kathy De Los Santos RN called from [facility] to inform PCP that Pt has passed away this morning. They are inquiring if PCP will sign death certificate and if so they are requesting a call back to relay .

## 2025-06-12 ENCOUNTER — APPOINTMENT (OUTPATIENT)
Dept: HEMATOLOGY/ONCOLOGY | Facility: CLINIC | Age: 80
End: 2025-06-12
Payer: MEDICARE

## 2025-06-16 ENCOUNTER — APPOINTMENT (OUTPATIENT)
Dept: PRIMARY CARE | Facility: CLINIC | Age: 80
End: 2025-06-16
Payer: MEDICARE

## (undated) DEVICE — NEEDLE, SAFETY, 25 GA X 1.5 IN

## (undated) DEVICE — HANDLE, PH, FOR YANKAUER SUCTION DEVICE

## (undated) DEVICE — BLOCK, FOAM, NEEDLE POP -N-COUNT, 1840, BLACK

## (undated) DEVICE — GEL, ECOVUE, ULTRASOUND, 20GRAM, STERILE

## (undated) DEVICE — SPONGE, HEMOSTATIC, GELATIN, SURGIFOAM, 8 X 12.5 CM X 10 MM

## (undated) DEVICE — TOWEL PACK, STERILE, 16X24, XRAY DETECTABLE, BLUE, 4/PK

## (undated) DEVICE — SPONGE, HEMOSTATIC, CELLULOSE, SURGICEL, NU-KNIT, 6 X 9 IN

## (undated) DEVICE — SOLUTION, SCRUB EXIDINE, 4% CHG, 8 OZ

## (undated) DEVICE — STRAP, ARM BOARD, 32 X 1.5

## (undated) DEVICE — GLOVE, SURGICAL, PROTEXIS PI , 6.0, PF, LF

## (undated) DEVICE — Device

## (undated) DEVICE — SPONGE, LAP, XRAY DECT, 18IN X 18IN, W/LOOP, STERILE

## (undated) DEVICE — SUTURE, VICRYL PLUS, 2-0, 27IN, UR-6, VIOLET, BRAIDED

## (undated) DEVICE — GOWN, SURGICAL, ROYAL SILK, LG, STERILE

## (undated) DEVICE — TUBING, SUCTION, 6MM X 10, CLEAN N-COND

## (undated) DEVICE — ELECTRODE, ELECTROSURGICAL, BLADE, INSULATED, ENT/IMA, STERILE

## (undated) DEVICE — CAUTERY, PENCIL, PUSH BUTTON, SMOKE EVAC, 70MM

## (undated) DEVICE — BRIEF, INCONTINENCE, SEAMLESS, SZ 2X/ 3X, PURPLE/ GRAY

## (undated) DEVICE — TRAY, DRY PREP, PREMIUM

## (undated) DEVICE — BOWL, BASIN, 32 OZ, STERILE

## (undated) DEVICE — SYRINGE, 10 CC, LUER LOCK

## (undated) DEVICE — SPONGE, HEMOSTATIC, CELLULOSE, SURGICEL, FIBRILLAR, 2 X 4 IN

## (undated) DEVICE — GOWN, SURGICAL, ROYAL SILK, XL, STERILE

## (undated) DEVICE — GLOVE, SURGICAL, PROTEXIS PI BLUE W/NEUTHERA, 6.5, PF, LF